# Patient Record
Sex: FEMALE | Race: WHITE | NOT HISPANIC OR LATINO | Employment: OTHER | URBAN - METROPOLITAN AREA
[De-identification: names, ages, dates, MRNs, and addresses within clinical notes are randomized per-mention and may not be internally consistent; named-entity substitution may affect disease eponyms.]

---

## 2021-01-20 DIAGNOSIS — Z23 ENCOUNTER FOR IMMUNIZATION: ICD-10-CM

## 2021-02-04 ENCOUNTER — IMMUNIZATIONS (OUTPATIENT)
Dept: FAMILY MEDICINE CLINIC | Facility: HOSPITAL | Age: 86
End: 2021-02-04

## 2021-02-04 DIAGNOSIS — Z23 ENCOUNTER FOR IMMUNIZATION: Primary | ICD-10-CM

## 2021-02-04 PROCEDURE — 0011A SARS-COV-2 / COVID-19 MRNA VACCINE (MODERNA) 100 MCG: CPT | Performed by: ANESTHESIOLOGY

## 2021-02-04 PROCEDURE — 91301 SARS-COV-2 / COVID-19 MRNA VACCINE (MODERNA) 100 MCG: CPT | Performed by: ANESTHESIOLOGY

## 2021-03-10 ENCOUNTER — IMMUNIZATIONS (OUTPATIENT)
Dept: FAMILY MEDICINE CLINIC | Facility: HOSPITAL | Age: 86
End: 2021-03-10

## 2021-03-10 DIAGNOSIS — Z23 ENCOUNTER FOR IMMUNIZATION: Primary | ICD-10-CM

## 2021-03-10 PROCEDURE — 91301 SARS-COV-2 / COVID-19 MRNA VACCINE (MODERNA) 100 MCG: CPT

## 2021-03-10 PROCEDURE — 0012A SARS-COV-2 / COVID-19 MRNA VACCINE (MODERNA) 100 MCG: CPT

## 2021-08-31 ENCOUNTER — APPOINTMENT (OUTPATIENT)
Dept: LAB | Facility: CLINIC | Age: 86
End: 2021-08-31
Payer: MEDICARE

## 2021-08-31 ENCOUNTER — TRANSCRIBE ORDERS (OUTPATIENT)
Dept: LAB | Facility: CLINIC | Age: 86
End: 2021-08-31

## 2021-08-31 DIAGNOSIS — I10 ESSENTIAL HYPERTENSION, BENIGN: ICD-10-CM

## 2021-08-31 DIAGNOSIS — E78.5 HYPERLIPIDEMIA, UNSPECIFIED HYPERLIPIDEMIA TYPE: Primary | ICD-10-CM

## 2021-08-31 DIAGNOSIS — E55.9 MILD VITAMIN D DEFICIENCY: ICD-10-CM

## 2021-08-31 DIAGNOSIS — Z79.899 ENCOUNTER FOR LONG-TERM (CURRENT) USE OF OTHER MEDICATIONS: ICD-10-CM

## 2021-08-31 DIAGNOSIS — E78.5 HYPERLIPIDEMIA, UNSPECIFIED HYPERLIPIDEMIA TYPE: ICD-10-CM

## 2021-08-31 LAB
25(OH)D3 SERPL-MCNC: 88.3 NG/ML (ref 30–100)
ALBUMIN SERPL BCP-MCNC: 3.2 G/DL (ref 3.5–5)
ALP SERPL-CCNC: 92 U/L (ref 46–116)
ALT SERPL W P-5'-P-CCNC: 22 U/L (ref 12–78)
ANION GAP SERPL CALCULATED.3IONS-SCNC: 5 MMOL/L (ref 4–13)
AST SERPL W P-5'-P-CCNC: 18 U/L (ref 5–45)
BASOPHILS # BLD AUTO: 0.07 THOUSANDS/ΜL (ref 0–0.1)
BASOPHILS NFR BLD AUTO: 1 % (ref 0–1)
BILIRUB SERPL-MCNC: 0.9 MG/DL (ref 0.2–1)
BUN SERPL-MCNC: 36 MG/DL (ref 5–25)
CALCIUM ALBUM COR SERPL-MCNC: 9.8 MG/DL (ref 8.3–10.1)
CALCIUM SERPL-MCNC: 9.2 MG/DL (ref 8.3–10.1)
CHLORIDE SERPL-SCNC: 106 MMOL/L (ref 100–108)
CHOLEST SERPL-MCNC: 195 MG/DL (ref 50–200)
CO2 SERPL-SCNC: 28 MMOL/L (ref 21–32)
CREAT SERPL-MCNC: 1.26 MG/DL (ref 0.6–1.3)
EOSINOPHIL # BLD AUTO: 0.27 THOUSAND/ΜL (ref 0–0.61)
EOSINOPHIL NFR BLD AUTO: 3 % (ref 0–6)
ERYTHROCYTE [DISTWIDTH] IN BLOOD BY AUTOMATED COUNT: 12.8 % (ref 11.6–15.1)
GFR SERPL CREATININE-BSD FRML MDRD: 37 ML/MIN/1.73SQ M
GLUCOSE P FAST SERPL-MCNC: 87 MG/DL (ref 65–99)
HCT VFR BLD AUTO: 42.3 % (ref 34.8–46.1)
HDLC SERPL-MCNC: 81 MG/DL
HGB BLD-MCNC: 13.2 G/DL (ref 11.5–15.4)
IMM GRANULOCYTES # BLD AUTO: 0.03 THOUSAND/UL (ref 0–0.2)
IMM GRANULOCYTES NFR BLD AUTO: 0 % (ref 0–2)
LDLC SERPL CALC-MCNC: 100 MG/DL (ref 0–100)
LDLC SERPL DIRECT ASSAY-MCNC: 96 MG/DL (ref 0–100)
LYMPHOCYTES # BLD AUTO: 1.64 THOUSANDS/ΜL (ref 0.6–4.47)
LYMPHOCYTES NFR BLD AUTO: 18 % (ref 14–44)
MCH RBC QN AUTO: 30.9 PG (ref 26.8–34.3)
MCHC RBC AUTO-ENTMCNC: 31.2 G/DL (ref 31.4–37.4)
MCV RBC AUTO: 99 FL (ref 82–98)
MONOCYTES # BLD AUTO: 0.97 THOUSAND/ΜL (ref 0.17–1.22)
MONOCYTES NFR BLD AUTO: 11 % (ref 4–12)
NEUTROPHILS # BLD AUTO: 6.09 THOUSANDS/ΜL (ref 1.85–7.62)
NEUTS SEG NFR BLD AUTO: 67 % (ref 43–75)
NONHDLC SERPL-MCNC: 114 MG/DL
NRBC BLD AUTO-RTO: 0 /100 WBCS
PLATELET # BLD AUTO: 301 THOUSANDS/UL (ref 149–390)
PMV BLD AUTO: 10.7 FL (ref 8.9–12.7)
POTASSIUM SERPL-SCNC: 4.4 MMOL/L (ref 3.5–5.3)
PROT SERPL-MCNC: 7.4 G/DL (ref 6.4–8.2)
RBC # BLD AUTO: 4.27 MILLION/UL (ref 3.81–5.12)
SODIUM SERPL-SCNC: 139 MMOL/L (ref 136–145)
TRIGL SERPL-MCNC: 69 MG/DL
WBC # BLD AUTO: 9.07 THOUSAND/UL (ref 4.31–10.16)

## 2021-08-31 PROCEDURE — 85025 COMPLETE CBC W/AUTO DIFF WBC: CPT

## 2021-08-31 PROCEDURE — 82306 VITAMIN D 25 HYDROXY: CPT

## 2021-08-31 PROCEDURE — 36415 COLL VENOUS BLD VENIPUNCTURE: CPT

## 2021-08-31 PROCEDURE — 80053 COMPREHEN METABOLIC PANEL: CPT

## 2021-08-31 PROCEDURE — 83721 ASSAY OF BLOOD LIPOPROTEIN: CPT

## 2021-08-31 PROCEDURE — 80061 LIPID PANEL: CPT

## 2021-09-08 ENCOUNTER — APPOINTMENT (INPATIENT)
Dept: CT IMAGING | Facility: HOSPITAL | Age: 86
DRG: 871 | End: 2021-09-08
Payer: MEDICARE

## 2021-09-08 ENCOUNTER — APPOINTMENT (EMERGENCY)
Dept: RADIOLOGY | Facility: HOSPITAL | Age: 86
DRG: 871 | End: 2021-09-08
Payer: MEDICARE

## 2021-09-08 ENCOUNTER — HOSPITAL ENCOUNTER (INPATIENT)
Facility: HOSPITAL | Age: 86
LOS: 2 days | Discharge: HOME | DRG: 871 | End: 2021-09-10
Attending: EMERGENCY MEDICINE | Admitting: INTERNAL MEDICINE
Payer: MEDICARE

## 2021-09-08 DIAGNOSIS — R09.02 HYPOXIA: ICD-10-CM

## 2021-09-08 DIAGNOSIS — N17.9 AKI (ACUTE KIDNEY INJURY) (HCC): ICD-10-CM

## 2021-09-08 DIAGNOSIS — I21.4 NSTEMI (NON-ST ELEVATED MYOCARDIAL INFARCTION) (HCC): ICD-10-CM

## 2021-09-08 DIAGNOSIS — J18.9 PNEUMONIA: ICD-10-CM

## 2021-09-08 DIAGNOSIS — R77.8 ELEVATED TROPONIN: ICD-10-CM

## 2021-09-08 DIAGNOSIS — G45.9 TIA (TRANSIENT ISCHEMIC ATTACK): ICD-10-CM

## 2021-09-08 DIAGNOSIS — J45.901 ASTHMA EXACERBATION: ICD-10-CM

## 2021-09-08 DIAGNOSIS — I48.91 ATRIAL FIBRILLATION (HCC): Primary | ICD-10-CM

## 2021-09-08 DIAGNOSIS — J18.9 PNEUMONIA OF RIGHT UPPER LOBE DUE TO INFECTIOUS ORGANISM: ICD-10-CM

## 2021-09-08 PROBLEM — R79.89 ELEVATED TROPONIN: Status: ACTIVE | Noted: 2021-09-08

## 2021-09-08 PROBLEM — A41.9 SEPSIS (HCC): Status: ACTIVE | Noted: 2021-09-08

## 2021-09-08 PROBLEM — N18.30 CKD (CHRONIC KIDNEY DISEASE) STAGE 3, GFR 30-59 ML/MIN (HCC): Status: ACTIVE | Noted: 2021-09-08

## 2021-09-08 PROBLEM — I48.20 ATRIAL FIBRILLATION, CHRONIC (HCC): Status: ACTIVE | Noted: 2019-11-08

## 2021-09-08 PROBLEM — I10 HYPERTENSION, ESSENTIAL: Status: ACTIVE | Noted: 2019-11-08

## 2021-09-08 PROBLEM — Z79.01 CURRENT USE OF LONG TERM ANTICOAGULATION: Status: ACTIVE | Noted: 2019-11-08

## 2021-09-08 LAB
ALBUMIN SERPL BCP-MCNC: 3.8 G/DL (ref 3.4–4.8)
ALP SERPL-CCNC: 71.1 U/L (ref 35–140)
ALT SERPL W P-5'-P-CCNC: 15 U/L (ref 5–54)
ANION GAP SERPL CALCULATED.3IONS-SCNC: 10 MMOL/L (ref 4–13)
APTT PPP: 26 SECONDS (ref 23–31)
AST SERPL W P-5'-P-CCNC: 19 U/L (ref 15–41)
ATRIAL RATE: 190 BPM
BASOPHILS # BLD AUTO: 0.02 THOUSANDS/ΜL (ref 0–0.1)
BASOPHILS NFR BLD AUTO: 0 % (ref 0–1)
BILIRUB SERPL-MCNC: 0.59 MG/DL (ref 0.3–1.2)
BUN SERPL-MCNC: 32 MG/DL (ref 6–20)
CALCIUM SERPL-MCNC: 9.4 MG/DL (ref 8.4–10.2)
CHLORIDE SERPL-SCNC: 106 MMOL/L (ref 96–108)
CHOLEST SERPL-MCNC: 171 MG/DL
CO2 SERPL-SCNC: 26 MMOL/L (ref 22–33)
CREAT SERPL-MCNC: 1.33 MG/DL (ref 0.4–1.1)
EOSINOPHIL # BLD AUTO: 0.09 THOUSAND/ΜL (ref 0–0.61)
EOSINOPHIL NFR BLD AUTO: 1 % (ref 0–6)
ERYTHROCYTE [DISTWIDTH] IN BLOOD BY AUTOMATED COUNT: 12.9 % (ref 11.6–15.1)
EST. AVERAGE GLUCOSE BLD GHB EST-MCNC: 117 MG/DL
GFR SERPL CREATININE-BSD FRML MDRD: 34 ML/MIN/1.73SQ M
GLUCOSE SERPL-MCNC: 120 MG/DL (ref 65–140)
HBA1C MFR BLD: 5.7 %
HCT VFR BLD AUTO: 42.4 % (ref 34.8–46.1)
HDLC SERPL-MCNC: 70 MG/DL
HGB BLD-MCNC: 13.3 G/DL (ref 11.5–15.4)
IMM GRANULOCYTES # BLD AUTO: 0.04 THOUSAND/UL (ref 0–0.2)
IMM GRANULOCYTES NFR BLD AUTO: 0 % (ref 0–2)
INR PPP: 0.98 (ref 0.9–1.1)
LACTATE SERPL-SCNC: 1.3 MMOL/L (ref 0–2)
LDLC SERPL CALC-MCNC: 87 MG/DL (ref 0–100)
LYMPHOCYTES # BLD AUTO: 1 THOUSANDS/ΜL (ref 0.6–4.47)
LYMPHOCYTES NFR BLD AUTO: 7 % (ref 14–44)
MCH RBC QN AUTO: 30.5 PG (ref 26.8–34.3)
MCHC RBC AUTO-ENTMCNC: 31.4 G/DL (ref 31.4–37.4)
MCV RBC AUTO: 97 FL (ref 82–98)
MONOCYTES # BLD AUTO: 1.21 THOUSAND/ΜL (ref 0.17–1.22)
MONOCYTES NFR BLD AUTO: 8 % (ref 4–12)
NEUTROPHILS # BLD AUTO: 12.58 THOUSANDS/ΜL (ref 1.85–7.62)
NEUTS SEG NFR BLD AUTO: 84 % (ref 43–75)
PHOSPHATE SERPL-MCNC: 3.1 MG/DL (ref 2.5–5)
PLATELET # BLD AUTO: 274 THOUSANDS/UL (ref 149–390)
PMV BLD AUTO: 10.5 FL (ref 8.9–12.7)
POTASSIUM SERPL-SCNC: 4 MMOL/L (ref 3.5–5)
PR INTERVAL: 52 MS
PROCALCITONIN SERPL-MCNC: 0.11 NG/ML
PROT SERPL-MCNC: 7.1 G/DL (ref 6.4–8.3)
PROTHROMBIN TIME: 11.1 SECONDS (ref 9.5–12.1)
QRS AXIS: -41 DEGREES
QRSD INTERVAL: 124 MS
QT INTERVAL: 377 MS
QTC INTERVAL: 496 MS
RBC # BLD AUTO: 4.36 MILLION/UL (ref 3.81–5.12)
SARS-COV-2 RNA RESP QL NAA+PROBE: NEGATIVE
SODIUM SERPL-SCNC: 142 MMOL/L (ref 133–145)
T WAVE AXIS: -68 DEGREES
TRIGL SERPL-MCNC: 68.4 MG/DL
TROPONIN I SERPL-MCNC: 1.17 NG/ML (ref 0–0.07)
TROPONIN I SERPL-MCNC: 1.23 NG/ML (ref 0–0.07)
TROPONIN I SERPL-MCNC: 1.24 NG/ML (ref 0–0.07)
VENTRICULAR RATE: 104 BPM
WBC # BLD AUTO: 14.94 THOUSAND/UL (ref 4.31–10.16)

## 2021-09-08 PROCEDURE — G1004 CDSM NDSC: HCPCS

## 2021-09-08 PROCEDURE — 85610 PROTHROMBIN TIME: CPT | Performed by: EMERGENCY MEDICINE

## 2021-09-08 PROCEDURE — 99285 EMERGENCY DEPT VISIT HI MDM: CPT | Performed by: EMERGENCY MEDICINE

## 2021-09-08 PROCEDURE — 85025 COMPLETE CBC W/AUTO DIFF WBC: CPT | Performed by: EMERGENCY MEDICINE

## 2021-09-08 PROCEDURE — 1124F ACP DISCUSS-NO DSCNMKR DOCD: CPT | Performed by: EMERGENCY MEDICINE

## 2021-09-08 PROCEDURE — 80053 COMPREHEN METABOLIC PANEL: CPT | Performed by: EMERGENCY MEDICINE

## 2021-09-08 PROCEDURE — 71045 X-RAY EXAM CHEST 1 VIEW: CPT

## 2021-09-08 PROCEDURE — 94760 N-INVAS EAR/PLS OXIMETRY 1: CPT

## 2021-09-08 PROCEDURE — 87635 SARS-COV-2 COVID-19 AMP PRB: CPT | Performed by: EMERGENCY MEDICINE

## 2021-09-08 PROCEDURE — 83605 ASSAY OF LACTIC ACID: CPT | Performed by: EMERGENCY MEDICINE

## 2021-09-08 PROCEDURE — 93010 ELECTROCARDIOGRAM REPORT: CPT | Performed by: INTERNAL MEDICINE

## 2021-09-08 PROCEDURE — 93005 ELECTROCARDIOGRAM TRACING: CPT

## 2021-09-08 PROCEDURE — 84100 ASSAY OF PHOSPHORUS: CPT | Performed by: EMERGENCY MEDICINE

## 2021-09-08 PROCEDURE — 71250 CT THORAX DX C-: CPT

## 2021-09-08 PROCEDURE — 84145 PROCALCITONIN (PCT): CPT | Performed by: PHYSICIAN ASSISTANT

## 2021-09-08 PROCEDURE — 80061 LIPID PANEL: CPT | Performed by: PHYSICIAN ASSISTANT

## 2021-09-08 PROCEDURE — 99222 1ST HOSP IP/OBS MODERATE 55: CPT | Performed by: INTERNAL MEDICINE

## 2021-09-08 PROCEDURE — 83036 HEMOGLOBIN GLYCOSYLATED A1C: CPT | Performed by: PHYSICIAN ASSISTANT

## 2021-09-08 PROCEDURE — 84484 ASSAY OF TROPONIN QUANT: CPT | Performed by: EMERGENCY MEDICINE

## 2021-09-08 PROCEDURE — 94664 DEMO&/EVAL PT USE INHALER: CPT

## 2021-09-08 PROCEDURE — 36415 COLL VENOUS BLD VENIPUNCTURE: CPT | Performed by: EMERGENCY MEDICINE

## 2021-09-08 PROCEDURE — 99285 EMERGENCY DEPT VISIT HI MDM: CPT

## 2021-09-08 PROCEDURE — 85730 THROMBOPLASTIN TIME PARTIAL: CPT | Performed by: EMERGENCY MEDICINE

## 2021-09-08 PROCEDURE — 84484 ASSAY OF TROPONIN QUANT: CPT | Performed by: PHYSICIAN ASSISTANT

## 2021-09-08 PROCEDURE — 87040 BLOOD CULTURE FOR BACTERIA: CPT | Performed by: EMERGENCY MEDICINE

## 2021-09-08 PROCEDURE — 99223 1ST HOSP IP/OBS HIGH 75: CPT | Performed by: INTERNAL MEDICINE

## 2021-09-08 PROCEDURE — 96375 TX/PRO/DX INJ NEW DRUG ADDON: CPT

## 2021-09-08 PROCEDURE — 92610 EVALUATE SWALLOWING FUNCTION: CPT

## 2021-09-08 PROCEDURE — 96374 THER/PROPH/DIAG INJ IV PUSH: CPT

## 2021-09-08 PROCEDURE — 94640 AIRWAY INHALATION TREATMENT: CPT

## 2021-09-08 PROCEDURE — 97163 PT EVAL HIGH COMPLEX 45 MIN: CPT

## 2021-09-08 RX ORDER — GUAIFENESIN 600 MG
600 TABLET, EXTENDED RELEASE 12 HR ORAL EVERY 12 HOURS SCHEDULED
Status: DISCONTINUED | OUTPATIENT
Start: 2021-09-08 | End: 2021-09-08

## 2021-09-08 RX ORDER — METOPROLOL SUCCINATE 100 MG/1
100 TABLET, EXTENDED RELEASE ORAL DAILY
Status: DISCONTINUED | OUTPATIENT
Start: 2021-09-08 | End: 2021-09-10 | Stop reason: HOSPADM

## 2021-09-08 RX ORDER — PRAVASTATIN SODIUM 40 MG
40 TABLET ORAL
Status: DISCONTINUED | OUTPATIENT
Start: 2021-09-11 | End: 2021-09-10 | Stop reason: HOSPADM

## 2021-09-08 RX ORDER — IRON POLYSACCHARIDE COMPLEX 150 MG
150 CAPSULE ORAL DAILY
Status: DISCONTINUED | OUTPATIENT
Start: 2021-09-08 | End: 2021-09-10 | Stop reason: HOSPADM

## 2021-09-08 RX ORDER — GUAIFENESIN 100 MG/5ML
200 SOLUTION ORAL EVERY 4 HOURS
Status: DISCONTINUED | OUTPATIENT
Start: 2021-09-08 | End: 2021-09-10 | Stop reason: HOSPADM

## 2021-09-08 RX ORDER — SODIUM CHLORIDE FOR INHALATION 0.9 %
3 VIAL, NEBULIZER (ML) INHALATION
Status: DISCONTINUED | OUTPATIENT
Start: 2021-09-08 | End: 2021-09-10 | Stop reason: HOSPADM

## 2021-09-08 RX ORDER — LEVALBUTEROL 1.25 MG/.5ML
1.25 SOLUTION, CONCENTRATE RESPIRATORY (INHALATION)
Status: DISCONTINUED | OUTPATIENT
Start: 2021-09-08 | End: 2021-09-10 | Stop reason: HOSPADM

## 2021-09-08 RX ORDER — GABAPENTIN 100 MG/1
200 CAPSULE ORAL 2 TIMES DAILY
Status: DISCONTINUED | OUTPATIENT
Start: 2021-09-08 | End: 2021-09-10 | Stop reason: HOSPADM

## 2021-09-08 RX ORDER — GABAPENTIN 100 MG/1
200 CAPSULE ORAL 2 TIMES DAILY
COMMUNITY
Start: 2021-08-26 | End: 2022-07-14 | Stop reason: SDUPTHER

## 2021-09-08 RX ORDER — ACETAMINOPHEN 325 MG/1
650 TABLET ORAL EVERY 6 HOURS PRN
Status: DISCONTINUED | OUTPATIENT
Start: 2021-09-08 | End: 2021-09-10 | Stop reason: HOSPADM

## 2021-09-08 RX ORDER — DILTIAZEM HYDROCHLORIDE 120 MG/1
120 CAPSULE, COATED, EXTENDED RELEASE ORAL DAILY
Status: DISCONTINUED | OUTPATIENT
Start: 2021-09-08 | End: 2021-09-10 | Stop reason: HOSPADM

## 2021-09-08 RX ORDER — CEFTRIAXONE 1 G/50ML
1000 INJECTION, SOLUTION INTRAVENOUS ONCE
Status: COMPLETED | OUTPATIENT
Start: 2021-09-08 | End: 2021-09-08

## 2021-09-08 RX ORDER — IPRATROPIUM BROMIDE AND ALBUTEROL SULFATE 2.5; .5 MG/3ML; MG/3ML
3 SOLUTION RESPIRATORY (INHALATION) ONCE
Status: COMPLETED | OUTPATIENT
Start: 2021-09-08 | End: 2021-09-08

## 2021-09-08 RX ORDER — LISINOPRIL 5 MG/1
5 TABLET ORAL DAILY
Status: DISCONTINUED | OUTPATIENT
Start: 2021-09-08 | End: 2021-09-10 | Stop reason: HOSPADM

## 2021-09-08 RX ORDER — LISINOPRIL 5 MG/1
5 TABLET ORAL DAILY
COMMUNITY
End: 2022-07-18 | Stop reason: SDUPTHER

## 2021-09-08 RX ORDER — IRON POLYSACCHARIDE COMPLEX 150 MG
150 CAPSULE ORAL DAILY
COMMUNITY

## 2021-09-08 RX ORDER — METOPROLOL SUCCINATE 100 MG/1
100 TABLET, EXTENDED RELEASE ORAL DAILY
COMMUNITY
End: 2022-07-18 | Stop reason: SDUPTHER

## 2021-09-08 RX ORDER — LEVALBUTEROL 1.25 MG/.5ML
1.25 SOLUTION, CONCENTRATE RESPIRATORY (INHALATION)
Status: DISCONTINUED | OUTPATIENT
Start: 2021-09-08 | End: 2021-09-08

## 2021-09-08 RX ORDER — ROSUVASTATIN CALCIUM 5 MG/1
5 TABLET, COATED ORAL
COMMUNITY
Start: 2021-06-11 | End: 2022-07-18 | Stop reason: SDUPTHER

## 2021-09-08 RX ORDER — BENZONATATE 100 MG/1
100 CAPSULE ORAL 3 TIMES DAILY PRN
Status: DISCONTINUED | OUTPATIENT
Start: 2021-09-08 | End: 2021-09-10 | Stop reason: HOSPADM

## 2021-09-08 RX ORDER — ASPIRIN 81 MG/1
162 TABLET, CHEWABLE ORAL ONCE
Status: COMPLETED | OUTPATIENT
Start: 2021-09-08 | End: 2021-09-08

## 2021-09-08 RX ORDER — METHYLPREDNISOLONE SODIUM SUCCINATE 125 MG/2ML
125 INJECTION, POWDER, LYOPHILIZED, FOR SOLUTION INTRAMUSCULAR; INTRAVENOUS ONCE
Status: COMPLETED | OUTPATIENT
Start: 2021-09-08 | End: 2021-09-08

## 2021-09-08 RX ORDER — CEFTRIAXONE 1 G/50ML
1000 INJECTION, SOLUTION INTRAVENOUS EVERY 24 HOURS
Status: DISCONTINUED | OUTPATIENT
Start: 2021-09-09 | End: 2021-09-10 | Stop reason: HOSPADM

## 2021-09-08 RX ORDER — DILTIAZEM HYDROCHLORIDE 120 MG/1
120 CAPSULE, COATED, EXTENDED RELEASE ORAL DAILY
COMMUNITY
Start: 2021-06-11 | End: 2022-07-18 | Stop reason: SDUPTHER

## 2021-09-08 RX ORDER — ALBUTEROL SULFATE 90 UG/1
2 AEROSOL, METERED RESPIRATORY (INHALATION) EVERY 4 HOURS PRN
Status: DISCONTINUED | OUTPATIENT
Start: 2021-09-08 | End: 2021-09-10 | Stop reason: HOSPADM

## 2021-09-08 RX ADMIN — LEVALBUTEROL HYDROCHLORIDE 1.25 MG: 1.25 SOLUTION, CONCENTRATE RESPIRATORY (INHALATION) at 19:42

## 2021-09-08 RX ADMIN — CEFTRIAXONE 1000 MG: 1 INJECTION, SOLUTION INTRAVENOUS at 06:10

## 2021-09-08 RX ADMIN — IPRATROPIUM BROMIDE AND ALBUTEROL SULFATE 3 ML: 2.5; .5 SOLUTION RESPIRATORY (INHALATION) at 05:05

## 2021-09-08 RX ADMIN — GUAIFENESIN 600 MG: 600 TABLET ORAL at 08:51

## 2021-09-08 RX ADMIN — GABAPENTIN 200 MG: 100 CAPSULE ORAL at 08:51

## 2021-09-08 RX ADMIN — APIXABAN 2.5 MG: 2.5 TABLET, FILM COATED ORAL at 17:01

## 2021-09-08 RX ADMIN — GUAIFENESIN 200 MG: 100 SOLUTION ORAL at 17:01

## 2021-09-08 RX ADMIN — GABAPENTIN 200 MG: 100 CAPSULE ORAL at 17:01

## 2021-09-08 RX ADMIN — AZITHROMYCIN MONOHYDRATE 500 MG: 500 INJECTION, POWDER, LYOPHILIZED, FOR SOLUTION INTRAVENOUS at 11:28

## 2021-09-08 RX ADMIN — ISODIUM CHLORIDE 3 ML: 0.03 SOLUTION RESPIRATORY (INHALATION) at 19:42

## 2021-09-08 RX ADMIN — POLYSACCHARIDE-IRON COMPLEX 150 MG: 150 CAPSULE ORAL at 08:51

## 2021-09-08 RX ADMIN — GUAIFENESIN 200 MG: 100 SOLUTION ORAL at 23:04

## 2021-09-08 RX ADMIN — ASPIRIN 162 MG: 81 TABLET, CHEWABLE ORAL at 08:56

## 2021-09-08 RX ADMIN — ISODIUM CHLORIDE 3 ML: 0.03 SOLUTION RESPIRATORY (INHALATION) at 14:28

## 2021-09-08 RX ADMIN — APIXABAN 2.5 MG: 2.5 TABLET, FILM COATED ORAL at 08:51

## 2021-09-08 RX ADMIN — SODIUM CHLORIDE 250 ML: 0.9 INJECTION, SOLUTION INTRAVENOUS at 05:02

## 2021-09-08 RX ADMIN — ISODIUM CHLORIDE 3 ML: 0.03 SOLUTION RESPIRATORY (INHALATION) at 09:03

## 2021-09-08 RX ADMIN — LEVALBUTEROL HYDROCHLORIDE 1.25 MG: 1.25 SOLUTION, CONCENTRATE RESPIRATORY (INHALATION) at 14:28

## 2021-09-08 RX ADMIN — METHYLPREDNISOLONE SODIUM SUCCINATE 125 MG: 125 INJECTION, POWDER, FOR SOLUTION INTRAMUSCULAR; INTRAVENOUS at 05:05

## 2021-09-08 RX ADMIN — LEVALBUTEROL HYDROCHLORIDE 1.25 MG: 1.25 SOLUTION, CONCENTRATE RESPIRATORY (INHALATION) at 09:03

## 2021-09-08 NOTE — CONSULTS
Consultation - Pulmonary Medicine   Norris Morfin 80 y o  female MRN: [id number]  Unit/Bed#: -01 Encounter: [id number]      Assessment and Plan:    1  Sepsis  - presented with sudden onset shortness of breath  Also reports of low-grade fever, chills and mild cough with productive whitish sputum  Meets sepsis criteria on admission  Leukocytosis WBC 14 94   - chest x-ray showed possible moderate right pneumonia with trace right effusion  - continue current empiric antibiotics azithromycin and ceftriaxone  2  Pneumonia  - meets sepsis criteria on admission  Currently no fever  She has leukocytosis to 14 94  Lactic acid is negative  COVID-19 negative  Patient reports she received COVID-19 vaccine x2   - chest x-ray showed possible moderate right pneumonia with trace right effusion  ? Opacity on left upper lobe as well  - currently patient is comfortable  Saturating well on 3 L of oxygen via nasal cannula  Consider to wean out oxygen  - will follow-up with CT chest without contrast  - follow-up with urine strep pneumonia, Legionella antigen, sputum culture, blood culture  3  Mild intermittent asthma  - presented with acute shortness of breath, cough with whitish sputum and wheezing  Improved with breathing treatment  - reports on albuterol as needed  She was prescribed Advair before however she does not use it  Reports she uses albuterol once every 3 days or so  - patient is comfortable without respiratory distress  She has mild occasional wheezing and rhonchi yanira on right lung currently  - currently on Xopenex Q 8 hourly and albuterol as needed  4  Elevated troponin  - troponin 1 17  > 1 24   > 1 23  - cardiology on board  - pending echocardiogram    5  Permanent Afib  - rate controlled  - on metoprolol, diltiazem and Eliquis    6  History of TIA  - history of TIA more than 10 years ago without residual deficit  - currently on low-dose Eliquis    7   CKD stage 3  - current creatinine 1 33  Last creatinine 1 26 on August     History of Present Illness   Physician Requesting Consult: Enid Calero MD  Reason for Consult / Principal Problem:  Asthma exacerbation, Pneumonia  Hx and PE limited by: None  HPI: Jerri Sheppard is a 80y o  year old female with past medical history of asthma, permanent atrial fibrillation on Eliquis, hypertension, hyperlipidemia, history of TIA who presents with sudden onset shortness of breath  Patient reports she woke up with sudden onset shortness of breath around 4:00 a m  this morning  Patient reports she was at her usual state of health until last night  Reported productive cough with whitish sputum, denies any blood in the sputum  Patient also reports she lost about 4 lb over 2 months which is unintentional   She reports her appetite is good  She is a never smoker  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  She is pretty active and reports she rarely uses a cane at home  She denies any chest pain, lightheadedness, dizziness, leg swelling  On admission, patient meets sepsis criteria likely due to pneumonia  She was given breathing treatment, ceftriaxone and azithromycin  Patient reports her breathing is significantly improved  Currently, patient offers no complaints  Inpatient consult to Pulmonology     Performed by  Samantha Christy MD     Authorized by Agnes Villa MD              Review of Systems   Constitutional: Negative for activity change, appetite change, chills, fatigue and fever  HENT: Negative for congestion, sinus pressure, sinus pain and sneezing  Respiratory: Positive for shortness of breath  Negative for cough, wheezing and stridor  Cardiovascular: Negative for chest pain, palpitations and leg swelling  Gastrointestinal: Negative for abdominal distention, constipation, diarrhea, nausea and vomiting  Genitourinary: Negative for difficulty urinating, dysuria and frequency     Musculoskeletal: Positive for gait problem  Negative for arthralgias, back pain and joint swelling  Neurological: Negative for dizziness, weakness, numbness and headaches  Psychiatric/Behavioral: Negative for decreased concentration and sleep disturbance  The patient is not nervous/anxious  Historical Information   Past Medical History:   Diagnosis Date    Anemia     Asthma     Atrial fibrillation ([address] ) 11/08/2019    CAD     CKD (chronic kidney disease)     Current use of long term anticoagulation 11/08/2019    Hypertension, essential 11/08/2019    Pacemaker     TIA (transient ischemic attack) 11/08/2019     History reviewed  No pertinent surgical history  Social History   Social History     Substance and Sexual Activity   Alcohol Use Yes    Comment: occasionaly      Social History     Substance and Sexual Activity   Drug Use Never     E-Cigarette/Vaping    E-Cigarette Use Never User      E-Cigarette/Vaping Substances    Nicotine No     THC No     CBD No     Flavoring No     Other No     Unknown No      Social History     Tobacco Use   Smoking Status Never Smoker   Smokeless Tobacco Never Used     Occupational History: Retired    Family History: non-contributory    Meds/Allergies   all current active meds have been reviewed    Allergies   Allergen Reactions    Silver Sulfadiazine Other (See Comments)       Objective   Vitals: Blood pressure 108/62, pulse 80, temperature 99 8 °F (37 7 °C), temperature source Oral, resp  rate 20, height 5' 2" (1 575 m), weight 50 6 kg (111 lb 8 8 oz), SpO2 96 %  ,Body mass index is 20 4 kg/m²  Intake/Output Summary (Last 24 hours) at 9/8/2021 1432  Last data filed at 9/8/2021 0520  Gross per 24 hour   Intake 250 ml   Output --   Net 250 ml     Invasive Devices     Peripheral Intravenous Line            Peripheral IV 09/08/21 Right Antecubital <1 day                Physical Exam  Vitals and nursing note reviewed     Constitutional:       General: She is not in acute distress  Appearance: She is well-developed  She is not ill-appearing  HENT:      Head: Normocephalic and atraumatic  Eyes:      General: No scleral icterus  Conjunctiva/sclera: Conjunctivae normal    Cardiovascular:      Rate and Rhythm: Normal rate  Rhythm irregular  Pulses: Normal pulses  Heart sounds: Normal heart sounds  No murmur heard  No friction rub  No gallop  Pulmonary:      Effort: Pulmonary effort is normal  No respiratory distress  Breath sounds: Rhonchi present  No wheezing or rales  Abdominal:      General: Bowel sounds are normal  There is no distension  Palpations: Abdomen is soft  Tenderness: There is no abdominal tenderness  There is no guarding  Musculoskeletal:      Cervical back: Neck supple  Right lower leg: No edema  Left lower leg: No edema  Skin:     General: Skin is warm and dry  Neurological:      Mental Status: She is alert  Mental status is at baseline  Psychiatric:         Mood and Affect: Mood normal          Behavior: Behavior normal          Lab Results: I have personally reviewed pertinent lab results  Imaging Studies: I have personally reviewed pertinent reports  EKG, Pathology, and Other Studies: I have personally reviewed pertinent reports  VTE Prophylaxis: Sequential compression device Denita Perez)     Code Status: Level 1 - Full Code  Advance Directive and Living Will:      Power of :    POLST:      Counseling/Coordination of Care: Total floor / unit time spent today 50 minutes  Greater than 50% of total time was spent with the patient and / or family counseling and / or coordination of care   A description of the counseling / coordination of care: as above

## 2021-09-08 NOTE — SPEECH THERAPY NOTE
Speech-Language Pathology Bedside Swallow Evaluation        Patient Name: Baron Valdez    IVGAS'K Date: 9/8/2021     Problem List  Principal Problem:    Right upper lobe pneumonia  Active Problems:    Atrial fibrillation ([facility] )    Hypertension, essential    Asthma exacerbation    Elevated troponin    CKD (chronic kidney disease) stage 3, GFR 30-59 ml/min ([facility] )    Sepsis ([facility] )         Past Medical History  Past Medical History:   Diagnosis Date    Anemia     Asthma     Atrial fibrillation ([facility] ) 11/08/2019    CAD     CKD (chronic kidney disease)     Current use of long term anticoagulation 11/08/2019    Hypertension, essential 11/08/2019    Pacemaker     TIA (transient ischemic attack) 11/08/2019       Past Surgical History  History reviewed  No pertinent surgical history  Summary    Oral and pharyngeal stages of swallowing appear WNL  No overt s/s aspiration and no c/o food dysphagia symptoms  Recommendations:   Diet: regular diet and thin liquids   Meds: whole with liquid   Frequent Oral care: 2x/day  Other Recommendations/ considerations: No follow up tx needed  Current Medical Status  Pt is a 80 y o  female who presented to [address] [place] [address]  with RUL pneumonia  Pt woke up this morning suddenly short of breath with productive cough of white mucus with small streaks of blood  There was no yanick hemoptysis  She also was noted to developed chills and the sudden difficulty with breathing    The patient was noted to be wheezing on arrival and was given Solu-Medrol and DuoNeb with improvement  Patient's troponin was noted to return elevated at 1 17  Emergency department physician discussed with the patient's family  They did not wish to pursue cardiac catheterization or CABG and were only interested in medical treatment  The patient was noted have chest x-ray finding of potential right-sided pneumonia was started on ceftriaxone      Past medical history:   Please see H&P for details    Special Studies:  CXR: 9/8/21  Moderate right pneumonia with trace right effusion  Social/Education/Vocational Hx:  Pt lives with family    Swallow Information   Current Risks for Dysphagia & Aspiration: generalized weakness  Current Symptoms/Concerns: current pneumonia  Current Diet: regular diet and thin liquids   Baseline Diet: regular diet and thin liquids  Takes pills- whole w/ water     Baseline Assessment   Behavior/Cognition: alert  Speech/Language Status: able to participate in conversation and able to follow commands  Patient Positioning: upright in bed     Swallow Mechanism Exam   Facial: symmetrical  Labial: WFL  Lingual: WFL  Velum: unable to visualize  Mandible: adequate ROM  Dentition: adequate  Vocal quality:clear/adequate   Volitional Cough: strong/productive   Respiratory: NC    Consistencies Assessed and Performance   Consistencies Administered: thin liquids, nectar thick, puree and hard solids    Oral Stage: pt was able to self feed, drink from cup and straw  Able to bite cookie  Mastication was somewhat prolonged due to pt stated she is missing some teeth  Good oral control of liquids  No oral residue  Pharyngeal Stage: swallow initiation was timely and complete w/ no coughing, throat clearing or wet voice         Esophageal Concerns: none reported      Results Reviewed with: patient and RN   Dysphagia Goals: none at this time      Shane Peña CCC-SLP  Speech Pathologist  Available via  tiger text

## 2021-09-08 NOTE — ED PROVIDER NOTES
History  Chief Complaint   Patient presents with    Shortness of Breath     patient woke up with SOB      This is a 80year old female with a PMH of asthma, atrial fib ( on Eliquis), hypertension TIA, anemia  and a pacemaker that presented to the ED this morning via private vehicle accompanied by her daughter and     Pt is amor Amador 46 are patient, daughter and  - all of whom are reliable historians    Pt has a hx of asthma for which she uses albuterol HFA as needed - one of her triggers is seasonal environmental allergies  Pt awoke this morning reporting shortness of breath and coughing up thick white mucous  Denies hx of smoking or COPD  Denies chest pain  Daughter reports a low grade fever or 99 0 during the day yesterday  Pt reports chills at present  Denies nausea vomiting or diarrhea  Denies chest or abd pain    No identified aggravating or alleviating factors    Some of PMH obtained from reviewing cardiology notes SIRS           Prior to Admission Medications   Prescriptions Last Dose Informant Patient Reported? Taking?    apixaban (ELIQUIS) 2 5 mg 9/7/2021 at Unknown time  Yes Yes   Sig: Take 2 5 mg by mouth 2 (two) times a day   diltiazem (CARDIZEM CD) 120 mg 24 hr capsule 9/7/2021 at Unknown time  Yes Yes   Sig: Take 120 mg by mouth daily    gabapentin (NEURONTIN) 100 mg capsule 9/7/2021 at Unknown time  Yes Yes   Sig: Take 200 mg by mouth 2 (two) times a day    iron polysaccharides (FERREX) 150 mg capsule 9/7/2021 at Unknown time  Yes Yes   Sig: Take 150 mg by mouth daily   lisinopril (ZESTRIL) 5 mg tablet 9/7/2021 at Unknown time  Yes Yes   Sig: Take 5 mg by mouth daily   metoprolol succinate (TOPROL-XL) 100 mg 24 hr tablet 9/7/2021 at Unknown time  Yes Yes   Sig: Take 100 mg by mouth daily   rosuvastatin (CRESTOR) 5 mg tablet 9/7/2021 at Unknown time  Yes Yes   Sig: Take 5 mg by mouth tues saturday      Facility-Administered Medications: None       Past Medical History: Diagnosis Date    Anemia     Asthma     Atrial fibrillation ([facility] [address] ) 11/08/2019    CAD     CKD (chronic kidney disease)     Current use of long term anticoagulation 11/08/2019    Hypertension, essential 11/08/2019    Pacemaker     TIA (transient ischemic attack) 11/08/2019       History reviewed  No pertinent surgical history  History reviewed  No pertinent family history  I have reviewed and agree with the history as documented  E-Cigarette/Vaping    E-Cigarette Use Never User      E-Cigarette/Vaping Substances    Nicotine No     THC No     CBD No     Flavoring No     Other No     Unknown No      Social History     Tobacco Use    Smoking status: Never Smoker    Smokeless tobacco: Never Used   Vaping Use    Vaping Use: Never used   Substance Use Topics    Alcohol use: Yes     Comment: occasionaly     Drug use: Never       Review of Systems   Constitutional: Positive for chills  Negative for activity change, appetite change, diaphoresis, fatigue, fever and unexpected weight change  HENT: Negative for congestion, ear discharge, ear pain, mouth sores, sinus pressure, sinus pain, sneezing, sore throat, trouble swallowing and voice change  [place]   Eyes: Negative for photophobia, pain, discharge, redness, itching and visual disturbance  Respiratory: Positive for cough, shortness of breath and wheezing  Negative for chest tightness  Cardiovascular: Negative for chest pain, palpitations and leg swelling  Gastrointestinal: Negative for abdominal pain, constipation, nausea and vomiting  Endocrine: Negative for cold intolerance, heat intolerance, polydipsia, polyphagia and polyuria  Genitourinary: Negative for decreased urine volume, difficulty urinating, dysuria, frequency, hematuria and urgency  Musculoskeletal: Negative for arthralgias, back pain, gait problem, joint swelling, myalgias, neck pain and neck stiffness  Skin: Negative for color change and rash  Allergic/Immunologic: Negative for immunocompromised state  Neurological: Negative for dizziness, tremors, seizures, syncope, speech difficulty, weakness, light-headedness, numbness and headaches  Hematological: Does not bruise/bleed easily  Psychiatric/Behavioral: Negative for behavioral problems and suicidal ideas  Physical Exam  Physical Exam  Vitals and nursing note reviewed  Constitutional:       General: She is not in acute distress  Appearance: Normal appearance  She is well-developed and normal weight  She is not ill-appearing, toxic-appearing or diaphoretic  HENT:      Head: Normocephalic and atraumatic  Right Ear: Tympanic membrane, ear canal and external ear normal  There is no impacted cerumen  Left Ear: Tympanic membrane, ear canal and external ear normal  There is no impacted cerumen  Nose: Nose normal  No congestion or rhinorrhea  Mouth/Throat:      Pharynx: No oropharyngeal exudate or posterior oropharyngeal erythema  Eyes:      General: No scleral icterus  Right eye: No discharge  Left eye: No discharge  Extraocular Movements: Extraocular movements intact  Conjunctiva/sclera: Conjunctivae normal       Pupils: Pupils are equal, round, and reactive to light  Neck:      Thyroid: No thyromegaly  Vascular: No hepatojugular reflux or JVD  Trachea: No tracheal deviation  Cardiovascular:      Rate and Rhythm: Regular rhythm  Tachycardia present  Pulses: Normal pulses  Carotid pulses are 2+ on the right side and 2+ on the left side  Radial pulses are 2+ on the right side and 2+ on the left side  Dorsalis pedis pulses are 2+ on the right side and 2+ on the left side  Posterior tibial pulses are 2+ on the right side and 2+ on the left side  Heart sounds: Normal heart sounds  No murmur heard       Pulmonary:      Effort: Pulmonary effort is normal  No accessory muscle usage or respiratory distress  Breath sounds: Examination of the right-upper field reveals decreased breath sounds and wheezing  Examination of the left-upper field reveals decreased breath sounds and wheezing  Examination of the right-middle field reveals decreased breath sounds and wheezing  Examination of the left-middle field reveals decreased breath sounds and wheezing  Examination of the right-lower field reveals decreased breath sounds and wheezing  Examination of the left-lower field reveals decreased breath sounds and wheezing  Decreased breath sounds and wheezing present  No rales  Chest:      Chest wall: No mass, deformity, tenderness, crepitus or edema  Abdominal:      General: Bowel sounds are normal  There is no distension or abdominal bruit  Palpations: Abdomen is soft  There is no hepatomegaly, splenomegaly or mass  Tenderness: There is no abdominal tenderness  There is no right CVA tenderness, left CVA tenderness, guarding or rebound  Hernia: No hernia is present  Musculoskeletal:         General: No tenderness  Normal range of motion  Cervical back: Normal range of motion and neck supple  No rigidity  No muscular tenderness  Right lower leg: No tenderness  No edema  Left lower leg: No tenderness  No edema  Lymphadenopathy:      Cervical: No cervical adenopathy  Skin:     General: Skin is warm and dry  Capillary Refill: Capillary refill takes less than 2 seconds  Findings: No ecchymosis or rash  Neurological:      General: No focal deficit present  Mental Status: She is alert and oriented to person, place, and time  Cranial Nerves: No cranial nerve deficit  Sensory: No sensory deficit  Motor: No weakness or abnormal muscle tone  Deep Tendon Reflexes: Reflexes normal    Psychiatric:         Mood and Affect: Mood normal          Behavior: Behavior normal          Thought Content:  Thought content normal          Judgment: Judgment normal  Vital Signs  ED Triage Vitals   Temperature Pulse Respirations Blood Pressure SpO2   09/08/21 0450 09/08/21 0450 09/08/21 0450 09/08/21 0450 09/08/21 0450   99 4 °F (37 4 °C) 105 18 169/78 95 %      Temp Source Heart Rate Source Patient Position - Orthostatic VS BP Location FiO2 (%)   09/08/21 0450 09/08/21 0450 09/08/21 0450 09/08/21 0450 --   Oral Monitor Lying Left arm       Pain Score       09/08/21 0850       No Pain           Vitals:    09/08/21 0744 09/08/21 1133 09/08/21 1443 09/08/21 2300   BP: 126/69 108/62 98/53 125/64   Pulse: 61 80 80 94   Patient Position - Orthostatic VS: Lying  Lying Lying         Visual Acuity      ED Medications  Medications   benzonatate (TESSALON PERLES) capsule 100 mg (has no administration in time range)   acetaminophen (TYLENOL) tablet 650 mg (has no administration in time range)   apixaban (ELIQUIS) tablet 2 5 mg (2 5 mg Oral Given 9/8/21 1701)   diltiazem (CARDIZEM CD) 24 hr capsule 120 mg (120 mg Oral Not Given 9/8/21 1133)   gabapentin (NEURONTIN) capsule 200 mg (200 mg Oral Given 9/8/21 1701)   iron polysaccharides (FERREX) capsule 150 mg (150 mg Oral Given 9/8/21 0851)   lisinopril (ZESTRIL) tablet 5 mg (5 mg Oral Not Given 9/8/21 1134)   metoprolol succinate (TOPROL-XL) 24 hr tablet 100 mg (100 mg Oral Not Given 9/8/21 1133)   pravastatin (PRAVACHOL) tablet 40 mg (has no administration in time range)   cefTRIAXone (ROCEPHIN) IVPB (premix in dextrose) 1,000 mg 50 mL (has no administration in time range)   albuterol (PROVENTIL HFA,VENTOLIN HFA) inhaler 2 puff (has no administration in time range)   sodium chloride 0 9 % inhalation solution 3 mL (3 mL Nebulization Given 9/8/21 1942)     And   levalbuterol (XOPENEX) inhalation solution 1 25 mg (1 25 mg Nebulization Given 9/8/21 1942)   azithromycin (ZITHROMAX) 500 mg in sodium chloride 0 9 % 250 mL IVPB (500 mg Intravenous New Bag 9/8/21 1128)   guaiFENesin (ROBITUSSIN) oral solution 200 mg (200 mg Oral Given 9/8/21 2412)   sodium chloride 0 9 % bolus 250 mL (0 mL Intravenous Stopped 9/8/21 0520)   methylPREDNISolone sodium succinate (Solu-MEDROL) injection 125 mg (125 mg Intravenous Given 9/8/21 0505)   ipratropium-albuterol (DUO-NEB) 0 5-2 5 mg/3 mL inhalation solution 3 mL (3 mL Nebulization Given 9/8/21 0505)   cefTRIAXone (ROCEPHIN) IVPB (premix in dextrose) 1,000 mg 50 mL (0 mg Intravenous Stopped 9/8/21 0730)   aspirin chewable tablet 162 mg (162 mg Oral Given 9/8/21 0856)       Diagnostic Studies  Results Reviewed     Procedure Component Value Units Date/Time    Procalcitonin [[id number]]  (Normal) Collected: 09/08/21 0801    Lab Status: Final result Specimen: Blood from Arm, Left Updated: 09/08/21 1618     Procalcitonin 0 11 ng/ml     Blood culture #1 [[id number]] Collected: 09/08/21 0458    Lab Status: Preliminary result Specimen: Blood from Arm, Left Updated: 09/08/21 1601     Blood Culture Received in Microbiology Lab  Culture in Progress  Blood culture #2 [[id number]] Collected: 09/08/21 0457    Lab Status: Preliminary result Specimen: Blood from Arm, Right Updated: 09/08/21 1601     Blood Culture Received in Microbiology Lab  Culture in Progress      Hemoglobin A1C [[id number]]  (Abnormal) Collected: 09/08/21 0457    Lab Status: Final result Specimen: Blood from Arm, Right Updated: 09/08/21 1214     Hemoglobin A1C 5 7 %       mg/dl     Troponin I [[id number]]  (Abnormal) Collected: 09/08/21 1124    Lab Status: Final result Specimen: Blood from Arm, Left Updated: 09/08/21 1149     Troponin I 1 23 ng/mL     Lipid Panel with Direct LDL reflex [[id number]]  (Normal) Collected: 09/08/21 0457    Lab Status: Final result Specimen: Blood from Arm, Right Updated: 09/08/21 0857     Cholesterol 171 mg/dL      Triglycerides 68 4 mg/dL      HDL, Direct 70 mg/dL      LDL Calculated 87 mg/dL     Troponin I [[id number]]  (Abnormal) Collected: 09/08/21 0801    Lab Status: Final result Specimen: Blood from Arm, Left Updated: 09/08/21 0845     Troponin I 1 24 ng/mL     Legionella antigen, urine [[id number]]     Lab Status: No result Specimen: Urine     Strep Pneumoniae, Urine [[id number]]     Lab Status: No result Specimen: Urine     Sputum culture and Gram stain [[id number]]     Lab Status: No result Specimen: Sputum     Novel Coronavirus (Covid-19),PCR [facility] - 2 Hour Stat [[id number]]  (Normal) Collected: 09/08/21 0457    Lab Status: Final result Specimen: Nares from Nose Updated: 09/08/21 0604     SARS-CoV-2 Negative    Narrative: The specimen collection materials, transport medium, and/or testing methodology utilized in the production of these test results have been proven to be reliable in a limited validation with an abbreviated program under the Emergency Utilization Authorization provided by the FDA  Testing reported as "Presumptive positive" will be confirmed with secondary testing to ensure result accuracy  Clinical caution and judgement should be used with the interpretation of these results with consideration of the clinical impression and other laboratory testing  Testing reported as "Positive" or "Negative" has been proven to be accurate according to standard laboratory validation requirements  All testing is performed with control materials showing appropriate reactivity at standard intervals  Troponin I [[id number]]  (Abnormal) Collected: 09/08/21 0457    Lab Status: Final result Specimen: Blood from Arm, Right Updated: 09/08/21 0535     Troponin I 1 17 ng/mL     Lactic acid [[id number]]  (Normal) Collected: 09/08/21 0457    Lab Status: Final result Specimen: Blood from Arm, Right Updated: 09/08/21 0534     LACTIC ACID 1 3 mmol/L     Narrative:      Result may be elevated if tourniquet was used during collection      Comprehensive metabolic panel [[id number]]  (Abnormal) Collected: 09/08/21 0457    Lab Status: Final result Specimen: Blood from Arm, Right Updated: 09/08/21 0533     Sodium 142 mmol/L      Potassium 4 0 mmol/L      Chloride 106 mmol/L      CO2 26 mmol/L      ANION GAP 10 mmol/L      BUN 32 mg/dL      Creatinine 1 33 mg/dL      Glucose 120 mg/dL      Calcium 9 4 mg/dL      AST 19 U/L      ALT 15 U/L      Alkaline Phosphatase 71 1 U/L      Total Protein 7 1 g/dL      Albumin 3 8 g/dL      Total Bilirubin 0 59 mg/dL      eGFR 34 ml/min/1 73sq m     Narrative:      [place] guidelines for Chronic Kidney Disease (CKD):     Stage 1 with normal or high GFR (GFR > 90 mL/min/1 73 square meters)    Stage 2 Mild CKD (GFR = 60-89 mL/min/1 73 square meters)    Stage 3A Moderate CKD (GFR = 45-59 mL/min/1 73 square meters)    Stage 3B Moderate CKD (GFR = 30-44 mL/min/1 73 square meters)    Stage 4 Severe CKD (GFR = 15-29 mL/min/1 73 square meters)    Stage 5 End Stage CKD (GFR <15 mL/min/1 73 square meters)  Note: GFR calculation is accurate only with a steady state creatinine    Phosphorus [[id number]]  (Normal) Collected: 09/08/21 0457    Lab Status: Final result Specimen: Blood from Arm, Right Updated: 09/08/21 0533     Phosphorus 3 1 mg/dL     Protime-INR [[id number]]  (Normal) Collected: 09/08/21 0457    Lab Status: Final result Specimen: Blood from Arm, Right Updated: 09/08/21 0518     Protime 11 1 seconds      INR 0 98    Narrative:      INR Reference Ranges:  No Anticoagulant, Normal:           0 9-1 1  Standard Dose, Oral Anticoagulant:  2 0-3 0  High Dose, Oral Anticoagulant:      2 5-3 5    APTT [[id number]]  (Normal) Collected: 09/08/21 0457    Lab Status: Final result Specimen: Blood from Arm, Right Updated: 09/08/21 0518     PTT 26 seconds     CBC and differential [[id number]]  (Abnormal) Collected: 09/08/21 0457    Lab Status: Final result Specimen: Blood from Arm, Right Updated: 09/08/21 0507     WBC 14 94 Thousand/uL      RBC 4 36 Million/uL      Hemoglobin 13 3 g/dL      Hematocrit 42 4 %      MCV 97 fL      MCH 30 5 pg      MCHC 31 4 g/dL      RDW 12 9 %      MPV 10 5 fL Platelets 531 Thousands/uL      Neutrophils Relative 84 %      Immat GRANS % 0 %      Lymphocytes Relative 7 %      Monocytes Relative 8 %      Eosinophils Relative 1 %      Basophils Relative 0 %      Neutrophils Absolute 12 58 Thousands/µL      Immature Grans Absolute 0 04 Thousand/uL      Lymphocytes Absolute 1 00 Thousands/µL      Monocytes Absolute 1 21 Thousand/µL      Eosinophils Absolute 0 09 Thousand/µL      Basophils Absolute 0 02 Thousands/µL     UA (URINE) with reflex to Scope [[id number]]     Lab Status: No result Specimen: Urine                  XR chest 1 view portable   ED Interpretation by Deandre Sherwood MD (09/08 0606)   Right sided PNA      Final Result by Indu Santos MD (09/08 0745)      Moderate right pneumonia with trace right effusion  Workstation performed: [id number]         CT chest wo contrast    (Results Pending)              Procedures  ECG 12 Lead Documentation Only    Date/Time: 9/8/2021 5:08 AM  Performed by: Deandre Sherwood MD  Authorized by:  Deandre Sherwood MD     Indications / Diagnosis:  Shortness of breath  ECG reviewed by me, the ED Provider: yes    Patient location:  Bedside  Previous ECG:     Previous ECG:  Unavailable    Comparison to cardiac monitor: Yes    Interpretation:     Interpretation: abnormal    Rate:     ECG rate:  104    ECG rate assessment: tachycardic    Rhythm:     Rhythm: atrial fibrillation    QRS:     QRS axis:  Normal    QRS intervals:  Normal  Conduction:     Conduction: abnormal      Abnormal conduction: non-specific intraventricular conduction delay    ST segments:     ST segments:  Normal  T waves:     T waves: normal    Comments:      Non spec ST depression inferior leads    No acute ischemia or infarction    Atrial fib with controlled ventricular response   (known atrial fib)             ED Course  ED Course as of Sep 09 0257   Wed Sep 08, 2021   0600 Discussed elevated trop with daughter and patient and possible causes including primary cardiac ischemia or secondary due to increased demand from shortness of breath - pt has no chest pain - no acute EKG changes - patient and family request only medical intervention if needed and not cardiac catherization/CABG       0603 Will TT hospitalist for admission at this facility due to elevated trop and asthma exacerbation      0604 No sepsis criteria - only one SIRS criteria - WBB over 14K      2000 Hypoxia with sleeping at 88% - applied oxygen by NC at 2L      Thu Sep 09, 2021   0254 Troponin elevated 1 24  Patient has no EKG changes  She also denies chest pain  Rule repeat troponin at 3:00 a m  Rinda Manner  COVID was negative  BUN and creatinine elevated at 32 1 33  Patient's CECI  Electrolytes are normal   Random glucose normal 120 and liver function normal   Leukocytosis with a white count of 14 94  Hemoglobin hematocrit 13 3 and 42 4  Platelets stable 849  Lactic acid normal 1 3  Blood cultures  Drawn are pending  0255 Chest x-ray showed right upper lobe pneumonia  Started patient on Rocephin  Patient to be admitted under the care of the hospitalist  She is stable to be admitted at this [facility] as family does not desire intervention other than medications for elevated trop (if indicated)    All imaging and/or lab testing discussed with patient  Patient and/or family members verbalizes understanding and agrees with plan for admission  Patient is stable for admission      Portions of the record may have been created with voice recognition software  Occasional wrong word or "sound a like" substitutions may have occurred due to the inherent limitations of voice recognition software  Read the chart carefully and recognize, using context, where substitutions have occurred                                                       MDM  Number of Diagnoses or Management Options  CECI (acute kidney injury) [facility]): new and requires workup  Asthma exacerbation: new and requires workup  Atrial fibrillation [facility]): new and requires workup  Elevated troponin: new and requires workup  Hypoxia: new and requires workup  Pneumonia: new and requires workup  Diagnosis management comments: This is a 80year old female with a PMH of asthma, a fib, anticoagulation that presented to the ED this morning with shortness of breath and wheezing of sudden onset  Denies chest pain, fever or chills  Wheezing throughout all lobes on arrival      Concerned for asthma exac, COVID, CHF, AMI, NSTEMI, PNA, viral syndrome, bronchitis and other concerns  Will obtain cardiac workup  COVID test  Chest x-ray and EKG   Order Duoneb and Solumedrol for asthma exac - if she becomes hypoxic will apply appropriate method of oxygenation       Amount and/or Complexity of Data Reviewed  Clinical lab tests: ordered and reviewed  Tests in the radiology section of CPT®: ordered and reviewed  Obtain history from someone other than the patient: yes (Daughter and spouse)  Review and summarize past medical records: yes  Discuss the patient with other providers: yes (hospitalist)  Independent visualization of images, tracings, or specimens: yes    Risk of Complications, Morbidity, and/or Mortality  Presenting problems: high  Diagnostic procedures: moderate  Management options: moderate    Patient Progress  Patient progress: improved      Disposition  Final diagnoses:   Asthma exacerbation   Pneumonia   Hypoxia   Elevated troponin   Atrial fibrillation ([facility] [address] )   [facility]     Time reflects when diagnosis was documented in both MDM as applicable and the Disposition within this note     Time User Action Codes Description Comment    9/8/2021  6:11 AM Gem Grieve Add [J45 901] Asthma exacerbation     9/8/2021  6:11 AM Gem Grieve Add [J18 9] Pneumonia     9/8/2021  6:11 AM Gem Grieve Add [R09 02] Hypoxia     9/8/2021  6:11 AM Gem Grieve Add [R77 8] Elevated troponin     9/8/2021  6:11 AM Ginette Keen Add [B11 76] Atrial fibrillation ([facility] [address] )     9/8/2021  6:12 AM Ginette Keen Add [N17 9] [facility]     9/8/2021  6:40 AM Odilia Rueda Add [I21 4] NSTEMI (non-ST elevated myocardial infarction) [facility])       ED Disposition     ED Disposition Condition Date/Time Comment    Admit Stable Wed Sep 8, 2021  6:11 AM Case was discussed with hospitalist and the patient's admission status was agreed to be Admission Status: inpatient status to the service of Dr Mitesh Nava   Follow-up Information    None         Current Discharge Medication List      CONTINUE these medications which have NOT CHANGED    Details   apixaban (ELIQUIS) 2 5 mg Take 2 5 mg by mouth 2 (two) times a day      diltiazem (CARDIZEM CD) 120 mg 24 hr capsule Take 120 mg by mouth daily       gabapentin (NEURONTIN) 100 mg capsule Take 200 mg by mouth 2 (two) times a day       iron polysaccharides (FERREX) 150 mg capsule Take 150 mg by mouth daily      lisinopril (ZESTRIL) 5 mg tablet Take 5 mg by mouth daily      metoprolol succinate (TOPROL-XL) 100 mg 24 hr tablet Take 100 mg by mouth daily      rosuvastatin (CRESTOR) 5 mg tablet Take 5 mg by mouth tues saturday           No discharge procedures on file      PDMP Review     None          ED Provider  Electronically Signed by           Standley Ahumada, MD  09/08/21 8048       Standley Ahumada, MD  09/09/21 1409

## 2021-09-08 NOTE — CONSULTS
[address] Cardiology  CONSULT    Patient Name: Miles Cervantes  Patient MRN: [de-identified]  Admission Date: 9/8/2021  4:48 AM  Attending Provider: Wm Akhtar MD  Service: Cardiology    Reason for consult: elevated troponin     NOTE: Patient is hard of hearing but a reliable historian  History obtained from patient, daughter and granddaughter  HPI  This is a 80 y o  female with a past medical history of asthma, hypertension, chronic kidney disease, permanent atrial fibrillation, TIA, permanent pacemaker, ?coronary artery disease who presented initially with acute on onset shortness of breath  She was in her baseline state of health yesterday when she turned 80years old  She went about her normal activities and went out to get her hair done  She returned home around 1900 and had no complaints  This morning around 0300 she became short of breath  It was associated with a productive cough of white mucus with streaks of blood  She had chills and a low grade fever  In the emergency department her ECG revealed atrial fibrillation w/ RVR, nonspecific IVCD and nonspecific ST-T wave abnormalities inferior leads  CXR revealed right upper lobe pneumonia with trace right sided pleural effusion  Her blood work was significant for troponin 1 17 and white blood cell count 15  Her SARS-COV-2 testing was negative  Currently, she feels like "a million bucks " No complaints at this time and denies any preceding chest pain, palpitations, exertional dyspnea  Further denies fever, chills, orthopnea, PND, nausea, vomiting, lower extremity swelling       Review of Systems  10 point review of systems negative except as noted in HPI    Past Medical History:   Diagnosis Date    Anemia     Asthma     Atrial fibrillation ([facility] [address] ) 11/08/2019    CAD     CKD (chronic kidney disease)     Current use of long term anticoagulation 11/08/2019    Hypertension, essential 11/08/2019    Pacemaker     TIA (transient ischemic attack) 11/08/2019     History reviewed  No pertinent surgical history  History reviewed  No pertinent family history  Social History     Tobacco Use    Smoking status: Never Smoker    Smokeless tobacco: Never Used   Substance Use Topics    Alcohol use: Yes     Comment: occasionaly        Vitals:    09/08/21 0906   BP:    Pulse:    Resp:    Temp:    SpO2: 97%        I/O last 3 completed shifts: In: 250 [IV Piggyback:250]  Out: -   No intake/output data recorded       Oxygen:  3L NC    Physical Exam  General Appearance: Alert, cooperative, no distress, hard of hearing  Head: Normocephalic, without obvious abnormality, atraumatic  Mouth: Dry mucous membranes  Eyes: PERRL, sclerae anicteric, EOM's intact  Neck: No carotid bruit or JVD  Lungs: Coarse BS with rhonchi R > L, +expiratory wheezing, no rales  Heart: irregular rate and rhythm, no murmur, rub or gallop  Abdomen: Soft, non-tender, bowel sounds active  Extremities: Extremities normal, atraumatic, no edema  Skin: Warm and dry without and rashes or lesions  Neurologic: Grossly normal    Current Medications:  Scheduled Meds:  Current Facility-Administered Medications   Medication Dose Route Frequency Provider Last Rate    acetaminophen  650 mg Oral Q6H PRN Florecita Rueda PA-C      albuterol  2 puff Inhalation Q4H PRN Florecita Rueda PA-C      apixaban  2 5 mg Oral BID Florecita Rueda PA-C      benzonatate  100 mg Oral TID PRN Js Agee PA-C      [START ON 9/9/2021] cefTRIAXone  1,000 mg Intravenous Q24H Florecita Rueda PA-C      diltiazem  120 mg Oral Daily Florecita Rueda PA-C      gabapentin  200 mg Oral BID Florecita Rueda PA-C      guaiFENesin  600 mg Oral Q12H [address] Florecita Rueda PA-C      iron polysaccharides  150 mg Oral Daily Florecita Rueda PA-C      sodium chloride  3 mL Nebulization TID Jun Haynes MD      And    levalbuterol  1 25 mg Nebulization TID Jun Haynes MD      lisinopril  5 mg Oral Daily Florecita Rueda PA-C      metoprolol succinate  100 mg Oral Daily Florecita Rueda PA-C      [START ON 9/11/2021] pravastatin  40 mg Oral Once per day on Tue Sat Brooke Maldonado PA-C       Continuous Infusions:   PRN Meds:   acetaminophen    albuterol    benzonatate    Laboratory Studies:  Lab Results   Component Value Date    WBC 14 94 (H) 09/08/2021    HGB 13 3 09/08/2021    HCT 42 4 09/08/2021    MCV 97 09/08/2021     09/08/2021       Lab Results   Component Value Date    CALCIUM 9 4 09/08/2021    SODIUM 142 09/08/2021    K 4 0 09/08/2021    CO2 26 09/08/2021     09/08/2021    BUN 32 (H) 09/08/2021    CREATININE 1 33 (H) 09/08/2021       No results found for: HGBA1C    No results found for: TSH    Lab Results   Component Value Date    HDL 70 09/08/2021    LDLCALC 87 09/08/2021    TRIG 68 4 09/08/2021       Lactate  No results for input(s): LACTATE in the last 72 hours  CARDIAC IMAGING:  Echocardiogram: PENDING    Cardiac Cath: No prior available    Stress Test: No prior avaliable    ECG: Personally reviewed    Telemetry: Not on tele    IMAGING  XR chest 1 view portable   ED Interpretation   Right sided PNA      Final Result      Moderate right pneumonia with trace right effusion  Workstation performed: [id number]              Assessment / Plan  #  Sepsis  #  Right Upper Lobe Pneumonia - CAP'  #  Asthma  #  Elevated troponin  #  CKD III  #  Permanent atrial fibrillation  #  Chronic anticoagulation   #  Biotronik dual chamber PPM  #  History TIA (>11 years ago)  ? Coronary artery disease    80year old female with the above mentioned past medical history who presented with acute onset shortness of breath and associated low grade fever with chills  Presentation revealed tachycardia, leukocytosis and RUL pneumonia on chest x-ray  She has been started on antibiotics for CAP  Her blood cultures and urinary antigens are pending at this time  ECG revealed afib with RVR   Rates are now controlled and in 70's on exam  She has no signs of congestive heart failure on exam or low output state  She is warm and well-perfused on exam  Lactic acid was negative  Renal function elevated but likely at her baseline when compared to prior labs  Troponin is elevated and I suspect this is non-MI troponin elevation from underlying sepsis rather than a primary acute coronary syndrome  Due to the acute onset of her symptoms, it is worthwhile to check a repeat troponin and echocardiogram for wall motion abnormalities  I did discuss potential avenues if her second troponin is significantly elevated and/or she has abnormalities on the echocardiogram  Namely, stress testing and whether or not this would impact management  On chart review, previous discussions noted that the family would not be interested in pursuing a cardiac cath or invasive procedures  For this reason, medical management is reasonable  She is very active and independent at home (uses walker and wheelchair for safety)  RECOMMENDATIONS:  · Treatment of underlying sepsis and CAP per primary team  She will benefit from a nebulizer given wheezing on exam   · Will follow up repeat troponin level and echocardiogram  No need to begin heparin or DAPT at this time  Continue her statin and beta blocker  · For her atrial fibrillation, okay to continue metoprolol and diltiazem at this time  Rates are currently controlled  No signs of decompensated CHF and no known history of reduced LVEF  Follow up echocardiogram  Continue Eliquis 2 5 mg BID for stroke prophylaxis      Principal Problem:    Right upper lobe pneumonia  Active Problems:    Atrial fibrillation (HCC)    Hypertension, essential    Asthma exacerbation    Elevated troponin    CKD (chronic kidney disease) stage 3, GFR 30-59 ml/min (HCC)    Sepsis ([facility] [address] )    Code Status: Level 1 - Full Code    Shelby Jurado MD

## 2021-09-08 NOTE — RESPIRATORY THERAPY NOTE
RT Protocol Note  Gracie Mcgowan 80 y o  female MRN: [id number]  Unit/Bed#: -01 Encounter: [id number]    Assessment    Principal Problem:    Right upper lobe pneumonia  Active Problems:    Atrial fibrillation ([place] [address] )    Hypertension, essential    Asthma exacerbation    Elevated troponin    CKD (chronic kidney disease) stage 3, GFR 30-59 ml/min ([facility])    Sepsis ([place] [address] )      Home Pulmonary Medications:  Albuterol HFA  Advair (Dose unknown)       Past Medical History:   Diagnosis Date    Anemia     Asthma     Atrial fibrillation ([place] [address] ) 11/08/2019    CAD     CKD (chronic kidney disease)     Current use of long term anticoagulation 11/08/2019    Hypertension, essential 11/08/2019    Pacemaker     TIA (transient ischemic attack) 11/08/2019     Social History     Socioeconomic History    Marital status:      Spouse name: None    Number of children: None    Years of education: None    Highest education level: None   Occupational History    None   Tobacco Use    Smoking status: Never Smoker    Smokeless tobacco: Never Used   Vaping Use    Vaping Use: Never used   Substance and Sexual Activity    Alcohol use: Yes     Comment: occasionaly     Drug use: Never    Sexual activity: Not Currently     Birth control/protection: Post-menopausal   Other Topics Concern    None   Social History Narrative    None     Social Determinants of Health     Financial Resource Strain:     Difficulty of Paying Living Expenses:    Food Insecurity:     Worried About Running Out of Food in the Last Year:     Ran Out of Food in the Last Year:    Transportation Needs:     Lack of Transportation (Medical):      Lack of Transportation (Non-Medical):    Physical Activity:     Days of Exercise per Week:     Minutes of Exercise per Session:    Stress:     Feeling of Stress :    Social Connections:     Frequency of Communication with Friends and Family:     Frequency of Social Gatherings with Friends and Family:  Attends [employer] Services:     Active Member of Clubs or Organizations:     Attends Club or Organization Meetings:     Marital Status:    Intimate Partner Violence:     Fear of Current or Ex-Partner:     Emotionally Abused:     Physically Abused:     Sexually Abused:        Subjective         Objective    Physical Exam:   Assessment Type: Pre-treatment  General Appearance: Alert, Awake  Respiratory Pattern: Normal  Chest Assessment: Chest expansion symmetrical  Bilateral Breath Sounds: Diminished  Cough: (P) Non-productive, Strong    Vitals:  Blood pressure 126/69, pulse 61, temperature 99 8 °F (37 7 °C), temperature source Oral, resp  rate 20, height 5' 2" (1 575 m), weight 50 6 kg (111 lb 8 8 oz), SpO2 97 %  Imaging and other studies: I have personally reviewed pertinent reports  Plan    Respiratory Plan: Mild Distress pathway        Resp Comments: (P) Patient assessed per Respiratory Protocol  Patient has a history of asthma  Home medications discussed with family at bedside  Current lung sounds are diminished  Patient currently on 3L NC with a saturation of 97%  Mild Distress Pathway ordered and Respiratory to follow and reassess as needed  Soraida Carlton

## 2021-09-08 NOTE — H&P
[address]  [facility]   H&P- Ana Cavanaugh 9/7/1926, 80 y o  female MRN: [id number]  Unit/Bed#: -01 Encounter: [id number]  Primary Care Provider: Rodrigo Rodrigez MD   Date and time admitted to hospital: 9/8/2021  4:48 AM    * Right upper lobe pneumonia  Assessment & Plan  · Chest x-ray personally reviewed shows patchy opacities in the right lung, predominantly at the right upper lobe  · COVID-19 negative  · Likely community-acquired in nature  · Check Legionella and strep pneumo urinary antigen  · Obtain sputum culture if able to expectorates  · Status post IV ceftriaxone in the emergency department which will be continued  · Obtain procalcitonin  · Respiratory protocol  · Mucinex scheduled    Sepsis [facility])  Assessment & Plan  · Patient meet septic criteria on admission secondary to leukocytosis and tachycardia  · Source felt to be right-sided pneumonia  · COVID-19 negative  · Follow-up on blood cultures obtained in the emergency department  · Lactic acid negative  · No evidence of end-organ dysfunction  · Continue IV ceftriaxone  · Follow-up on procalcitonin    Elevated troponin  Assessment & Plan  · Troponin noted to be elevated to 1 17 on admission  · DDX: non-MI trop elevation secondary to PNA vs  NSTEMI  · Patient is noted to be chronically anticoagulated with Eliquis 2 5 mg PO BID secondary to permanent atrial fibrillation  Will continue at this time rather than transition to heparin - will discuss with cardiology  · Emergency department physician discussed with patient's family who would not be interested in cardiac catheterization or invasive measures  · Will obtain echocardiogram   · Cardiology consultation  · Trend serial troponins  · Give 162 mg of aspirin x1 now  · Monitor on telemetry  · Serial EKGs    Asthma exacerbation  Assessment & Plan  · Noted to be wheezing on admission to the emergency department    · Currently without wheezing at this time  · Likely related to pneumonia  · Hold further steroids  If wheezing recurs, would initiate steroids at that time  · Will schedule Xopenex TID    Atrial fibrillation [facility])  Assessment & Plan  · Patient with known permanent atrial fibrillation  · Chronically anticoagulated with 2 5 mg PO Eliquis BID secondary to age, CKD and weight  · [id number] score was   which corresponds to No [id number] score filed  Please complete smartform annual risk of stroke  · Continue metoprolol and Cardizem  · Follow[facility] with [facility]    CKD (chronic kidney disease) stage 3, GFR 30-59 ml/min [facility])  Assessment & Plan  Lab Results   Component Value Date    EGFR 34 09/08/2021    EGFR 37 08/31/2021    CREATININE 1 33 (H) 09/08/2021    CREATININE 1 26 08/31/2021     · Baseline creatinine per review of outpatient records is 1 2-1 3  · Currently remains at baseline creatinine    Hypertension, essential  Assessment & Plan  · Continue metoprolol and Cardizem for rate control and blood pressure control  · Continue lisinopril    VTE Pharmacologic Prophylaxis: VTE Score: 5 High Risk (Score >/= 5) - Pharmacological DVT Prophylaxis Ordered: apixaban (Eliquis)  Sequential Compression Devices Ordered  Code Status: FULL CODE   Discussion with family: Updated  (daughter) at bedside  Anticipated Length of Stay: Patient will be admitted on an inpatient basis with an anticipated length of stay of greater than 2 midnights secondary to PNA  Total Time for Visit, including Counseling / Coordination of Care: 60 minutes Greater than 50% of this total time spent on direct patient counseling and coordination of care      Chief Complaint: SOB    History of Present Illness:  Lon Price is a 80 y o  female with a PMH of asthma, permanent nonvalvular atrial fibrillation anticoagulated with Eliquis, hyperlipidemia, hypertension, history of permanent pacemaker, coronary artery disease, perimembranous VSD, history of TIA who presents with shortness of breath and productive cough of thick white mucus  The patient turned 89 yesterday  She still drives and lives independently  She was noted to get her hair done yesterday  She had her family over her house  There was a dog there that was noted to be rolling around on her couch which was not normal for her  She thinks that the dog may have caused her to get sick  She woke up this morning suddenly short of breath with productive cough of white mucus with small streaks of blood  There was no yanick hemoptysis  She also was noted to developed chills and the sudden difficulty with breathing  The patient was noted to be wheezing on arrival and was given Solu-Medrol and DuoNeb with improvement  Patient's troponin was noted to return elevated at 1 17  Emergency department physician discussed with the patient's family  They did not wish to pursue cardiac catheterization or CABG and were only interested in medical treatment  The patient was noted have chest x-ray finding of potential right-sided pneumonia was started on ceftriaxone  She feels significantly better since admission  Leukocytosis was noted as well as a troponin of 1 17  She denies any chest pain  She was in her normal state of health yesterday and is doing her activities of daily living without any change in symptoms  Review of Systems:  Review of Systems   Constitutional: Positive for chills and fatigue  Negative for diaphoresis, fever and unexpected weight change  HENT: Negative for sore throat  Respiratory: Positive for cough and shortness of breath  Negative for chest tightness  Cardiovascular: Negative for chest pain, palpitations and leg swelling  Gastrointestinal: Negative for abdominal pain, diarrhea, nausea and vomiting  Genitourinary: Negative for dysuria  Musculoskeletal: Negative for myalgias  Neurological: Positive for weakness  Negative for dizziness, syncope, numbness and headaches  Psychiatric/Behavioral: Negative for confusion  All other systems reviewed and are negative  Past Medical and Surgical History:   Past Medical History:   Diagnosis Date    Anemia     Asthma     Atrial fibrillation ([address] ) 11/08/2019    CAD     CKD (chronic kidney disease)     Current use of long term anticoagulation 11/08/2019    Hypertension, essential 11/08/2019    Pacemaker     TIA (transient ischemic attack) 11/08/2019       History reviewed  No pertinent surgical history  Meds/Allergies:  Prior to Admission medications    Medication Sig Start Date End Date Taking? Authorizing Provider   rosuvastatin (CRESTOR) 5 mg tablet TAKE 1 TABLET TWICE WEEKLY 6/11/21  Yes Historical Provider, MD   apixaban (ELIQUIS) 2 5 mg Take 2 5 mg by mouth 2 (two) times a day    Historical Provider, MD   diltiazem (CARDIZEM CD) 120 mg 24 hr capsule  6/11/21   Historical Provider, MD   gabapentin (NEURONTIN) 300 mg capsule  8/26/21   Historical Provider, MD   iron polysaccharides (FERREX) 150 mg capsule Take 150 mg by mouth daily    Historical Provider, MD   lisinopril (ZESTRIL) 5 mg tablet Take 5 mg by mouth daily    Historical Provider, MD   metoprolol succinate (TOPROL-XL) 100 mg 24 hr tablet Take 100 mg by mouth daily    Historical Provider, MD     I have reviewed home medications with patient family member  Allergies: Allergies   Allergen Reactions    Hydrocortisone Other (See Comments)    Silver Sulfadiazine Other (See Comments)       Social History:  Marital Status:     Occupation: retired  Patient Pre-hospital Living Situation: Home  Patient Pre-hospital Level of Mobility: walks with cane  Patient Pre-hospital Diet Restrictions: none  Substance Use History:   Social History     Substance and Sexual Activity   Alcohol Use Yes    Comment: occasionaly      Social History     Tobacco Use   Smoking Status Never Smoker   Smokeless Tobacco Never Used     Social History     Substance and Sexual Activity Drug Use Never       Family History:  History reviewed  No pertinent family history  Physical Exam:     Vitals:   Blood Pressure: 169/78 (09/08/21 0450)  Pulse: 105 (09/08/21 0450)  Temperature: 99 4 °F (37 4 °C) (09/08/21 0450)  Temp Source: Oral (09/08/21 0450)  Respirations: 18 (09/08/21 0450)  Height: 5' 2" (157 5 cm) (09/08/21 0450)  Weight - Scale: 49 kg (108 lb) (09/08/21 0450)  SpO2: 95 % (09/08/21 0450)    Physical Exam  Vitals and nursing note reviewed  Constitutional:       General: She is not in acute distress  Appearance: Normal appearance  She is not diaphoretic  Comments: Appears younger than stated age   HENT:      Head: Normocephalic and atraumatic  Mouth/Throat:      Mouth: Mucous membranes are dry  Cardiovascular:      Rate and Rhythm: Normal rate  Rhythm irregular  Pulmonary:      Effort: Pulmonary effort is normal       Breath sounds: No stridor  Rhonchi ( right side) present  No wheezing or rales  Abdominal:      General: Bowel sounds are normal       Palpations: Abdomen is soft  There is no mass  Tenderness: There is no abdominal tenderness  There is no guarding  Musculoskeletal:      Right lower leg: No edema  Left lower leg: No edema  Skin:     General: Skin is warm and dry  Neurological:      General: No focal deficit present  Mental Status: She is alert and oriented to person, place, and time  Cranial Nerves: No cranial nerve deficit     Psychiatric:         Mood and Affect: Mood normal          Behavior: Behavior normal           Additional Data:     Lab Results:  Results from last 7 days   Lab Units 09/08/21 0457   WBC Thousand/uL 14 94*   HEMOGLOBIN g/dL 13 3   HEMATOCRIT % 42 4   PLATELETS Thousands/uL 274   NEUTROS PCT % 84*   LYMPHS PCT % 7*   MONOS PCT % 8   EOS PCT % 1     Results from last 7 days   Lab Units 09/08/21 0457   SODIUM mmol/L 142   POTASSIUM mmol/L 4 0   CHLORIDE mmol/L 106   CO2 mmol/L 26   BUN mg/dL 32* CREATININE mg/dL 1 33*   ANION GAP mmol/L 10   CALCIUM mg/dL 9 4   ALBUMIN g/dL 3 8   TOTAL BILIRUBIN mg/dL 0 59   ALK PHOS U/L 71 1   ALT U/L 15   AST U/L 19   GLUCOSE RANDOM mg/dL 120     Results from last 7 days   Lab Units 09/08/21  0457   INR  0 98             Results from last 7 days   Lab Units 09/08/21  0457   LACTIC ACID mmol/L 1 3       Imaging: Personally reviewed the following imaging: chest xray  XR chest 1 view portable   ED Interpretation by Pepper Masterson MD (09/08 0606)   Right sided PNA          EKG and Other Studies Reviewed on Admission:   · EKG: Atrial fibrillation    poor r wave progression  ** Please Note: This note has been constructed using a voice recognition system   **

## 2021-09-08 NOTE — PHYSICAL THERAPY NOTE
PHYSICAL THERAPY EVALUATION  Time: 8520-2242    NAME:  Alexx Myles  DATE: 09/08/21    AGE:   89 y o  Mrn:   [id number]  Length Of Stay: 0    ADMIT DX:  Atrial fibrillation ([facility] ) [I48 91]  Shortness of breath [R06 02]  Pneumonia [J18 9]  Hypoxia [R09 02]  Elevated troponin [R77 8]  NSTEMI (non-ST elevated myocardial infarction) ([facility] ) [I21 4]  CECI (acute kidney injury) ([place] [address] ) [N17 9]  Asthma exacerbation [J45 901]    Past Medical History:   Diagnosis Date    Anemia     Asthma     Atrial fibrillation ([place] [address] ) 11/08/2019    CAD     CKD (chronic kidney disease)     Current use of long term anticoagulation 11/08/2019    Hypertension, essential 11/08/2019    Pacemaker     TIA (transient ischemic attack) 11/08/2019     History reviewed  No pertinent surgical history  Performed at least 2 patient identifiers during session: Name, Humberto Louise and ID bracelet        09/08/21 0945   PT Last Visit   PT Visit Date 09/08/21   Note Type   Note type Evaluation   Pain Assessment   Pain Assessment Tool 0-10   Pain Score No Pain   Effect of Pain on Daily Activities n/a   Hospital Pain Intervention(s) Ambulation/increased activity   Multiple Pain Sites No   Home Living   Type of Home House   Home Layout Performs ADLs on one level; Able to live on main level with bedroom/bathroom;Stairs to enter with rails  (3-4 MARILYNN w/ HR)   Bathroom Toilet Standard   Bathroom Equipment Grab bars in shower   [address]; Wheelchair-manual  ("hurri-cane")   Prior Function   Level of [place] Independent with ADLs and functional mobility   Lives With Alone   Receives Help From Family   ADL Assistance Independent   IADLs Independent  (but has family assist for IADLs as needed)   Falls in the last 6 months 0  (pt and family deny falls)   Vocational Retired   Comments Pt lives at home alone in a [facility] with a couple MARILYNN  Pt is indep with ADLs and most IADLs but has family assist prn   Pt ambulates household and community distances with no AD vs cane  Owns WC for longer community distance mobility (boardwalk, shopping malls, etc)  Pt's family very supportive and provides assistance as needed, lives next door, son or daughter check in daily  Pt drives  Sleeps on couch  Restrictions/Precautions   Weight Bearing Precautions Per Order No   Other Precautions Bed Alarm; Fall Risk; Chair Alarm;Hard of hearing  ([place])   General   Additional Pertinent History Pt admitted 9/8/21 with SOB, dx: CECI, R lower lobe PNA, NSTEMI  Family/Caregiver Present Yes  (daughter and granddaughter present t/o session)   Cognition   Overall Cognitive Status WFL   Arousal/Participation Alert   Orientation Level Oriented X4   Memory Within functional limits   Following Commands Follows multistep commands with increased time or repetition  (due to hearing impairment)   RUE Assessment   RUE Assessment WFL   LUE Assessment   LUE Assessment WFL   RLE Assessment   RLE Assessment WFL   LLE Assessment   LLE Assessment WFL   Coordination   Movements are Fluid and Coordinated 1   Sensation WFL   Light Touch   RLE Light Touch Grossly intact   LLE Light Touch Grossly intact   Bed Mobility   Supine to Sit 6  Modified independent   Additional items Assist x 1;HOB elevated; Bedrails   Sit to Supine 6  Modified independent   Additional items HOB elevated   Transfers   Sit to Stand 5  Supervision   Additional items Assist x 1   Stand to Sit 5  Supervision   Additional items Assist x 1   Toilet transfer 5  Supervision   Additional items Assist x 1;Standard toilet   Ambulation/Elevation   Gait pattern Decreased foot clearance; Short stride; Excessively slow;Narrow JOANNA;Shuffling  (improved mechanics with increased amb distance)   Gait Assistance 5  Supervision   Additional items Assist x 1;Verbal cues   Assistive Device Straight cane   Distance 74ft x1 including change in direction residential   Stair Management Assistance Not tested   Balance   Static Sitting Good   Dynamic Sitting Fair +   Static Standing Fair +   Dynamic Standing Fair +   Ambulatory Fair +   Endurance Deficit   Endurance Deficit Yes   Endurance Deficit Description At baseline pt does not wear supplemental O2  Pt currently on 3L O2 via NC  Activity Tolerance   Activity Tolerance Patient tolerated treatment well;Patient limited by fatigue   Medical Staff Made Aware Spoke with SLIM   Nurse Made Aware Spoke with MERYL Javier pre/post session   Assessment   Prognosis Good   Problem List Decreased endurance; Impaired balance;Decreased mobility; Impaired hearing   Assessment Pt seen for PT evaluation, cleared by MERYL Javier, activity orders of "up with assist"  Pt admitted 9/8/2021 with SOB, dx Right upper lobe pneumonia, NSTEMI, CECI  Comorbidities affecting pt's fnxl performance include: Afib, asthma, hypertension and pacemaker, severe hearing impairment  Personal factors affecting pt at time of PT IE include: ambulating with assistive device, stairs to enter home and hearing impairments  PTA, pt was independent with functional mobility with straight cane, independent with ADLS, independent with IADLS and living alone in 1 story home with 3-4 steps to enter  Pt demonstrates fnxl impairments identified in objective findings from Elderly Mobility Scale assessment, scoring 13/20, indicating borderline in terms of safety with mobility/requires some assist with ADLs & mobility  Pt scores 20/24 on AM-PAC objective tool w/ a standardized score of 43 99, indicating pt demonstrates functional capacity for return to home self care vs with increased supports upon d/c  The Barthel Index outcome tool was used & pt scores 65 indicating moderate limitations of fnxl mobility/ADLS  Pt is at risk of falls d/t impaired balance, use of ambulatory aid, varying levels of pain , advanced age, acuity of medical illness and ongoing medical treatment of primary dx   Pt's clinical presentation is currently unstable/unpredictable seen in pt's presentation of changing level of pain, varying levels of cognitive performance, increased fall risk, new onset of impairment of functional mobility and decreased endurance  This PT IE requires high complexity clinical decision making, based on multiple medical/physiological/fnxl/social factors which affect pt's performance w/ evaluation & therapist judgement for disposition recommendations  Pt will benefit from continued PT treatment in order to address impairments, decrease risk of falls, maximize independence w/ fnxl mobility, & ensure safety w/ mobility for transition to next level of care  Based on pt presentation & impairments, pt would most appropriately benefit from return to home with family support upon d/c     Barriers to Discharge None   Goals   Patient Goals "to go home"   [facility] Expiration Date 09/15/21   Short Term Goal #1 Patient PT goals established in order to address patient self reported goal of "to go home"  Pt will complete all transfers at [facility] level with LRAD, in order to increase safety with functional mobility  Pt will ambulate >150ft with LRAD at [facility] level in order to increase safety with household and short community distance functional mobility  Pt will negotiate 3-5 stairs with HR assist at [facility] level in order to demonstrate safety to enter/exit her home  Pt will improve AM-PAC score to >/= 24/24 in order to increase independence with mobility and decrease burden of care  Pt will improve Barthel Index score to >/= 75/100 in order to increase independence and decrease risk of falls  Pt will improve Elderly Mobility Scale score to >/= 17/20 in order to decrease burden of care and increase independence with functional mobility and ADLs  Pt will improve dynamic standing balance to >/= GOOD grade in order to promote safety and increased independence with mobility  Pt will tolerate >3hrs OOB in upright position, in order to improve muscular endurance and respiratory status      PT Treatment Day 0   Plan   Treatment/Interventions Functional transfer training;ADL retraining;LE strengthening/ROM; Elevations; Therapeutic exercise; Endurance training;Patient/family training;Gait training;Spoke to MD;Spoke to nursing   PT Frequency 2-3x/wk   Recommendation   PT Discharge Recommendation No rehabilitation needs  ([address] [facility] )   Additional Comments Pt owns [facility] for ambulation and  for longer community mobility if needed  AM-PAC Basic Mobility Inpatient   Turning in Bed Without Bedrails 4   Lying on Back to Sitting on Edge of Flat Bed 4   Moving Bed to Chair 3   Standing Up From Chair 3   Walk in Room 3   Climb 3-5 Stairs 3   Basic Mobility Inpatient Raw Score 20   Basic Mobility Standardized Score 43 99   Modified Delfino Scale   Modified [place] Scale 3   Barthel Index   Feeding 10   Bathing 0   Grooming Score 5   Dressing Score 10   Bladder Score 10   Bowels Score 10   Toilet Use Score 5   Transfers (Bed/Chair) Score 10   Mobility (Level Surface) Score 0   Stairs Score 5   Barthel Index Score 65       The patient's AM-PAC Basic Mobility Inpatient Short Form Raw Score is 20, Standardized Score is 43 99  A standardized score greater than 42 9 suggests the patient may benefit from discharge to home  Please also refer to the recommendation of the Physical Therapist for safe discharge planning          Wing Robles, PT, DPT   Available via [employer]  NPI # [id number]  [place] License - [id number]  0/5/7918

## 2021-09-09 ENCOUNTER — APPOINTMENT (INPATIENT)
Dept: NON INVASIVE DIAGNOSTICS | Facility: HOSPITAL | Age: 86
DRG: 871 | End: 2021-09-09
Payer: MEDICARE

## 2021-09-09 LAB
ALBUMIN SERPL BCP-MCNC: 3.3 G/DL (ref 3.4–4.8)
ALP SERPL-CCNC: 55.7 U/L (ref 35–140)
ALT SERPL W P-5'-P-CCNC: 16 U/L (ref 5–54)
ANION GAP SERPL CALCULATED.3IONS-SCNC: 8 MMOL/L (ref 4–13)
AST SERPL W P-5'-P-CCNC: 15 U/L (ref 15–41)
BASOPHILS # BLD AUTO: 0.01 THOUSANDS/ΜL (ref 0–0.1)
BASOPHILS NFR BLD AUTO: 0 % (ref 0–1)
BILIRUB SERPL-MCNC: 0.45 MG/DL (ref 0.3–1.2)
BILIRUB UR QL STRIP: NEGATIVE
BUN SERPL-MCNC: 31 MG/DL (ref 6–20)
CALCIUM ALBUM COR SERPL-MCNC: 9.3 MG/DL (ref 8.3–10.1)
CALCIUM SERPL-MCNC: 8.7 MG/DL (ref 8.4–10.2)
CHLORIDE SERPL-SCNC: 109 MMOL/L (ref 96–108)
CLARITY UR: CLEAR
CO2 SERPL-SCNC: 26 MMOL/L (ref 22–33)
COLOR UR: YELLOW
CREAT SERPL-MCNC: 1.14 MG/DL (ref 0.4–1.1)
EOSINOPHIL # BLD AUTO: 0 THOUSAND/ΜL (ref 0–0.61)
EOSINOPHIL NFR BLD AUTO: 0 % (ref 0–6)
ERYTHROCYTE [DISTWIDTH] IN BLOOD BY AUTOMATED COUNT: 13.1 % (ref 11.6–15.1)
GFR SERPL CREATININE-BSD FRML MDRD: 41 ML/MIN/1.73SQ M
GLUCOSE SERPL-MCNC: 128 MG/DL (ref 65–140)
GLUCOSE UR STRIP-MCNC: NEGATIVE MG/DL
HCT VFR BLD AUTO: 34.8 % (ref 34.8–46.1)
HGB BLD-MCNC: 10.7 G/DL (ref 11.5–15.4)
HGB UR QL STRIP.AUTO: NEGATIVE
IMM GRANULOCYTES # BLD AUTO: 0.06 THOUSAND/UL (ref 0–0.2)
IMM GRANULOCYTES NFR BLD AUTO: 0 % (ref 0–2)
KETONES UR STRIP-MCNC: NEGATIVE MG/DL
L PNEUMO1 AG UR QL IA.RAPID: NEGATIVE
LEUKOCYTE ESTERASE UR QL STRIP: NEGATIVE
LYMPHOCYTES # BLD AUTO: 0.95 THOUSANDS/ΜL (ref 0.6–4.47)
LYMPHOCYTES NFR BLD AUTO: 5 % (ref 14–44)
MAGNESIUM SERPL-MCNC: 1.9 MG/DL (ref 1.6–2.6)
MCH RBC QN AUTO: 30.1 PG (ref 26.8–34.3)
MCHC RBC AUTO-ENTMCNC: 30.7 G/DL (ref 31.4–37.4)
MCV RBC AUTO: 98 FL (ref 82–98)
MONOCYTES # BLD AUTO: 1.44 THOUSAND/ΜL (ref 0.17–1.22)
MONOCYTES NFR BLD AUTO: 7 % (ref 4–12)
NEUTROPHILS # BLD AUTO: 17.61 THOUSANDS/ΜL (ref 1.85–7.62)
NEUTS SEG NFR BLD AUTO: 88 % (ref 43–75)
NITRITE UR QL STRIP: NEGATIVE
PH UR STRIP.AUTO: 5 [PH]
PLATELET # BLD AUTO: 240 THOUSANDS/UL (ref 149–390)
PMV BLD AUTO: 11.1 FL (ref 8.9–12.7)
POTASSIUM SERPL-SCNC: 4.2 MMOL/L (ref 3.5–5)
PROCALCITONIN SERPL-MCNC: 0.3 NG/ML
PROT SERPL-MCNC: 6 G/DL (ref 6.4–8.3)
PROT UR STRIP-MCNC: NEGATIVE MG/DL
RBC # BLD AUTO: 3.56 MILLION/UL (ref 3.81–5.12)
S PNEUM AG UR QL: NEGATIVE
SODIUM SERPL-SCNC: 143 MMOL/L (ref 133–145)
SP GR UR STRIP.AUTO: 1.02 (ref 1–1.03)
UROBILINOGEN UR QL STRIP.AUTO: 0.2 E.U./DL
WBC # BLD AUTO: 20.07 THOUSAND/UL (ref 4.31–10.16)

## 2021-09-09 PROCEDURE — 94760 N-INVAS EAR/PLS OXIMETRY 1: CPT

## 2021-09-09 PROCEDURE — 83735 ASSAY OF MAGNESIUM: CPT | Performed by: PHYSICIAN ASSISTANT

## 2021-09-09 PROCEDURE — 94640 AIRWAY INHALATION TREATMENT: CPT

## 2021-09-09 PROCEDURE — 87449 NOS EACH ORGANISM AG IA: CPT | Performed by: INTERNAL MEDICINE

## 2021-09-09 PROCEDURE — 84145 PROCALCITONIN (PCT): CPT | Performed by: PHYSICIAN ASSISTANT

## 2021-09-09 PROCEDURE — 87070 CULTURE OTHR SPECIMN AEROBIC: CPT | Performed by: PHYSICIAN ASSISTANT

## 2021-09-09 PROCEDURE — 81003 URINALYSIS AUTO W/O SCOPE: CPT | Performed by: PHYSICIAN ASSISTANT

## 2021-09-09 PROCEDURE — 85025 COMPLETE CBC W/AUTO DIFF WBC: CPT | Performed by: PHYSICIAN ASSISTANT

## 2021-09-09 PROCEDURE — 93306 TTE W/DOPPLER COMPLETE: CPT

## 2021-09-09 PROCEDURE — 99232 SBSQ HOSP IP/OBS MODERATE 35: CPT | Performed by: INTERNAL MEDICINE

## 2021-09-09 PROCEDURE — 87449 NOS EACH ORGANISM AG IA: CPT | Performed by: PHYSICIAN ASSISTANT

## 2021-09-09 PROCEDURE — 97116 GAIT TRAINING THERAPY: CPT

## 2021-09-09 PROCEDURE — 87205 SMEAR GRAM STAIN: CPT | Performed by: PHYSICIAN ASSISTANT

## 2021-09-09 PROCEDURE — 87186 SC STD MICRODIL/AGAR DIL: CPT | Performed by: PHYSICIAN ASSISTANT

## 2021-09-09 PROCEDURE — 93306 TTE W/DOPPLER COMPLETE: CPT | Performed by: INTERNAL MEDICINE

## 2021-09-09 PROCEDURE — 80053 COMPREHEN METABOLIC PANEL: CPT | Performed by: PHYSICIAN ASSISTANT

## 2021-09-09 PROCEDURE — 87077 CULTURE AEROBIC IDENTIFY: CPT | Performed by: PHYSICIAN ASSISTANT

## 2021-09-09 RX ORDER — FLUTICASONE FUROATE AND VILANTEROL 100; 25 UG/1; UG/1
1 POWDER RESPIRATORY (INHALATION) DAILY
Status: DISCONTINUED | OUTPATIENT
Start: 2021-09-10 | End: 2021-09-10 | Stop reason: HOSPADM

## 2021-09-09 RX ADMIN — GABAPENTIN 200 MG: 100 CAPSULE ORAL at 17:19

## 2021-09-09 RX ADMIN — GUAIFENESIN 200 MG: 100 SOLUTION ORAL at 11:15

## 2021-09-09 RX ADMIN — DILTIAZEM HYDROCHLORIDE 120 MG: 120 CAPSULE, COATED, EXTENDED RELEASE ORAL at 11:06

## 2021-09-09 RX ADMIN — ISODIUM CHLORIDE 3 ML: 0.03 SOLUTION RESPIRATORY (INHALATION) at 13:58

## 2021-09-09 RX ADMIN — APIXABAN 2.5 MG: 2.5 TABLET, FILM COATED ORAL at 17:19

## 2021-09-09 RX ADMIN — ISODIUM CHLORIDE 3 ML: 0.03 SOLUTION RESPIRATORY (INHALATION) at 07:33

## 2021-09-09 RX ADMIN — GUAIFENESIN 200 MG: 100 SOLUTION ORAL at 05:21

## 2021-09-09 RX ADMIN — GUAIFENESIN 200 MG: 100 SOLUTION ORAL at 22:06

## 2021-09-09 RX ADMIN — ISODIUM CHLORIDE 3 ML: 0.03 SOLUTION RESPIRATORY (INHALATION) at 19:46

## 2021-09-09 RX ADMIN — LISINOPRIL 5 MG: 5 TABLET ORAL at 11:06

## 2021-09-09 RX ADMIN — POLYSACCHARIDE-IRON COMPLEX 150 MG: 150 CAPSULE ORAL at 08:51

## 2021-09-09 RX ADMIN — LEVALBUTEROL HYDROCHLORIDE 1.25 MG: 1.25 SOLUTION, CONCENTRATE RESPIRATORY (INHALATION) at 19:46

## 2021-09-09 RX ADMIN — AZITHROMYCIN MONOHYDRATE 500 MG: 500 INJECTION, POWDER, LYOPHILIZED, FOR SOLUTION INTRAVENOUS at 11:06

## 2021-09-09 RX ADMIN — CEFTRIAXONE 1000 MG: 1 INJECTION, SOLUTION INTRAVENOUS at 05:21

## 2021-09-09 RX ADMIN — APIXABAN 2.5 MG: 2.5 TABLET, FILM COATED ORAL at 08:51

## 2021-09-09 RX ADMIN — METOPROLOL SUCCINATE 100 MG: 100 TABLET, EXTENDED RELEASE ORAL at 11:06

## 2021-09-09 RX ADMIN — LEVALBUTEROL HYDROCHLORIDE 1.25 MG: 1.25 SOLUTION, CONCENTRATE RESPIRATORY (INHALATION) at 07:33

## 2021-09-09 RX ADMIN — LEVALBUTEROL HYDROCHLORIDE 1.25 MG: 1.25 SOLUTION, CONCENTRATE RESPIRATORY (INHALATION) at 13:58

## 2021-09-09 RX ADMIN — GABAPENTIN 200 MG: 100 CAPSULE ORAL at 08:51

## 2021-09-09 NOTE — OCCUPATIONAL THERAPY NOTE
Occupational Therapy Screen     Patient Name: Marian Elliott  LNSYE'X Date: 9/9/2021  Problem List  Principal Problem:    Right upper lobe pneumonia  Active Problems:    Atrial fibrillation ([facility] )    Hypertension, essential    Asthma exacerbation    Elevated troponin    CKD (chronic kidney disease) stage 3, GFR 30-59 ml/min ([facility] )    Sepsis ([facility] )    Past Medical History  Past Medical History:   Diagnosis Date    Anemia     Asthma     Atrial fibrillation ([facility] ) 11/08/2019    CAD     CKD (chronic kidney disease)     Current use of long term anticoagulation 11/08/2019    Hypertension, essential 11/08/2019    Pacemaker     TIA (transient ischemic attack) 11/08/2019     Past Surgical History  History reviewed  No pertinent surgical history  09/09/21 1604   OT Last Visit   OT Visit Date 09/09/21   Note Type   Note type Screen   Activity Tolerance   Nurse Made Aware per Joey SHETH appropriate to see pt   Assessment   Assessment OT orders received and chart review completed  Contact made w/ pt from 5621-5009  Pt is hard of hearing but able to participate in conversation w/ + time  Pt lives alone and supportive daughter present  Pt completing ADL / near baseline level of I and has been ambulating  Recommend active participation in ADL tasks w/ RN staff in acute care and discharge home to [facility] w/ family support when medically stable  No acute OT needs at this time   Will screen from [address], OTR/L

## 2021-09-09 NOTE — PROGRESS NOTES
Luis U  66   Progress Note - Cornell Libman 9/7/1926, 80 y o  female MRN: [id number]  Unit/Bed#: -Anup Encounter: [id number]  Primary Care Provider: Duncan Amador MD   Date and time admitted to hospital: 9/8/2021  4:48 AM    * Right upper lobe pneumonia  Assessment & Plan  · Chest x-ray personally reviewed shows patchy opacities in the right lung, predominantly at the right upper lobe  · COVID-19 negative  · Likely community-acquired in nature  · Check Legionella and strep pneumo urinary antigen  · Obtain sputum culture if able to expectorates  · Continue IV ceftriaxone and azithromycin  Sepsis ([facility] )  Assessment & Plan  · On presentation, patient met sepsis criteria  · Sepsis most likely due to right upper lobe pneumonia  CKD (chronic kidney disease) stage 3, GFR 30-59 ml/min [facility])  Assessment & Plan  Lab Results   Component Value Date    EGFR 41 09/09/2021    EGFR 34 09/08/2021    EGFR 37 08/31/2021    CREATININE 1 14 (H) 09/09/2021    CREATININE 1 33 (H) 09/08/2021    CREATININE 1 26 08/31/2021     · Baseline creatinine per review of outpatient records is 1 2-1 3  · Currently remains at baseline creatinine    Elevated troponin  Assessment & Plan  · Troponin noted to be elevated to 1 17 on admission  · DDX: non-MI trop elevation secondary to PNA vs  NSTEMI  · Patient is noted to be chronically anticoagulated with Eliquis 2 5 mg PO BID secondary to permanent atrial fibrillation  Will continue at this time rather than transition to heparin - will discuss with cardiology  · Emergency department physician discussed with patient's family who would not be interested in cardiac catheterization or invasive measures  · Troponin stabilized at 1 2  · Elevated troponin most likely due to sepsis versus PNA  · Give 162 mg of aspirin x1 now    · Echo with no  RWMA, LVEF of 60%, pulmonary hypertension, moderate AV stenosis  · Serial EKGs    Asthma exacerbation  Assessment & Plan  · Noted to be wheezing on admission to the emergency department  · Currently without wheezing at this time  · Likely related to pneumonia  · Hold further steroids  If wheezing recurs, would initiate steroids at that time  · Will schedule Xopenex TID    Hypertension, essential  Assessment & Plan  · Continue metoprolol and Cardizem for rate control and blood pressure control  · Continue lisinopril    Atrial fibrillation (HCC)  Assessment & Plan  · Patient with known permanent atrial fibrillation  · Chronically anticoagulated with 2 5 mg PO Eliquis BID secondary to age, CKD and weight  · [id number] score was 6 which corresponds to 9 8% annual risk of stroke  · Continue metoprolol and Cardizem  · Follows with [facility]      VTE Pharmacologic Prophylaxis:   VTE Score: 5 Moderate Risk (Score 3-4) - Pharmacological DVT Prophylaxis Ordered: Enoxaparin (Lovenox)  Mechanical VTE Prophylaxis in Place: Yes    Patient Centered Rounds: I have performed bedside rounds with nursing staff today  Discussions with Specialists or Other Care Team Provider:  Cardiology, Attending, physical therapy    Education and Discussions with Family / Patient: Updated  (daughter) at bedside  Current Length of Stay: 1 day(s)    Current Patient Status: Inpatient     Discharge Plan / Estimated Discharge Date: Anticipate discharge tomorrow to home  Code Status: Level 1 - Full Code      Subjective:   Patient seen and examined by me at bedside  Reports no new complaints  Feels much better when compared to presentation status  Denies cough, nausea, vomiting, dysuria and leg swelling or pain  Will continue IV antibiotics with possible discharge tomorrow      Objective:     Vitals:   Temp (24hrs), Av 8 °F (36 6 °C), Min:97 2 °F (36 2 °C), Max:98 2 °F (36 8 °C)    Temp:  [97 2 °F (36 2 °C)-98 2 °F (36 8 °C)] 98 2 °F (36 8 °C)  HR:  [80-94] 80  Resp:  [18] 18  BP: ()/(53-67) 117/67  SpO2:  [90 %-99 %] 97 %  Body mass index is 20 4 kg/m²  Input and Output Summary (last 24 hours): Intake/Output Summary (Last 24 hours) at 9/9/2021 1429  Last data filed at 9/9/2021 0901  Gross per 24 hour   Intake 240 ml   Output 450 ml   Net -210 ml       Physical Exam:     Physical Exam  Vitals and nursing note reviewed  Constitutional:       General: She is not in acute distress  Appearance: Normal appearance  She is normal weight  She is not toxic-appearing  HENT:      Head: Normocephalic and atraumatic  Mouth/Throat:      Mouth: Mucous membranes are moist    Cardiovascular:      Rate and Rhythm: Tachycardia present  Rhythm irregular  Pulses: Normal pulses  Heart sounds: No murmur heard  Pulmonary:      Breath sounds: Rhonchi present  Abdominal:      General: There is no distension  Palpations: Abdomen is soft  Tenderness: There is no abdominal tenderness  Musculoskeletal:      Right lower leg: No edema  Left lower leg: No edema  Skin:     Coloration: Skin is not jaundiced or pale  Neurological:      General: No focal deficit present  Mental Status: She is oriented to person, place, and time  Mental status is at baseline            Additional Data:     Labs:  Results from last 7 days   Lab Units 09/09/21  0500   WBC Thousand/uL 20 07*   HEMOGLOBIN g/dL 10 7*   HEMATOCRIT % 34 8   PLATELETS Thousands/uL 240   NEUTROS PCT % 88*   LYMPHS PCT % 5*   MONOS PCT % 7   EOS PCT % 0     Results from last 7 days   Lab Units 09/09/21  0500   SODIUM mmol/L 143   POTASSIUM mmol/L 4 2   CHLORIDE mmol/L 109*   CO2 mmol/L 26   BUN mg/dL 31*   CREATININE mg/dL 1 14*   ANION GAP mmol/L 8   CALCIUM mg/dL 8 7   ALBUMIN g/dL 3 3*   TOTAL BILIRUBIN mg/dL 0 45   ALK PHOS U/L 55 7   ALT U/L 16   AST U/L 15   GLUCOSE RANDOM mg/dL 128     Results from last 7 days   Lab Units 09/08/21  0457   INR  0 98         Results from last 7 days   Lab Units 09/08/21  0457   HEMOGLOBIN A1C % 5 7*     Results from last 7 days   Lab Units 09/09/21  0500 09/08/21  0801 09/08/21  0457   LACTIC ACID mmol/L  --   --  1 3   PROCALCITONIN ng/ml 0 30* 0 11  --        Imaging: Reviewed radiology reports from this admission including: chest xray and chest CT scan    Recent Cultures (last 7 days):     Results from last 7 days   Lab Units 09/08/21  0458 09/08/21  0457   BLOOD CULTURE  Received in Microbiology Lab  Culture in Progress  Received in Microbiology Lab  Culture in Progress  Lines/Drains:  Invasive Devices     Peripheral Intravenous Line            Peripheral IV 09/08/21 Right Antecubital 1 day                Telemetry:        Last 24 Hours Medication List:   Current Facility-Administered Medications   Medication Dose Route Frequency Provider Last Rate    acetaminophen  650 mg Oral Q6H PRN Yani Dejesus PA-C      albuterol  2 puff Inhalation Q4H PRN Florecita Rueda PA-C      apixaban  2 5 mg Oral BID Florecita Rueda PA-C      azithromycin  500 mg Intravenous Q24H Romulo Smyth  mg (09/09/21 1106)    benzonatate  100 mg Oral TID PRN Fernanda Rueda PA-C      cefTRIAXone  1,000 mg Intravenous Q24H Florecita Rueda PA-C 1,000 mg (09/09/21 0521)    diltiazem  120 mg Oral Daily Florecita Rueda PA-C      gabapentin  200 mg Oral BID Florecita Rueda PA-C      guaiFENesin  200 mg Oral Q4H Romulo Smyth MD      iron polysaccharides  150 mg Oral Daily Florecita Rueda PA-C      sodium chloride  3 mL Nebulization TID Monse Dasilva MD      And    levalbuterol  1 25 mg Nebulization TID Monse Dasilva MD      lisinopril  5 mg Oral Daily Florecita Rueda PA-C      metoprolol succinate  100 mg Oral Daily Florecita Rueda PA-C      [START ON 9/11/2021] pravastatin  40 mg Oral Once per day on Tue Sat Yani Dejesus PA-C          Today, Patient Was Seen By: Jaylyn Li MD    ** Please Note: This note has been constructed using a voice recognition system   **

## 2021-09-09 NOTE — PLAN OF CARE
Problem: PHYSICAL THERAPY ADULT  Goal: Performs mobility at highest level of function for planned discharge setting  See evaluation for individualized goals  Description: Treatment/Interventions: Functional transfer training, ADL retraining, LE strengthening/ROM, Elevations, Therapeutic exercise, Endurance training, Patient/family training, Gait training, Spoke to MD, Spoke to nursing     See flowsheet documentation for full assessment, interventions and recommendations  Outcome: Adequate for Discharge  Note: Prognosis: Good  Problem List: Impaired balance, Decreased mobility, Impaired hearing  Assessment: Pt seen for PT treatment today with focus on gait training and stair training  Pt presents semi-supine in bed, eager and agreeable to participate in session, denies pain or SOB  Pt completes all bed mobility independently, transfers at independently, ambulates 130ft with RW at [facility] level (therapist management of O2 tank), and negotiates 5 stairs with HR assist at [facility] level  Pt overall making excellent progress towards therapy goals, demonstrates improved balance and confident during mobility with use of RW as compared to [facility]  Pt's daughter present t/o session, reports she plans to self purchase a beatriz RW for home  Pt denies SOB with functional mobility training, supplemental O2 at 3L (baseline does not wear)  Pt and pt's daughter both deny concerns about return to home upon d/c  Will continue skilled PT POC 1-2 additional sessions to encourage safe transition to use of SPC for ambulation as able, as pt utilized SPC at baseline  Pt was left seated in bedside recliner chair with chair alarm engaged, needs within reach, and lines intact, family present  Care returned to RN  Barriers to Discharge: None  Barriers to Discharge Comments: Although pt lives at home alone, pt demonstrates good safety awareness + has excellent family support at home (checks in at least daily and provides assistance as needed)  PT Discharge Recommendation: No rehabilitation needs ([address] )          See flowsheet documentation for full assessment

## 2021-09-09 NOTE — CASE MANAGEMENT
LOS: 1 day   Bundle: No   Readmission: No   Unplanned Readmission Score: 14    Patient admitted to [facility] on 9/8/21 for right upper lobe pna  CM Met with patient and granddaughter today to introduce CM, review role, complete initial assessment and discuss DCP  Lives [place]: [address] [place] [address]  Lives with: Son   Home Type & Entry: House with 2 steps entry  DME: Patient uses straight cane to ambulate  Patient does not remember where she got cane from  Patient may be interested in a rolator  CM to follow up with Tejas Chamberlain about whether patient needs a rolator at this time  ADLs: Independent   VNA history: No  STR history: No  Pharmacy Preference & Coverage: [facility]  Patient states she has prescription insurance and can normally afford prescriptions  Mental Health/Drug/ETOH history:No  Dialysis history: None   POA/AD/LW: No; Patient is not interested in information about advanced directives at this time  Income/Employment: [employer] and [employer] pension  Transportation: Family transports  PCP: ALMITA [address]: Daughter Tejas Chamberlain    DCP: Home with no needs      CM reviewed discharge planning process including the following: identifying caregivers at home, preference for d/c planning needs,    availability of treatment team to discuss questions or concerns patient and/or family may have regarding diagnosis, plan of care, old or new medications and discharge planning   CM will continue to follow for care coordination and update assessment as appropriate

## 2021-09-09 NOTE — PROGRESS NOTES
"[facility]    Cardiology Progress Note  Chun Robin 80 y o  female   YOB: 1926 MRN: [id number]  Unit/Bed#: MS [facility] Encounter: [id number]      Subjective:   No significant events overnight  Assessments  Principal Problem:    Right upper lobe pneumonia  Active Problems:    Atrial fibrillation (HCC)    Hypertension, essential    Asthma exacerbation    Elevated troponin    CKD (chronic kidney disease) stage 3, GFR 30-59 ml/min (HCC)    Sepsis [facility])    Outpatient cardiologist is Dr Emily Gong  Elevated troponin  · No c/o chest pain  · Troponin peaked at 1 24 and mostly flat  · ECG - Afib, non-specific ST-T changes  · Echo done, personally reviewed - LVEF normal, no diagnotic RWMA, mild LVH, moderate TR with mild-moderate PHTN, mild-moderate AS,  dilated LA/RA, dilated IVC  · Likely related to sepsis/PNA + CK  · No further inpatient cardiac evaluation    Shortness of breath  · Currently on 3 L O2 NC at rest with SpO2 97-98%  · Assess SpO2 on room air  · She does have bilateral wheezing, and has evidence of pneumonia on CT chest  · Antibiotics and breathing treatments per primary service   · She has JVP elevation on exam, and echocardiogram does show evidence of mild-to-moderate pulmonary hypertension and IVC dilatation, but she has no peripheral edema, orthopnea or PND at present  · Would avoid more fluid repletion unless absolutely necessary from infection standpoint  · If continues to be hypoxic and needing oxygen, consider single dose trial of IV Lasix    Chronic atrial fibrillation with acutely rapid rates  · Rates initially mildly elevated, but has been well controlled since then  ·  continue metoprolol succinate 100 mg, Cardizem CD 1 20 mg   · Continue anticoagulation with Eliquis 2 5 mg bid    S/p PPM  · No acute complaints related to same  · Being monitored outpatient with Dr Ruiz  · Outpatient follow up    ? Sepsis/PNA  · procalcitonin " negative  · WBC trended up to 20  · CT chest with evidence of right upper/middle lobe pneumonia      Review of Systems   All other systems reviewed and are negative  Telemetry Review: Not on telemetry    Objective:   Vitals: Blood pressure 117/67, pulse 80, temperature 98 2 °F (36 8 °C), temperature source Oral, resp  rate 18, height 5' 2" (1 575 m), weight 50 6 kg (111 lb 8 8 oz), SpO2 99 %  , Body mass index is 20 4 kg/m² ,   Orthostatic Blood Pressures      Most Recent Value   Blood Pressure  117/67 filed at 09/09/2021 4294   Patient Position - Orthostatic VS  Lying filed at 09/09/2021 9836         Systolic (00DUM), GPY:038 , Min:98 , DEEPTI:876     Diastolic (07RGW), VHF:88, Min:53, Max:67    Wt Readings from Last 5 Encounters:   09/08/21 50 6 kg (111 lb 8 8 oz)     I/O       09/07 0701 - 09/08 0700 09/08 0701 - 09/09 0700 09/09 0701 - 09/10 0700    P  O    240    IV Piggyback 250      Total Intake(mL/kg) 250 (5 1)  240 (4 7)    Urine (mL/kg/hr)  200 (0 2) 250 (1 5)    Total Output  200 250    Net +250 -200 -10                     Physical Exam  Vitals and nursing note reviewed  Constitutional:       General: She is not in acute distress  Appearance: Normal appearance  She is well-developed  She is not ill-appearing  HENT:      Head: Normocephalic and atraumatic  Nose: No congestion  Eyes:      General: No scleral icterus  Conjunctiva/sclera: Conjunctivae normal    Neck:      Vascular: JVD present  No carotid bruit  Cardiovascular:      Rate and Rhythm: Normal rate  Rhythm irregularly irregular  Pulses: Normal pulses  Heart sounds: Murmur heard  No friction rub  No gallop  Pulmonary:      Effort: Pulmonary effort is normal  No tachypnea or respiratory distress  Breath sounds: Examination of the right-upper field reveals wheezing  Examination of the left-upper field reveals wheezing  Examination of the right-middle field reveals wheezing  Wheezing present  No rales  Abdominal:      General: There is no distension  Palpations: Abdomen is soft  Tenderness: There is no abdominal tenderness  Musculoskeletal:         General: No swelling or tenderness  Cervical back: Neck supple  No edema  Right lower leg: No edema  Left lower leg: No edema  Skin:     General: Skin is warm  Neurological:      General: No focal deficit present  Mental Status: She is alert and oriented to person, place, and time  Mental status is at baseline  Psychiatric:         Mood and Affect: Mood normal          Behavior: Behavior normal          Thought Content: Thought content normal        Laboratory Results: personally reviewed  Results from last 7 days   Lab Units 09/08/21  1124 09/08/21  0801 09/08/21  0457   TROPONIN I ng/mL 1 23* 1 24* 1 17*       CBC with diff:   Results from last 7 days   Lab Units 09/09/21  0500 09/08/21  0457   WBC Thousand/uL 20 07* 14 94*   HEMOGLOBIN g/dL 10 7* 13 3   HEMATOCRIT % 34 8 42 4   MCV fL 98 97   PLATELETS Thousands/uL 240 274   MCH pg 30 1 30 5   MCHC g/dL 30 7* 31 4   RDW % 13 1 12 9   MPV fL 11 1 10 5         CMP:  Results from last 7 days   Lab Units 09/09/21  0500 09/08/21  0457   POTASSIUM mmol/L 4 2 4 0   CHLORIDE mmol/L 109* 106   CO2 mmol/L 26 26   BUN mg/dL 31* 32*   CREATININE mg/dL 1 14* 1 33*   CALCIUM mg/dL 8 7 9 4   AST U/L 15 19   ALT U/L 16 15   ALK PHOS U/L 55 7 71 1   EGFR ml/min/1 73sq m 41 34         BMP:  Results from last 7 days   Lab Units 09/09/21  0500 09/08/21  0457   POTASSIUM mmol/L 4 2 4 0   CHLORIDE mmol/L 109* 106   CO2 mmol/L 26 26   BUN mg/dL 31* 32*   CREATININE mg/dL 1 14* 1 33*   CALCIUM mg/dL 8 7 9 4       BNP: No results for input(s): BNP in the last 72 hours      Magnesium:   Results from last 7 days   Lab Units 09/09/21  0500   MAGNESIUM mg/dL 1 9       Coags:   Results from last 7 days   Lab Units 09/08/21  0457   PTT seconds 26   INR  0 98       TSH:        Hemoglobin A1C   Results from last 7 days   Lab Units 09/08/21  0457   HEMOGLOBIN A1C % 5 7*       Lipid Profile:   Results from last 7 days   Lab Units 09/08/21  0457   TRIGLYCERIDES mg/dL 68 4   HDL mg/dL 70       Meds/Allergies   all current active meds have been reviewed and current meds:   Current Facility-Administered Medications   Medication Dose Route Frequency    acetaminophen (TYLENOL) tablet 650 mg  650 mg Oral Q6H PRN    albuterol (PROVENTIL HFA,VENTOLIN HFA) inhaler 2 puff  2 puff Inhalation Q4H PRN    apixaban (ELIQUIS) tablet 2 5 mg  2 5 mg Oral BID    azithromycin (ZITHROMAX) 500 mg in sodium chloride 0 9 % 250 mL IVPB  500 mg Intravenous Q24H    benzonatate (TESSALON PERLES) capsule 100 mg  100 mg Oral TID PRN    cefTRIAXone (ROCEPHIN) IVPB (premix in dextrose) 1,000 mg 50 mL  1,000 mg Intravenous Q24H    diltiazem (CARDIZEM CD) 24 hr capsule 120 mg  120 mg Oral Daily    gabapentin (NEURONTIN) capsule 200 mg  200 mg Oral BID    guaiFENesin (ROBITUSSIN) oral solution 200 mg  200 mg Oral Q4H    iron polysaccharides (FERREX) capsule 150 mg  150 mg Oral Daily    sodium chloride 0 9 % inhalation solution 3 mL  3 mL Nebulization TID    And    levalbuterol (XOPENEX) inhalation solution 1 25 mg  1 25 mg Nebulization TID    lisinopril (ZESTRIL) tablet 5 mg  5 mg Oral Daily    metoprolol succinate (TOPROL-XL) 24 hr tablet 100 mg  100 mg Oral Daily    [START ON 9/11/2021] pravastatin (PRAVACHOL) tablet 40 mg  40 mg Oral Once per day on Tue Sat     Medications Prior to Admission   Medication    apixaban (ELIQUIS) 2 5 mg    diltiazem (CARDIZEM CD) 120 mg 24 hr capsule    gabapentin (NEURONTIN) 100 mg capsule    iron polysaccharides (FERREX) 150 mg capsule    lisinopril (ZESTRIL) 5 mg tablet    metoprolol succinate (TOPROL-XL) 100 mg 24 hr tablet    rosuvastatin (CRESTOR) 5 mg tablet            Cardiac testing: reviewed  No results found for this or any previous visit      No results found for this or any previous visit  No results found for this or any previous visit  No results found for this or any previous visit  Advancement Flap (Double) Text: Given the location of the defect, inherent tension at the surgical site, and the proximity to free margins a double (bilateral) advancement flap was deemed most appropriate for wound reconstruction. The risks, benefits, and possible outcomes of this procedure were discussed along with the risks, benefits, and possible outcomes of other options for wound repair (including but not limited to: complex closure, other flaps, grafts, and second intention). The patient verbalized understanding and consent to the outlined procedure. The patient verbalized understanding that intraoperative conditions may necessitate a change in the outlined procedure resulting in modification of the original surgical plan. This decision may be made at the discretion of the surgeon, and is due to factors subject to change or that are difficult to predict preoperatively. Using a sterile surgical marker, an appropriate bilateral advancement flap was drawn incorporating the defect and placing the expected incisions within the relaxed skin tension lines where possible. The surgical site and surrounding skin were prepped and draped in sterile fashion.  Standing cones were removed from opposite ends of the defect within the appropriate surgical plane, repositioning as necessary based upon local tension, proximity to free margins/other anatomic structures, and position of the relaxed skin tension lines. The resulting defect was undermined widely and bilaterally in the appropriate surgical plane taking care to preserve local arteries, veins, nerves, and other structures of anatomic importance. This created a sliding flap capable of advancing bilaterally across the defect to close the wound under minimal tension. Hemostasis was achieved and then the wound was sutured in layered fashion.

## 2021-09-09 NOTE — PHYSICAL THERAPY NOTE
PHYSICAL THERAPY TREATMENT  TIME: 0321-2826    NAME:  Michelle Krishna  DATE: 09/09/21    AGE:   89 y o  Mrn:   [id number]  Length Of Stay: 1    ADMIT DX:  Atrial fibrillation ([facility] ) [I48 91]  Shortness of breath [R06 02]  Pneumonia [J18 9]  Hypoxia [R09 02]  Elevated troponin [R77 8]  NSTEMI (non-ST elevated myocardial infarction) ([facility] ) [I21 4]  CECI (acute kidney injury) ([facility] ) [N17 9]  Asthma exacerbation [J45 901]    Past Medical History:   Diagnosis Date    Anemia     Asthma     Atrial fibrillation ([place] [address] ) 11/08/2019    CAD     CKD (chronic kidney disease)     Current use of long term anticoagulation 11/08/2019    Hypertension, essential 11/08/2019    Pacemaker     TIA (transient ischemic attack) 11/08/2019     History reviewed  No pertinent surgical history  Performed at least 2 patient identifiers during session: Name and ID bracelet        09/09/21 0815   PT Last Visit   PT Visit Date 09/09/21   Note Type   Note Type Treatment   Pain Assessment   Pain Assessment Tool 0-10   Pain Score No Pain   Effect of Pain on Daily Activities n/a   Hospital Pain Intervention(s) Ambulation/increased activity;Repositioned   Multiple Pain Sites No   Restrictions/Precautions   Weight Bearing Precautions Per Order No   Other Precautions Chair Alarm; Bed Alarm;Hard of hearing;O2  ([place]; 3L O2 via NC)   General   Chart Reviewed Yes   Additional Pertinent History Pt admitted 9/8/21 with SOB, dx: CECI, R lower lobe PNA, NSTEMI  No significant change since previous session  Response to Previous Treatment Patient with no complaints from previous session  Family/Caregiver Present Yes  (daughter present t/o session)   Cognition   Overall Cognitive Status WFL   Arousal/Participation Alert; Cooperative   Attention Within functional limits   Orientation Level Oriented X4   Memory Within functional limits   Following Commands Follows multistep commands with increased time or repetition  (due to hearing impairment) Subjective   Subjective "I am hoping to go home today! I'm ready to get out of here "   Bed Mobility   Supine to Sit 7  Independent   Sit to Supine 7  Independent   Transfers   Sit to Stand 7  Independent   Stand to Sit 7  Independent   Stand pivot 7  Independent  (bed>chair transfer)   Ambulation/Elevation   Gait pattern WNL; Short stride; Forward Flexion   Gait Assistance 6  Modified independent   Additional items Assist x 1;Other (Comment)  (therapist management of O2 tank)   Assistive Device Rolling walker  (beatriz RW)   Distance 130ft x1 including change in direction and stair training half way   Stair Management Assistance 6  Modified independent   Additional items Assist x 1;Other (Comment)  (therapist management of O2 tubing/tank)   Stair Management Technique One rail R;Step to pattern; Foreward   Number of Stairs 5   Balance   Static Sitting Good   Dynamic Sitting Good   Static Standing Good   Dynamic Standing Fair +   Ambulatory Fair +   Endurance Deficit   Endurance Deficit Yes   Endurance Deficit Description At baseline pt does not wear supplemental O2  Pt currently on 3L O2 via NC, however denies SOB at rest or with mobility  Activity Tolerance   Activity Tolerance Patient tolerated treatment well   Medical Staff Made Aware Spoke with SLIM and CM   Nurse Made Aware Spoke with MERYL Xiong pre/post session   Assessment   Prognosis Good   Problem List Impaired balance;Decreased mobility; Impaired hearing   Assessment Pt seen for PT treatment today with focus on gait training and stair training  Pt presents semi-supine in bed, eager and agreeable to participate in session, denies pain or SOB  Pt completes all bed mobility independently, transfers at independently, ambulates 130ft with RW at [facility] level (therapist management of O2 tank), and negotiates 5 stairs with HR assist at [facility] level   Pt overall making excellent progress towards therapy goals, demonstrates improved balance and confident during mobility with use of RW as compared to [facility]  Pt's daughter present t/o session, reports she plans to self purchase a beatriz RW for home  Pt denies SOB with functional mobility training, supplemental O2 at 3L (baseline does not wear)  Pt and pt's daughter both deny concerns about return to home upon d/c  Will continue skilled PT POC 1-2 additional sessions to encourage safe transition to use of SPC for ambulation as able, as pt utilized SPC at baseline  Pt was left seated in bedside recliner chair with chair alarm engaged, needs within reach, and lines intact, family present  Care returned to RN  Barriers to Discharge None   Barriers to Discharge Comments Although pt lives at home alone, pt demonstrates good safety awareness + has excellent family support at home (checks in at least daily and provides assistance as needed)  Goals   Patient Goals "to go home"   [facility] Expiration Date 09/15/21   Short Term Goal #1 Patient PT goals established in order to address patient self reported goal of "to go home"  Pt will complete all transfers at [facility] level with LRAD, in order to increase safety with functional mobility  Pt will ambulate >150ft with LRAD at [facility] level in order to increase safety with household and short community distance functional mobility  Pt will negotiate 3-5 stairs with HR assist at [facility] level in order to demonstrate safety to enter/exit her home  Pt will improve AM-PAC score to >/= 24/24 in order to increase independence with mobility and decrease burden of care  Pt will improve Barthel Index score to >/= 75/100 in order to increase independence and decrease risk of falls  Pt will improve Elderly Mobility Scale score to >/= 17/20 in order to decrease burden of care and increase independence with functional mobility and ADLs  Pt will improve dynamic standing balance to >/= GOOD grade in order to promote safety and increased independence with mobility   Pt will tolerate >3hrs OOB in upright position, in order to improve muscular endurance and respiratory status  PT Treatment Day 1   Plan   Treatment/Interventions Elevations;Gait training;Spoke to nursing;Family   Progress Improving as expected   PT Frequency 2-3x/wk   Recommendation   PT Discharge Recommendation No rehabilitation needs  ([address] [facility] )   Additional Comments Pt owns [facility] for ambulation and  for longer community mobility if needed  Pt's daughter also states she will self purchase a beatriz RW  AM-PAC Basic Mobility Inpatient   Turning in Bed Without Bedrails 4   Lying on Back to Sitting on Edge of Flat Bed 4   Moving Bed to Chair 4   Standing Up From Chair 4   Walk in Room 3   Climb 3-5 Stairs 3   Basic Mobility Inpatient Raw Score 22   Basic Mobility Standardized Score 47 4       The patient's AM-PAC Basic Mobility Inpatient Short Form Raw Score is 22, Standardized Score is 47 4  A standardized score greater than 42 9 suggests the patient may benefit from discharge to home  Please also refer to the recommendation of the Physical Therapist for safe discharge planning          Vane Kaye, PT, DPT   Available via [employer]  [facility] # [id number]  [place] License - [id number]  9/3/8287

## 2021-09-09 NOTE — PLAN OF CARE
Problem: Potential for Falls  Goal: Patient will remain free of falls  Description: INTERVENTIONS:  - Educate patient/family on patient safety including physical limitations  - Instruct patient to call for assistance with activity   - Consult OT/PT to assist with strengthening/mobility   - Keep Call bell within reach  - Keep bed low and locked with side rails adjusted as appropriate  - Keep care items and personal belongings within reach  - Initiate and maintain comfort rounds  - Make Fall Risk Sign visible to staff  - Initiate/Maintain bed alarm  - Apply yellow socks and bracelet for high fall risk patients  - Consider moving patient to room near nurses station  Outcome: Progressing     Problem: MOBILITY - ADULT  Goal: Maintain or return to baseline ADL function  Description: INTERVENTIONS:  -  Assess patient's ability to carry out ADLs; assess patient's baseline for ADL function and identify physical deficits which impact ability to perform ADLs (bathing, care of mouth/teeth, toileting, grooming, dressing, etc )  - Assess/evaluate cause of self-care deficits   - Assess range of motion  - Assess patient's mobility; develop plan if impaired  - Assess patient's need for assistive devices and provide as appropriate  - Encourage maximum independence but intervene and supervise when necessary  - Consider OT consult to assist with ADL evaluation and planning for discharge  - Provide patient education as appropriate  Outcome: Progressing

## 2021-09-09 NOTE — PROGRESS NOTES
Progress Note - Pulmonary   Chun Robin 80 y o  female MRN: [id number]  Unit/Bed#: -01 Encounter: [id number]    Assessment:    1  Sepsis  - presented with sudden onset shortness of breath  Also reports of low-grade fever, chills and mild cough with productive whitish sputum  Meets sepsis criteria on admission  Leukocytosis WBC 14 94   - chest x-ray showed possible moderate right pneumonia with trace right effusion    - currently on empiric antibiotics    2  Multilobar pneumonia  - meets sepsis criteria on admission  Currently no fever  She has leukocytosis to 14 94  Lactic acid is negative  COVID-19 negative  Patient reports she received COVID-19 vaccine x2   - chest x-ray showed possible moderate right pneumonia with trace right effusion  - CT chest showed pneumonia predominantly involving the right upper lobe and to a lesser extent the right middle lobe  - patient is clinically improving  Saturating well on room air  She has been afebrile  - Leukocytosis is trending up likely due to steroid: she was given methyl pred 125 mg at ED   - Procalcitonin is trending up 0 11 > 0 30     3  Mild intermittent asthma  - presented with acute shortness of breath, cough with whitish sputum and wheezing  Improved with breathing treatment  - reports on albuterol as needed  She was prescribed Advair before however she does not use it  Reports she uses albuterol once every 3 days or so  - patient is comfortable without respiratory distress  She has mild occasional wheezing and rhonchi yanira on right lung currently  - currently on Xopenex Q 8 hourly and albuterol as needed  4  Large hiatal hernia  - She was found to have large hiatal hernia on CT chest   - Currently she is asymptomatic, no nausea, vomiting  She was evaluated by speech therapy  5  Permanent Afib  - rate controlled  - on metoprolol, diltiazem and Eliquis     6   History of TIA  - history of TIA more than 10 years ago without residual deficit  - currently on low-dose Eliquis     7  CKD stage 3  - current creatinine 1 1  Last creatinine 1 26 on August     Plan:  - Follow-up with urine strep pneumonia, Legionella antigen, sputum culture, blood culture  - Could be considered to add flagyl to cover anaerobes for possible aspiration    - Add Breo upon discharge  Continue albuterol as needed  - She will need to follow up with pulmonology as an outpatient upon discharge  - Patient is very good 95 years ago and she is independent with her ADLs  Could be considered surgery referral for further evaluation and treatment for her large hiatal hernia  Chief Complaint:   Shortness of breath    Subjective:   Patient reports she is feeling better  She is concerning for mold at her home  [place] was [place] on Labor Day weekend  Currently patient denies any shortness of breath, cough, fever, chills, nausea or vomiting  She reports she was diagnosis of asthma more than 30 years ago and she has wheezing most of the time  However she had never hospitalized because of asthma  No significant event overnight  Objective:     Vitals: Blood pressure 117/67, pulse 80, temperature 98 2 °F (36 8 °C), temperature source Oral, resp  rate 18, height 5' 2" (1 575 m), weight 50 6 kg (111 lb 8 8 oz), SpO2 96 %  ,Body mass index is 20 4 kg/m²  Intake/Output Summary (Last 24 hours) at 9/9/2021 1336  Last data filed at 9/9/2021 0901  Gross per 24 hour   Intake 240 ml   Output 450 ml   Net -210 ml       Invasive Devices     Peripheral Intravenous Line            Peripheral IV 09/08/21 Right Antecubital 1 day                Physical Exam  Vitals and nursing note reviewed  Constitutional:       General: She is not in acute distress  Appearance: She is well-developed  She is not ill-appearing  HENT:      Head: Normocephalic and atraumatic  Eyes:      General: No scleral icterus       Conjunctiva/sclera: Conjunctivae normal    Cardiovascular:      Rate and Rhythm: Normal rate  Rhythm irregular  Pulses: Normal pulses  Heart sounds: Normal heart sounds  No murmur heard  No friction rub  No gallop  Pulmonary:      Effort: Pulmonary effort is normal  No respiratory distress  Breath sounds: Rhonchi present  No wheezing or rales  Abdominal:      General: Bowel sounds are normal  There is no distension  Palpations: Abdomen is soft  Tenderness: There is no abdominal tenderness  There is no guarding  Musculoskeletal:      Cervical back: Neck supple  Right lower leg: No edema  Left lower leg: No edema  Skin:     General: Skin is warm and dry  Neurological:      Mental Status: She is alert  Mental status is at baseline  Psychiatric:         Mood and Affect: Mood normal          Behavior: Behavior normal        Labs: I have personally reviewed pertinent lab results  Imaging and other studies: I have personally reviewed pertinent reports

## 2021-09-10 VITALS
BODY MASS INDEX: 20.53 KG/M2 | OXYGEN SATURATION: 95 % | RESPIRATION RATE: 16 BRPM | HEART RATE: 69 BPM | WEIGHT: 111.55 LBS | HEIGHT: 62 IN | DIASTOLIC BLOOD PRESSURE: 76 MMHG | SYSTOLIC BLOOD PRESSURE: 150 MMHG | TEMPERATURE: 98.8 F

## 2021-09-10 PROBLEM — R77.8 ELEVATED TROPONIN: Status: RESOLVED | Noted: 2021-09-08 | Resolved: 2021-09-10

## 2021-09-10 PROBLEM — R79.89 ELEVATED TROPONIN: Status: RESOLVED | Noted: 2021-09-08 | Resolved: 2021-09-10

## 2021-09-10 LAB
ANION GAP SERPL CALCULATED.3IONS-SCNC: 6 MMOL/L (ref 4–13)
BASOPHILS # BLD AUTO: 0.02 THOUSANDS/ΜL (ref 0–0.1)
BASOPHILS NFR BLD AUTO: 0 % (ref 0–1)
BUN SERPL-MCNC: 30 MG/DL (ref 6–20)
CALCIUM SERPL-MCNC: 8.6 MG/DL (ref 8.4–10.2)
CHLORIDE SERPL-SCNC: 106 MMOL/L (ref 96–108)
CO2 SERPL-SCNC: 29 MMOL/L (ref 22–33)
CREAT SERPL-MCNC: 1.16 MG/DL (ref 0.4–1.1)
EOSINOPHIL # BLD AUTO: 0.13 THOUSAND/ΜL (ref 0–0.61)
EOSINOPHIL NFR BLD AUTO: 1 % (ref 0–6)
ERYTHROCYTE [DISTWIDTH] IN BLOOD BY AUTOMATED COUNT: 13.4 % (ref 11.6–15.1)
GFR SERPL CREATININE-BSD FRML MDRD: 40 ML/MIN/1.73SQ M
GLUCOSE SERPL-MCNC: 81 MG/DL (ref 65–140)
HCT VFR BLD AUTO: 34.6 % (ref 34.8–46.1)
HGB BLD-MCNC: 10.9 G/DL (ref 11.5–15.4)
IMM GRANULOCYTES # BLD AUTO: 0.03 THOUSAND/UL (ref 0–0.2)
IMM GRANULOCYTES NFR BLD AUTO: 0 % (ref 0–2)
LYMPHOCYTES # BLD AUTO: 1.61 THOUSANDS/ΜL (ref 0.6–4.47)
LYMPHOCYTES NFR BLD AUTO: 11 % (ref 14–44)
MCH RBC QN AUTO: 30.6 PG (ref 26.8–34.3)
MCHC RBC AUTO-ENTMCNC: 31.5 G/DL (ref 31.4–37.4)
MCV RBC AUTO: 97 FL (ref 82–98)
MONOCYTES # BLD AUTO: 1.33 THOUSAND/ΜL (ref 0.17–1.22)
MONOCYTES NFR BLD AUTO: 9 % (ref 4–12)
NEUTROPHILS # BLD AUTO: 11.84 THOUSANDS/ΜL (ref 1.85–7.62)
NEUTS SEG NFR BLD AUTO: 79 % (ref 43–75)
PLATELET # BLD AUTO: 252 THOUSANDS/UL (ref 149–390)
PMV BLD AUTO: 10.7 FL (ref 8.9–12.7)
POTASSIUM SERPL-SCNC: 4.1 MMOL/L (ref 3.5–5)
RBC # BLD AUTO: 3.56 MILLION/UL (ref 3.81–5.12)
SODIUM SERPL-SCNC: 141 MMOL/L (ref 133–145)
WBC # BLD AUTO: 14.96 THOUSAND/UL (ref 4.31–10.16)

## 2021-09-10 PROCEDURE — 94760 N-INVAS EAR/PLS OXIMETRY 1: CPT

## 2021-09-10 PROCEDURE — 80048 BASIC METABOLIC PNL TOTAL CA: CPT | Performed by: INTERNAL MEDICINE

## 2021-09-10 PROCEDURE — 94640 AIRWAY INHALATION TREATMENT: CPT

## 2021-09-10 PROCEDURE — 99239 HOSP IP/OBS DSCHRG MGMT >30: CPT | Performed by: INTERNAL MEDICINE

## 2021-09-10 PROCEDURE — 99232 SBSQ HOSP IP/OBS MODERATE 35: CPT | Performed by: INTERNAL MEDICINE

## 2021-09-10 PROCEDURE — 85025 COMPLETE CBC W/AUTO DIFF WBC: CPT | Performed by: INTERNAL MEDICINE

## 2021-09-10 RX ORDER — AMOXICILLIN AND CLAVULANATE POTASSIUM 875; 125 MG/1; MG/1
1 TABLET, FILM COATED ORAL EVERY 12 HOURS SCHEDULED
Qty: 8 TABLET | Refills: 0 | Status: SHIPPED | OUTPATIENT
Start: 2021-09-10 | End: 2021-09-13 | Stop reason: HOSPADM

## 2021-09-10 RX ADMIN — GABAPENTIN 200 MG: 100 CAPSULE ORAL at 08:22

## 2021-09-10 RX ADMIN — APIXABAN 2.5 MG: 2.5 TABLET, FILM COATED ORAL at 08:22

## 2021-09-10 RX ADMIN — POLYSACCHARIDE-IRON COMPLEX 150 MG: 150 CAPSULE ORAL at 08:22

## 2021-09-10 RX ADMIN — CEFTRIAXONE 1000 MG: 1 INJECTION, SOLUTION INTRAVENOUS at 06:06

## 2021-09-10 RX ADMIN — METOPROLOL SUCCINATE 100 MG: 100 TABLET, EXTENDED RELEASE ORAL at 11:16

## 2021-09-10 RX ADMIN — LEVALBUTEROL HYDROCHLORIDE 1.25 MG: 1.25 SOLUTION, CONCENTRATE RESPIRATORY (INHALATION) at 08:19

## 2021-09-10 RX ADMIN — GUAIFENESIN 200 MG: 100 SOLUTION ORAL at 03:23

## 2021-09-10 RX ADMIN — DILTIAZEM HYDROCHLORIDE 120 MG: 120 CAPSULE, COATED, EXTENDED RELEASE ORAL at 11:16

## 2021-09-10 RX ADMIN — AZITHROMYCIN MONOHYDRATE 500 MG: 500 INJECTION, POWDER, LYOPHILIZED, FOR SOLUTION INTRAVENOUS at 09:39

## 2021-09-10 RX ADMIN — LISINOPRIL 5 MG: 5 TABLET ORAL at 11:16

## 2021-09-10 RX ADMIN — GUAIFENESIN 200 MG: 100 SOLUTION ORAL at 06:06

## 2021-09-10 RX ADMIN — ISODIUM CHLORIDE 3 ML: 0.03 SOLUTION RESPIRATORY (INHALATION) at 08:19

## 2021-09-10 RX ADMIN — FLUTICASONE FUROATE AND VILANTEROL TRIFENATATE 1 PUFF: 100; 25 POWDER RESPIRATORY (INHALATION) at 08:27

## 2021-09-10 NOTE — PLAN OF CARE
Problem: Potential for Falls  Goal: Patient will remain free of falls  Description: INTERVENTIONS:  - Educate patient/family on patient safety including physical limitations  - Instruct patient to call for assistance with activity   - Consult OT/PT to assist with strengthening/mobility   - Keep Call bell within reach  - Keep bed low and locked with side rails adjusted as appropriate  - Keep care items and personal belongings within reach  - Initiate and maintain comfort rounds  - Make Fall Risk Sign visible to staff  - Initiate/Maintain bed alarm  - Apply yellow socks and bracelet for high fall risk patients  - Consider moving patient to room near nurses station  9/10/2021 1026 by Jung More RN  Outcome: Progressing  9/10/2021 1026 by Jung More RN  Outcome: Progressing     Problem: SAFETY ADULT  Goal: Maintain or return to baseline ADL function  Description: INTERVENTIONS:  -  Assess patient's ability to carry out ADLs; assess patient's baseline for ADL function and identify physical deficits which impact ability to perform ADLs (bathing, care of mouth/teeth, toileting, grooming, dressing, etc )  - Assess/evaluate cause of self-care deficits   - Assess range of motion  - Assess patient's mobility; develop plan if impaired  - Assess patient's need for assistive devices and provide as appropriate  - Encourage maximum independence but intervene and supervise when necessary  - Involve family in performance of ADLs  - Assess for home care needs following discharge   - Consider OT consult to assist with ADL evaluation and planning for discharge  - Provide patient education as appropriate  9/10/2021 1026 by Jung More RN  Outcome: Progressing  9/10/2021 1026 by Jung More RN  Outcome: Progressing

## 2021-09-10 NOTE — PROGRESS NOTES
[facility]   Progress Note    Patient Name: Zina Beltran  Patient MRN: [de-identified]  Date of Service: 09/10/21 9:11 AM  Service: Cardiology    SUBJECTIVE  Interval: No acute events overnight  Feels well and is ready to go home  Now off supplemental oxygen  OBJECTIVE  Vitals:    09/10/21 0836   BP:    Pulse:    Resp:    Temp:    SpO2: 95%     I/O last 3 completed shifts: In: 56 [P O :240; IV Piggyback:250]  Out: 750 [Urine:750]  No intake/output data recorded        Physical Exam  General Appearance: Alert, cooperative, no distress, appears stated age  Head: Normocephalic, without obvious abnormality, atraumatic  Eyes: Sclerae anicteric  Neck: No carotid bruit or JVD  Lungs: bilateral expiratory wheezes, less rhonchi than prior, no rales  Heart: irreg irreg, rates normal, 2/6 murmur, no rub or gallop  Abdomen: Soft, non-tender, bowel sounds active  Extremities: Extremities normal, atraumatic, no edema  Skin: Warm and dry without and rashes or lesions    Current Medications:  Scheduled Meds:  Current Facility-Administered Medications   Medication Dose Route Frequency Provider Last Rate    acetaminophen  650 mg Oral Q6H PRN Florecita Rueda PA-C      albuterol  2 puff Inhalation Q4H PRN Florecita Rueda PA-C      apixaban  2 5 mg Oral BID Florecita Rueda PA-C      azithromycin  500 mg Intravenous Q24H Romulo Smyth MD Stopped (09/09/21 1206)    benzonatate  100 mg Oral TID PRN Roxanna Rueda PA-C      cefTRIAXone  1,000 mg Intravenous Q24H Florecita Rueda PA-C 1,000 mg (09/10/21 0606)    diltiazem  120 mg Oral Daily Florecita Rueda PA-C      fluticasone-vilanterol  1 puff Inhalation Daily Sophia Diaz MD      gabapentin  200 mg Oral BID Florecita Rueda PA-C      guaiFENesin  200 mg Oral Q4H Romulo Smyth MD      iron polysaccharides  150 mg Oral Daily Florecita Rueda PA-C      sodium chloride  3 mL Nebulization TID Adolfo Leal MD      And    levalbuterol  1 25 mg Nebulization TID Anjel Spring MD      lisinopril  5 mg Oral Daily Florecita Rueda PA-C      metoprolol succinate  100 mg Oral Daily Florecita Rueda PA-C      [START ON 9/11/2021] pravastatin  40 mg Oral Once per day on Tue Sat Florecita Rueda PA-C       Continuous Infusions:   PRN Meds:   acetaminophen    albuterol    benzonatate      Laboratory Studies:    Lab Results   Component Value Date    WBC 14 96 (H) 09/10/2021    HGB 10 9 (L) 09/10/2021    HCT 34 6 (L) 09/10/2021    MCV 97 09/10/2021     09/10/2021       Lab Results   Component Value Date    BUN 30 (H) 09/10/2021    CREATININE 1 16 (H) 09/10/2021    CALCIUM 8 6 09/10/2021    K 4 1 09/10/2021    CO2 29 09/10/2021     09/10/2021    ALKPHOS 55 7 09/09/2021    AST 15 09/09/2021    ALT 16 09/09/2021       Lab Results   Component Value Date    HGBA1C 5 7 (H) 09/08/2021       No results found for: TSH    Lab Results   Component Value Date    HDL 70 09/08/2021    LDLCALC 87 09/08/2021    TRIG 68 4 09/08/2021       CARDIAC IMAGING:  Echocardiogram 9/9/2021:   LEFT VENTRICLE:  Systolic function was normal  Ejection fraction was estimated to be 65 %  There were no regional wall motion abnormalities  Wall thickness was mildly to moderately increased  There was mild concentric hypertrophy      VENTRICULAR SEPTUM:  There was dyssynergic motion      RIGHT VENTRICLE:  The size was at the upper limits of normal      LEFT ATRIUM:  The atrium was moderately to markedly dilated      RIGHT ATRIUM:  The atrium was moderately to markedly dilated      MITRAL VALVE:  There was mild regurgitation      AORTIC VALVE:  The valve was probably trileaflet  Leaflets exhibited moderately increased thickness, moderate calcification, and moderately reduced cuspal separation  Transaortic velocity was increased due to valvular stenosis  There was moderate stenosis  Valve mean gradient was 12 mmHg  Estimated aortic valve area (by VTI) was 1 15 cmï¾²    Estimated aortic valve area (by Vmax) was 1 2 cmï¾²     TRICUSPID VALVE:  There was moderate to severe regurgitation  Regurgitation grade was 2-3+ on a scale of 0 to 4+  Estimated peak PA pressure was 52 mmHg  The findings suggest moderate pulmonary hypertension      PULMONIC VALVE:  There was mild regurgitation  Telemetry: Not on telemetry    IMAGING  CT chest wo contrast   Final Result      Pneumonia predominantly involving the right upper lobe and to a lesser extent the right middle lobe  Large hiatal hernia  Workstation performed: [id number]         XR chest 1 view portable   ED Interpretation   Right sided PNA      Final Result      Moderate right pneumonia with trace right effusion  Workstation performed: [id number]             ASSESSMENT & PLAN  #  Sepsis  #  Right Upper Lobe Pneumonia - CAP'  #  Asthma  #  Elevated troponin  #  CKD III  #  Permanent atrial fibrillation  #  Chronic anticoagulation   #  Biotronik dual chamber PPM  #  History TIA (>11 years ago)  #  Moderate AS  #  Moderate pulmonary hypertension     80year old female with the above mentioned past medical history who presented with acute onset shortness of breath and associated low grade fever with chills  Presentation revealed tachycardia, leukocytosis and RUL pneumonia on chest x-ray  She has been started on antibiotics for CAP  ECG revealed afib with RVR  Rates are now controlled and in 70's on exam  Today, she is doing well and improving with antibiotics  She is now off supplemental oxygen  ?Flagyl to be added for possible aspiration  Still with some bilateral expiratory wheezing on exam  No signs or symptoms of congestive heart failure on exam  Heart rates are well controlled on current regimen  Echo reviewed personally  Troponin elevation secondary to underlying sepsis from pneumonia and CKD  No acute coronary syndrome       RECOMMENDATIONS:  · Continue management of pneumonia per primary team  · Continue metoprolol succinate and cardizem at current doses  · Continue stroke prophylaxis with Eliquis 2 5 mg BID  · No additional inpatient cardiac evaluation at this time  · Follow up with Dr Ross Pepe  · Called and updated daughter    Principal Problem:    Right upper lobe pneumonia  Active Problems:    Atrial fibrillation (HCC)    Hypertension, essential    Asthma exacerbation    Elevated troponin    CKD (chronic kidney disease) stage 3, GFR 30-59 ml/min (HCC)    Sepsis ([facility] [address] )    Code Status: Level 1 - Full Code    Jordy Renee MD

## 2021-09-10 NOTE — DISCHARGE SUMMARY
[facility]  66   Discharge- Bishop Bullocks 9/7/1926, 80 y o  female MRN: [id number]  Unit/Bed#: -01 Encounter: [id number]  Primary Care Provider: Laurent Chavira MD   Date and time admitted to hospital: 9/8/2021  4:48 AM    * Right upper lobe pneumonia  Assessment & Plan  · Chest x-ray personally reviewed shows patchy opacities in the right lung, predominantly at the right upper lobe  · COVID-19 negative  · Likely community-acquired in nature  · Check Legionella and strep pneumo urinary antigen  · Obtain sputum culture if able to expectorates  · CC 3 days of IV ceftriaxone and azithromycin  · Will discharge on Augmentin 1 g b i d   -= total duration of 7 days    Sepsis ([address] )  Assessment & Plan  · Now resolved  · On presentation, patient met sepsis criteria  · Sepsis most likely due to right upper lobe pneumonia  CKD (chronic kidney disease) stage 3, GFR 30-59 ml/min [facility])  Assessment & Plan  Lab Results   Component Value Date    EGFR 40 09/10/2021    EGFR 41 09/09/2021    EGFR 34 09/08/2021    CREATININE 1 16 (H) 09/10/2021    CREATININE 1 14 (H) 09/09/2021    CREATININE 1 33 (H) 09/08/2021     · Baseline creatinine per review of outpatient records is 1 2-1 3  · Currently remains at baseline creatinine    Asthma exacerbation  Assessment & Plan  · Noted to be wheezing on admission to the emergency department  · Currently without wheezing at this time  · Likely related to pneumonia  · Hold further steroids    If wheezing recurs, would initiate steroids at that time  · Will schedule Xopenex TID    Hypertension, essential  Assessment & Plan  · Continue metoprolol and Cardizem for rate control and blood pressure control  · Continue lisinopril    Atrial fibrillation (HCC)  Assessment & Plan  · Patient with known permanent atrial fibrillation  · Chronically anticoagulated with 2 5 mg PO Eliquis BID secondary to age, CKD and weight  · [id number] score was 6 which corresponds to 9 8% annual risk of stroke  · Continue metoprolol and Cardizem  · Follows with [facility]    Elevated troponin-resolved as of 9/10/2021  Assessment & Plan  · Troponin noted to be elevated to 1 17 on admission  · DDX: non-MI trop elevation secondary to PNA vs  NSTEMI  · Patient is noted to be chronically anticoagulated with Eliquis 2 5 mg PO BID secondary to permanent atrial fibrillation  Will continue at this time rather than transition to heparin - will discuss with cardiology  · Emergency department physician discussed with patient's family who would not be interested in cardiac catheterization or invasive measures  · Troponin stabilized at 1 2  · Elevated troponin most likely due to sepsis versus PNA  · Give 162 mg of aspirin x1 now    · Echo with no  RWMA, LVEF of 60%, pulmonary hypertension, moderate AV stenosis      Discharging Resident Physician: Salvatore Santos MD  Attending: No att  providers found  PCP: Jj Lebron MD  Admission Date: 9/8/2021  Admission Date:   Admission Orders (From admission, onward)     Ordered        09/08/21 0617  INPATIENT ADMISSION  Once                   Discharge Date: 09/10/21    Disposition:     Home with VNA Services (Reminder: Complete face to face encounter)    Reason for Admission:  Rt Lobar pneumonia    Consultations During Hospital Stay:  · Pulmonary  · Cardiology  · Physical therapy    Procedures Performed:     Orders Placed This Encounter   Procedures    ED ECG Documentation Only       Diagnosis:    Medical Problems     Resolved Problems  Date Reviewed: 9/10/2021        Resolved    Elevated troponin 9/10/2021     Resolved by  Salvatore Santos MD                Principal Problem:    Right upper lobe pneumonia  Active Problems:    Atrial fibrillation ([address] )    Hypertension, essential    Asthma exacerbation    CKD (chronic kidney disease) stage 3, GFR 30-59 ml/min ([address] )    Sepsis ([facility] [address] )      Significant Findings / Test Results:     CT chest wo contrast Final Result by Ghanshyam Campuzano MD (09/09 0957)      Pneumonia predominantly involving the right upper lobe and to a lesser extent the right middle lobe  Large hiatal hernia  Workstation performed: [id number]         XR chest 1 view portable   ED Interpretation by Elisabeth Yoder MD (09/08 1004)   Right sided PNA      Final Result by Janice Humphreys MD (09/08 0745)      Moderate right pneumonia with trace right effusion  Workstation performed: [id number]         ·     Incidental Findings:   · None     Test Results Pending at Discharge (will require follow up): · None     Outpatient Tests Requested:  · None    Complications:  None    Hospital Course:     Jerri Sheppard is a 80 y o  female patient who originally presented to the hospital on 9/8/2021 due to acute shortness of breath  Chest x-ray on presentation showed a right lobar infiltrates consistent with a right lobar pneumonia  She has had IV antibiotic  Pulmonary was consulted who recommended CT of the chest   CT chest revealed hiatal hernia  She was recommended outpatient surgical follow-up  Cardiology was consulted in light of elevated troponin  Recommended outpatient follow-up with primary cardiologist     At the time of discharge, patient appears hemodynamically and clinically stable  She is discharged on Augmentin 1 g b i d  to complete 7 days of antibiotics  Condition at Discharge: stable     Discharge Day Visit / Exam:     Subjective: Patient seen and examined by me at bedside  Reports no new complaints  Feels much better when compared to presentation status  Denies cough, nausea, vomiting, dysuria and leg swelling or pain      Vitals: Blood Pressure: 150/76 (09/10/21 0734)  Pulse: 69 (09/10/21 0734)  Temperature: 98 8 °F (37 1 °C) (09/10/21 0734)  Temp Source: Oral (09/10/21 0734)  Respirations: 16 (09/10/21 0734)  Height: 5' 2" (157 5 cm) (09/08/21 0744)  Weight - Scale: 50 6 kg (111 lb 8 8 oz) (09/08/21 8644)  SpO2: 95 % (09/10/21 0836)  Exam:   Physical Exam  Vitals reviewed  Constitutional:       Appearance: Normal appearance  HENT:      Head: Normocephalic and atraumatic  Mouth/Throat:      Mouth: Mucous membranes are moist    Eyes:      Pupils: Pupils are equal, round, and reactive to light  Cardiovascular:      Rate and Rhythm: Normal rate  Rhythm irregular  Pulses: Normal pulses  Heart sounds: Normal heart sounds  Pulmonary:      Effort: Pulmonary effort is normal  No respiratory distress  Breath sounds: No stridor  Abdominal:      General: Bowel sounds are normal  There is no distension  Palpations: Abdomen is soft  There is no mass  Tenderness: There is no abdominal tenderness  Musculoskeletal:         General: Normal range of motion  Cervical back: Normal range of motion and neck supple  Right lower leg: No edema  Left lower leg: No edema  Skin:     General: Skin is warm  Neurological:      Mental Status: She is alert and oriented to person, place, and time  Psychiatric:         Mood and Affect: Mood normal        Discussion with Family:  Talked to patient/daughter  All questions/concerns were answered  they agrees with the plan  Discharge instructions/Information to patient and family:   See after visit summary for information provided to patient and family  Provisions for Follow-Up Care:  See after visit summary for information related to follow-up care and any pertinent home health orders  Planned Readmission: N/A     Discharge Medications:  See after visit summary for reconciled discharge medications provided to patient and family  Discharge Statement :  I spent 25 minutes discharging the patient  This time was spent on the day of discharge  I had direct contact with the patient on the day of discharge  Additional documentation is required if more than 30 minutes were spent on discharge           ** Please Note: This note has been constructed using a voice recognition system **

## 2021-09-10 NOTE — DISCHARGE INSTRUCTIONS
Pneumonia   WHAT YOU NEED TO KNOW:   Pneumonia is an infection in your lungs caused by bacteria, viruses, fungi, or parasites  You can become infected if you come in contact with someone who is sick  You can get pneumonia if you recently had surgery or needed a ventilator to help you breathe  Pneumonia can also be caused by accidentally inhaling saliva or small pieces of food  Pneumonia may cause mild symptoms, or it can be severe and life-threatening  DISCHARGE INSTRUCTIONS:   Seek care immediately if:   · You cough up blood  · Your heart beats more than 100 beats in 1 minute  · You are very tired, confused, and cannot think clearly  · You have chest pain or trouble breathing  · Your lips or fingernails turn gray or blue  Call your doctor if:   · Your symptoms are the same or get worse 48 hours after you start antibiotics  · Your fever is not below 99°F (37 2°C) 48 hours after you start antibiotics  · You have a fever higher than 101°F (38 3°C)  · You cannot eat, or you have loss of appetite, nausea, or are vomiting  · You have questions or concerns about your condition or care  Medicines: You may need any of the following:  · Antibiotics  treat pneumonia caused by bacteria  · Acetaminophen  decreases pain and fever  It is available without a doctor's order  Ask how much to take and how often to take it  Follow directions  Read the labels of all other medicines you are using to see if they also contain acetaminophen, or ask your doctor or pharmacist  Acetaminophen can cause liver damage if not taken correctly  Do not use more than 4 grams (4,000 milligrams) total of acetaminophen in one day  · NSAIDs , such as ibuprofen, help decrease swelling, pain, and fever  This medicine is available with or without a doctor's order  NSAIDs can cause stomach bleeding or kidney problems in certain people   If you take blood thinner medicine, always ask your healthcare provider if NSAIDs are safe for you  Always read the medicine label and follow directions  · Take your medicine as directed  Contact your healthcare provider if you think your medicine is not helping or if you have side effects  Tell him or her if you are allergic to any medicine  Keep a list of the medicines, vitamins, and herbs you take  Include the amounts, and when and why you take them  Bring the list or the pill bottles to follow-up visits  Carry your medicine list with you in case of an emergency  Follow up with your doctor as directed: You will need to return for more tests  Write down your questions so you remember to ask them during your visits  Manage your symptoms:   · Rest as needed  Rest often throughout the day  Alternate times of activity with times of rest     · Drink liquids as directed  Ask how much liquid to drink each day and which liquids are best for you  Liquids help thin your mucus, which may make it easier for you to cough it up  · Do not smoke  Avoid secondhand smoke  Smoking increases your risk for pneumonia  Smoking also makes it harder for you to get better after you have had pneumonia  Ask your healthcare provider for information if you need help to quit smoking  · Limit alcohol  Women should limit alcohol to 1 drink a day  Men should limit alcohol to 2 drinks a day  A drink of alcohol is 12 ounces of beer, 5 ounces of wine, or 1½ ounces of liquor  · Use a cool mist humidifier  A humidifier will help increase air moisture in your home  This may make it easier for you to breathe and help decrease your cough  · Keep your head elevated  You may be able to breathe better if you lie down with the head of your bed up  Prevent pneumonia:   · Wash your hands often  Use soap and water every time you wash your hands  Rub your soapy hands together, lacing your fingers  Use the fingers of one hand to scrub under the nails of the other hand  Wash for at least 20 seconds   Rinse with warm, running water for several seconds  Then dry your hands with a clean towel or paper towel  Use hand  that contains alcohol if soap and water are not available  Do not touch your eyes, nose, or mouth without washing your hands first          · Cover a sneeze or cough  Use a tissue that covers your mouth and nose  Throw the tissue away in a trash can right away  Use the bend of your arm if a tissue is not available  Wash your hands well with soap and water or use a hand   Do not stand close to anyone who is sneezing or coughing  · Stay away from others until you are well  Do not go to work or other activities  Wait until your symptoms are gone or your healthcare provider says it is okay to return  · Ask about vaccines you may need  You may need a vaccine to help prevent pneumonia  Get an influenza (flu) vaccine every year as soon as recommended, usually in September or October  Flu viruses change, so it is important to get a yearly flu vaccine  © Copyright [employer] 2021 Information is for End User's use only and may not be sold, redistributed or otherwise used for commercial purposes  All illustrations and images included in CareNotes® are the copyrighted property of A D A M , Inc  or [address] [place] [address]  The above information is an  only  It is not intended as medical advice for individual conditions or treatments  Talk to your doctor, nurse or pharmacist before following any medical regimen to see if it is safe and effective for you  Amoxicillin/Clavulanate Potassium (By mouth)   Amoxicillin (j-hbu-b-DODIE-in), Clavulanate Potassium (WEDA-zm-oe-parviz coc-ZNV-oc-um)  Treats infections  This medicine is a penicillin antibiotic  Brand Name(s): Augmentin, Augmentin ES-600, Augmentin XR   There may be other brand names for this medicine  When This Medicine Should Not Be Used: This medicine is not right for everyone   Do not use it if you had an allergic reaction to amoxicillin, clavulanate, or a similar antibiotic (penicillin or cephalosporin), or if you had liver problems caused by Augmentin®  How to Use This Medicine:   Liquid, Tablet, Chewable Tablet, Long Acting Tablet  · Your doctor will tell you how much medicine to use  Do not use more than directed  · Take this medicine with a snack or at the beginning of a meal to help prevent nausea  · Chewable tablets: Chew the tablet completely before you swallow it  · Measure the oral liquid medicine with a marked measuring spoon, oral syringe, or medicine cup  Shake the medicine well just before you measure each dose  Rinse the spoon or dropper after each use  · Swallow the extended-release tablet whole  Do not crush, break, or chew it  · Take all of the medicine in your prescription to clear up your infection, even if you feel better after the first few doses  · Missed dose: Take a dose as soon as you remember  If it is almost time for your next dose, wait until then and take a regular dose  Do not take extra medicine to make up for a missed dose  · Tablet, extended-release tablet, chewable tablet: Store at room temperature, away from heat, moisture, and direct light  · Oral liquid: Store in the refrigerator  Do not freeze  · Throw away any unused oral liquid after 10 days  Drugs and Foods to Avoid:   Ask your doctor or pharmacist before using any other medicine, including over-the-counter medicines, vitamins, and herbal products  · Some medicines can affect how this medicine works  Tell your doctor if you are taking a blood thinner (such as warfarin), allopurinol, or probenecid  Warnings While Using This Medicine:   · Tell your doctor if you are pregnant or breastfeeding, or if you have kidney disease, liver disease, or mononucleosis (mono)  · Birth control pills may not work as well while you are taking this medicine  Use another form of birth control to prevent pregnancy    · This medicine can cause diarrhea  Call your doctor if the diarrhea becomes severe, does not stop, or is bloody  Do not take any medicine to stop diarrhea until you have talked to your doctor  Diarrhea can occur 2 months or more after you stop taking this medicine  · Tell any doctor or dentist who treats you that you are using this medicine  This medicine may affect certain medical test results  · Call your doctor if your symptoms do not improve or if they get worse  · The chewable tablet and oral liquid contain phenylalanine  Talk to your doctor before you use this medicine if you have phenylketonuria (PKU)  · Keep all medicine out of the reach of children  Never share your medicine with anyone  Possible Side Effects While Using This Medicine:   Call your doctor right away if you notice any of these side effects:  · Allergic reaction: Itching or hives, swelling in your face or hands, swelling or tingling in your mouth or throat, chest tightness, trouble breathing  · Blistering, peeling, red skin rash  · Change in how much or how often you urinate  · Dark urine or pale stools, nausea, vomiting, loss of appetite, stomach pain, yellow eyes or skin  · Diarrhea that may contain blood, stomach cramps  If you notice these less serious side effects, talk with your doctor:   · Diaper rash  · Mild diarrhea, nausea, vomiting  · Tooth discoloration (in children)  If you notice other side effects that you think are caused by this medicine, tell your doctor  Call your doctor for medical advice about side effects  You may report side effects to FDA at 6-981-FDA-2308  © Copyright [employer] 2021 Information is for End User's use only and may not be sold, redistributed or otherwise used for commercial purposes  The above information is an  only  It is not intended as medical advice for individual conditions or treatments   Talk to your doctor, nurse or pharmacist before following any medical regimen to see if it is safe and effective for you

## 2021-09-10 NOTE — DISCHARGE INSTR - AVS FIRST PAGE
DISCHARGE INSTRUCTIONS   1  Follow up with Dr Rodrigo Rodrigez MD in one week  in regards to recent hospitalization    2  Take medications regularly     New medication:-  Augmentin 1 twice daily for the next 4 days    3  Come back to the ER if symptoms recur or worsen   4  Activity as tolerated  5  Diet : regular   Small meals

## 2021-09-11 ENCOUNTER — HOSPITAL ENCOUNTER (INPATIENT)
Facility: HOSPITAL | Age: 86
LOS: 1 days | Discharge: HOME | DRG: 393 | End: 2021-09-13
Attending: EMERGENCY MEDICINE | Admitting: INTERNAL MEDICINE
Payer: MEDICARE

## 2021-09-11 DIAGNOSIS — R19.7 DIARRHEA: ICD-10-CM

## 2021-09-11 DIAGNOSIS — M18.11 OSTEOARTHRITIS OF CARPOMETACARPAL (CMC) JOINT OF RIGHT THUMB: ICD-10-CM

## 2021-09-11 DIAGNOSIS — R11.2 NAUSEA AND VOMITING: Primary | ICD-10-CM

## 2021-09-11 DIAGNOSIS — J15.6: ICD-10-CM

## 2021-09-11 PROBLEM — K52.9 ACUTE GASTROENTERITIS: Status: ACTIVE | Noted: 2021-09-11

## 2021-09-11 LAB
ALBUMIN SERPL BCP-MCNC: 3.7 G/DL (ref 3.4–4.8)
ALP SERPL-CCNC: 102 U/L (ref 35–140)
ALT SERPL W P-5'-P-CCNC: 50 U/L (ref 5–54)
ANION GAP SERPL CALCULATED.3IONS-SCNC: 10 MMOL/L (ref 4–13)
AST SERPL W P-5'-P-CCNC: 26 U/L (ref 15–41)
BACTERIA SPT RESP CULT: ABNORMAL
BACTERIA SPT RESP CULT: ABNORMAL
BASOPHILS # BLD AUTO: 0.01 THOUSANDS/ΜL (ref 0–0.1)
BASOPHILS NFR BLD AUTO: 0 % (ref 0–1)
BILIRUB DIRECT SERPL-MCNC: 0.17 MG/DL (ref 0–0.3)
BILIRUB SERPL-MCNC: 0.9 MG/DL (ref 0.3–1.2)
BUN SERPL-MCNC: 23 MG/DL (ref 6–20)
CALCIUM SERPL-MCNC: 9.4 MG/DL (ref 8.4–10.2)
CHLORIDE SERPL-SCNC: 105 MMOL/L (ref 96–108)
CO2 SERPL-SCNC: 27 MMOL/L (ref 22–33)
CREAT SERPL-MCNC: 1.15 MG/DL (ref 0.4–1.1)
EOSINOPHIL # BLD AUTO: 0.15 THOUSAND/ΜL (ref 0–0.61)
EOSINOPHIL NFR BLD AUTO: 1 % (ref 0–6)
ERYTHROCYTE [DISTWIDTH] IN BLOOD BY AUTOMATED COUNT: 13 % (ref 11.6–15.1)
GFR SERPL CREATININE-BSD FRML MDRD: 41 ML/MIN/1.73SQ M
GLUCOSE SERPL-MCNC: 132 MG/DL (ref 65–140)
GRAM STN SPEC: ABNORMAL
HCT VFR BLD AUTO: 42.7 % (ref 34.8–46.1)
HGB BLD-MCNC: 13.4 G/DL (ref 11.5–15.4)
IMM GRANULOCYTES # BLD AUTO: 0.03 THOUSAND/UL (ref 0–0.2)
IMM GRANULOCYTES NFR BLD AUTO: 0 % (ref 0–2)
LIPASE SERPL-CCNC: 15 U/L (ref 13–60)
LYMPHOCYTES # BLD AUTO: 0.37 THOUSANDS/ΜL (ref 0.6–4.47)
LYMPHOCYTES NFR BLD AUTO: 3 % (ref 14–44)
MCH RBC QN AUTO: 29.9 PG (ref 26.8–34.3)
MCHC RBC AUTO-ENTMCNC: 31.4 G/DL (ref 31.4–37.4)
MCV RBC AUTO: 95 FL (ref 82–98)
MONOCYTES # BLD AUTO: 1.09 THOUSAND/ΜL (ref 0.17–1.22)
MONOCYTES NFR BLD AUTO: 8 % (ref 4–12)
NEUTROPHILS # BLD AUTO: 11.47 THOUSANDS/ΜL (ref 1.85–7.62)
NEUTS SEG NFR BLD AUTO: 88 % (ref 43–75)
PLATELET # BLD AUTO: 274 THOUSANDS/UL (ref 149–390)
PMV BLD AUTO: 10.9 FL (ref 8.9–12.7)
POTASSIUM SERPL-SCNC: 3.5 MMOL/L (ref 3.5–5)
PROT SERPL-MCNC: 7.2 G/DL (ref 6.4–8.3)
RBC # BLD AUTO: 4.48 MILLION/UL (ref 3.81–5.12)
SODIUM SERPL-SCNC: 142 MMOL/L (ref 133–145)
WBC # BLD AUTO: 13.12 THOUSAND/UL (ref 4.31–10.16)

## 2021-09-11 PROCEDURE — 36415 COLL VENOUS BLD VENIPUNCTURE: CPT | Performed by: EMERGENCY MEDICINE

## 2021-09-11 PROCEDURE — 80048 BASIC METABOLIC PNL TOTAL CA: CPT | Performed by: EMERGENCY MEDICINE

## 2021-09-11 PROCEDURE — 99220 PR INITIAL OBSERVATION CARE/DAY 70 MINUTES: CPT | Performed by: INTERNAL MEDICINE

## 2021-09-11 PROCEDURE — 83690 ASSAY OF LIPASE: CPT | Performed by: EMERGENCY MEDICINE

## 2021-09-11 PROCEDURE — 96361 HYDRATE IV INFUSION ADD-ON: CPT

## 2021-09-11 PROCEDURE — 80076 HEPATIC FUNCTION PANEL: CPT | Performed by: EMERGENCY MEDICINE

## 2021-09-11 PROCEDURE — 85025 COMPLETE CBC W/AUTO DIFF WBC: CPT | Performed by: EMERGENCY MEDICINE

## 2021-09-11 PROCEDURE — 99284 EMERGENCY DEPT VISIT MOD MDM: CPT

## 2021-09-11 PROCEDURE — 96374 THER/PROPH/DIAG INJ IV PUSH: CPT

## 2021-09-11 PROCEDURE — 99285 EMERGENCY DEPT VISIT HI MDM: CPT | Performed by: EMERGENCY MEDICINE

## 2021-09-11 PROCEDURE — 87493 C DIFF AMPLIFIED PROBE: CPT | Performed by: EMERGENCY MEDICINE

## 2021-09-11 RX ORDER — ONDANSETRON 2 MG/ML
4 INJECTION INTRAMUSCULAR; INTRAVENOUS ONCE
Status: COMPLETED | OUTPATIENT
Start: 2021-09-11 | End: 2021-09-11

## 2021-09-11 RX ORDER — ONDANSETRON 2 MG/ML
4 INJECTION INTRAMUSCULAR; INTRAVENOUS EVERY 6 HOURS PRN
Status: DISCONTINUED | OUTPATIENT
Start: 2021-09-11 | End: 2021-09-13 | Stop reason: HOSPADM

## 2021-09-11 RX ORDER — DILTIAZEM HYDROCHLORIDE 120 MG/1
120 CAPSULE, COATED, EXTENDED RELEASE ORAL DAILY
Status: DISCONTINUED | OUTPATIENT
Start: 2021-09-12 | End: 2021-09-13 | Stop reason: HOSPADM

## 2021-09-11 RX ORDER — DEXTROSE AND SODIUM CHLORIDE 5; .45 G/100ML; G/100ML
75 INJECTION, SOLUTION INTRAVENOUS CONTINUOUS
Status: DISCONTINUED | OUTPATIENT
Start: 2021-09-11 | End: 2021-09-11

## 2021-09-11 RX ORDER — PRAVASTATIN SODIUM 40 MG
40 TABLET ORAL
Status: DISCONTINUED | OUTPATIENT
Start: 2021-09-11 | End: 2021-09-13 | Stop reason: HOSPADM

## 2021-09-11 RX ORDER — CEFEPIME HYDROCHLORIDE 1 G/50ML
1000 INJECTION, SOLUTION INTRAVENOUS EVERY 12 HOURS
Status: DISCONTINUED | OUTPATIENT
Start: 2021-09-11 | End: 2021-09-13 | Stop reason: HOSPADM

## 2021-09-11 RX ORDER — METOPROLOL SUCCINATE 100 MG/1
100 TABLET, EXTENDED RELEASE ORAL DAILY
Status: DISCONTINUED | OUTPATIENT
Start: 2021-09-12 | End: 2021-09-13 | Stop reason: HOSPADM

## 2021-09-11 RX ORDER — DEXTROSE, SODIUM CHLORIDE, AND POTASSIUM CHLORIDE 5; .45; .15 G/100ML; G/100ML; G/100ML
75 INJECTION INTRAVENOUS CONTINUOUS
Status: DISCONTINUED | OUTPATIENT
Start: 2021-09-11 | End: 2021-09-12

## 2021-09-11 RX ORDER — POTASSIUM CHLORIDE 14.9 MG/ML
20 INJECTION INTRAVENOUS ONCE
Status: COMPLETED | OUTPATIENT
Start: 2021-09-11 | End: 2021-09-11

## 2021-09-11 RX ORDER — GABAPENTIN 100 MG/1
200 CAPSULE ORAL 2 TIMES DAILY
Status: DISCONTINUED | OUTPATIENT
Start: 2021-09-11 | End: 2021-09-13 | Stop reason: HOSPADM

## 2021-09-11 RX ORDER — ACETAMINOPHEN 325 MG/1
650 TABLET ORAL EVERY 6 HOURS PRN
Status: DISCONTINUED | OUTPATIENT
Start: 2021-09-11 | End: 2021-09-13 | Stop reason: HOSPADM

## 2021-09-11 RX ADMIN — POTASSIUM CHLORIDE 20 MEQ: 14.9 INJECTION, SOLUTION INTRAVENOUS at 20:56

## 2021-09-11 RX ADMIN — ONDANSETRON 4 MG: 2 INJECTION INTRAMUSCULAR; INTRAVENOUS at 15:42

## 2021-09-11 RX ADMIN — METOPROLOL TARTRATE 25 MG: 25 TABLET, FILM COATED ORAL at 18:51

## 2021-09-11 RX ADMIN — DEXTROSE, SODIUM CHLORIDE, AND POTASSIUM CHLORIDE 75 ML/HR: 5; .45; .15 INJECTION INTRAVENOUS at 20:30

## 2021-09-11 RX ADMIN — SODIUM CHLORIDE 1000 ML: 0.9 INJECTION, SOLUTION INTRAVENOUS at 15:42

## 2021-09-11 RX ADMIN — CEFEPIME HYDROCHLORIDE 1000 MG: 1 INJECTION, SOLUTION INTRAVENOUS at 18:54

## 2021-09-11 RX ADMIN — APIXABAN 2.5 MG: 2.5 TABLET, FILM COATED ORAL at 18:51

## 2021-09-11 NOTE — ED PROVIDER NOTES
History  Chief Complaint   Patient presents with    Vomiting     pt s/p d/c from hospital yesterday for pneumonia, pt was feeling better, but d/c on oral augmentin, shortly after starting med she became nauseas and started vomiting and having liquid stools     80year-old female previous history of atrial fibrillation, CAD, CKD, Eliquis usage, TIA, pacemaker, recent admission for right upper lobe pneumonia discharged yesterday  Patient notes acute onset of nausea, vomiting, diarrhea yesterday evening  Profuse vomiting and diarrhea  Nonbloody  nonbilious  Clear diarrhea  No hx of C diff  Patient took 2 dosage of Augmentin since being discharged home  Vomiting  Severity:  Severe  Timing:  Constant  Quality:  Stomach contents  Able to tolerate:  Liquids  Progression:  Unchanged  Chronicity:  New  Relieved by:  Nothing  Worsened by:  Nothing  Ineffective treatments:  None tried  Associated symptoms: diarrhea    Diarrhea:     Quality:  Explosive    Severity:  Severe    Timing:  Constant    Progression:  Unchanged  Diarrhea  Onset quality:  Gradual  Timing:  Constant  Associated symptoms: vomiting        Prior to Admission Medications   Prescriptions Last Dose Informant Patient Reported?  Taking?   amoxicillin-clavulanate (AUGMENTIN) 875-125 mg per tablet   No No   Sig: Take 1 tablet by mouth every 12 (twelve) hours for 4 days   apixaban (ELIQUIS) 2 5 mg   Yes No   Sig: Take 2 5 mg by mouth 2 (two) times a day   diltiazem (CARDIZEM CD) 120 mg 24 hr capsule   Yes No   Sig: Take 120 mg by mouth daily    gabapentin (NEURONTIN) 100 mg capsule   Yes No   Sig: Take 200 mg by mouth 2 (two) times a day    iron polysaccharides (FERREX) 150 mg capsule   Yes No   Sig: Take 150 mg by mouth daily   lisinopril (ZESTRIL) 5 mg tablet   Yes No   Sig: Take 5 mg by mouth daily   metoprolol succinate (TOPROL-XL) 100 mg 24 hr tablet   Yes No   Sig: Take 100 mg by mouth daily   rosuvastatin (CRESTOR) 5 mg tablet   Yes No   Sig: Take 5 mg by mouth tues saturday      Facility-Administered Medications: None       Past Medical History:   Diagnosis Date    Anemia     Asthma     Atrial fibrillation ([address] ) 11/08/2019    CAD     CKD (chronic kidney disease)     Current use of long term anticoagulation 11/08/2019    Elevated troponin 9/8/2021    Hypertension, essential 11/08/2019    Pacemaker     TIA (transient ischemic attack) 11/08/2019       History reviewed  No pertinent surgical history  History reviewed  No pertinent family history  I have reviewed and agree with the history as documented  E-Cigarette/Vaping    E-Cigarette Use Never User      E-Cigarette/Vaping Substances    Nicotine No     THC No     CBD No     Flavoring No     Other No     Unknown No      Social History     Tobacco Use    Smoking status: Never Smoker    Smokeless tobacco: Never Used   Vaping Use    Vaping Use: Never used   Substance Use Topics    Alcohol use: Yes     Comment: occasionaly     Drug use: Never       Review of Systems   Constitutional: Positive for fatigue  Gastrointestinal: Positive for diarrhea and vomiting  All other systems reviewed and are negative  Physical Exam  Physical Exam  Vitals and nursing note reviewed  Constitutional:       General: She is not in acute distress  Appearance: Normal appearance  She is not ill-appearing  Comments: Fatigued appearing     HENT:      Head: Normocephalic and atraumatic  Right Ear: External ear normal       Left Ear: External ear normal       Nose: Nose normal       Mouth/Throat:      Comments: Appears dehydrated  Eyes:      General:         Right eye: No discharge  Left eye: No discharge  Conjunctiva/sclera: Conjunctivae normal    Cardiovascular:      Rate and Rhythm: Normal rate and regular rhythm  Pulses: Normal pulses  Heart sounds: No murmur heard       Pulmonary:      Effort: Pulmonary effort is normal       Breath sounds: Normal breath sounds  Abdominal:      General: Abdomen is flat  There is no distension  Tenderness: There is no abdominal tenderness  Musculoskeletal:         General: Normal range of motion  Cervical back: Normal range of motion  Skin:     General: Skin is warm  Capillary Refill: Capillary refill takes less than 2 seconds  Findings: No rash  Neurological:      General: No focal deficit present  Mental Status: She is alert  Mental status is at baseline     Psychiatric:         Mood and Affect: Mood normal          Behavior: Behavior normal          Vital Signs  ED Triage Vitals   Temperature Pulse Respirations Blood Pressure SpO2   09/11/21 1525 09/11/21 0109 09/11/21 0109 09/11/21 1534 09/11/21 0109   97 8 °F (36 6 °C) (!) 109 18 (!) 181/88 97 %      Temp Source Heart Rate Source Patient Position - Orthostatic VS BP Location FiO2 (%)   09/11/21 1525 09/11/21 0109 09/11/21 0109 09/11/21 0109 --   Oral Monitor Lying Right arm       Pain Score       09/11/21 1839       No Pain           Vitals:    09/11/21 0109 09/11/21 1534 09/11/21 1839   BP:  (!) 181/88 168/83   Pulse: (!) 109  100   Patient Position - Orthostatic VS: Lying Lying Lying         Visual Acuity      ED Medications  Medications   acetaminophen (TYLENOL) tablet 650 mg (has no administration in time range)   ondansetron (ZOFRAN) injection 4 mg (has no administration in time range)   apixaban (ELIQUIS) tablet 2 5 mg (2 5 mg Oral Given 9/11/21 1851)   diltiazem (CARDIZEM CD) 24 hr capsule 120 mg (has no administration in time range)   gabapentin (NEURONTIN) capsule 200 mg (200 mg Oral Not Given 9/11/21 1857)   metoprolol succinate (TOPROL-XL) 24 hr tablet 100 mg (has no administration in time range)   pravastatin (PRAVACHOL) tablet 40 mg (40 mg Oral Not Given 9/11/21 1858)   cefepime (MAXIPIME) IVPB (premix in dextrose) 1,000 mg 50 mL (1,000 mg Intravenous New Bag 9/11/21 1854)   potassium chloride 20 mEq IVPB (premix) (has no administration in time range)   dextrose 5 % and sodium chloride 0 45 % with KCl 20 mEq/L infusion (has no administration in time range)   ondansetron (ZOFRAN) injection 4 mg (4 mg Intravenous Given 9/11/21 1542)   sodium chloride 0 9 % bolus 1,000 mL (1,000 mL Intravenous New Bag 9/11/21 1542)   metoprolol tartrate (LOPRESSOR) tablet 25 mg (25 mg Oral Given 9/11/21 1851)       Diagnostic Studies  Results Reviewed     Procedure Component Value Units Date/Time    Hepatic function panel [[id number]]  (Normal) Collected: 09/11/21 1538    Lab Status: Final result Specimen: Blood from Arm, Right Updated: 09/11/21 1636     Total Bilirubin 0 90 mg/dL      Bilirubin, Direct 0 17 mg/dL      Alkaline Phosphatase 102 0 U/L      AST 26 U/L      ALT 50 U/L      Total Protein 7 2 g/dL      Albumin 3 7 g/dL     Basic metabolic panel [[id number]]  (Abnormal) Collected: 09/11/21 1538    Lab Status: Final result Specimen: Blood from Arm, Right Updated: 09/11/21 1603     Sodium 142 mmol/L      Potassium 3 5 mmol/L      Chloride 105 mmol/L      CO2 27 mmol/L      ANION GAP 10 mmol/L      BUN 23 mg/dL      Creatinine 1 15 mg/dL      Glucose 132 mg/dL      Calcium 9 4 mg/dL      eGFR 41 ml/min/1 73sq m     Narrative:      [place] guidelines for Chronic Kidney Disease (CKD):     Stage 1 with normal or high GFR (GFR > 90 mL/min/1 73 square meters)    Stage 2 Mild CKD (GFR = 60-89 mL/min/1 73 square meters)    Stage 3A Moderate CKD (GFR = 45-59 mL/min/1 73 square meters)    Stage 3B Moderate CKD (GFR = 30-44 mL/min/1 73 square meters)    Stage 4 Severe CKD (GFR = 15-29 mL/min/1 73 square meters)    Stage 5 End Stage CKD (GFR <15 mL/min/1 73 square meters)  Note: GFR calculation is accurate only with a steady state creatinine    Lipase [[id number]]  (Normal) Collected: 09/11/21 1538    Lab Status: Final result Specimen: Blood from Arm, Right Updated: 09/11/21 1603     Lipase 15 u/L     Clostridium difficile toxin by PCR with EIA [[id number]] Collected: 09/11/21 1557    Lab Status: In process Specimen: Stool from Per Rectum Updated: 09/11/21 1558    CBC and differential [[id number]]  (Abnormal) Collected: 09/11/21 1538    Lab Status: Final result Specimen: Blood from Arm, Right Updated: 09/11/21 1549     WBC 13 12 Thousand/uL      RBC 4 48 Million/uL      Hemoglobin 13 4 g/dL      Hematocrit 42 7 %      MCV 95 fL      MCH 29 9 pg      MCHC 31 4 g/dL      RDW 13 0 %      MPV 10 9 fL      Platelets 658 Thousands/uL      Neutrophils Relative 88 %      Immat GRANS % 0 %      Lymphocytes Relative 3 %      Monocytes Relative 8 %      Eosinophils Relative 1 %      Basophils Relative 0 %      Neutrophils Absolute 11 47 Thousands/µL      Immature Grans Absolute 0 03 Thousand/uL      Lymphocytes Absolute 0 37 Thousands/µL      Monocytes Absolute 1 09 Thousand/µL      Eosinophils Absolute 0 15 Thousand/µL      Basophils Absolute 0 01 Thousands/µL                  No orders to display              Procedures  Procedures         ED Course                             SBIRT 20yo+      Most Recent Value   SBIRT (22 yo +)   In order to provide better care to our patients, we are screening all of our patients for alcohol and drug use  Would it be okay to ask you these screening questions? Yes Filed at: 09/11/2021 1603   Initial Alcohol Screen: US AUDIT-C    1  How often do you have a drink containing alcohol?  0 Filed at: 09/11/2021 1603   2  How many drinks containing alcohol do you have on a typical day you are drinking? 0 Filed at: 09/11/2021 1603   3a  Male UNDER 65: How often do you have five or more drinks on one occasion? 0 Filed at: 09/11/2021 1603   3b  FEMALE Any Age, or MALE 65+: How often do you have 4 or more drinks on one occassion? 0 Filed at: 09/11/2021 1603   Audit-C Score  0 Filed at: 09/11/2021 1603   SIMÓN: How many times in the past year have you       Used an illegal drug or used a prescription medication for non-medical reasons? Never Filed at: 09/11/2021 1603                    [facility]  Number of Diagnoses or Management Options  Diarrhea: new and requires workup  Nausea and vomiting: new and requires workup  Diagnosis management comments: Fatigued female with Diarrhea, N/V  Possible C diff due to recent antibiotics  No abdominal pain or suspicion for acute surgical abd process  Possible viral illness  Will rehydrate  Will likely need observation secondary to copious diarrhea, N/ V and living at home  No significant metabolic abnormalities or CECI  Will place in observation  Amount and/or Complexity of Data Reviewed  Clinical lab tests: ordered and reviewed  Review and summarize past medical records: yes  Discuss the patient with other providers: yes    Risk of Complications, Morbidity, and/or Mortality  Presenting problems: moderate  Diagnostic procedures: moderate  Management options: moderate        Disposition  Final diagnoses:   Nausea and vomiting   Diarrhea     Time reflects when diagnosis was documented in both MDM as applicable and the Disposition within this note     Time User Action Codes Description Comment    9/11/2021  5:29 PM Jacquelyn Calle Add [R11 2] Nausea and vomiting     9/11/2021  5:29 PM Jacquelyn Calle Add [R19 7] Diarrhea       ED Disposition     ED Disposition Condition Date/Time Comment    Admit Stable Sat Sep 11, 2021  5:29 PM Case was discussed with angelo and the patient's admission status was agreed to be Admission Status: observation status to the service of Dr Miguelito Griffin           Follow-up Information    None         Current Discharge Medication List      CONTINUE these medications which have NOT CHANGED    Details   amoxicillin-clavulanate (AUGMENTIN) 875-125 mg per tablet Take 1 tablet by mouth every 12 (twelve) hours for 4 days  Qty: 8 tablet, Refills: 0    Associated Diagnoses: Pneumonia of right upper lobe due to infectious organism      apixaban (ELIQUIS) 2 5 mg Take 2 5 mg by mouth 2 (two) times a day      diltiazem (CARDIZEM CD) 120 mg 24 hr capsule Take 120 mg by mouth daily       gabapentin (NEURONTIN) 100 mg capsule Take 200 mg by mouth 2 (two) times a day       iron polysaccharides (FERREX) 150 mg capsule Take 150 mg by mouth daily      lisinopril (ZESTRIL) 5 mg tablet Take 5 mg by mouth daily      metoprolol succinate (TOPROL-XL) 100 mg 24 hr tablet Take 100 mg by mouth daily      rosuvastatin (CRESTOR) 5 mg tablet Take 5 mg by mouth tues saturday           No discharge procedures on file      PDMP Review       Value Time User    PDMP Reviewed  Yes 9/10/2021  9:14 AM Soraida Cheung MD          ED Provider  Electronically Signed by           Fady Gabriel DO  09/11/21 2033

## 2021-09-11 NOTE — H&P
Luis U  66   H&P- Damián Romero 9/7/1926, 80 y o  female MRN: [id number]  Unit/Bed#: [facility] Encounter: [id number]  Primary Care Provider: Alex Morrissey MD   Date and time admitted to hospital: 9/11/2021  3:22 PM    * Acute gastroenteritis  Assessment & Plan  As per the family patient started to have nausea, vomiting, diarrhea after taking Augmentin since yesterday  She took 1 pill yesterday and 1 pill this morning and she has not tolerated it and had multiple episodes of vomiting and multiple episodes of loose stools  No blood in it  No prior history of C difficile colitis  Stool for C difficile was sent from the emergency department  Will keep the patient under observation, discontinue Augmentin, give gentle hydration  Clear liquid diet  CKD (chronic kidney disease) stage 3, GFR 30-59 ml/min [facility])  Assessment & Plan  Lab Results   Component Value Date    EGFR 41 09/11/2021    EGFR 40 09/10/2021    EGFR 41 09/09/2021    CREATININE 1 15 (H) 09/11/2021    CREATININE 1 16 (H) 09/10/2021    CREATININE 1 14 (H) 09/09/2021   Monitor BUN and creatinine  Right upper lobe pneumonia  Assessment & Plan  Recently hospitalized treated with IV antibiotics and transition to Augmentin upon discharge however she is not tolerating Augmentin  Sputum culture growing Pseudomonas will treat the patient with cefepime  Hypertension, essential  Assessment & Plan  Continue on metoprolol and diltiazem, hold lisinopril  Atrial fibrillation [facility])  Assessment & Plan  Paroxysmal, continue on diltiazem and metoprolol and Eliquis  VTE Prophylaxis: Apixaban (Eliquis)  / sequential compression device   Code Status:  DNR  POLST: There is no POLST form on file for this patient (pre-hospital)  Discussion with family:  Patient's daughter    Anticipated Length of Stay:  Patient will be admitted on an Observation basis with an anticipated length of stay of  less than 2 midnights     Justification for Hospital Stay:  Nausea and vomiting, diarrhea antibiotic intolerance    Total Time for Visit, including Counseling / Coordination of Care: 60 minutes  Greater than 50% of this total time spent on direct patient counseling and coordination of care  Chief Complaint:   Nausea vomiting and diarrhea    History of Present Illness:    Ana Cavanaugh is a 80 y o  female who presents with past medical history of AFib, hypertension was discharged from [facility] yesterday after treatment for pneumonia and hypoxia, she was started on Augmentin  After going home she started taking Augmentin since yesterday, she took 1 pill yesterday and had nausea vomiting and diarrhea and this morning she called her PCP who advised her to take it with food, she took another pill this morning with food and she continued to have nausea vomiting and diarrhea  Patient states she had multiple episodes, no blood in it, denies any abdominal pain  After coming to the emergency department she received some Zofran and she feels better nausea and vomiting wise however she continues to have diarrhea  She denies any chest pain, shortness of breath, fever, chills  Complains of mild cough  Denies any urinary complaints  Given her advanced age and ongoing diarrhea which appears to be intractable she will be placed under observation  Review of Systems:    More than 10 system review of systems was performed, pertinent positive and negative findings mentioned as per history present illness  Past Medical and Surgical History:     Past Medical History:   Diagnosis Date    Anemia     Asthma     Atrial fibrillation ([facility] [address] ) 11/08/2019    CAD     CKD (chronic kidney disease)     Current use of long term anticoagulation 11/08/2019    Elevated troponin 9/8/2021    Hypertension, essential 11/08/2019    Pacemaker     TIA (transient ischemic attack) 11/08/2019       History reviewed   No pertinent surgical history  Meds/Allergies:    Prior to Admission medications    Medication Sig Start Date End Date Taking? Authorizing Provider   amoxicillin-clavulanate (AUGMENTIN) 875-125 mg per tablet Take 1 tablet by mouth every 12 (twelve) hours for 4 days 9/10/21 9/14/21  Anjelica Agee MD   apixaban (ELIQUIS) 2 5 mg Take 2 5 mg by mouth 2 (two) times a day    Historical Provider, MD   diltiazem (CARDIZEM CD) 120 mg 24 hr capsule Take 120 mg by mouth daily  6/11/21   Historical Provider, MD   gabapentin (NEURONTIN) 100 mg capsule Take 200 mg by mouth 2 (two) times a day  8/26/21   Historical Provider, MD   iron polysaccharides (FERREX) 150 mg capsule Take 150 mg by mouth daily    Historical Provider, MD   lisinopril (ZESTRIL) 5 mg tablet Take 5 mg by mouth daily    Historical Provider, MD   metoprolol succinate (TOPROL-XL) 100 mg 24 hr tablet Take 100 mg by mouth daily    Historical Provider, MD   rosuvastatin (CRESTOR) 5 mg tablet Take 5 mg by mouth tues saturday 6/11/21   Historical Provider, MD     I have reviewed home medications with patient personally  Allergies: Allergies   Allergen Reactions    Silver Sulfadiazine Other (See Comments)       Social History:     Marital Status:       Social History     Substance and Sexual Activity   Alcohol Use Yes    Comment: occasionaly      Social History     Tobacco Use   Smoking Status Never Smoker   Smokeless Tobacco Never Used     Social History     Substance and Sexual Activity   Drug Use Never       Family History:    non-contributory    Physical Exam:     Vitals:   Blood Pressure: (!) 181/88 (09/11/21 1534)  Pulse: (!) 109 (09/11/21 0109)  Temperature: 97 8 °F (36 6 °C) (09/11/21 1525)  Temp Source: Oral (09/11/21 1525)  Respirations: 18 (09/11/21 0109)  SpO2: 97 % (09/11/21 0109)    Physical Exam    General appearance:  Patient not in acute distress, but appears tired  Eyes:  Pupils equal reacting to light  ENT:  Dry oral mucous membranes  CVS:  S1-S2 heard, regular rate and rhythm, no pedal edema  Chest:  Bilateral air entry present, clear to auscultation  Abdomen:  Soft, nontender, increased bowel sounds  CNS:  No focal neurological deficits  Genitourinary: deferred  Skin:  No acute rash   psychiatric:  No psychosis  Musculoskeletal:  No joint deformities       Additional Data:     Lab Results: I have personally reviewed pertinent reports  Results from last 7 days   Lab Units 09/11/21  1538   WBC Thousand/uL 13 12*   HEMOGLOBIN g/dL 13 4   HEMATOCRIT % 42 7   PLATELETS Thousands/uL 274   NEUTROS PCT % 88*   LYMPHS PCT % 3*   MONOS PCT % 8   EOS PCT % 1     Results from last 7 days   Lab Units 09/11/21  1538   SODIUM mmol/L 142   POTASSIUM mmol/L 3 5   CHLORIDE mmol/L 105   CO2 mmol/L 27   BUN mg/dL 23*   CREATININE mg/dL 1 15*   ANION GAP mmol/L 10   CALCIUM mg/dL 9 4   ALBUMIN g/dL 3 7   TOTAL BILIRUBIN mg/dL 0 90   ALK PHOS U/L 102 0   ALT U/L 50   AST U/L 26   GLUCOSE RANDOM mg/dL 132     Results from last 7 days   Lab Units 09/08/21  0457   INR  0 98         Results from last 7 days   Lab Units 09/08/21  0457   HEMOGLOBIN A1C % 5 7*     Results from last 7 days   Lab Units 09/09/21  0500 09/08/21  0801 09/08/21  0457   LACTIC ACID mmol/L  --   --  1 3   PROCALCITONIN ng/ml 0 30* 0 11  --        Imaging: I have personally reviewed pertinent reports  No orders to display           Allscripts / Epic Records Reviewed: Yes     ** Please Note: This note has been constructed using a voice recognition system   **

## 2021-09-11 NOTE — ASSESSMENT & PLAN NOTE
Lab Results   Component Value Date    EGFR 41 09/11/2021    EGFR 40 09/10/2021    EGFR 41 09/09/2021    CREATININE 1 15 (H) 09/11/2021    CREATININE 1 16 (H) 09/10/2021    CREATININE 1 14 (H) 09/09/2021   Monitor BUN and creatinine

## 2021-09-11 NOTE — ASSESSMENT & PLAN NOTE
Recently hospitalized treated with IV antibiotics and transition to Augmentin upon discharge however she is not tolerating Augmentin  Sputum culture growing Pseudomonas will treat the patient with cefepime

## 2021-09-11 NOTE — ASSESSMENT & PLAN NOTE
As per the family patient started to have nausea, vomiting, diarrhea after taking Augmentin since yesterday  She took 1 pill yesterday and 1 pill this morning and she has not tolerated it and had multiple episodes of vomiting and multiple episodes of loose stools  No blood in it  No prior history of C difficile colitis  Stool for C difficile was sent from the emergency department  Will keep the patient under observation, discontinue Augmentin, give gentle hydration  Clear liquid diet

## 2021-09-11 NOTE — PLAN OF CARE
Problem: Potential for Falls  Goal: Patient will remain free of falls  Description: INTERVENTIONS:  - Educate patient/family on patient safety including physical limitations  - Instruct patient to call for assistance with activity   - Consult OT/PT to assist with strengthening/mobility   - Keep Call bell within reach  - Keep bed low and locked with side rails adjusted as appropriate  - Keep care items and personal belongings within reach  - Initiate and maintain comfort rounds    - Offer Toileting every  2  Hours, in advance of need  - Initiate/Maintain bed and chair alarm    - Apply yellow socks and bracelet for high fall risk patients    Outcome: Progressing     Problem: MOBILITY - ADULT  Goal: Maintain or return to baseline ADL function  Description: INTERVENTIONS:  - Educate patient/family on patient safety including physical limitations  - Instruct patient to call for assistance with activity   - Consult OT/PT to assist with strengthening/mobility   - Keep Call bell within reach  - Keep bed low and locked with side rails adjusted as appropriate  - Keep care items and personal belongings within reach  - Initiate and maintain comfort rounds    - Offer Toileting every 2  Hours, in advance of need  - Initiate/Maintain bed and chair alarm    - Apply yellow socks and bracelet for high fall risk patients    Outcome: Progressing  Goal: Maintains/Returns to pre admission functional level  Description: INTERVENTIONS:  -  Assess patient's ability to carry out ADLs; assess patient's baseline for ADL function and identify physical deficits which impact ability to perform ADLs (bathing, care of mouth/teeth, toileting, grooming, dressing, etc )  - Assess/evaluate cause of self-care deficits   - Assess range of motion  - Assess patient's mobility; develop plan if impaired  - Assess patient's need for assistive devices and provide as appropriate  - Encourage maximum independence but intervene and supervise when necessary  - Involve family in performance of ADLs  - Assess for home care needs following discharge   - Consider OT consult to assist with ADL evaluation and planning for discharge  - Provide patient education as appropriate  Outcome: Progressing     Problem: Prexisting or High Potential for Compromised Skin Integrity  Goal: Skin integrity is maintained or improved  Description: INTERVENTIONS:  - Identify patients at risk for skin breakdown  - Assess and monitor skin integrity  - Assess and monitor nutrition and hydration status  - Monitor labs   - Assess for incontinence   - Turn and reposition patient  - Assist with mobility/ambulation  - Relieve pressure over bony prominences  - Avoid friction and shearing  - Provide appropriate hygiene as needed including keeping skin clean and dry  - Evaluate need for skin moisturizer/barrier cream  - Collaborate with interdisciplinary team   - Patient/family teaching  - Consider wound care consult   Outcome: Progressing     Problem: PAIN - ADULT  Goal: Verbalizes/displays adequate comfort level or baseline comfort level  Description: Interventions:  - Encourage patient to monitor pain and request assistance  - Assess pain using appropriate pain scale  - Administer analgesics based on type and severity of pain and evaluate response  - Implement non-pharmacological measures as appropriate and evaluate response  - Consider cultural and social influences on pain and pain management  - Notify physician/advanced practitioner if interventions unsuccessful or patient reports new pain  Outcome: Progressing     Problem: INFECTION - ADULT  Goal: Absence or prevention of progression during hospitalization  Description: INTERVENTIONS:  - Assess and monitor for signs and symptoms of infection  - Monitor lab/diagnostic results  r  - Administer medications as ordered  - Instruct and encourage patient and family to use good hand hygiene technique  - Identify and instruct in appropriate isolation precautions for identified infection/condition  Outcome: Progressing  Goal: Absence of fever/infection during neutropenic period  Description: INTERVENTIONS:  - Monitor WBC    Outcome: Progressing     Problem: SAFETY ADULT  Goal: Patient will remain free of falls  Description: INTERVENTIONS:  - Educate patient/family on patient safety including physical limitations  - Instruct patient to call for assistance with activity   - Consult OT/PT to assist with strengthening/mobility   - Keep Call bell within reach  - Keep bed low and locked with side rails adjusted as appropriate  - Keep care items and personal belongings within reach  - Initiate and maintain comfort rounds    - Offer Toileting every 2 Hours, in advance of need  - Initiate/Maintain  bed and chair alarm    - Apply yellow socks and bracelet for high fall risk patients    Outcome: Progressing  Goal: Maintain or return to baseline ADL function  Description: INTERVENTIONS:  - Educate patient/family on patient safety including physical limitations  - Instruct patient to call for assistance with activity   - Consult OT/PT to assist with strengthening/mobility   - Keep Call bell within reach  - Keep bed low and locked with side rails adjusted as appropriate  - Keep care items and personal belongings within reach  - Initiate and maintain comfort rounds    - Offer Toileting every  2  Hours, in advance of need  - Initiate/Maintain bed and chair alarm    - Apply yellow socks and bracelet for high fall risk patients    Outcome: Progressing  Goal: Maintains/Returns to pre admission functional level  Description: INTERVENTIONS:  -  Assess patient's ability to carry out ADLs; assess patient's baseline for ADL function and identify physical deficits which impact ability to perform ADLs (bathing, care of mouth/teeth, toileting, grooming, dressing, etc )  - Assess/evaluate cause of self-care deficits   - Assess range of motion  - Assess patient's mobility; develop plan if impaired  - Assess patient's need for assistive devices and provide as appropriate  - Encourage maximum independence but intervene and supervise when necessary  - Involve family in performance of ADLs  - Assess for home care needs following discharge   - Consider OT consult to assist with ADL evaluation and planning for discharge  - Provide patient education as appropriate  Outcome: Progressing

## 2021-09-12 LAB
ANION GAP SERPL CALCULATED.3IONS-SCNC: 7 MMOL/L (ref 4–13)
BUN SERPL-MCNC: 17 MG/DL (ref 6–20)
C DIFF TOX GENS STL QL NAA+PROBE: NEGATIVE
CALCIUM SERPL-MCNC: 8.4 MG/DL (ref 8.4–10.2)
CHLORIDE SERPL-SCNC: 109 MMOL/L (ref 96–108)
CO2 SERPL-SCNC: 26 MMOL/L (ref 22–33)
CREAT SERPL-MCNC: 1.07 MG/DL (ref 0.4–1.1)
ERYTHROCYTE [DISTWIDTH] IN BLOOD BY AUTOMATED COUNT: 13 % (ref 11.6–15.1)
GFR SERPL CREATININE-BSD FRML MDRD: 44 ML/MIN/1.73SQ M
GLUCOSE P FAST SERPL-MCNC: 88 MG/DL (ref 70–105)
GLUCOSE SERPL-MCNC: 88 MG/DL (ref 65–140)
HCT VFR BLD AUTO: 37.7 % (ref 34.8–46.1)
HGB BLD-MCNC: 11.9 G/DL (ref 11.5–15.4)
MCH RBC QN AUTO: 30.3 PG (ref 26.8–34.3)
MCHC RBC AUTO-ENTMCNC: 31.6 G/DL (ref 31.4–37.4)
MCV RBC AUTO: 96 FL (ref 82–98)
PLATELET # BLD AUTO: 285 THOUSANDS/UL (ref 149–390)
PMV BLD AUTO: 10.5 FL (ref 8.9–12.7)
POTASSIUM SERPL-SCNC: 4.2 MMOL/L (ref 3.5–5)
RBC # BLD AUTO: 3.93 MILLION/UL (ref 3.81–5.12)
SODIUM SERPL-SCNC: 142 MMOL/L (ref 133–145)
WBC # BLD AUTO: 6.55 THOUSAND/UL (ref 4.31–10.16)

## 2021-09-12 PROCEDURE — 99232 SBSQ HOSP IP/OBS MODERATE 35: CPT | Performed by: INTERNAL MEDICINE

## 2021-09-12 PROCEDURE — 80048 BASIC METABOLIC PNL TOTAL CA: CPT | Performed by: INTERNAL MEDICINE

## 2021-09-12 PROCEDURE — 85027 COMPLETE CBC AUTOMATED: CPT | Performed by: INTERNAL MEDICINE

## 2021-09-12 RX ORDER — SACCHAROMYCES BOULARDII 250 MG
250 CAPSULE ORAL 2 TIMES DAILY
Status: DISCONTINUED | OUTPATIENT
Start: 2021-09-12 | End: 2021-09-13 | Stop reason: HOSPADM

## 2021-09-12 RX ADMIN — CEFEPIME HYDROCHLORIDE 1000 MG: 1 INJECTION, SOLUTION INTRAVENOUS at 18:00

## 2021-09-12 RX ADMIN — METOPROLOL SUCCINATE 100 MG: 100 TABLET, EXTENDED RELEASE ORAL at 08:18

## 2021-09-12 RX ADMIN — Medication 250 MG: at 17:57

## 2021-09-12 RX ADMIN — GABAPENTIN 200 MG: 100 CAPSULE ORAL at 17:57

## 2021-09-12 RX ADMIN — CEFEPIME HYDROCHLORIDE 1000 MG: 1 INJECTION, SOLUTION INTRAVENOUS at 07:00

## 2021-09-12 RX ADMIN — DEXTROSE, SODIUM CHLORIDE, AND POTASSIUM CHLORIDE 75 ML/HR: 5; .45; .15 INJECTION INTRAVENOUS at 12:23

## 2021-09-12 RX ADMIN — PRAVASTATIN SODIUM 40 MG: 40 TABLET ORAL at 17:57

## 2021-09-12 RX ADMIN — APIXABAN 2.5 MG: 2.5 TABLET, FILM COATED ORAL at 08:18

## 2021-09-12 RX ADMIN — APIXABAN 2.5 MG: 2.5 TABLET, FILM COATED ORAL at 17:57

## 2021-09-12 RX ADMIN — GABAPENTIN 200 MG: 100 CAPSULE ORAL at 08:18

## 2021-09-12 RX ADMIN — DILTIAZEM HYDROCHLORIDE 120 MG: 120 CAPSULE, COATED, EXTENDED RELEASE ORAL at 08:18

## 2021-09-12 NOTE — PROGRESS NOTES
Luis U  66   Progress Note - Ute Adler 9/7/1926, 80 y o  female MRN: [id number]  Unit/Bed#: -01 Encounter: [id number]  Primary Care Provider: Krishna Parker MD   Date and time admitted to hospital: 9/11/2021  3:22 PM    * Acute gastroenteritis  Assessment & Plan  Patient feels much better today, denies any further episodes of nausea and vomiting  Patient states she is still having some loose stools however much better  Denies any fever, chills, abdominal pain  Stool for C difficile came back negative  Will give the patient some Imodium and will start her on diet  CKD (chronic kidney disease) stage 3, GFR 30-59 ml/min [facility])  Assessment & Plan  Lab Results   Component Value Date    EGFR 44 09/12/2021    EGFR 41 09/11/2021    EGFR 40 09/10/2021    CREATININE 1 07 09/12/2021    CREATININE 1 15 (H) 09/11/2021    CREATININE 1 16 (H) 09/10/2021   Monitor BUN and creatinine  Right upper lobe pneumonia  Assessment & Plan  Recently hospitalized treated with IV antibiotics and transition to Augmentin upon discharge however she is not tolerating Augmentin  Sputum culture growing Pseudomonas will treat the patient with cefepime  Hypertension, essential  Assessment & Plan  Continue on metoprolol and diltiazem, hold lisinopril  Atrial fibrillation [facility])  Assessment & Plan  Paroxysmal, continue on diltiazem and metoprolol and Eliquis  Subjective/Objective     Subjective:   Patient feels better today, denies any nausea vomiting, abdominal pain  Denies any fever, chills  Patient states diarrhea is improving  Objective:  Vitals: Blood pressure 148/69, pulse 70, temperature 98 9 °F (37 2 °C), temperature source Tympanic, resp  rate 19, height 5' 3" (1 6 m), weight 48 5 kg (107 lb), SpO2 96 %  ,Body mass index is 18 95 kg/m²        Intake/Output Summary (Last 24 hours) at 9/12/2021 1519  Last data filed at 9/12/2021 0719  Gross per 24 hour   Intake --   Output 200 ml   Net -200 ml       Invasive Devices     Peripheral Intravenous Line            Peripheral IV 09/11/21 Right Antecubital <1 day                Physical Exam:  General appearance:  Patient not in acute distress  Eyes:  Pupils equal reacting to light  ENT:  Moist oral mucous membranes  CVS:  S1-S2 heard, regular rate and rhythm, no pedal edema  Chest:  Bilateral air entry present, clear to auscultation  Abdomen:  Soft, nontender, bowel sounds present  CNS:  No focal neurological deficits  Genitourinary: deferred  Skin:  No acute rash   psychiatric:  No psychosis  Musculoskeletal:  No joint deformities    Lab, Imaging and other studies: I have personally reviewed pertinent reports  VTE Pharmacologic Prophylaxis; apixaban  VTE Mechanical Prophylaxis: sequential compression device     Discussed with the patient's daughter at the bedside, continue with gentle hydration, advance diet  Possible discharge to home on 09/13/2021

## 2021-09-12 NOTE — ASSESSMENT & PLAN NOTE
Patient feels much better today, denies any further episodes of nausea and vomiting  Patient states she is still having some loose stools however much better  Denies any fever, chills, abdominal pain  Stool for C difficile came back negative  Will give the patient some Imodium and will start her on diet

## 2021-09-12 NOTE — ASSESSMENT & PLAN NOTE
Lab Results   Component Value Date    EGFR 44 09/12/2021    EGFR 41 09/11/2021    EGFR 40 09/10/2021    CREATININE 1 07 09/12/2021    CREATININE 1 15 (H) 09/11/2021    CREATININE 1 16 (H) 09/10/2021   Monitor BUN and creatinine

## 2021-09-12 NOTE — PLAN OF CARE
Problem: Potential for Falls  Goal: Patient will remain free of falls  Description: INTERVENTIONS:  - Educate patient/family on patient safety including physical limitations  - Instruct patient to call for assistance with activity   - Consult OT/PT to assist with strengthening/mobility   - Keep Call bell within reach  - Keep bed low and locked with side rails adjusted as appropriate  - Keep care items and personal belongings within reach  - Initiate and maintain comfort rounds    - Offer Toileting every 2  Hours, in advance of need  - Initiate/Maintain bed and chair alarm    - Apply yellow socks and bracelet for high fall risk patients    Outcome: Progressing     Problem: MOBILITY - ADULT  Goal: Maintain or return to baseline ADL function  Description: INTERVENTIONS:  -  Assess patient's ability to carry out ADLs; assess patient's baseline for ADL function and identify physical deficits which impact ability to perform ADLs (bathing, care of mouth/teeth, toileting, grooming, dressing, etc )  - Assess/evaluate cause of self-care deficits   - Assess range of motion  - Assess patient's mobility; develop plan if impaired  - Assess patient's need for assistive devices and provide as appropriate  - Encourage maximum independence but intervene and supervise when necessary  - Involve family in performance of ADLs  - Assess for home care needs following discharge   - Consider OT consult to assist with ADL evaluation and planning for discharge  - Provide patient education as appropriate  Outcome: Progressing  Goal: Maintains/Returns to pre admission functional level  Description: INTERVENTIONS:  - Perform BMAT or MOVE assessment daily    - Set and communicate daily mobility goal to care team and patient/family/caregiver  - Collaborate with rehabilitation services on mobility goals if consulted  - Perform Range of Motion  3  times a day        - Stand patient  3  times a day  - Ambulate patient 3 times a day  - Out of bed to chair  3  times a day   - Out of bed for meals 3  times a day  - Out of bed for toileting  - Record patient progress and toleration of activity level   Outcome: Progressing     Problem: Prexisting or High Potential for Compromised Skin Integrity  Goal: Skin integrity is maintained or improved  Description: INTERVENTIONS:  - Identify patients at risk for skin breakdown  - Assess and monitor skin integrity  - Assess and monitor nutrition and hydration status  - Monitor labs   - Assess for incontinence     - Assist with mobility/ambulation  - Relieve pressure over bony prominences  - Avoid friction and shearing  - Provide appropriate hygiene as needed including keeping skin clean and dry  - Evaluate need for skin moisturizer/barrier cream  - Collaborate with interdisciplinary team   - Patient/family teaching  - Consider wound care consult   Outcome: Progressing     Problem: PAIN - ADULT  Goal: Verbalizes/displays adequate comfort level or baseline comfort level  Description: Interventions:  - Encourage patient to monitor pain and request assistance  - Assess pain using appropriate pain scale  - Administer analgesics based on type and severity of pain and evaluate response  - Implement non-pharmacological measures as appropriate and evaluate response  - Consider cultural and social influences on pain and pain management  - Notify physician/advanced practitioner if interventions unsuccessful or patient reports new pain  Outcome: Progressing     Problem: INFECTION - ADULT  Goal: Absence or prevention of progression during hospitalization  Description: INTERVENTIONS:  - Assess and monitor for signs and symptoms of infection  - Monitor lab/diagnostic results    - Administer medications as ordered  - Instruct and encourage patient and family to use good hand hygiene technique  - Identify and instruct in appropriate isolation precautions for identified infection/condition  Outcome: Progressing  Goal: Absence of fever/infection during neutropenic period  Description: INTERVENTIONS:  - Monitor WBC    Outcome: Progressing     Problem: SAFETY ADULT  Goal: Patient will remain free of falls  Description: INTERVENTIONS:  - Educate patient/family on patient safety including physical limitations  - Instruct patient to call for assistance with activity   - Consult OT/PT to assist with strengthening/mobility   - Keep Call bell within reach  - Keep bed low and locked with side rails adjusted as appropriate  - Keep care items and personal belongings within reach  - Initiate and maintain comfort rounds    - Offer Toileting every  2 Hours, in advance of need  - Initiate/Maintain  bed and chair alarm    - Apply yellow socks and bracelet for high fall risk patients  - Consider moving patient to room near nurses station  Outcome: Progressing  Goal: Maintain or return to baseline ADL function  Description: INTERVENTIONS:  -  Assess patient's ability to carry out ADLs; assess patient's baseline for ADL function and identify physical deficits which impact ability to perform ADLs (bathing, care of mouth/teeth, toileting, grooming, dressing, etc )  - Assess/evaluate cause of self-care deficits   - Assess range of motion  - Assess patient's mobility; develop plan if impaired  - Assess patient's need for assistive devices and provide as appropriate  - Encourage maximum independence but intervene and supervise when necessary  - Involve family in performance of ADLs  - Assess for home care needs following discharge   - Consider OT consult to assist with ADL evaluation and planning for discharge  - Provide patient education as appropriate  Outcome: Progressing  Goal: Maintains/Returns to pre admission functional level  Description: INTERVENTIONS:  - Perform BMAT or MOVE assessment daily    - Set and communicate daily mobility goal to care team and patient/family/caregiver     - Collaborate with rehabilitation services on mobility goals if consulted  - Perform Range of Motion 3 times a day        - Stand patient 3  times a day  - Ambulate patient  3  times a day  - Out of bed to chair  3  times a day   - Out of bed for meals 3  times a day  - Out of bed for toileting  - Record patient progress and toleration of activity level   Outcome: Progressing

## 2021-09-13 VITALS
OXYGEN SATURATION: 96 % | WEIGHT: 107 LBS | SYSTOLIC BLOOD PRESSURE: 156 MMHG | DIASTOLIC BLOOD PRESSURE: 78 MMHG | TEMPERATURE: 98.7 F | HEIGHT: 63 IN | BODY MASS INDEX: 18.96 KG/M2 | HEART RATE: 67 BPM | RESPIRATION RATE: 16 BRPM

## 2021-09-13 LAB
ANION GAP SERPL CALCULATED.3IONS-SCNC: 6 MMOL/L (ref 4–13)
BACTERIA BLD CULT: NORMAL
BACTERIA BLD CULT: NORMAL
BASOPHILS # BLD AUTO: 0.01 THOUSANDS/ΜL (ref 0–0.1)
BASOPHILS NFR BLD AUTO: 0 % (ref 0–1)
BUN SERPL-MCNC: 14 MG/DL (ref 6–20)
CALCIUM SERPL-MCNC: 8.5 MG/DL (ref 8.4–10.2)
CHLORIDE SERPL-SCNC: 108 MMOL/L (ref 96–108)
CO2 SERPL-SCNC: 26 MMOL/L (ref 22–33)
CREAT SERPL-MCNC: 1.03 MG/DL (ref 0.4–1.1)
EOSINOPHIL # BLD AUTO: 0.36 THOUSAND/ΜL (ref 0–0.61)
EOSINOPHIL NFR BLD AUTO: 6 % (ref 0–6)
ERYTHROCYTE [DISTWIDTH] IN BLOOD BY AUTOMATED COUNT: 12.5 % (ref 11.6–15.1)
GFR SERPL CREATININE-BSD FRML MDRD: 46 ML/MIN/1.73SQ M
GLUCOSE SERPL-MCNC: 80 MG/DL (ref 65–140)
HCT VFR BLD AUTO: 34.6 % (ref 34.8–46.1)
HGB BLD-MCNC: 11 G/DL (ref 11.5–15.4)
IMM GRANULOCYTES # BLD AUTO: 0.01 THOUSAND/UL (ref 0–0.2)
IMM GRANULOCYTES NFR BLD AUTO: 0 % (ref 0–2)
LYMPHOCYTES # BLD AUTO: 1.24 THOUSANDS/ΜL (ref 0.6–4.47)
LYMPHOCYTES NFR BLD AUTO: 20 % (ref 14–44)
MCH RBC QN AUTO: 30.3 PG (ref 26.8–34.3)
MCHC RBC AUTO-ENTMCNC: 31.8 G/DL (ref 31.4–37.4)
MCV RBC AUTO: 95 FL (ref 82–98)
MONOCYTES # BLD AUTO: 0.81 THOUSAND/ΜL (ref 0.17–1.22)
MONOCYTES NFR BLD AUTO: 13 % (ref 4–12)
NEUTROPHILS # BLD AUTO: 3.8 THOUSANDS/ΜL (ref 1.85–7.62)
NEUTS SEG NFR BLD AUTO: 61 % (ref 43–75)
PLATELET # BLD AUTO: 254 THOUSANDS/UL (ref 149–390)
PMV BLD AUTO: 10.6 FL (ref 8.9–12.7)
POTASSIUM SERPL-SCNC: 3.7 MMOL/L (ref 3.5–5)
RBC # BLD AUTO: 3.63 MILLION/UL (ref 3.81–5.12)
SODIUM SERPL-SCNC: 140 MMOL/L (ref 133–145)
WBC # BLD AUTO: 6.23 THOUSAND/UL (ref 4.31–10.16)

## 2021-09-13 PROCEDURE — 80048 BASIC METABOLIC PNL TOTAL CA: CPT | Performed by: INTERNAL MEDICINE

## 2021-09-13 PROCEDURE — 99238 HOSP IP/OBS DSCHRG MGMT 30/<: CPT | Performed by: INTERNAL MEDICINE

## 2021-09-13 PROCEDURE — 85025 COMPLETE CBC W/AUTO DIFF WBC: CPT | Performed by: INTERNAL MEDICINE

## 2021-09-13 RX ORDER — ONDANSETRON 4 MG/1
4 TABLET, FILM COATED ORAL EVERY 8 HOURS PRN
Qty: 20 TABLET | Refills: 0 | Status: SHIPPED | OUTPATIENT
Start: 2021-09-13 | End: 2021-12-21 | Stop reason: SDUPTHER

## 2021-09-13 RX ORDER — METHYLPREDNISOLONE 4 MG/1
TABLET ORAL
Qty: 21 TABLET | Refills: 0 | Status: SHIPPED | OUTPATIENT
Start: 2021-09-13 | End: 2021-12-20

## 2021-09-13 RX ORDER — CIPROFLOXACIN 500 MG/1
500 TABLET, FILM COATED ORAL EVERY 24 HOURS
Qty: 4 TABLET | Refills: 0 | Status: SHIPPED | OUTPATIENT
Start: 2021-09-14 | End: 2021-12-20

## 2021-09-13 RX ORDER — CIPROFLOXACIN 500 MG/1
500 TABLET, FILM COATED ORAL EVERY 12 HOURS SCHEDULED
Status: DISCONTINUED | OUTPATIENT
Start: 2021-09-13 | End: 2021-09-13 | Stop reason: HOSPADM

## 2021-09-13 RX ADMIN — CEFEPIME HYDROCHLORIDE 1000 MG: 1 INJECTION, SOLUTION INTRAVENOUS at 05:54

## 2021-09-13 RX ADMIN — APIXABAN 2.5 MG: 2.5 TABLET, FILM COATED ORAL at 08:10

## 2021-09-13 RX ADMIN — METOPROLOL SUCCINATE 100 MG: 100 TABLET, EXTENDED RELEASE ORAL at 08:10

## 2021-09-13 RX ADMIN — CIPROFLOXACIN HYDROCHLORIDE 500 MG: 500 TABLET, FILM COATED ORAL at 11:24

## 2021-09-13 RX ADMIN — Medication 250 MG: at 08:10

## 2021-09-13 RX ADMIN — DILTIAZEM HYDROCHLORIDE 120 MG: 120 CAPSULE, COATED, EXTENDED RELEASE ORAL at 08:10

## 2021-09-13 RX ADMIN — GABAPENTIN 200 MG: 100 CAPSULE ORAL at 08:10

## 2021-09-13 NOTE — ASSESSMENT & PLAN NOTE
Lab Results   Component Value Date    EGFR 46 09/13/2021    EGFR 44 09/12/2021    EGFR 41 09/11/2021    CREATININE 1 03 09/13/2021    CREATININE 1 07 09/12/2021    CREATININE 1 15 (H) 09/11/2021   Monitor BUN and creatinine

## 2021-09-13 NOTE — ASSESSMENT & PLAN NOTE
Recently hospitalized treated with IV antibiotics and transition to Augmentin upon discharge however she is not tolerating Augmentin  Sputum culture growing Pseudomonas will treat the patient with cefepime    Will DC on Ciprofloxacin 500 mg daily ( CrCl of 11) for a total of 5 days - starting 9/13/2021

## 2021-09-13 NOTE — DISCHARGE INSTRUCTIONS
Gastroenteritis   WHAT YOU NEED TO KNOW:   Gastroenteritis, or stomach flu, is an infection of the stomach and intestines  DISCHARGE INSTRUCTIONS:   Call 911 for any of the following:   · You have trouble breathing or a very fast pulse  Seek care immediately if:   · You see blood in your diarrhea  · You cannot stop vomiting  · You have not urinated for 12 hours  · You feel like you are going to faint  Contact your healthcare provider if:   · You have a fever  · You continue to vomit or have diarrhea, even after treatment  · You see worms in your diarrhea  · Your mouth or eyes are dry  You are not urinating as much or as often  · You have questions or concerns about your condition or care  Medicines:   · Medicines  may be given to stop vomiting or diarrhea, decrease abdominal cramps, or treat an infection  · Take your medicine as directed  Contact your healthcare provider if you think your medicine is not helping or if you have side effects  Tell him or her if you are allergic to any medicine  Keep a list of the medicines, vitamins, and herbs you take  Include the amounts, and when and why you take them  Bring the list or the pill bottles to follow-up visits  Carry your medicine list with you in case of an emergency  Manage your symptoms:   · Drink liquids as directed  Ask your healthcare provider how much liquid to drink each day, and which liquids are best for you  You may also need to drink an oral rehydration solution (ORS)  An ORS has the right amounts of sugar, salt, and minerals in water to replace body fluids  · Eat bland foods  When you feel hungry, begin eating soft, bland foods  Examples are bananas, clear soup, potatoes, and applesauce  Do not have dairy products, alcohol, sugary drinks, or drinks with caffeine until you feel better  · Rest as much as possible  Slowly start to do more each day when you begin to feel better      Prevent the spread of gastroenteritis:  Gastroenteritis can spread easily  Keep yourself, your family, and your surroundings clean to help prevent the spread of gastroenteritis:  · Wash your hands often  Use soap and water  Wash your hands after you use the bathroom, change a child's diapers, or sneeze  Wash your hands before you prepare or eat food  · Clean surfaces and do laundry often  Wash your clothes and towels separately from the rest of the laundry  Clean surfaces in your home with antibacterial  or bleach  · Clean food thoroughly and cook safely  Wash raw vegetables before you cook  Cook meat, fish, and eggs fully  Do not use the same dishes for raw meat as you do for other foods  Refrigerate any leftover food immediately  · Be aware when you camp or travel  Drink only clean water  Do not drink from rivers or lakes unless you purify or boil the water first  When you travel, drink bottled water and do not add ice  Do not eat fruit that has not been peeled  Do not eat raw fish or meat that is not fully cooked  Follow up with your healthcare provider as directed:  Write down your questions so you remember to ask them during your visits  © Copyright [employer] 2021 Information is for End User's use only and may not be sold, redistributed or otherwise used for commercial purposes  All illustrations and images included in CareNotes® are the copyrighted property of [employer]  or [employer]  The above information is an  only  It is not intended as medical advice for individual conditions or treatments  Talk to your doctor, nurse or pharmacist before following any medical regimen to see if it is safe and effective for you

## 2021-09-13 NOTE — DISCHARGE INSTR - AVS FIRST PAGE
DISCHARGE INSTRUCTIONS   1  Follow up with Dr Armani Hoffman MD in one week  in regards to recent hospitalization    2  Take medications regularly     New medication:-  Take ciprofloxacin 500 mg every day for the next 4 days  Take Zofran as needed for nausea and vomiting   Medrol Dosepak for thumb arthritis    Discontinue medication:-  Stop taking Augmentin     3  Come back to the ER if symptoms recur or worsen   4  Activity as tolerated  5   Diet :  Regular

## 2021-09-13 NOTE — NURSING NOTE
AVS reviewed with pt's daughter  Daughter verbalizes understanding of instructions  All belongings accounted for  IV removed  Pt has no further questions at this time

## 2021-09-13 NOTE — DISCHARGE SUMMARY
Luis U  66   Discharge- [facility] 9/7/1926, 80 y o  female MRN: [id number]  Unit/Bed#: -01 Encounter: [id number]  Primary Care Provider: Marion Marlow MD   Date and time admitted to hospital: 9/11/2021  3:22 PM    Right upper lobe pneumonia  Assessment & Plan  Recently hospitalized treated with IV antibiotics and transition to Augmentin upon discharge however she is not tolerating Augmentin  Sputum culture growing Pseudomonas will treat the patient with cefepime  Will DC on Cirofloxacin 500 mg daily ( CrCl of 11) for a total of 5 days    CKD (chronic kidney disease) stage 3, GFR 30-59 ml/min [facility])  Assessment & Plan  Lab Results   Component Value Date    EGFR 46 09/13/2021    EGFR 44 09/12/2021    EGFR 41 09/11/2021    CREATININE 1 03 09/13/2021    CREATININE 1 07 09/12/2021    CREATININE 1 15 (H) 09/11/2021   Monitor BUN and creatinine  Hypertension, essential  Assessment & Plan  Continue on metoprolol and diltiazem, hold lisinopril  Atrial fibrillation [facility])  Assessment & Plan  Paroxysmal, continue on diltiazem and metoprolol and Eliquis  * Acute gastroenteritis  Assessment & Plan  Patient feels much better today, denies any further episodes of nausea and vomiting  Patient states she is still having some loose stools however much better  Denies any fever, chills, abdominal pain  Stool for C difficile came back negative  Will give the patient some Imodium and will start her on diet        Discharging Resident Physician: Anjelica Agee MD  Attending: Ganga Lopez MD  PCP: Marion Marlow MD  Admission Date: 9/11/2021  Admission Date:   Admission Orders (From admission, onward)     Ordered        09/12/21 1522  Inpatient Admission  Once         09/11/21 1730  Place in Observation  Once                   Discharge Date: 09/13/21    Disposition:     Home    Reason for Admission:  Intractable nausea and vomiting    Consultations During [facility] Stay:  · None    Procedures Performed:     No orders of the defined types were placed in this encounter  Diagnosis:    Medical Problems     Resolved Problems  Date Reviewed: 9/13/2021    None                Principal Problem:    Acute gastroenteritis  Active Problems:    Right upper lobe pneumonia    Atrial fibrillation (HCC)    Hypertension, essential    CKD (chronic kidney disease) stage 3, GFR 30-59 ml/min (HCC)      Significant Findings / Test Results:     No orders to display   ·     Incidental Findings:   · None    Test Results Pending at Discharge (will require follow up): · None     Outpatient Tests Requested:  · None    Complications:  None    Hospital Course:     Hector Busby is a 80 y o  female patient who originally presented to the hospital on 9/11/2021 due to intractable nausea and vomiting  Patient was recently hospitalized for right lobar pneumonia treated with Cefepime and discharged on Augmentin  However was unable to tolerate Augmentin with causedher intractable nausea, vomiting and diarrhea  C diff toxin was negative  She is subsequently improved  Augmentin was discontinued  Sputum culture grew pansensitive Pseudomonas  She is to be discharged on ciprofloxacin 500 mg daily for the next 5 days  At the time of discharge, patient appears hemodynamically and clinically stable  First dose of ciprofloxacin will be given as inpatient to see if patient tolerates it  Condition at Discharge: stable     Discharge Day Visit / Exam:     Subjective: Patient seen and examined by me at bedside  Reports no new complaints  Feels much better when compared to presentation status  Denies cough, nausea, vomiting, dysuria and leg swelling or pain      Vitals: Blood Pressure: 156/78 (09/13/21 0741)  Pulse: 67 (09/13/21 0741)  Temperature: 98 7 °F (37 1 °C) (09/13/21 0741)  Temp Source: Oral (09/13/21 0741)  Respirations: 16 (09/13/21 0741)  Height: 5' 3" (160 cm) (09/11/21 1838)  Weight - Scale: 48 5 kg (107 lb) (09/11/21 1838)  SpO2: 96 % (09/13/21 0741)  Exam:   Physical Exam  Vitals reviewed  Constitutional:       General: She is not in acute distress  HENT:      Head: Normocephalic and atraumatic  Cardiovascular:      Rate and Rhythm: Normal rate  Rhythm irregular  Pulses: Normal pulses  Heart sounds: No murmur heard  Pulmonary:      Effort: Pulmonary effort is normal  No respiratory distress  Breath sounds: Wheezing (scattered) present  Abdominal:      General: Bowel sounds are normal  There is no distension  Palpations: Abdomen is soft  Tenderness: There is no abdominal tenderness  Musculoskeletal:      Right lower leg: No edema  Left lower leg: No edema  Skin:     Coloration: Skin is not jaundiced or pale  Neurological:      General: No focal deficit present  Mental Status: She is alert and oriented to person, place, and time  Mental status is at baseline  Psychiatric:         Mood and Affect: Mood normal          Behavior: Behavior normal          Discussion with Family:  Talked to daughter  All questions/concerns were answered  They agrees with the plan  Discharge instructions/Information to patient and family:   See after visit summary for information provided to patient and family  Provisions for Follow-Up Care:  See after visit summary for information related to follow-up care and any pertinent home health orders  Planned Readmission:  No     Discharge Medications:  See after visit summary for reconciled discharge medications provided to patient and family  Discharge Statement :  I spent 25 minutes discharging the patient  This time was spent on the day of discharge  I had direct contact with the patient on the day of discharge  Additional documentation is required if more than 30 minutes were spent on discharge           ** Please Note: This note has been constructed using a voice recognition system **

## 2021-09-17 NOTE — PHYSICIAN ADVISOR
Current patient class: Inpatient  The patient is currently on Hospital Day: 3 at [facility]      The patient was admitted to the hospital at  3:22 PM on 9/12/21 for the following diagnosis:  Diarrhea [R19 7]  Vomiting [R11 10]  Nausea and vomiting [R11 2]       There is documentation in the medical record of an expected length of stay of at least 2 midnights  The patient is therefore expected to satisfy the 2 midnight benchmark and given the 2 midnight presumption is appropriate for INPATIENT ADMISSION  Given this expectation of a satisfying stay, CMS instructs us that the patient is most often appropriate for inpatient admission under part A provided medical necessity is documented in the chart  After review of the relevant documentation, labs, vital signs and test results, the patient is appropriate for INPATIENT ADMISSION  Admission to the hospital as an inpatient is a complex decision making process which requires the practitioner to consider the patients presenting complaint, history and physical examination and all relevant testing  With this in mind, in this case, the patient was deemed appropriate for INPATIENT ADMISSION  After review of the documentation and testing available at the time of the admission I concur with this clinical determination of medical necessity  Rationale is as follows: The patient is a 80 yrs old Female who presented to the ED at 9/11/2021  3:22 PM with a chief complaint of Vomiting (pt s/p d/c from hospital yesterday for pneumonia, pt was feeling better, but d/c on oral augmentin, shortly after starting med she became nauseas and started vomiting and having liquid stools)  Pt admitted with intractable nausea and vomiting  She received IVF and IV zofran  Patient was recently hospitalized for right lobar pneumonia treated with Cefepime and discharged on Augmentin  C diff toxin was negative  She is subsequently improved   Augmentin was discontinued as likely culprit  Sputum culture 9/9/21 grew pansensitive Pseudomonas  She was discharged on cipro  The patients vitals on arrival were   ED Triage Vitals   Temperature Pulse Respirations Blood Pressure SpO2   09/11/21 1525 09/11/21 0109 09/11/21 0109 09/11/21 1534 09/11/21 0109   97 8 °F (36 6 °C) (!) 109 18 (!) 181/88 97 %      Temp Source Heart Rate Source Patient Position - Orthostatic VS BP Location FiO2 (%)   09/11/21 1525 09/11/21 0109 09/11/21 0109 09/11/21 0109 --   Oral Monitor Lying Right arm       Pain Score       09/11/21 1839       No Pain           Past Medical History:   Diagnosis Date    Anemia     Asthma     Atrial fibrillation ([facility] [address] ) 11/08/2019    CAD     CKD (chronic kidney disease)     Current use of long term anticoagulation 11/08/2019    Elevated troponin 9/8/2021    Hypertension, essential 11/08/2019    Pacemaker     TIA (transient ischemic attack) 11/08/2019     History reviewed  No pertinent surgical history  Consults have been placed to:   None    Vitals:    09/12/21 0719 09/12/21 1445 09/12/21 2112 09/13/21 0741   BP: 164/98 148/69 149/75 156/78   BP Location: Left arm Left arm Left arm Left arm   Pulse: 99 70 71 67   Resp: 17 19 18 16   Temp: 99 3 °F (37 4 °C) 98 9 °F (37 2 °C) 98 2 °F (36 8 °C) 98 7 °F (37 1 °C)   TempSrc: Oral Tympanic Tympanic Oral   SpO2: 96% 96% 97% 96%   Weight:       Height:           Most recent labs:    No results for input(s): WBC, HGB, HCT, PLT, K, NA, CALCIUM, BUN, CREATININE, LIPASE, AMYLASE, INR, TROPONINI, CKTOTAL, AST, ALT, ALKPHOS, BILITOT in the last 72 hours  Scheduled Meds:  Continuous Infusions:No current facility-administered medications for this encounter  PRN Meds:      Surgical procedures (if appropriate):

## 2021-12-20 ENCOUNTER — HOSPITAL ENCOUNTER (OUTPATIENT)
Facility: HOSPITAL | Age: 86
Setting detail: OBSERVATION
Discharge: HOME | End: 2021-12-21
Attending: EMERGENCY MEDICINE | Admitting: INTERNAL MEDICINE
Payer: MEDICARE

## 2021-12-20 ENCOUNTER — APPOINTMENT (EMERGENCY)
Dept: CT IMAGING | Facility: HOSPITAL | Age: 86
End: 2021-12-20
Payer: MEDICARE

## 2021-12-20 DIAGNOSIS — R19.7 DIARRHEA: ICD-10-CM

## 2021-12-20 DIAGNOSIS — I48.91 ATRIAL FIBRILLATION WITH RAPID VENTRICULAR RESPONSE (HCC): ICD-10-CM

## 2021-12-20 DIAGNOSIS — N17.9 AKI (ACUTE KIDNEY INJURY) (HCC): ICD-10-CM

## 2021-12-20 DIAGNOSIS — R11.2 NAUSEA AND VOMITING: ICD-10-CM

## 2021-12-20 DIAGNOSIS — R10.9 ABDOMINAL PAIN: Primary | ICD-10-CM

## 2021-12-20 PROBLEM — R65.10 SIRS (SYSTEMIC INFLAMMATORY RESPONSE SYNDROME) (HCC): Status: RESOLVED | Noted: 2021-12-20 | Resolved: 2021-12-20

## 2021-12-20 PROBLEM — R65.10 SIRS (SYSTEMIC INFLAMMATORY RESPONSE SYNDROME) (HCC): Status: ACTIVE | Noted: 2021-12-20

## 2021-12-20 LAB
2HR DELTA HS TROPONIN: 0 NG/L
4HR DELTA HS TROPONIN: 0 NG/L
ALBUMIN SERPL BCP-MCNC: 3.9 G/DL (ref 3.4–4.8)
ALP SERPL-CCNC: 82.4 U/L (ref 35–140)
ALT SERPL W P-5'-P-CCNC: 16 U/L (ref 5–54)
ANION GAP SERPL CALCULATED.3IONS-SCNC: 11 MMOL/L (ref 4–13)
AST SERPL W P-5'-P-CCNC: 29 U/L (ref 15–41)
BACTERIA UR QL AUTO: ABNORMAL /HPF
BASOPHILS # BLD AUTO: 0.02 THOUSANDS/ΜL (ref 0–0.1)
BASOPHILS NFR BLD AUTO: 0 % (ref 0–1)
BILIRUB SERPL-MCNC: 0.53 MG/DL (ref 0.3–1.2)
BILIRUB UR QL STRIP: NEGATIVE
BUN SERPL-MCNC: 21 MG/DL (ref 6–20)
CALCIUM SERPL-MCNC: 9 MG/DL (ref 8.4–10.2)
CARDIAC TROPONIN I PNL SERPL HS: 12 NG/L
CHLORIDE SERPL-SCNC: 105 MMOL/L (ref 96–108)
CLARITY UR: CLEAR
CO2 SERPL-SCNC: 25 MMOL/L (ref 22–33)
COLOR UR: YELLOW
CREAT SERPL-MCNC: 1.19 MG/DL (ref 0.4–1.1)
EOSINOPHIL # BLD AUTO: 0.02 THOUSAND/ΜL (ref 0–0.61)
EOSINOPHIL NFR BLD AUTO: 0 % (ref 0–6)
ERYTHROCYTE [DISTWIDTH] IN BLOOD BY AUTOMATED COUNT: 13.7 % (ref 11.6–15.1)
GFR SERPL CREATININE-BSD FRML MDRD: 38 ML/MIN/1.73SQ M
GLUCOSE SERPL-MCNC: 98 MG/DL (ref 65–140)
GLUCOSE UR STRIP-MCNC: NEGATIVE MG/DL
HCT VFR BLD AUTO: 41.6 % (ref 34.8–46.1)
HGB BLD-MCNC: 13.6 G/DL (ref 11.5–15.4)
HGB UR QL STRIP.AUTO: ABNORMAL
IMM GRANULOCYTES # BLD AUTO: 0.02 THOUSAND/UL (ref 0–0.2)
IMM GRANULOCYTES NFR BLD AUTO: 0 % (ref 0–2)
KETONES UR STRIP-MCNC: ABNORMAL MG/DL
LACTATE SERPL-SCNC: 1.7 MMOL/L (ref 0–2)
LEUKOCYTE ESTERASE UR QL STRIP: NEGATIVE
LIPASE SERPL-CCNC: 21 U/L (ref 13–60)
LYMPHOCYTES # BLD AUTO: 1.14 THOUSANDS/ΜL (ref 0.6–4.47)
LYMPHOCYTES NFR BLD AUTO: 20 % (ref 14–44)
MCH RBC QN AUTO: 30.1 PG (ref 26.8–34.3)
MCHC RBC AUTO-ENTMCNC: 32.7 G/DL (ref 31.4–37.4)
MCV RBC AUTO: 92 FL (ref 82–98)
MONOCYTES # BLD AUTO: 0.65 THOUSAND/ΜL (ref 0.17–1.22)
MONOCYTES NFR BLD AUTO: 11 % (ref 4–12)
NEUTROPHILS # BLD AUTO: 3.87 THOUSANDS/ΜL (ref 1.85–7.62)
NEUTS SEG NFR BLD AUTO: 69 % (ref 43–75)
NITRITE UR QL STRIP: NEGATIVE
NON-SQ EPI CELLS URNS QL MICRO: ABNORMAL /HPF
NRBC BLD AUTO-RTO: 0 /100 WBCS
PH UR STRIP.AUTO: 5.5 [PH]
PLATELET # BLD AUTO: 248 THOUSANDS/UL (ref 149–390)
PMV BLD AUTO: 10.3 FL (ref 8.9–12.7)
POTASSIUM SERPL-SCNC: 4.5 MMOL/L (ref 3.5–5)
PROT SERPL-MCNC: 6.9 G/DL (ref 6.4–8.3)
PROT UR STRIP-MCNC: NEGATIVE MG/DL
RBC # BLD AUTO: 4.52 MILLION/UL (ref 3.81–5.12)
RBC #/AREA URNS AUTO: ABNORMAL /HPF
SODIUM SERPL-SCNC: 141 MMOL/L (ref 133–145)
SP GR UR STRIP.AUTO: <=1.005 (ref 1–1.03)
UROBILINOGEN UR QL STRIP.AUTO: 0.2 E.U./DL
WBC # BLD AUTO: 5.72 THOUSAND/UL (ref 4.31–10.16)
WBC #/AREA URNS AUTO: ABNORMAL /HPF

## 2021-12-20 PROCEDURE — 81001 URINALYSIS AUTO W/SCOPE: CPT | Performed by: EMERGENCY MEDICINE

## 2021-12-20 PROCEDURE — 99285 EMERGENCY DEPT VISIT HI MDM: CPT | Performed by: EMERGENCY MEDICINE

## 2021-12-20 PROCEDURE — 85025 COMPLETE CBC W/AUTO DIFF WBC: CPT | Performed by: EMERGENCY MEDICINE

## 2021-12-20 PROCEDURE — 83605 ASSAY OF LACTIC ACID: CPT | Performed by: EMERGENCY MEDICINE

## 2021-12-20 PROCEDURE — 96375 TX/PRO/DX INJ NEW DRUG ADDON: CPT

## 2021-12-20 PROCEDURE — 83690 ASSAY OF LIPASE: CPT | Performed by: EMERGENCY MEDICINE

## 2021-12-20 PROCEDURE — 96374 THER/PROPH/DIAG INJ IV PUSH: CPT

## 2021-12-20 PROCEDURE — 74177 CT ABD & PELVIS W/CONTRAST: CPT

## 2021-12-20 PROCEDURE — 99285 EMERGENCY DEPT VISIT HI MDM: CPT

## 2021-12-20 PROCEDURE — 96361 HYDRATE IV INFUSION ADD-ON: CPT

## 2021-12-20 PROCEDURE — 80053 COMPREHEN METABOLIC PANEL: CPT | Performed by: EMERGENCY MEDICINE

## 2021-12-20 PROCEDURE — 99220 PR INITIAL OBSERVATION CARE/DAY 70 MINUTES: CPT | Performed by: INTERNAL MEDICINE

## 2021-12-20 PROCEDURE — 84484 ASSAY OF TROPONIN QUANT: CPT | Performed by: EMERGENCY MEDICINE

## 2021-12-20 PROCEDURE — 36415 COLL VENOUS BLD VENIPUNCTURE: CPT | Performed by: EMERGENCY MEDICINE

## 2021-12-20 PROCEDURE — 93005 ELECTROCARDIOGRAM TRACING: CPT

## 2021-12-20 RX ORDER — DILTIAZEM HYDROCHLORIDE 120 MG/1
120 CAPSULE, COATED, EXTENDED RELEASE ORAL DAILY
Status: DISCONTINUED | OUTPATIENT
Start: 2021-12-20 | End: 2021-12-21 | Stop reason: HOSPADM

## 2021-12-20 RX ORDER — METOPROLOL SUCCINATE 50 MG/1
100 TABLET, EXTENDED RELEASE ORAL DAILY
Status: DISCONTINUED | OUTPATIENT
Start: 2021-12-20 | End: 2021-12-21 | Stop reason: HOSPADM

## 2021-12-20 RX ORDER — ONDANSETRON 2 MG/ML
4 INJECTION INTRAMUSCULAR; INTRAVENOUS ONCE
Status: COMPLETED | OUTPATIENT
Start: 2021-12-20 | End: 2021-12-20

## 2021-12-20 RX ORDER — GABAPENTIN 100 MG/1
200 CAPSULE ORAL 2 TIMES DAILY
Status: DISCONTINUED | OUTPATIENT
Start: 2021-12-20 | End: 2021-12-21 | Stop reason: HOSPADM

## 2021-12-20 RX ORDER — IRON POLYSACCHARIDE COMPLEX 150 MG
150 CAPSULE ORAL DAILY
Status: DISCONTINUED | OUTPATIENT
Start: 2021-12-20 | End: 2021-12-21 | Stop reason: HOSPADM

## 2021-12-20 RX ORDER — PRAVASTATIN SODIUM 40 MG
40 TABLET ORAL
Status: DISCONTINUED | OUTPATIENT
Start: 2021-12-20 | End: 2021-12-21 | Stop reason: HOSPADM

## 2021-12-20 RX ORDER — DEXTROSE AND SODIUM CHLORIDE 5; .9 G/100ML; G/100ML
75 INJECTION, SOLUTION INTRAVENOUS CONTINUOUS
Status: DISCONTINUED | OUTPATIENT
Start: 2021-12-20 | End: 2021-12-21 | Stop reason: HOSPADM

## 2021-12-20 RX ADMIN — Medication 150 MG: at 17:26

## 2021-12-20 RX ADMIN — DILTIAZEM HYDROCHLORIDE 120 MG: 120 CAPSULE, COATED, EXTENDED RELEASE ORAL at 14:36

## 2021-12-20 RX ADMIN — GABAPENTIN 200 MG: 100 CAPSULE ORAL at 17:30

## 2021-12-20 RX ADMIN — DEXTROSE AND SODIUM CHLORIDE 75 ML/HR: 5; .9 INJECTION, SOLUTION INTRAVENOUS at 14:04

## 2021-12-20 RX ADMIN — MORPHINE SULFATE 2 MG: 2 INJECTION, SOLUTION INTRAMUSCULAR; INTRAVENOUS at 09:45

## 2021-12-20 RX ADMIN — SODIUM CHLORIDE 1000 ML: 0.9 INJECTION, SOLUTION INTRAVENOUS at 09:45

## 2021-12-20 RX ADMIN — PRAVASTATIN SODIUM 40 MG: 40 TABLET ORAL at 17:26

## 2021-12-20 RX ADMIN — METOPROLOL SUCCINATE 100 MG: 50 TABLET, EXTENDED RELEASE ORAL at 14:36

## 2021-12-20 RX ADMIN — IOHEXOL 85 ML: 350 INJECTION, SOLUTION INTRAVENOUS at 10:16

## 2021-12-20 RX ADMIN — ONDANSETRON 4 MG: 2 INJECTION INTRAMUSCULAR; INTRAVENOUS at 09:45

## 2021-12-20 RX ADMIN — APIXABAN 2.5 MG: 2.5 TABLET, FILM COATED ORAL at 17:30

## 2021-12-21 VITALS
RESPIRATION RATE: 18 BRPM | HEART RATE: 69 BPM | WEIGHT: 111.77 LBS | SYSTOLIC BLOOD PRESSURE: 117 MMHG | HEIGHT: 63 IN | TEMPERATURE: 98.5 F | BODY MASS INDEX: 19.8 KG/M2 | OXYGEN SATURATION: 97 % | DIASTOLIC BLOOD PRESSURE: 59 MMHG

## 2021-12-21 LAB
ANION GAP SERPL CALCULATED.3IONS-SCNC: 10 MMOL/L (ref 4–13)
ATRIAL RATE: 126 BPM
BUN SERPL-MCNC: 16 MG/DL (ref 6–20)
CALCIUM SERPL-MCNC: 8.1 MG/DL (ref 8.4–10.2)
CHLORIDE SERPL-SCNC: 107 MMOL/L (ref 96–108)
CO2 SERPL-SCNC: 24 MMOL/L (ref 22–33)
CREAT SERPL-MCNC: 1.15 MG/DL (ref 0.4–1.1)
ERYTHROCYTE [DISTWIDTH] IN BLOOD BY AUTOMATED COUNT: 13.8 % (ref 11.6–15.1)
GFR SERPL CREATININE-BSD FRML MDRD: 40 ML/MIN/1.73SQ M
GLUCOSE SERPL-MCNC: 106 MG/DL (ref 65–140)
HCT VFR BLD AUTO: 36.3 % (ref 34.8–46.1)
HGB BLD-MCNC: 11.6 G/DL (ref 11.5–15.4)
MCH RBC QN AUTO: 29.8 PG (ref 26.8–34.3)
MCHC RBC AUTO-ENTMCNC: 32 G/DL (ref 31.4–37.4)
MCV RBC AUTO: 93 FL (ref 82–98)
PLATELET # BLD AUTO: 235 THOUSANDS/UL (ref 149–390)
PMV BLD AUTO: 10.3 FL (ref 8.9–12.7)
POTASSIUM SERPL-SCNC: 3.8 MMOL/L (ref 3.5–5)
PR INTERVAL: 156 MS
QRS AXIS: -39 DEGREES
QRSD INTERVAL: 117 MS
QT INTERVAL: 360 MS
QTC INTERVAL: 471 MS
RBC # BLD AUTO: 3.89 MILLION/UL (ref 3.81–5.12)
SODIUM SERPL-SCNC: 141 MMOL/L (ref 133–145)
T WAVE AXIS: 117 DEGREES
VENTRICULAR RATE: 103 BPM
WBC # BLD AUTO: 6.07 THOUSAND/UL (ref 4.31–10.16)

## 2021-12-21 PROCEDURE — 85027 COMPLETE CBC AUTOMATED: CPT | Performed by: INTERNAL MEDICINE

## 2021-12-21 PROCEDURE — 99217 PR OBSERVATION CARE DISCHARGE MANAGEMENT: CPT | Performed by: INTERNAL MEDICINE

## 2021-12-21 PROCEDURE — 80048 BASIC METABOLIC PNL TOTAL CA: CPT | Performed by: INTERNAL MEDICINE

## 2021-12-21 PROCEDURE — 36415 COLL VENOUS BLD VENIPUNCTURE: CPT | Performed by: INTERNAL MEDICINE

## 2021-12-21 PROCEDURE — 93010 ELECTROCARDIOGRAM REPORT: CPT | Performed by: INTERNAL MEDICINE

## 2021-12-21 RX ORDER — ACETAMINOPHEN 325 MG/1
650 TABLET ORAL EVERY 6 HOURS PRN
Status: DISCONTINUED | OUTPATIENT
Start: 2021-12-21 | End: 2021-12-21 | Stop reason: HOSPADM

## 2021-12-21 RX ORDER — ONDANSETRON 4 MG/1
4 TABLET, FILM COATED ORAL EVERY 8 HOURS PRN
Qty: 20 TABLET | Refills: 0 | Status: SHIPPED | OUTPATIENT
Start: 2021-12-21 | End: 2022-01-14 | Stop reason: HOSPADM

## 2021-12-21 RX ADMIN — ACETAMINOPHEN 650 MG: 325 TABLET, FILM COATED ORAL at 10:05

## 2021-12-21 RX ADMIN — Medication 150 MG: at 11:05

## 2021-12-21 RX ADMIN — DILTIAZEM HYDROCHLORIDE 120 MG: 120 CAPSULE, COATED, EXTENDED RELEASE ORAL at 09:28

## 2021-12-21 RX ADMIN — APIXABAN 2.5 MG: 2.5 TABLET, FILM COATED ORAL at 09:29

## 2021-12-21 RX ADMIN — METOPROLOL SUCCINATE 100 MG: 50 TABLET, EXTENDED RELEASE ORAL at 09:29

## 2021-12-21 RX ADMIN — GABAPENTIN 200 MG: 100 CAPSULE ORAL at 09:28

## 2022-01-12 ENCOUNTER — APPOINTMENT (EMERGENCY)
Dept: RADIOLOGY | Facility: HOSPITAL | Age: 87
DRG: 202 | End: 2022-01-12
Payer: MEDICARE

## 2022-01-12 ENCOUNTER — HOSPITAL ENCOUNTER (INPATIENT)
Facility: HOSPITAL | Age: 87
LOS: 2 days | Discharge: HOME HEALTH | DRG: 202 | End: 2022-01-14
Attending: EMERGENCY MEDICINE | Admitting: HOSPITALIST
Payer: MEDICARE

## 2022-01-12 DIAGNOSIS — R06.02 SHORTNESS OF BREATH: ICD-10-CM

## 2022-01-12 DIAGNOSIS — J18.9 COMMUNITY ACQUIRED PNEUMONIA: Primary | ICD-10-CM

## 2022-01-12 DIAGNOSIS — R06.02 SOB (SHORTNESS OF BREATH): ICD-10-CM

## 2022-01-12 DIAGNOSIS — J45.41 MODERATE PERSISTENT ASTHMA WITH EXACERBATION: ICD-10-CM

## 2022-01-12 LAB
2HR DELTA HS TROPONIN: -1 NG/L
4HR DELTA HS TROPONIN: 0 NG/L
ALBUMIN SERPL BCP-MCNC: 3.4 G/DL (ref 3.5–5)
ALP SERPL-CCNC: 102 U/L (ref 46–116)
ALT SERPL W P-5'-P-CCNC: 22 U/L (ref 12–78)
ANION GAP SERPL CALCULATED.3IONS-SCNC: 9 MMOL/L (ref 4–13)
AST SERPL W P-5'-P-CCNC: 19 U/L (ref 5–45)
BASOPHILS # BLD AUTO: 0.07 THOUSANDS/ΜL (ref 0–0.1)
BASOPHILS NFR BLD AUTO: 1 % (ref 0–1)
BILIRUB SERPL-MCNC: 0.52 MG/DL (ref 0.2–1)
BUN SERPL-MCNC: 20 MG/DL (ref 5–25)
CALCIUM ALBUM COR SERPL-MCNC: 9.7 MG/DL (ref 8.3–10.1)
CALCIUM SERPL-MCNC: 9.2 MG/DL (ref 8.3–10.1)
CARDIAC TROPONIN I PNL SERPL HS: 5 NG/L
CARDIAC TROPONIN I PNL SERPL HS: 6 NG/L
CARDIAC TROPONIN I PNL SERPL HS: 6 NG/L
CHLORIDE SERPL-SCNC: 102 MMOL/L (ref 100–108)
CO2 SERPL-SCNC: 26 MMOL/L (ref 21–32)
CREAT SERPL-MCNC: 1.43 MG/DL (ref 0.6–1.3)
EOSINOPHIL # BLD AUTO: 0.02 THOUSAND/ΜL (ref 0–0.61)
EOSINOPHIL NFR BLD AUTO: 0 % (ref 0–6)
ERYTHROCYTE [DISTWIDTH] IN BLOOD BY AUTOMATED COUNT: 14.2 % (ref 11.6–15.1)
FLUAV RNA RESP QL NAA+PROBE: NEGATIVE
FLUBV RNA RESP QL NAA+PROBE: NEGATIVE
GFR SERPL CREATININE-BSD FRML MDRD: 31 ML/MIN/1.73SQ M
GLUCOSE SERPL-MCNC: 99 MG/DL (ref 65–140)
HCT VFR BLD AUTO: 40.6 % (ref 34.8–46.1)
HGB BLD-MCNC: 12.7 G/DL (ref 11.5–15.4)
IMM GRANULOCYTES # BLD AUTO: 0.04 THOUSAND/UL (ref 0–0.2)
IMM GRANULOCYTES NFR BLD AUTO: 0 % (ref 0–2)
LYMPHOCYTES # BLD AUTO: 1.53 THOUSANDS/ΜL (ref 0.6–4.47)
LYMPHOCYTES NFR BLD AUTO: 16 % (ref 14–44)
MCH RBC QN AUTO: 30.2 PG (ref 26.8–34.3)
MCHC RBC AUTO-ENTMCNC: 31.3 G/DL (ref 31.4–37.4)
MCV RBC AUTO: 96 FL (ref 82–98)
MONOCYTES # BLD AUTO: 1.09 THOUSAND/ΜL (ref 0.17–1.22)
MONOCYTES NFR BLD AUTO: 12 % (ref 4–12)
NEUTROPHILS # BLD AUTO: 6.58 THOUSANDS/ΜL (ref 1.85–7.62)
NEUTS SEG NFR BLD AUTO: 71 % (ref 43–75)
NRBC BLD AUTO-RTO: 0 /100 WBCS
NT-PROBNP SERPL-MCNC: 5632 PG/ML
PLATELET # BLD AUTO: 253 THOUSANDS/UL (ref 149–390)
PLATELET # BLD AUTO: 298 THOUSANDS/UL (ref 149–390)
PMV BLD AUTO: 10.2 FL (ref 8.9–12.7)
PMV BLD AUTO: 10.3 FL (ref 8.9–12.7)
POTASSIUM SERPL-SCNC: 4.5 MMOL/L (ref 3.5–5.3)
PROCALCITONIN SERPL-MCNC: 0.08 NG/ML
PROT SERPL-MCNC: 8 G/DL (ref 6.4–8.2)
RBC # BLD AUTO: 4.21 MILLION/UL (ref 3.81–5.12)
RSV RNA RESP QL NAA+PROBE: NEGATIVE
SARS-COV-2 RNA RESP QL NAA+PROBE: NEGATIVE
SODIUM SERPL-SCNC: 137 MMOL/L (ref 136–145)
WBC # BLD AUTO: 9.33 THOUSAND/UL (ref 4.31–10.16)

## 2022-01-12 PROCEDURE — 87040 BLOOD CULTURE FOR BACTERIA: CPT

## 2022-01-12 PROCEDURE — 83880 ASSAY OF NATRIURETIC PEPTIDE: CPT

## 2022-01-12 PROCEDURE — 84484 ASSAY OF TROPONIN QUANT: CPT | Performed by: EMERGENCY MEDICINE

## 2022-01-12 PROCEDURE — 99222 1ST HOSP IP/OBS MODERATE 55: CPT | Performed by: GENERAL PRACTICE

## 2022-01-12 PROCEDURE — 99285 EMERGENCY DEPT VISIT HI MDM: CPT | Performed by: EMERGENCY MEDICINE

## 2022-01-12 PROCEDURE — 93005 ELECTROCARDIOGRAM TRACING: CPT

## 2022-01-12 PROCEDURE — 71045 X-RAY EXAM CHEST 1 VIEW: CPT

## 2022-01-12 PROCEDURE — 85025 COMPLETE CBC W/AUTO DIFF WBC: CPT | Performed by: EMERGENCY MEDICINE

## 2022-01-12 PROCEDURE — 36415 COLL VENOUS BLD VENIPUNCTURE: CPT

## 2022-01-12 PROCEDURE — 84484 ASSAY OF TROPONIN QUANT: CPT | Performed by: FAMILY MEDICINE

## 2022-01-12 PROCEDURE — 0241U HB NFCT DS VIR RESP RNA 4 TRGT: CPT

## 2022-01-12 PROCEDURE — 84145 PROCALCITONIN (PCT): CPT

## 2022-01-12 PROCEDURE — 85049 AUTOMATED PLATELET COUNT: CPT | Performed by: FAMILY MEDICINE

## 2022-01-12 PROCEDURE — 80053 COMPREHEN METABOLIC PANEL: CPT | Performed by: EMERGENCY MEDICINE

## 2022-01-12 PROCEDURE — 99285 EMERGENCY DEPT VISIT HI MDM: CPT

## 2022-01-12 RX ORDER — ALBUTEROL SULFATE 90 UG/1
2 AEROSOL, METERED RESPIRATORY (INHALATION) EVERY 6 HOURS PRN
Status: DISCONTINUED | OUTPATIENT
Start: 2022-01-12 | End: 2022-01-14 | Stop reason: HOSPADM

## 2022-01-12 RX ORDER — ACETAMINOPHEN 325 MG/1
650 TABLET ORAL EVERY 6 HOURS PRN
Status: DISCONTINUED | OUTPATIENT
Start: 2022-01-12 | End: 2022-01-14 | Stop reason: HOSPADM

## 2022-01-12 RX ORDER — GABAPENTIN 100 MG/1
200 CAPSULE ORAL 2 TIMES DAILY
Status: DISCONTINUED | OUTPATIENT
Start: 2022-01-12 | End: 2022-01-14 | Stop reason: HOSPADM

## 2022-01-12 RX ORDER — DOCUSATE SODIUM 100 MG/1
100 CAPSULE, LIQUID FILLED ORAL 2 TIMES DAILY
Status: DISCONTINUED | OUTPATIENT
Start: 2022-01-12 | End: 2022-01-14 | Stop reason: HOSPADM

## 2022-01-12 RX ORDER — ALBUTEROL SULFATE 90 UG/1
2 AEROSOL, METERED RESPIRATORY (INHALATION) EVERY 4 HOURS PRN
Status: DISCONTINUED | OUTPATIENT
Start: 2022-01-12 | End: 2022-01-14 | Stop reason: HOSPADM

## 2022-01-12 RX ORDER — ASCORBIC ACID 500 MG
250 TABLET ORAL DAILY
Status: DISCONTINUED | OUTPATIENT
Start: 2022-01-13 | End: 2022-01-14 | Stop reason: HOSPADM

## 2022-01-12 RX ORDER — IRON POLYSACCHARIDE COMPLEX 150 MG
150 CAPSULE ORAL DAILY
Status: DISCONTINUED | OUTPATIENT
Start: 2022-01-13 | End: 2022-01-14 | Stop reason: HOSPADM

## 2022-01-12 RX ORDER — PREDNISONE 20 MG/1
40 TABLET ORAL DAILY
Status: DISCONTINUED | OUTPATIENT
Start: 2022-01-13 | End: 2022-01-14 | Stop reason: HOSPADM

## 2022-01-12 RX ORDER — METOPROLOL SUCCINATE 100 MG/1
100 TABLET, EXTENDED RELEASE ORAL DAILY
Status: DISCONTINUED | OUTPATIENT
Start: 2022-01-13 | End: 2022-01-14 | Stop reason: HOSPADM

## 2022-01-12 RX ORDER — HEPARIN SODIUM 5000 [USP'U]/ML
5000 INJECTION, SOLUTION INTRAVENOUS; SUBCUTANEOUS EVERY 8 HOURS SCHEDULED
Status: DISCONTINUED | OUTPATIENT
Start: 2022-01-12 | End: 2022-01-12

## 2022-01-12 RX ORDER — ALBUTEROL SULFATE 90 UG/1
2 AEROSOL, METERED RESPIRATORY (INHALATION) ONCE
Status: COMPLETED | OUTPATIENT
Start: 2022-01-12 | End: 2022-01-12

## 2022-01-12 RX ORDER — GUAIFENESIN 600 MG
600 TABLET, EXTENDED RELEASE 12 HR ORAL EVERY 12 HOURS SCHEDULED
Status: DISCONTINUED | OUTPATIENT
Start: 2022-01-12 | End: 2022-01-14 | Stop reason: HOSPADM

## 2022-01-12 RX ORDER — DILTIAZEM HYDROCHLORIDE 120 MG/1
120 CAPSULE, COATED, EXTENDED RELEASE ORAL DAILY
Status: DISCONTINUED | OUTPATIENT
Start: 2022-01-13 | End: 2022-01-14 | Stop reason: HOSPADM

## 2022-01-12 RX ORDER — BENZONATATE 100 MG/1
100 CAPSULE ORAL 3 TIMES DAILY PRN
Status: DISCONTINUED | OUTPATIENT
Start: 2022-01-12 | End: 2022-01-14 | Stop reason: HOSPADM

## 2022-01-12 RX ORDER — ONDANSETRON 2 MG/ML
4 INJECTION INTRAMUSCULAR; INTRAVENOUS EVERY 6 HOURS PRN
Status: DISCONTINUED | OUTPATIENT
Start: 2022-01-12 | End: 2022-01-14 | Stop reason: HOSPADM

## 2022-01-12 RX ORDER — PRAVASTATIN SODIUM 40 MG
40 TABLET ORAL
Status: DISCONTINUED | OUTPATIENT
Start: 2022-01-13 | End: 2022-01-14 | Stop reason: HOSPADM

## 2022-01-12 RX ADMIN — DOCUSATE SODIUM 100 MG: 100 CAPSULE ORAL at 21:20

## 2022-01-12 RX ADMIN — GABAPENTIN 200 MG: 100 CAPSULE ORAL at 21:19

## 2022-01-12 RX ADMIN — CEFTRIAXONE SODIUM 1000 MG: 10 INJECTION, POWDER, FOR SOLUTION INTRAVENOUS at 18:31

## 2022-01-12 RX ADMIN — GUAIFENESIN 600 MG: 600 TABLET, EXTENDED RELEASE ORAL at 21:20

## 2022-01-12 RX ADMIN — ALBUTEROL SULFATE 2 PUFF: 90 AEROSOL, METERED RESPIRATORY (INHALATION) at 18:30

## 2022-01-12 RX ADMIN — APIXABAN 2.5 MG: 2.5 TABLET, FILM COATED ORAL at 21:20

## 2022-01-12 NOTE — ED PROVIDER NOTES
History  Chief Complaint   Patient presents with    Shortness of Breath     C/o SOB x3 days  Referred by allergist for r/o pneumonia  Ms Allison Cheng is a 80 yof sent in by her allergist for increasing shortness of breath and productive cough x 1 week, worse over the past three days  Has history of asthma, had pneumonia in 2021  Endorses complete resolution of symptoms from that bout of pneumonia prior to symptom onset last week  Feels breathing is "tight", is out of breath with minimal exertion, has productive cough with white sputum, was febrile this morning which resolved after taking tylenol  States breathing not improved with albuterol inhaler use, but inhaler is "probably "  Denies headache, chills, weakness, sore throat, chest pain, abdominal pain, N/V/D, constipation, hematuria, blood in stool, unilateral calf tenderness or swelling, or peripheral edema  No recent travel or sick contacts, no history of DVT, is vaccinated but not boosted for COVID  Prior to Admission Medications   Prescriptions Last Dose Informant Patient Reported? Taking?    apixaban (ELIQUIS) 2 5 mg   Yes Yes   Sig: Take 2 5 mg by mouth 2 (two) times a day   diltiazem (CARDIZEM CD) 120 mg 24 hr capsule   Yes Yes   Sig: Take 120 mg by mouth daily    gabapentin (NEURONTIN) 100 mg capsule   Yes Yes   Sig: Take 200 mg by mouth 2 (two) times a day    iron polysaccharides (FERREX) 150 mg capsule   Yes Yes   Sig: Take 150 mg by mouth daily   lisinopril (ZESTRIL) 5 mg tablet   Yes Yes   Sig: Take 5 mg by mouth daily   metoprolol succinate (TOPROL-XL) 100 mg 24 hr tablet   Yes Yes   Sig: Take 100 mg by mouth daily   ondansetron (ZOFRAN) 4 mg tablet Not Taking at Unknown time  No No   Sig: Take 1 tablet (4 mg total) by mouth every 8 (eight) hours as needed for nausea or vomiting   Patient not taking: Reported on 2022    rosuvastatin (CRESTOR) 5 mg tablet   Yes Yes   Sig: Take 5 mg by mouth  Facility-Administered Medications: None       Past Medical History:   Diagnosis Date    Anemia     Asthma     Atrial fibrillation ([facility], [place] [address] ) 11/08/2019    CAD     CKD (chronic kidney disease)     Current use of long term anticoagulation 11/08/2019    Elevated troponin 9/8/2021    Hypertension, essential 11/08/2019    Pacemaker     TIA (transient ischemic attack) 11/08/2019       History reviewed  No pertinent surgical history  History reviewed  No pertinent family history  I have reviewed and agree with the history as documented  E-Cigarette/Vaping    E-Cigarette Use Never User      E-Cigarette/Vaping Substances    Nicotine No     THC No     CBD No     Flavoring No     Other No     Unknown No      Social History     Tobacco Use    Smoking status: Never Smoker    Smokeless tobacco: Never Used   Vaping Use    Vaping Use: Never used   Substance Use Topics    Alcohol use: Yes     Comment: occasionaly     Drug use: Never        Review of Systems   Constitutional: Positive for appetite change and fever  Negative for chills, diaphoresis and fatigue  Poor PO intake x 3 days  HENT: Negative  Negative for congestion, rhinorrhea, sneezing, sore throat and trouble swallowing  Eyes: Negative  Negative for visual disturbance  Respiratory: Positive for cough, chest tightness and shortness of breath  Negative for wheezing  Cardiovascular: Negative  Negative for chest pain, palpitations and leg swelling  Gastrointestinal: Negative  Negative for abdominal distention, abdominal pain, constipation, diarrhea, nausea and vomiting  Endocrine: Negative  Genitourinary: Negative  Negative for difficulty urinating and hematuria  Musculoskeletal: Negative  Negative for back pain, gait problem and neck pain  Skin: Negative  Neurological: Negative for dizziness, syncope, weakness, light-headedness and headaches  Hematological: Negative  Psychiatric/Behavioral: Negative      All other systems reviewed and are negative  Physical Exam  ED Triage Vitals   Temperature Pulse Respirations Blood Pressure SpO2   01/12/22 1545 01/12/22 1546 01/12/22 1546 01/12/22 1549 01/12/22 1546   98 4 °F (36 9 °C) 95 16 167/76 100 %      Temp Source Heart Rate Source Patient Position - Orthostatic VS BP Location FiO2 (%)   01/12/22 1545 01/12/22 1546 01/12/22 1600 01/12/22 1600 --   Oral Monitor Lying Right arm       Pain Score       01/12/22 1546       No Pain             Orthostatic Vital Signs  Vitals:    01/12/22 1546 01/12/22 1549 01/12/22 1600 01/12/22 1845   BP:  167/76 167/76 146/73   Pulse: 95  80 72   Patient Position - Orthostatic VS:   Lying Lying       Physical Exam  Vitals and nursing note reviewed  Constitutional:       General: She is not in acute distress  Appearance: Normal appearance  She is not ill-appearing or diaphoretic  HENT:      Head: Normocephalic and atraumatic  Right Ear: External ear normal       Left Ear: External ear normal       Nose: Nose normal       Mouth/Throat:      Mouth: Mucous membranes are moist       Pharynx: Oropharynx is clear  Eyes:      General: No scleral icterus  Extraocular Movements: Extraocular movements intact  Conjunctiva/sclera: Conjunctivae normal       Pupils: Pupils are equal, round, and reactive to light  Cardiovascular:      Rate and Rhythm: Normal rate and regular rhythm  Pulses: Normal pulses  Heart sounds: Normal heart sounds  No murmur heard  No friction rub  No gallop  Pulmonary:      Effort: Pulmonary effort is normal  No respiratory distress  Breath sounds: Examination of the right-upper field reveals wheezing  Examination of the left-upper field reveals wheezing  Examination of the right-middle field reveals wheezing  Examination of the left-middle field reveals wheezing  Examination of the right-lower field reveals wheezing and rales   Examination of the left-lower field reveals wheezing and rales  Wheezing and rales present  No decreased breath sounds or rhonchi  Comments: Diffuse expiratory wheezing, bilateral lower lobe rales, R>L  Chest:      Chest wall: No tenderness  Abdominal:      General: Abdomen is flat  Bowel sounds are normal  There is no distension  Palpations: Abdomen is soft  Tenderness: There is no abdominal tenderness  Musculoskeletal:         General: No swelling or tenderness  Normal range of motion  Cervical back: Normal range of motion and neck supple  No tenderness  Right lower leg: No edema  Left lower leg: No edema  Comments: No calf tenderness, erythema, or swelling bilaterally  Lymphadenopathy:      Cervical: No cervical adenopathy  Skin:     General: Skin is warm and dry  Capillary Refill: Capillary refill takes less than 2 seconds  Coloration: Skin is not pale  Findings: No rash  Neurological:      General: No focal deficit present  Mental Status: She is alert     Psychiatric:         Mood and Affect: Mood normal          Behavior: Behavior normal          ED Medications  Medications   apixaban (ELIQUIS) tablet 2 5 mg (2 5 mg Oral Given 1/12/22 2120)   diltiazem (CARDIZEM CD) 24 hr capsule 120 mg (has no administration in time range)   gabapentin (NEURONTIN) capsule 200 mg (200 mg Oral Given 1/12/22 2119)   iron polysaccharides (FERREX) capsule 150 mg (has no administration in time range)   metoprolol succinate (TOPROL-XL) 24 hr tablet 100 mg (has no administration in time range)   pravastatin (PRAVACHOL) tablet 40 mg (has no administration in time range)   acetaminophen (TYLENOL) tablet 650 mg (has no administration in time range)   docusate sodium (COLACE) capsule 100 mg (100 mg Oral Given 1/12/22 2120)   ondansetron (ZOFRAN) injection 4 mg (has no administration in time range)   guaiFENesin (MUCINEX) 12 hr tablet 600 mg (600 mg Oral Given 1/12/22 2120)   ascorbic acid (VITAMIN C) tablet 250 mg (has no administration in time range)   predniSONE tablet 40 mg (has no administration in time range)   albuterol (PROVENTIL HFA,VENTOLIN HFA) inhaler 2 puff (has no administration in time range)   ceftriaxone (ROCEPHIN) 1 g/50 mL in dextrose IVPB (has no administration in time range)   benzonatate (TESSALON PERLES) capsule 100 mg (has no administration in time range)   albuterol (PROVENTIL HFA,VENTOLIN HFA) inhaler 2 puff (2 puffs Inhalation Given 1/12/22 1830)   ceftriaxone (ROCEPHIN) 1 g/50 mL in dextrose IVPB (0 mg Intravenous Stopped 1/12/22 1901)       Diagnostic Studies  Results Reviewed     Procedure Component Value Units Date/Time    HS Troponin I 4hr [[id number]] Collected: 01/12/22 1957    Lab Status: Final result Specimen: Blood from Arm, Left Updated: 01/12/22 2027     hs TnI 4hr 6 ng/L      Delta 4hr hsTnI 0 ng/L     Platelet count [[id number]]  (Normal) Collected: 01/12/22 1957    Lab Status: Final result Specimen: Blood from Arm, Left Updated: 01/12/22 2005     Platelets 529 Thousands/uL      MPV 10 3 fL     Blood culture #1 [[id number]] Collected: 01/12/22 1641    Lab Status: Preliminary result Specimen: Blood from Arm, Left Updated: 01/12/22 2001     Blood Culture Received in Microbiology Lab  Culture in Progress  Blood culture #2 [[id number]] Collected: 01/12/22 1641    Lab Status: Preliminary result Specimen: Blood from Arm, Right Updated: 01/12/22 2001     Blood Culture Received in Microbiology Lab  Culture in Progress      Sputum culture and Gram stain [[id number]]     Lab Status: No result Specimen: Sputum     Strep Pneumoniae, Urine [[id number]]     Lab Status: No result Specimen: Urine     Legionella antigen, Urine [[id number]]     Lab Status: No result Specimen: Urine     HS Troponin I 2hr [[id number]] Collected: 01/12/22 1830    Lab Status: Final result Specimen: Blood from Arm, Left Updated: 01/12/22 1925     hs TnI 2hr 5 ng/L      Delta 2hr hsTnI -1 ng/L     COVID/FLU/RSV [[id number]]  (Normal) Collected: 01/12/22 1641    Lab Status: Final result Specimen: Nares from Nose Updated: 01/12/22 2026     SARS-CoV-2 Negative     INFLUENZA A PCR Negative     INFLUENZA B PCR Negative     RSV PCR Negative    Narrative:      FOR PEDIATRIC PATIENTS - copy/paste COVID Guidelines URL to browser: [email or web address]    SARS-CoV-2 assay is a Nucleic Acid Amplification assay intended for the  qualitative detection of nucleic acid from SARS-CoV-2 in nasopharyngeal  swabs  Results are for the presumptive identification of SARS-CoV-2 RNA  Positive results are indicative of infection with SARS-CoV-2, the virus  causing COVID-19, but do not rule out bacterial infection or co-infection  with other viruses  Laboratories within the [place] and its  territories are required to report all positive results to the appropriate  public health authorities  Negative results do not preclude SARS-CoV-2  infection and should not be used as the sole basis for treatment or other  patient management decisions  Negative results must be combined with  clinical observations, patient history, and epidemiological information  This test has not been FDA cleared or approved  This test has been authorized by FDA under an Emergency Use Authorization  (EUA)  This test is only authorized for the duration of time the  declaration that circumstances exist justifying the authorization of the  emergency use of an in vitro diagnostic tests for detection of SARS-CoV-2  virus and/or diagnosis of COVID-19 infection under section 564(b)(1) of  the Act, 21 U  S C  177LSU-0(T)(4), unless the authorization is terminated  or revoked sooner  The test has been validated but independent review by FDA  and CLIA is pending  Test performed using [employer]: This RT-PCR assay targets N2,  a region unique to SARS-CoV-2   A conserved region in the E-gene was chosen  for pan-Sarbecovirus detection which includes SARS-CoV-2      NT-BNP PRO [[id number]]  (Abnormal) Collected: 01/12/22 1558    Lab Status: Final result Specimen: Blood from Arm, Left Updated: 01/12/22 1719     NT-proBNP 5,632 pg/mL     Procalcitonin with AM Reflex [[id number]]  (Normal) Collected: 01/12/22 1558    Lab Status: Final result Specimen: Blood Updated: 01/12/22 1704     Procalcitonin 0 08 ng/ml     Procalcitonin Reflex [[id number]]     Lab Status: No result Specimen: Blood     HS Troponin 0hr (reflex protocol) [[id number]]  (Normal) Collected: 01/12/22 1558    Lab Status: Final result Specimen: Blood from Arm, Left Updated: 01/12/22 1635     hs TnI 0hr 6 ng/L     Comprehensive metabolic panel [[id number]]  (Abnormal) Collected: 01/12/22 1558    Lab Status: Final result Specimen: Blood from Arm, Left Updated: 01/12/22 1629     Sodium 137 mmol/L      Potassium 4 5 mmol/L      Chloride 102 mmol/L      CO2 26 mmol/L      ANION GAP 9 mmol/L      BUN 20 mg/dL      Creatinine 1 43 mg/dL      Glucose 99 mg/dL      Calcium 9 2 mg/dL      Corrected Calcium 9 7 mg/dL      AST 19 U/L      ALT 22 U/L      Alkaline Phosphatase 102 U/L      Total Protein 8 0 g/dL      Albumin 3 4 g/dL      Total Bilirubin 0 52 mg/dL      eGFR 31 ml/min/1 73sq m     Narrative:      [place] guidelines for Chronic Kidney Disease (CKD):     Stage 1 with normal or high GFR (GFR > 90 mL/min/1 73 square meters)    Stage 2 Mild CKD (GFR = 60-89 mL/min/1 73 square meters)    Stage 3A Moderate CKD (GFR = 45-59 mL/min/1 73 square meters)    Stage 3B Moderate CKD (GFR = 30-44 mL/min/1 73 square meters)    Stage 4 Severe CKD (GFR = 15-29 mL/min/1 73 square meters)    Stage 5 End Stage CKD (GFR <15 mL/min/1 73 square meters)  Note: GFR calculation is accurate only with a steady state creatinine    CBC and differential [[id number]]  (Abnormal) Collected: 01/12/22 1558    Lab Status: Final result Specimen: Blood from Arm, Left Updated: 01/12/22 1614     WBC 9 33 Thousand/uL      RBC 4 21 Million/uL      Hemoglobin 12 7 g/dL      Hematocrit 40 6 %      MCV 96 fL      MCH 30 2 pg      MCHC 31 3 g/dL      RDW 14 2 %      MPV 10 2 fL      Platelets 059 Thousands/uL      nRBC 0 /100 WBCs      Neutrophils Relative 71 %      Immat GRANS % 0 %      Lymphocytes Relative 16 %      Monocytes Relative 12 %      Eosinophils Relative 0 %      Basophils Relative 1 %      Neutrophils Absolute 6 58 Thousands/µL      Immature Grans Absolute 0 04 Thousand/uL      Lymphocytes Absolute 1 53 Thousands/µL      Monocytes Absolute 1 09 Thousand/µL      Eosinophils Absolute 0 02 Thousand/µL      Basophils Absolute 0 07 Thousands/µL                  XR chest 1 view portable   Final Result by Amy Troncoso MD (01/12 5594)   Improved right upper lobe infiltrate  Small right effusion unchanged  Workstation performed: [id number]               Procedures  ECG 12 Lead Documentation Only    Date/Time: 1/12/2022 3:50 PM  Performed by: Ignacio Tay DO  Authorized by: Ignacio Tay DO     Indications / Diagnosis:  SOB  ECG reviewed by me, the ED Provider: yes    Previous ECG:     Previous ECG:  Compared to current    Comparison ECG info:  12/20/2021    Similarity:  No change  Interpretation:     Interpretation: non-specific    Rate:     ECG rate:  77    ECG rate assessment: normal    Rhythm:     Rhythm: sinus rhythm    Ectopy:     Ectopy: PVCs      PVCs:  Multifocal and infrequent  QRS:     QRS axis:  Left    QRS intervals:  Normal  Conduction:     Conduction: normal    ST segments:     ST segments:  Normal  T waves:     T waves: inverted      Inverted:  V5 and V6          ED Course                             SBIRT 20yo+      Most Recent Value   SBIRT (22 yo +)    In order to provide better care to our patients, we are screening all of our patients for alcohol and drug use  Would it be okay to ask you these screening questions?  Yes Filed at: 01/12/2022 4513   Initial Alcohol Screen: US AUDIT-C     1  How often do you have a drink containing alcohol? 0 Filed at: 01/12/2022 1553   2  How many drinks containing alcohol do you have on a typical day you are drinking? 0 Filed at: 01/12/2022 1553   3b  FEMALE Any Age, or MALE 65+: How often do you have 4 or more drinks on one occassion? 0 Filed at: 01/12/2022 1553   Audit-C Score 0 Filed at: 01/12/2022 1553   SIMÓN: How many times in the past year have you    Used an illegal drug or used a prescription medication for non-medical reasons? Never Filed at: 01/12/2022 1553                MDM  Number of Diagnoses or Management Options  Community acquired pneumonia: new and requires workup  SOB (shortness of breath): new and requires workup  Diagnosis management comments: Ms Whitten is a pleasant 89 yof presenting for worsening cough and SOB, with onset of fever this morning  HPI and ROS as above  Pt is well appearing, in no apparent distress, VS stable  Physical exam with diffuse expiratory wheezing, R>L lower lobe rales, however good air movement appreciated, no evidence of respiratory distress, no signs of DVT  Labs:  CMP with mild CECI  CBC WNL  Troponin, procal, and COVID/flu WNL  BNP 5632, no baseline for comparison  CXR with small right effusion, improvement in previous pneumonia  Given fever, shortness of breath, concern exists for occult, developing pneumonia  However, heart failure cannot be excluded as cause of SOB, however no evidence of fluid overload on exam   Given albuterol inhaler with mild improvement in symptoms  Started on rocephin for possible CAP, inpatient team to de-escalate if appropriate  Stable on admission          Amount and/or Complexity of Data Reviewed  Clinical lab tests: ordered and reviewed  Tests in the radiology section of CPT®: ordered and reviewed  Obtain history from someone other than the patient: yes  Review and summarize past medical records: yes  Discuss the patient with other providers: yes  Independent visualization of images, tracings, or specimens: yes        Disposition  Final diagnoses:   SOB (shortness of breath)   Community acquired pneumonia     Time reflects when diagnosis was documented in both MDM as applicable and the Disposition within this note     Time User Action Codes Description Comment    1/12/2022  6:02 PM Padmaja  Add [R06 02] SOB (shortness of breath)     1/12/2022  6:03 PM Padmaja  Add [J18 9] Community acquired pneumonia     1/12/2022  6:03 PM Padmaja  Modify [R06 02] SOB (shortness of breath)     1/12/2022  6:03 PM Padmaja  Modify [J18 9] Community acquired pneumonia       ED Disposition     ED Disposition Condition Date/Time Comment    Admit Stable Wed Jan 12, 2022  6:02 PM Case was discussed with Dr Edgar Bowling and the patient's admission status was agreed to be Admission Status: inpatient status to the service of Dr Edgar Bowling   Follow-up Information    None         Patient's Medications   Discharge Prescriptions    No medications on file     No discharge procedures on file  PDMP Review       Value Time User    PDMP Reviewed  Yes 9/10/2021  9:14 AM Janusz Dias MD           ED Provider  Attending physically available and evaluated Daniele Thompson  PARAMJIT managed the patient along with the ED Attending      Electronically Signed by         Ruel Malave DO  01/12/22 9497

## 2022-01-13 LAB
ANION GAP SERPL CALCULATED.3IONS-SCNC: 10 MMOL/L (ref 4–13)
ATRIAL RATE: 258 BPM
BUN SERPL-MCNC: 20 MG/DL (ref 5–25)
CALCIUM SERPL-MCNC: 8.9 MG/DL (ref 8.3–10.1)
CHLORIDE SERPL-SCNC: 103 MMOL/L (ref 100–108)
CO2 SERPL-SCNC: 24 MMOL/L (ref 21–32)
CREAT SERPL-MCNC: 1.13 MG/DL (ref 0.6–1.3)
ERYTHROCYTE [DISTWIDTH] IN BLOOD BY AUTOMATED COUNT: 14 % (ref 11.6–15.1)
GFR SERPL CREATININE-BSD FRML MDRD: 41 ML/MIN/1.73SQ M
GLUCOSE SERPL-MCNC: 94 MG/DL (ref 65–140)
HCT VFR BLD AUTO: 37.7 % (ref 34.8–46.1)
HGB BLD-MCNC: 12.3 G/DL (ref 11.5–15.4)
L PNEUMO1 AG UR QL IA.RAPID: NEGATIVE
MCH RBC QN AUTO: 30.9 PG (ref 26.8–34.3)
MCHC RBC AUTO-ENTMCNC: 32.6 G/DL (ref 31.4–37.4)
MCV RBC AUTO: 95 FL (ref 82–98)
PLATELET # BLD AUTO: 245 THOUSANDS/UL (ref 149–390)
PMV BLD AUTO: 10.2 FL (ref 8.9–12.7)
POTASSIUM SERPL-SCNC: 4.2 MMOL/L (ref 3.5–5.3)
PROCALCITONIN SERPL-MCNC: 0.12 NG/ML
QRS AXIS: -67 DEGREES
QRSD INTERVAL: 118 MS
QT INTERVAL: 408 MS
QTC INTERVAL: 461 MS
RBC # BLD AUTO: 3.98 MILLION/UL (ref 3.81–5.12)
S PNEUM AG UR QL: NEGATIVE
SODIUM SERPL-SCNC: 137 MMOL/L (ref 136–145)
T WAVE AXIS: 118 DEGREES
VENTRICULAR RATE: 77 BPM
WBC # BLD AUTO: 7.87 THOUSAND/UL (ref 4.31–10.16)

## 2022-01-13 PROCEDURE — 85027 COMPLETE CBC AUTOMATED: CPT | Performed by: FAMILY MEDICINE

## 2022-01-13 PROCEDURE — 93010 ELECTROCARDIOGRAM REPORT: CPT | Performed by: INTERNAL MEDICINE

## 2022-01-13 PROCEDURE — 99232 SBSQ HOSP IP/OBS MODERATE 35: CPT | Performed by: GENERAL PRACTICE

## 2022-01-13 PROCEDURE — 87449 NOS EACH ORGANISM AG IA: CPT | Performed by: FAMILY MEDICINE

## 2022-01-13 PROCEDURE — 80048 BASIC METABOLIC PNL TOTAL CA: CPT | Performed by: FAMILY MEDICINE

## 2022-01-13 PROCEDURE — 84145 PROCALCITONIN (PCT): CPT | Performed by: FAMILY MEDICINE

## 2022-01-13 RX ADMIN — GABAPENTIN 200 MG: 100 CAPSULE ORAL at 17:06

## 2022-01-13 RX ADMIN — OXYCODONE HYDROCHLORIDE AND ACETAMINOPHEN 250 MG: 500 TABLET ORAL at 08:56

## 2022-01-13 RX ADMIN — DOCUSATE SODIUM 100 MG: 100 CAPSULE ORAL at 17:06

## 2022-01-13 RX ADMIN — APIXABAN 2.5 MG: 2.5 TABLET, FILM COATED ORAL at 08:56

## 2022-01-13 RX ADMIN — GABAPENTIN 200 MG: 100 CAPSULE ORAL at 08:56

## 2022-01-13 RX ADMIN — GUAIFENESIN 600 MG: 600 TABLET, EXTENDED RELEASE ORAL at 08:55

## 2022-01-13 RX ADMIN — PREDNISONE 40 MG: 20 TABLET ORAL at 08:55

## 2022-01-13 RX ADMIN — POLYSACCHARIDE-IRON COMPLEX 150 MG: 150 CAPSULE ORAL at 08:56

## 2022-01-13 RX ADMIN — APIXABAN 2.5 MG: 2.5 TABLET, FILM COATED ORAL at 17:06

## 2022-01-13 RX ADMIN — GUAIFENESIN 600 MG: 600 TABLET, EXTENDED RELEASE ORAL at 21:49

## 2022-01-13 RX ADMIN — ACETAMINOPHEN 650 MG: 325 TABLET, FILM COATED ORAL at 06:07

## 2022-01-13 RX ADMIN — PRAVASTATIN SODIUM 40 MG: 40 TABLET ORAL at 17:06

## 2022-01-13 RX ADMIN — DILTIAZEM HYDROCHLORIDE 120 MG: 120 CAPSULE, COATED, EXTENDED RELEASE ORAL at 08:56

## 2022-01-13 RX ADMIN — ALBUTEROL SULFATE 2 PUFF: 90 AEROSOL, METERED RESPIRATORY (INHALATION) at 07:47

## 2022-01-13 RX ADMIN — METOPROLOL SUCCINATE 100 MG: 100 TABLET, EXTENDED RELEASE ORAL at 08:55

## 2022-01-13 RX ADMIN — DOCUSATE SODIUM 100 MG: 100 CAPSULE ORAL at 08:56

## 2022-01-13 NOTE — ED NOTES
02 2L/min via NC applied at this time due to desaturations while asleep       Liat Mak RN  01/13/22 9047

## 2022-01-13 NOTE — H&P
[address] 9/7/1926, 80 y o  female MRN: [id number]  Unit/Bed#: ED 27 Encounter: [id number]  Primary Care Provider: Sarahi Lozano MD   Date and time admitted to hospital: 1/12/2022  3:45 PM    * Shortness of breath  Assessment & Plan  Patient presents with worsening productive cough and SOB x 1 week  Recent hospitalization on 9/11/2021 for RUL PNA with sputum cultures growing Pseudomonas and was treated with ciprofloxacin x 5 days  Significant wheezing b/l noted on exam      CXR- Improved right upper lobe infiltrate  Small right effusion unchanged  7/0/7877 Echo- Systolic function was normal  Ejection fraction was estimated to be 65 %  There was mild concentric hypertrophy  Estimated peak PA pressure was 52 mmHg  The findings suggest moderate pulmonary hypertension  Work up  CBC wnl, CMP wnl  COVID-19/Flu/COVID neg  Procal 0 08 wnl  BNP 5,632, trop neg x1     Plan  · Continue IV rocephin for possible CAP  · Will treat with Prednisone 40mg x 5 days  · Continue albuterol inhaler q4prn  · Trend troponins  · Procal in the am, if neg will d/c abx  · Bcx x 2 pending  · Sputum cultures ordered  · Urine Legionella  · Urine Strep Pneumoniae    CKD (chronic kidney disease) stage 3, GFR 30-59 ml/min [facility])  Assessment & Plan  Lab Results   Component Value Date    EGFR 31 01/12/2022    EGFR 40 12/21/2021    EGFR 38 12/20/2021    CREATININE 1 43 (H) 01/12/2022    CREATININE 1 15 (H) 12/21/2021    CREATININE 1 19 (H) 12/20/2021   Stage 3 CKD baseline 1-1 2  Cr slightly elevated on admission,however, does not meet criteria for CECI  Plan  · Repeat BMP in am  · Hold lisinopril overnight, likely resume in the am if improvement in Cr  · Monitor UOP  · Encourage PO intake    Hypertension, essential  Assessment & Plan  BP stable 140/70s      Plan  · Will hold home lisinopril overnight  · Monitor VSS closely    A-fib [facility])  Assessment & Plan  Patient with hx of Afib follows with [facility] cardiology Dr Ruiz  Plan  · Continue Eliquis 2 5mg BID  · Continue Cardizem 120mg 24 hour capsule  · Continue metoprolol 100mg 24 hour tablet    VTE Prophylaxis: Apixaban (Eliquis)  / sequential compression device   Code Status: Level 1  POLST: There is no POLST form on file for this patient (pre-hospital)    Anticipated Length of Stay:  Patient will be admitted on an Inpatient basis with an anticipated length of stay of  2 midnights  Justification for Hospital Stay:  Shortness of Breathe    Chief Complaint:   SOB    History of Present Illness:    Ayah Thompson is a 80 y o  female with a PMH of hypertension, atrial fibrillation on Eliquis, history of pacemaker implant x 10 years ago, CKD stage 3 presents with SOB and productive cough  x 1 week  Patient is hard of hearing, and had difficulty relaying full details or history  Hence, daughter was also called to discuss further  Per daughter, patient developed worsening cough and white thick productive phelgm x 1 week  Daughter reports she called PCPs office and he ended up sending over an albuterol inhaler for symptoms which did seem to slightly improve symptoms  However, patient developed a fever this am of 100 2F and was subsequently evaluated at PCP's office around 2:30 pm on day of admission  PCP expressed concern for pneumonia and recommend patient be evaluated in the ED  Patient denies any N/V, diarrhea, CP or SOB  Upon admission patient is Afebrile  at 98 4F, satting 95% on RA, Resp 16  CXR showing improved right upper lobe infiltrate and small right effusion unchanged  Of note, patient was hospitalized back on 9/11/2021 for RUL PNA with sputum cultures growing Pseudomonas and was treated with ciprofloxacin x 5 days  Review of Systems:    Review of Systems   Constitutional: Negative for chills  Respiratory: Positive for cough  Negative for chest tightness and shortness of breath      Gastrointestinal: Negative for abdominal pain, nausea and vomiting  Neurological: Negative for headaches  Past Medical and Surgical History:     Past Medical History:   Diagnosis Date    Anemia     Asthma     Atrial fibrillation ([address] ) 11/08/2019    CAD     CKD (chronic kidney disease)     Current use of long term anticoagulation 11/08/2019    Elevated troponin 9/8/2021    Hypertension, essential 11/08/2019    Pacemaker     TIA (transient ischemic attack) 11/08/2019       History reviewed  No pertinent surgical history  Meds/Allergies:    Prior to Admission medications    Medication Sig Start Date End Date Taking? Authorizing Provider   apixaban (ELIQUIS) 2 5 mg Take 2 5 mg by mouth 2 (two) times a day   Yes Historical Provider, MD   diltiazem (CARDIZEM CD) 120 mg 24 hr capsule Take 120 mg by mouth daily  6/11/21  Yes Historical Provider, MD   gabapentin (NEURONTIN) 100 mg capsule Take 200 mg by mouth 2 (two) times a day  8/26/21  Yes Historical Provider, MD   iron polysaccharides (FERREX) 150 mg capsule Take 150 mg by mouth daily   Yes Historical Provider, MD   lisinopril (ZESTRIL) 5 mg tablet Take 5 mg by mouth daily   Yes Historical Provider, MD   metoprolol succinate (TOPROL-XL) 100 mg 24 hr tablet Take 100 mg by mouth daily   Yes Historical Provider, MD   rosuvastatin (CRESTOR) 5 mg tablet Take 5 mg by mouth tues saturday 6/11/21  Yes Historical Provider, MD   ondansetron (ZOFRAN) 4 mg tablet Take 1 tablet (4 mg total) by mouth every 8 (eight) hours as needed for nausea or vomiting  Patient not taking: Reported on 1/12/2022 12/21/21   Kari Irvin MD     I have reviewed home medications with patient family member  Allergies: Allergies   Allergen Reactions    Silver Sulfadiazine Other (See Comments)       Social History:     Marital Status:     Occupation: Retired  Patient Pre-hospital Living Situation: Living with daughter  Patient Pre-hospital Level of Mobility: Ambulates independently   Patient Pre-hospital Diet Restrictions: None  Substance Use History:   Social History     Substance and Sexual Activity   Alcohol Use Yes    Comment: occasionaly      Social History     Tobacco Use   Smoking Status Never Smoker   Smokeless Tobacco Never Used     Social History     Substance and Sexual Activity   Drug Use Never       Family History:    non-contributory    Physical Exam:     Vitals:   Blood Pressure: 146/73 (01/12/22 1845)  Pulse: 72 (01/12/22 1845)  Temperature: 98 3 °F (36 8 °C) (01/12/22 1845)  Temp Source: Oral (01/12/22 1845)  Respirations: 18 (01/12/22 1845)  Height: 5' 3" (160 cm) (01/12/22 1845)  Weight - Scale: 50 7 kg (111 lb 12 4 oz) (01/12/22 1845)  SpO2: 93 % (01/12/22 1845)    Physical Exam  Constitutional:       Appearance: Normal appearance  Comments: Hard of hearing   HENT:      Head: Normocephalic and atraumatic  Right Ear: External ear normal       Left Ear: External ear normal       Nose: Nose normal    Eyes:      Conjunctiva/sclera: Conjunctivae normal    Cardiovascular:      Rate and Rhythm: Normal rate and regular rhythm  Heart sounds: Normal heart sounds  Pulmonary:      Effort: Pulmonary effort is normal       Breath sounds: Wheezing present  Abdominal:      General: Bowel sounds are normal       Palpations: Abdomen is soft  Tenderness: There is no abdominal tenderness  Musculoskeletal:      Right lower leg: No edema  Left lower leg: No edema  Neurological:      Mental Status: She is alert  Mental status is at baseline  Additional Data:     Lab Results: I have personally reviewed pertinent reports        Results from last 7 days   Lab Units 01/12/22 1957 01/12/22  1558 01/12/22  1558   WBC Thousand/uL  --   --  9 33   HEMOGLOBIN g/dL  --   --  12 7   HEMATOCRIT %  --   --  40 6   PLATELETS Thousands/uL 253   < > 298   NEUTROS PCT %  --   --  71   LYMPHS PCT %  --   --  16   MONOS PCT %  --   --  12   EOS PCT %  --   --  0    < > = values in this interval not displayed  Results from last 7 days   Lab Units 01/12/22  1558   POTASSIUM mmol/L 4 5   CHLORIDE mmol/L 102   CO2 mmol/L 26   BUN mg/dL 20   CREATININE mg/dL 1 43*   CALCIUM mg/dL 9 2   ALK PHOS U/L 102   ALT U/L 22   AST U/L 19           Imaging: I have personally reviewed pertinent reports  XR chest 1 view portable    Result Date: 1/12/2022  Narrative: CHEST INDICATION:   Shortness of Breath  COMPARISON:  Compared with 9/8/2021 EXAM PERFORMED/VIEWS:  XR CHEST PORTABLE FINDINGS:  Left-sided chest wall intracardiac device is identified  Leads are intact  Mild cardiomegaly    The lungs are clear  No pneumothorax  Blunted right costophrenic angle unchanged  Osseous structures appear within normal limits for patient age  Impression: Improved right upper lobe infiltrate  Small right effusion unchanged  Workstation performed: [id number]     CT abdomen pelvis with contrast    Result Date: 12/20/2021  Narrative: CT ABDOMEN AND PELVIS WITH IV CONTRAST INDICATION:   Abdominal pain, diarrhea, history of atrial fibrillation  COMPARISON:  None  TECHNIQUE:  CT examination of the abdomen and pelvis was performed  Axial, sagittal, and coronal 2D reformatted images were created from the source data and submitted for interpretation  Radiation dose length product (DLP) for this visit:  291 mGy-cm   This examination, like all CT scans performed in the [address] [place] [address], was performed utilizing techniques to minimize radiation dose exposure, including the use of iterative reconstruction and automated exposure control  IV Contrast:  85 mL of iohexol (OMNIPAQUE) Enteric Contrast:  Enteric contrast was not administered  FINDINGS: ABDOMEN LOWER CHEST:  Trace pericardial fluid partially imaged  Heart is enlarged  Pacer leads are visualized  There is right lower lobe endobronchial mucus  There is a large hiatal hernia with partially intrathoracic stomach   LIVER/BILIARY TREE:  One or more subcentimeter sharply circumscribed low-density hepatic lesion(s) are noted, too small to accurately characterize, but statistically most likely to represent subcentimeter hepatic cysts  No suspicious solid hepatic lesion is identified  Hepatic contours are normal   No biliary dilatation  GALLBLADDER:  No calcified gallstones  No pericholecystic inflammatory change  SPLEEN:  Unremarkable  PANCREAS:  There is prominence of the pancreatic duct measuring 5 to 6 mm  There is no abrupt duct cut off sign  No discrete pancreatic mass lesion is seen  ADRENAL GLANDS:  Unremarkable  KIDNEYS/URETERS:  Unremarkable  No hydronephrosis  STOMACH AND BOWEL:  There is colonic diverticulosis without evidence of acute diverticulitis  APPENDIX:  No findings to suggest appendicitis  ABDOMINOPELVIC CAVITY:  No ascites  No pneumoperitoneum  No lymphadenopathy  VESSELS:  Atherosclerotic changes are present  No evidence of aneurysm  PELVIS REPRODUCTIVE ORGANS:  There is a right ovarian cyst measuring 4 9 x 5 3 cm  There is no suspicious associated nodularity  URINARY BLADDER:  Unremarkable  ABDOMINAL WALL/INGUINAL REGIONS:  There is a right inguinal hernia containing fat and bowel  OSSEOUS STRUCTURES:  No acute fracture or destructive osseous lesion  Impression: Colonic diverticulosis without evidence for diverticulitis  Large hiatal hernia with a partially intrathoracic stomach  Prominent pancreatic duct measuring 5 to 6 mm  No discrete duct cutoff sign or definite focal pancreatic lesion identified on the current exam  Right ovarian cyst measuring 5 3 x 4 9 cm  No suspicious nodularity on the current exam  Right inguinal hernia containing nonobstructed bowel  Workstation performed: [id number]       EKG, Pathology, and Other Studies Reviewed on Admission:   · EKG: NSR    Epic / Care Everywhere Records Reviewed: Yes     ** Please Note: This note has been constructed using a voice recognition system   **

## 2022-01-13 NOTE — ASSESSMENT & PLAN NOTE
Patient presents with worsening productive cough and SOB x 1 week  Recent hospitalization on 9/11/2021 for RUL PNA with sputum cultures growing Pseudomonas and was treated with ciprofloxacin x 5 days  Significant wheezing b/l noted on exam      CXR- Improved right upper lobe infiltrate    Small right effusion unchanged    Work up  CBC wnl, CMP wnl  COVID-19/Flu/COVID neg  Procal 0 08 wnl  BNP 5,632, trop neg x1     Plan  · Continue IV rocephin for possible CAP  · Will treat with Prednisone 40mg x 5 days  · Continue albuterol inhaler q4prn  · Trend troponins  · Procal in the am, if neg will d/c abx  · Bcx x 2 pending  · Sputum cultures ordered  · Urine Legionella  · Urine Strep Pneumoniae

## 2022-01-13 NOTE — PLAN OF CARE
Problem: PAIN - ADULT  Goal: Verbalizes/displays adequate comfort level or baseline comfort level  Description: Interventions:  - Encourage patient to monitor pain and request assistance  - Assess pain using appropriate pain scale  - Administer analgesics based on type and severity of pain and evaluate response  - Implement non-pharmacological measures as appropriate and evaluate response  - Consider cultural and social influences on pain and pain management  - Notify physician/advanced practitioner if interventions unsuccessful or patient reports new pain  Outcome: Progressing     Problem: INFECTION - ADULT  Goal: Absence or prevention of progression during hospitalization  Description: INTERVENTIONS:  - Assess and monitor for signs and symptoms of infection  - Monitor lab/diagnostic results  - Monitor all insertion sites, i e  indwelling lines, tubes, and drains  - Monitor endotracheal if appropriate and nasal secretions for changes in amount and color  - [place] appropriate cooling/warming therapies per order  - Administer medications as ordered  - Instruct and encourage patient and family to use good hand hygiene technique  - Identify and instruct in appropriate isolation precautions for identified infection/condition  Outcome: Progressing  Goal: Absence of fever/infection during neutropenic period  Description: INTERVENTIONS:  - Monitor WBC    Outcome: Progressing     Problem: SAFETY ADULT  Goal: Patient will remain free of falls  Description: INTERVENTIONS:  - Educate patient/family on patient safety including physical limitations  - Instruct patient to call for assistance with activity   - Consult OT/PT to assist with strengthening/mobility   - Keep Call bell within reach  - Keep bed low and locked with side rails adjusted as appropriate  - Keep care items and personal belongings within reach  - Initiate and maintain comfort rounds  - Make Fall Risk Sign visible to staff  - Offer Toileting every  Hours, in advance of need  - Initiate/Maintain alarm  - Obtain necessary fall risk management equipment  - Apply yellow socks and bracelet for high fall risk patients  - Consider moving patient to room near nurses station  Outcome: Progressing  Goal: Maintain or return to baseline ADL function  Description: INTERVENTIONS:  -  Assess patient's ability to carry out ADLs; assess patient's baseline for ADL function and identify physical deficits which impact ability to perform ADLs (bathing, care of mouth/teeth, toileting, grooming, dressing, etc )  - Assess/evaluate cause of self-care deficits   - Assess range of motion  - Assess patient's mobility; develop plan if impaired  - Assess patient's need for assistive devices and provide as appropriate  - Encourage maximum independence but intervene and supervise when necessary  - Involve family in performance of ADLs  - Assess for home care needs following discharge   - Consider OT consult to assist with ADL evaluation and planning for discharge  - Provide patient education as appropriate  Outcome: Progressing  Goal: Maintains/Returns to pre admission functional level  Description: INTERVENTIONS:  - Perform BMAT or MOVE assessment daily    - Set and communicate daily mobility goal to care team and patient/family/caregiver  - Collaborate with rehabilitation services on mobility goals if consulted  - Perform Range of Motion  times a day  - Reposition patient every  hours    - Dangle patient  times a day  - Stand patient  times a day  - Ambulate patient  times a day  - Out of bed to chair  times a day   - Out of bed for meals  times a day  - Out of bed for toileting  - Record patient progress and toleration of activity level   Outcome: Progressing

## 2022-01-13 NOTE — ASSESSMENT & PLAN NOTE
Patient with hx of Afib follows with [facility] cardiology Dr Ruiz      Plan  · Continue Eliquis 2 5mg BID  · Continue Cardizem 120mg 24 hour capsule  · Continue metoprolol 100mg 24 hour tablet

## 2022-01-13 NOTE — ASSESSMENT & PLAN NOTE
Patient with hx of Afib follows with [facility] cardiology Dr Ruiz      Plan  · Continue Eliquis 2 5mg BID  · Continue Cardizem 120mg 24 hour capsule  · Continue metoprolol 100mg 24 hour tablet

## 2022-01-13 NOTE — ED ATTENDING ATTESTATION
1/12/2022  Mimi YUSUF DO, saw and evaluated the patient  I have discussed the patient with the resident/non-physician practitioner and agree with the resident's/non-physician practitioner's findings, Plan of Care, and MDM as documented in the resident's/non-physician practitioner's note, except where noted  All available labs and Radiology studies were reviewed  I was present for key portions of any procedure(s) performed by the resident/non-physician practitioner and I was immediately available to provide assistance  At this point I agree with the current assessment done in the Emergency Department  I have conducted an independent evaluation of this patient a history and physical is as follows:    Patient is a 80-year-old female who presents with shortness of breath  Patient has had a cough and shortness of breath for a couple of days  Daughter states that she had a fever earlier today as well  She has received 2 vaccines for COVID receive her booster  She denies chest pain, nausea, vomiting, diarrhea, constipation or other complaints  On exam, patient is in no acute distress  She has coarse breath sounds in bilateral bases  Heart is regular rate and rhythm  Abdomen is soft, nontender, nondistended  No lower extremity edema or calf tenderness  Chest x-ray reveals opacity obscuring right hemidiaphragm  Concern for community-acquired pneumonia  Patient has an elevated BNP but does not clinically appear fluid overloaded  Will administer IV antibiotics for possible pneumonia and hospitalize for further workup and treatment  Portions of the above record have been created with voice recognition software  Occasional wrong word or "sound alike" substitutions may have occurred due to the inherent limitations of voice recognition software  Read the chart carefully and recognize, using context, where substitutions may have occurred        ED Course         Critical Care Time  Procedures

## 2022-01-13 NOTE — PROGRESS NOTES
[facility] Attending Physician Attestation Note - Progress Note    I have seen and examined Tavo Tesfaye personally and have reviewed the medical record independently  I have reviewed the case with the resident physician including all assessments and the plan of care for each  I agree with the resident physician and offer the following addendum to the below statements by the resident physician:     Date Evaluated: 1/13/222  Time Evaluated: 11 AM    Subjective / HPI: Noted to desat to 87 last night    Exam:   Vitals:    01/13/22 1551   BP: 127/86   Pulse: 84   Resp: 20   Temp: 98 6 °F (37 °C)   SpO2: 94%     NAD  Wheezes    Assessment and Plan:  · Asthma exac  · Viral bronchitis vs PNA  O2 sat acceptable in low 90s on RA while awake  Will tolerate O2 sat of 85 while asleep  D/c abx as procals neg x 2  C/w prednisone    Patient Centered Rounds: I have performed bedside rounds with nursing staff today  Time Spent for Care: 30 minutes  More than 50% of total time spent on counseling and coordination of care as described above  Current Length of Stay: 1 day(s)    Current Patient Status: Inpatient   Certification Statement: The patient will continue to require additional inpatient hospital stay due to need to monitor O2    Discharge Plan / Estimated Discharge Date: possibly tomorrow    For detailed history, assessment, and plan of care, please review the statements in a separate note by Dr Randall Sigala,

## 2022-01-13 NOTE — ASSESSMENT & PLAN NOTE
Lab Results   Component Value Date    EGFR 41 01/13/2022    EGFR 31 01/12/2022    EGFR 40 12/21/2021    CREATININE 1 13 01/13/2022    CREATININE 1 43 (H) 01/12/2022    CREATININE 1 15 (H) 12/21/2021   Stage 3 CKD baseline 1-1 2  Cr slightly elevated on admission,however, does not meet criteria for CECI      Plan  · Resolved at this time - may consider restarting lisinopril as needed  · Monitor UOP  · Encourage PO intake

## 2022-01-13 NOTE — ASSESSMENT & PLAN NOTE
Lab Results   Component Value Date    EGFR 31 01/12/2022    EGFR 40 12/21/2021    EGFR 38 12/20/2021    CREATININE 1 43 (H) 01/12/2022    CREATININE 1 15 (H) 12/21/2021    CREATININE 1 19 (H) 12/20/2021   Stage 3 CKD baseline 1-1 2  Cr slightly elevated on admission,however, does not meet criteria for CECI      Plan  · Repeat BMP in am  · Hold lisinopril overnight, likely resume in the am if improvement in Cr  · Monitor UOP  · Encourage PO intake

## 2022-01-13 NOTE — ASSESSMENT & PLAN NOTE
POA: Patient presents with worsening productive cough and SOB x 1 week  Recent hospitalization on 9/11/2021 for RUL PNA with sputum cultures growing Pseudomonas and was treated with ciprofloxacin x 5 days  Significant wheezing b/l noted on exam      CXR- Improved right upper lobe infiltrate  Small right effusion unchanged  8/4/6930 Echo- Systolic function was normal  Ejection fraction was estimated to be 65 %  There was mild concentric hypertrophy  Estimated peak PA pressure was 52 mmHg  The findings suggest moderate pulmonary hypertension      Work up  CBC wnl, CMP wnl  COVID-19/Flu/COVID neg  Procal negative x2  BNP 5,632, trop neg x3  Negative urine legionella and strep pneumonae    Plan  ·  IV rocephin discontinued at this time  · Will treat with Prednisone 40mg x 5 days 1/5  · Continue albuterol inhaler q4prn  · Bcx x 2 negative at 24 hours   · Sputum cultures ordered

## 2022-01-14 VITALS
DIASTOLIC BLOOD PRESSURE: 78 MMHG | RESPIRATION RATE: 18 BRPM | HEART RATE: 85 BPM | SYSTOLIC BLOOD PRESSURE: 155 MMHG | WEIGHT: 111.77 LBS | HEIGHT: 63 IN | TEMPERATURE: 98.6 F | BODY MASS INDEX: 19.8 KG/M2 | OXYGEN SATURATION: 92 %

## 2022-01-14 PROCEDURE — 97163 PT EVAL HIGH COMPLEX 45 MIN: CPT

## 2022-01-14 PROCEDURE — 99239 HOSP IP/OBS DSCHRG MGMT >30: CPT | Performed by: GENERAL PRACTICE

## 2022-01-14 PROCEDURE — 94761 N-INVAS EAR/PLS OXIMETRY MLT: CPT

## 2022-01-14 RX ORDER — PREDNISONE 20 MG/1
40 TABLET ORAL DAILY
Qty: 6 TABLET | Refills: 0 | Status: SHIPPED | OUTPATIENT
Start: 2022-01-15 | End: 2022-01-18

## 2022-01-14 RX ORDER — ALBUTEROL SULFATE 90 UG/1
2 AEROSOL, METERED RESPIRATORY (INHALATION) EVERY 4 HOURS PRN
Qty: 18 G | Refills: 0 | Status: SHIPPED | OUTPATIENT
Start: 2022-01-14

## 2022-01-14 RX ORDER — GUAIFENESIN 600 MG
600 TABLET, EXTENDED RELEASE 12 HR ORAL EVERY 12 HOURS SCHEDULED
Qty: 20 TABLET | Refills: 0 | Status: SHIPPED | OUTPATIENT
Start: 2022-01-14 | End: 2022-01-24

## 2022-01-14 RX ORDER — BENZONATATE 100 MG/1
100 CAPSULE ORAL 3 TIMES DAILY PRN
Qty: 20 CAPSULE | Refills: 0 | Status: SHIPPED | OUTPATIENT
Start: 2022-01-14

## 2022-01-14 RX ADMIN — DILTIAZEM HYDROCHLORIDE 120 MG: 120 CAPSULE, COATED, EXTENDED RELEASE ORAL at 09:47

## 2022-01-14 RX ADMIN — METOPROLOL SUCCINATE 100 MG: 100 TABLET, EXTENDED RELEASE ORAL at 09:41

## 2022-01-14 RX ADMIN — OXYCODONE HYDROCHLORIDE AND ACETAMINOPHEN 250 MG: 500 TABLET ORAL at 09:40

## 2022-01-14 RX ADMIN — PREDNISONE 40 MG: 20 TABLET ORAL at 09:40

## 2022-01-14 RX ADMIN — POLYSACCHARIDE-IRON COMPLEX 150 MG: 150 CAPSULE ORAL at 09:40

## 2022-01-14 RX ADMIN — GUAIFENESIN 600 MG: 600 TABLET, EXTENDED RELEASE ORAL at 09:40

## 2022-01-14 RX ADMIN — GABAPENTIN 200 MG: 100 CAPSULE ORAL at 09:40

## 2022-01-14 RX ADMIN — APIXABAN 2.5 MG: 2.5 TABLET, FILM COATED ORAL at 09:40

## 2022-01-14 NOTE — PROGRESS NOTES
[facility]  Progress Note - Wilhemenia Leaks 9/7/1926, 80 y o  female MRN: [id number]  Unit/Bed#: W -01 Encounter: [id number]  Primary Care Provider: Clyde Hernandez MD   Date and time admitted to hospital: 1/12/2022  3:45 PM    * Shortness of breath  Assessment & Plan  POA: Patient presents with worsening productive cough and SOB x 1 week  Recent hospitalization on 9/11/2021 for RUL PNA with sputum cultures growing Pseudomonas and was treated with ciprofloxacin x 5 days  Significant wheezing b/l noted on exam      CXR- Improved right upper lobe infiltrate  Small right effusion unchanged  9/0/6380 Echo- Systolic function was normal  Ejection fraction was estimated to be 65 %  There was mild concentric hypertrophy  Estimated peak PA pressure was 52 mmHg  The findings suggest moderate pulmonary hypertension  Work up  CBC wnl, CMP wnl  COVID-19/Flu/COVID neg  Procal negative x2  BNP 5,632, trop neg x3  Negative urine legionella and strep pneumonae    Plan  ·  IV rocephin discontinued at this time  · Will treat with Prednisone 40mg x 5 days 1/5  · Continue albuterol inhaler q4prn  · Bcx x 2 negative at 24 hours   · Sputum cultures ordered    CKD (chronic kidney disease) stage 3, GFR 30-59 ml/min [facility])  Assessment & Plan  Lab Results   Component Value Date    EGFR 41 01/13/2022    EGFR 31 01/12/2022    EGFR 40 12/21/2021    CREATININE 1 13 01/13/2022    CREATININE 1 43 (H) 01/12/2022    CREATININE 1 15 (H) 12/21/2021   Stage 3 CKD baseline 1-1 2  Cr slightly elevated on admission,however, does not meet criteria for CECI  Plan  · Resolved at this time - may consider restarting lisinopril as needed  · Monitor UOP  · Encourage PO intake    Hypertension, essential  Assessment & Plan  BP stable 140/70s      Plan  · Holding home lisinopril may consider restarting as needed/tolerated    A-fib [facility])  Assessment & Plan  Patient with hx of Afib follows with [facility] cardiology Dr Ruiz  Plan  · Continue Eliquis 2 5mg BID  · Continue Cardizem 120mg 24 hour capsule  · Continue metoprolol 100mg 24 hour tablet          VTE Pharmacologic Prophylaxis:   VTE Score: 3 Moderate Risk (Score 3-4) - Pharmacological DVT Prophylaxis Ordered: Apixaban (Eliquis)  Mechanical VTE Prophylaxis in Place: Yes    Patient Centered Rounds: I have performed bedside rounds with nursing staff today  Discussions with Specialists or Other Care Team Provider:     Education and Discussions with Family / Patient: Family member updated by Dr Mendoza Peguero of Stay: 1 day(s)    Current Patient Status: Inpatient     Discharge Plan / Estimated Discharge Date: Anticipate discharge tomorrow to home vs home with home health services    Code Status: Level 1 - Full Code      Subjective:   Patient reports that she not feeling well this morning with significant cough and wheezing  She reports poor sleep  She denies chest pain, shortness of breath, N/V/D/C, abdominal pain  Objective:     Vitals:   Temp (24hrs), Av 9 °F (37 2 °C), Min:98 6 °F (37 °C), Max:99 1 °F (37 3 °C)    Temp:  [98 6 °F (37 °C)-99 1 °F (37 3 °C)] 98 6 °F (37 °C)  HR:  [] 84  Resp:  [16-24] 20  BP: (127-196)/(71-90) 127/86  SpO2:  [87 %-99 %] 94 %  Body mass index is 19 8 kg/m²  Input and Output Summary (last 24 hours): Intake/Output Summary (Last 24 hours) at 2022  Last data filed at 2022 1800  Gross per 24 hour   Intake 240 ml   Output --   Net 240 ml       Physical Exam:     Physical Exam  Vitals and nursing note reviewed  Constitutional:       General: She is not in acute distress  Appearance: She is ill-appearing  She is not toxic-appearing  HENT:      Head: Normocephalic and atraumatic  Mouth/Throat:      Mouth: Mucous membranes are moist       Pharynx: Oropharynx is clear  Eyes:      Pupils: Pupils are equal, round, and reactive to light     Cardiovascular:      Rate and Rhythm: Normal rate and regular rhythm  Heart sounds: No murmur heard  No friction rub  No gallop  Pulmonary:      Breath sounds: Rhonchi present  Comments: Prolonged expiratory phase noted  Abdominal:      General: Bowel sounds are normal       Palpations: Abdomen is soft  Musculoskeletal:      Right lower leg: No edema  Left lower leg: No edema  Skin:     General: Skin is warm and dry  Neurological:      General: No focal deficit present  Mental Status: She is alert and oriented to person, place, and time  Psychiatric:         Mood and Affect: Mood normal          Behavior: Behavior normal           Additional Data:     Labs:  Results from last 7 days   Lab Units 01/13/22  0541 01/12/22  1957 01/12/22  1558   WBC Thousand/uL 7 87   < > 9 33   HEMOGLOBIN g/dL 12 3   < > 12 7   HEMATOCRIT % 37 7   < > 40 6   PLATELETS Thousands/uL 245   < > 298   NEUTROS PCT %  --   --  71   LYMPHS PCT %  --   --  16   MONOS PCT %  --   --  12   EOS PCT %  --   --  0    < > = values in this interval not displayed  Results from last 7 days   Lab Units 01/13/22  0541 01/12/22  1558 01/12/22  1558   SODIUM mmol/L 137   < > 137   POTASSIUM mmol/L 4 2   < > 4 5   CHLORIDE mmol/L 103   < > 102   CO2 mmol/L 24   < > 26   BUN mg/dL 20   < > 20   CREATININE mg/dL 1 13   < > 1 43*   ANION GAP mmol/L 10   < > 9   CALCIUM mg/dL 8 9   < > 9 2   ALBUMIN g/dL  --   --  3 4*   TOTAL BILIRUBIN mg/dL  --   --  0 52   ALK PHOS U/L  --   --  102   ALT U/L  --   --  22   AST U/L  --   --  19   GLUCOSE RANDOM mg/dL 94   < > 99    < > = values in this interval not displayed  Results from last 7 days   Lab Units 01/13/22  0541 01/12/22  1558   PROCALCITONIN ng/ml 0 12 0 08       Imaging: Reviewed radiology reports from this admission including: chest xray    Recent Cultures (last 7 days):     Results from last 7 days   Lab Units 01/13/22  0155 01/12/22  1641   BLOOD CULTURE   --  No Growth at 24 hrs    No Growth at 24 hrs  LEGIONELLA URINARY ANTIGEN  Negative  --        Lines/Drains:  Invasive Devices  Report    Peripheral Intravenous Line            Peripheral IV 01/12/22 Left;Dorsal (posterior) Forearm 1 day                Telemetry:        Last 24 Hours Medication List:   Current Facility-Administered Medications   Medication Dose Route Frequency Provider Last Rate    acetaminophen  650 mg Oral Q6H PRN Arnold Painter MD      albuterol  2 puff Inhalation Q4H PRN Arnold Painter MD      albuterol  2 puff Inhalation Q6H PRN Castillo Pascual MD      apixaban  2 5 mg Oral BID Arnold Painter MD      ascorbic acid  250 mg Oral Daily Arnold Painter MD      benzonatate  100 mg Oral TID PRN Arnold Painter MD      diltiazem  120 mg Oral Daily Arnold Painter MD      docusate sodium  100 mg Oral BID Arnold Painter MD      gabapentin  200 mg Oral BID Arnold Painter MD      guaiFENesin  600 mg Oral Q12H [address] Arnold Painter MD      iron polysaccharides  150 mg Oral Daily Arnold Painter MD      metoprolol succinate  100 mg Oral Daily Hui Wolf MD      ondansetron  4 mg Intravenous Q6H PRN Arnold Painter MD      pravastatin  40 mg Oral Daily With Dinner Arnold Painter MD      predniSONE  40 mg Oral Daily Arnold Painter MD          Today, Patient Was Seen By: Piero Rodrigues DO    ** Please Note: This note has been constructed using a voice recognition system   **

## 2022-01-14 NOTE — PLAN OF CARE
Problem: PHYSICAL THERAPY ADULT  Goal: Performs mobility at highest level of function for planned discharge setting  See evaluation for individualized goals  Description: Treatment/Interventions: Functional transfer training,LE strengthening/ROM,Elevations,Therapeutic exercise,Endurance training,Patient/family training,Equipment eval/education,Bed mobility,Gait training  Equipment Recommended: Amrik Grace       See flowsheet documentation for full assessment, interventions and recommendations  Note: Prognosis: Good  Problem List: Decreased strength,Impaired balance,Decreased mobility,Impaired hearing  Assessment: Sharon Lyon is a 80 y o  Female who [facility] w/ c/o SOB and diagnosis of SOB  Orders for PT eval and treat received, w/ activity orders of up and OOB as tolerated and contact and hand hygiene precautions precautions  Pt presents w/ comorbidities of anemia, CAD, CKD, h/o TIA, pacemaker and personal factors including: advanced age and stair(s) to enter home  At baseline, pt mobilizes independently w/ cane as needed, and reports 0 falls in the last 6 months  Upon evaluation, pt presents w/ the following deficits: weakness, impaired balance and gait deviations  Pt requires Mod I for bed mobility, supervision for transfers, supervision for gait, and supervision for step trial  Pt's clinical presentation is unstable/unpredictable due to need for input for mobility technique, ongoing medical monitoring/management, and impaired balance  Pt is at an increased risk of falls due to impaired balance  Given the above findings, discharge recommendation is for Home w/ HHPT  During this admission, pt would benefit from continued skilled inpatient PT in the acute care setting in order to address the abovementioned deficits to maximize function and mobility before DC from acute care             PT Discharge Recommendation: Home with home health rehabilitation          See flowsheet documentation for full assessment

## 2022-01-14 NOTE — ASSESSMENT & PLAN NOTE
Patient with hx of Afib follows with [facility] cardiology Dr Ruiz      Plan  · Continue Eliquis 2 5mg BID  · Continue Cardizem 120mg 24 hour capsule  · Continue metoprolol 100mg 24 hour tablet

## 2022-01-14 NOTE — RESPIRATORY THERAPY NOTE
Home Oxygen Qualifying Test       Patient name: Jannette Adams        : 1926   Date of Test:  2022  Diagnosis:      Home Oxygen Test:    **Medicare Guidelines require item(s) 1-5 on all ambulatory patients or 1 and 2 on non-ambulatory patients  1   Baseline SPO2 on Room Air at rest 95 %  2   SPO2 during exercise on Room Air 91 %  During exercise monitor SpO2  If SPO2 increases >=89% with ambulation do not add supplemental             oxygen  If <= 88% on room air add O2 via NC and titrate patient  Patient must be ambulated with O2 and titrated to > 88% with exertion  3   SPO2 on Oxygen at rest % lpm     4   SPO2 during exercise on Oxygen  % a liter flow of  lpm     5   Exercise performed:          walking          []  Supplemental Home Oxygen is indicated  [x]  Client does not qualify for home oxygen  Respiratory Additional Notes-     Patient was walked for 200 feet with walker for home oxygen evaluation  Room air sat 95%  During walk sats remained 91-95% on room air  No oxygen required       Catina Nava, RT

## 2022-01-14 NOTE — ASSESSMENT & PLAN NOTE
Lab Results   Component Value Date    EGFR 41 01/13/2022    EGFR 31 01/12/2022    EGFR 40 12/21/2021    CREATININE 1 13 01/13/2022    CREATININE 1 43 (H) 01/12/2022    CREATININE 1 15 (H) 12/21/2021   Stage 3 CKD baseline 1-1 2  Cr slightly elevated on admission,however, does not meet criteria for CECI      Plan  · Resolved at this time Monitor UOP  · Encourage PO intake

## 2022-01-14 NOTE — DISCHARGE INSTRUCTIONS
Acute Bronchitis   WHAT YOU SHOULD KNOW:   Acute bronchitis is swelling and irritation in the air passages of your lungs  This irritation may cause you to cough or have other breathing problems  Acute bronchitis often starts because of another viral illness, such as a cold or the flu  The illness spreads from your nose and throat to your windpipe and airways  Bronchitis is often called a chest cold  Acute bronchitis lasts about 2 weeks and is usually not a serious illness  AFTER YOU LEAVE:   Medicines:   · Ibuprofen or acetaminophen:  These medicines help lower a fever  They are available without a doctor's order  Ask your healthcare provider which medicine is right for you  Ask how much to take and how often to take it  Follow directions  These medicines can cause stomach bleeding if not taken correctly  Ibuprofen can cause kidney damage  Do not take ibuprofen if you have kidney disease, an ulcer, or allergies to aspirin  Acetaminophen can cause liver damage  Do not drink alcohol if you take acetaminophen  · Cough medicine: This medicine helps loosen mucus in your lungs and make it easier to cough up  This can help you breathe easier  · Inhalers: You may need one or more inhalers to help you breathe easier and cough less  An inhaler gives your medicine in a mist form so that you can breathe it into your lungs  Ask your healthcare provider to show you how to use your inhaler correctly  · Steroid medicine:  Steroid medicine helps open your air passages so you can breathe easier  · Take your medicine as directed  Call your healthcare provider if you think your medicine is not helping or if you have side effects  Tell him if you are allergic to any medicine  Keep a list of the medicines, vitamins, and herbs you take  Include the amounts, and when and why you take them  Bring the list or the pill bottles to follow-up visits  Carry your medicine list with you in case of an emergency    How to use an inhaler:   · Shake the inhaler well to make sure you get the correct amount of medicine per puff  Remove the cover from your inhaler's mouthpiece  If you are using a spacer, connect your inhaler to the flat end of the spacer  · Exhale as much air from your lungs as you can  Put the mouthpiece in your mouth past your front teeth and rest it on the top of your tongue  Do not block the mouthpiece opening with your tongue  · Breathe in through your mouth at a slow and steady rate  As you do this, press the inhaler to release the puff of medicine  Finish breathing in slowly and deeply as you inhale the medicine  When your lungs are full, hold your breath for 10 seconds  Then breathe out slowly through puckered lips or through your nose  · If you need to take more puffs, wait at least 1 minute between each puff  · Rinse your mouth with water after you use the inhaler  This may keep you from getting a mouth infection or irritation  · Follow the instructions that come with your inhaler to clean it  You should clean your inhaler at least once a week  Ways to care for yourself:   · Avoid alcohol:  Alcohol dulls your urge to cough and sneeze  When you have bronchitis, you need to be able to cough and sneeze to clear your air passages  Alcohol also causes your body to lose fluid  This can make the mucus in your lungs thicker and harder to cough up  · Avoid irritants in the air:  Do not smoke or allow others to smoke around you  Avoid chemicals, fumes, and dust  Wear a face mask if you must work around dust or fumes  Stay inside on days when air pollution levels are high  If you have allergies, stay inside when pollen counts are high  Avoid aerosol products  This includes spray-on deodorant, bug spray, and hair spray  · Drink more liquids:  Most people should drink at least 8 eight-ounce cups of water a day  You may need to drink more liquids when you have acute bronchitis   Liquids help keep your air passages moist and help you cough up mucus  · Get more rest:  You may feel like resting more  Slowly start to do more each day  Rest when you feel it is needed  · Eat healthy foods:  Eat a variety healthy foods every day  Your diet should include fruits, vegetables, breads, and protein (such as chicken, fish, and beans)  Dairy products (such as milk, cheese, and ice cream) can sometimes increase the amount of mucus your body makes  Ask if you should decrease your intake of dairy products  · Use a humidifier:  Use a cool mist humidifier to increase air moisture in your home  This may make it easier for you to breathe and help decrease your cough  Decrease your risk of acute bronchitis:   · Get the vaccinations you need:  Ask your healthcare provider if you should get vaccinated against the flu or pneumonia  · Avoid things that may irritate your lungs:  Stay inside or cover your mouth and nose with a scarf when you are outside during cold weather  You should also stay inside on days when air pollution levels are high  If you have allergies, stay inside when pollen counts are high  Avoid using aerosol products in your home  This includes spray-on deodorant, bug spray, and hair spray  · Avoid the spread of germs:        [facility] your hands often with soap and water  Carry germ-killing gel with you  You can use the gel to clean your hands when there is no soap and water available  ¨ Do not touch your eyes, nose, or mouth unless you have washed your hands first     ¨ Always cover your mouth when you cough  Cough into a tissue or your shirtsleeve so you do not spread germs from your hands  ¨ Try to avoid people who have a cold or the flu  If you are sick, stay away from others as much as possible  Follow up with your healthcare provider as directed:  Write down questions you have so you will remember to ask them during your follow-up visits    Contact your healthcare provider if:   · You have a fever     · Your skin becomes itchy or you have a rash after you take your medicine  · Your breathing problems do not go away or get worse  · Your cough does not get better with treatment  · You cough up blood  · You have questions or concerns about your condition or care  Seek care immediately or call 911 if:   · You faint  · Your lips or fingernails turn blue  · You feel like you are not getting enough air when you breathe  · You have swelling of your lips, tongue, or throat that makes it hard to breathe or swallow  © 2014 [employer] is for End User's use only and may not be sold, redistributed or otherwise used for commercial purposes  All illustrations and images included in CareNotes® are the copyrighted property of [employer]  or Anson Oconnor  The above information is an  only  It is not intended as medical advice for individual conditions or treatments  Talk to your doctor, nurse or pharmacist before following any medical regimen to see if it is safe and effective for you

## 2022-01-14 NOTE — CASE MANAGEMENT
Case Management Assessment & Discharge Planning Note    Patient name Malcolm Salmon  Location W /W -13 MRN [id number]  : 1926 Date 2022       Current Admission Date: 2022  Current Admission Diagnosis:Shortness of breath   Patient Active Problem List    Diagnosis Date Noted    Shortness of breath 2022    Diarrhea 2021    Acute gastroenteritis 2021    Asthma exacerbation 2021    Right upper lobe pneumonia 2021    Hypoxia 2021    CKD (chronic kidney disease) stage 3, GFR 30-59 ml/min ([facility]) 2021    Sepsis ([facility] [address] ) 2021    [facility]    Current use of long term anticoagulation 2019    Hypertension, essential 2019    TIA (transient ischemic attack) 2019    Pacemaker 2013      LOS (days): 2  Geometric Mean LOS (GMLOS) (days): 3 00  Days to GMLOS:1 1     OBJECTIVE:    Risk of Unplanned Readmission Score: 16         Current admission status: Inpatient       Preferred Pharmacy:   [address], [address] [place] [address]  Phone: [phone] Fax: [phone]    Primary Care Provider: Dariel Hancock MD    Primary Insurance: MEDICARE  Secondary Insurance: AARP    ASSESSMENT:  Active Health Care Agents    There are no active Health Care Agents on file  Patient Information  Admitted from[de-identified] Home  Mental Status: Alert  During Assessment patient was accompanied by: Other-Comment (spoke to daughter Fabi Sheppard) via phone)  Assessment information provided by[de-identified] Daughter (spoke to daughter Fabi Sheppard) via phone)  Primary Caregiver: Self  Support Systems: Family members  Home entry access options   Select all that apply : Stairs  Number of steps to enter home : 2  Living Arrangements: Lives w/ Son    Activities of Daily Living Prior to Admission  Functional Status: Independent  Completes ADLs independently?: Yes  Ambulates independently?: Yes  Does patient use assisted devices?: No  Does patient currently own DME?: Yes  What DME does the patient currently own?: [facility]  Does patient have a history of Outpatient Therapy (PT/OT)?: No  Does the patient have a history of Short-Term Rehab?: No  Does patient have a history of HHC?: No  Does patient currently have [address]?: No         Patient Information Continued  Does patient receive dialysis treatments?: No         Means of Transportation  Means of Transport to Appts[de-identified] Family transport  In the past 12 months, has lack of transportation kept you from medical appointments or from getting medications?: No  In the past 12 months, has lack of transportation kept you from meetings, work, or from getting things needed for daily living?: No  Was application for public transport provided?: No        DISCHARGE DETAILS:    Discharge planning discussed with[de-identified] daughter  Freedom of Choice: Yes  Comments - Freedom of Choice: no preference for hhc   CM contacted family/caregiver?: Yes  Were Treatment Team discharge recommendations reviewed with patient/caregiver?: Yes  Did patient/caregiver verbalize understanding of patient care needs?: Yes  Were patient/caregiver advised of the risks associated with not following Treatment Team discharge recommendations?: Yes    Contacts  Patient Contacts: Guerda Bowen (Daughter)  Relationship to Patient[de-identified] Family  Contact Method: Phone  Phone Number: [phone] Kaycee Villasenor)  Reason/Outcome: Continuity of [address]         Is the patient interested in [address] at discharge?: Yes  [address] requested[de-identified] [address] Name[de-identified] Other ([address] [place] [address])  [facility] [address] Provider[de-identified] PCP  [address] Needed[de-identified] Gait/ADL Training,Evaluate Functional Status and Safety,Strengthening/Theraputic Exercises to Improve Function  Homebound Criteria Met[de-identified] Uses an Assist Device (i e  cane, walker, etc)  Supporting Clincal Findings[de-identified] Fatigues Easliy in Short Distances,Limited Endurance    DME Referral Provided  Referral made for DME?: No    Other Referral/Resources/Interventions Provided:  Interventions: HHC  Referral Comments: Patient admitted due to SOB, Community acquired pneumonia  Ready for d/c, does not qualify for home o2  PT recommendation for c  Spoke with patient at bedside to discuss assessment and d/c planning  Patient requested CM reach out to daughter re: d/c planning  CM spoke to daughter Janee Altamirano) via phone re: same  Patient lives with son with 2 [address] home  Patient's main bed and bath are on main floor  Pt is independent with ADLs, doesn't use dme but owns wheelchair, cane, walker, shower chair if needed  Pt is able to drive but duaghter providese transport to appointments  Confirmed PCP Rashad Taylor) and preferred pharmacy [facility])  Pt does not currently have living will or POA, declined further information when offered  Discussed PT recommendation for [facility], daughter relayed no preference  Referrals sent via ecin  CM reached out to daughter once responses received  Daughter chose [facility] date of 1/16/22  Daughter will provide transport home  IMM reviewed with daughter, denied any concerns for d/c today, copy provided to patient      Would you like to participate in our [address] service program?  : No - Declined    Treatment Team Recommendation: Home with [address]  Discharge Destination Plan[de-identified] Home with [address]                                IMM Given (Date):: 01/14/22  IMM Given to[de-identified] Patient

## 2022-01-14 NOTE — ASSESSMENT & PLAN NOTE
POA: Patient presents with worsening productive cough and SOB x 1 week  Recent hospitalization on 9/11/2021 for RUL PNA with sputum cultures growing Pseudomonas and was treated with ciprofloxacin x 5 days  Significant wheezing b/l noted on exam      CXR- Improved right upper lobe infiltrate  Small right effusion unchanged  1/3/0870 Echo- Systolic function was normal  Ejection fraction was estimated to be 65 %  There was mild concentric hypertrophy  Estimated peak PA pressure was 52 mmHg  The findings suggest moderate pulmonary hypertension      Work up  CBC wnl, CMP wnl  COVID-19/Flu/COVID neg  Procal negative x2  BNP 5,632, trop neg x3  Negative urine legionella and strep pneumonae    Plan  · No further antibiotics needed  · Will treat with Prednisone 40mg x 5 days 2/5  · Continue albuterol inhaler q4prn  · Bcx x 2 negative at 24 hours   · Sputum cultures ordered

## 2022-01-14 NOTE — PHYSICAL THERAPY NOTE
PHYSICAL THERAPY EVALUATION NOTE          Patient Name: Marialuisa Pham  KMSPQ'W Date: 2022     AGE:   80 y o  Mrn:   [id number]  ADMIT DX:  Shortness of breath [R06 02]  SOB (shortness of breath) [R06 02]  Community acquired pneumonia [J18 9]    Past Medical History:   Diagnosis Date    Anemia     Asthma     Atrial fibrillation ([address] ) 2019    CAD     CKD (chronic kidney disease)     Current use of long term anticoagulation 2019    Elevated troponin 2021    Hypertension, essential 2019    Pacemaker     TIA (transient ischemic attack) 2019     Length Of Stay: 2  PHYSICAL THERAPY EVALUATION :   Patient's identity confirmed via 2 patient identifiers (full name and ) at start of session       22 0828   PT Last Visit   PT Visit Date 22   Note Type   Note type Evaluation   Pain Assessment   Pain Assessment Tool 0-10   Pain Score No Pain   Restrictions/Precautions   Weight Bearing Precautions Per Order No   Other Precautions Contact/isolation; Chair Alarm; Bed Alarm; Fall Risk;Hard of hearing   Home Living   Type of [address] [place] [address] Ave One level;Performs ADLs on one level; Able to live on main level with bedroom/bathroom;Stairs to enter with rails  (2+1 MARILYNN)   Bathroom Shower/Tub Walk-in shower   Bathroom Equipment Grab bars in [address]   Additional Comments Pt resides w/ son in a 1 level house w/ 2+1 MARILYNN  Pt reports using cane as needed for ambulation    Prior Function   Level of [place] Independent with ADLs and functional mobility   Lives With Son  (Works during the day)   Receives Help From Family  (Daughter lives next door)   [address] in the last 6 months 0   General   Additional Pertinent History Pt's SpO2 remained >92% during mobility on RA   Pt denied feeling SOB throughout   Family/Caregiver Present No   Cognition Overall Cognitive Status WFL   Arousal/Participation Alert   Orientation Level Oriented X4   Memory Within functional limits   Following Commands Follows one step commands with increased time or repetition  (Pt [place])   Comments Pt ID via name and ; pt agreeable to PT eval   Subjective   Subjective "Oh it feels so good to be up"   Strength RLE   RLE Overall Strength 3+/5  (Grossly assessed w/ functional mobility)   Strength LLE   LLE Overall Strength 3+/5  (Grossly assessed w/ functional mobility)   Light Touch   RLE Light Touch Grossly intact   LLE Light Touch Grossly intact   Bed Mobility   Supine to Sit 6  Modified independent   Additional items Assist x 1;HOB elevated; Bedrails   Sit to Supine Unable to assess   Additional items   (pt OOB in recliner chair at end of session)   Transfers   Sit to Stand 5  Supervision   Additional items Increased time required   Stand to Sit 5  Supervision   Additional items Armrests; Increased time required   Additional Comments VC for hand placement   Ambulation/Elevation   Gait pattern Forward Flexion;Decreased foot clearance; Short stride   Gait Assistance 5  Supervision   Additional items Verbal cues   Assistive Device Rolling walker  (short RW)   Distance 72'   Stair Management Assistance 5  Supervision   Additional items Verbal cues   Stair Management Technique Two rails; Step to pattern   Number of Stairs 3   Ambulation/Elevation Additional Comments Pt demosntrated ability to negotiate turns w/ RW w/o LOB   Balance   Static Sitting Fair +   Dynamic Sitting Fair   Static Standing Fair   Dynamic Standing Fair -  (w/ RW)   Ambulatory Fair -  (w/ RW)   Activity Tolerance   Activity Tolerance Patient limited by fatigue   Nurse Made Aware MERYL Marquez Fat   Assessment   Prognosis Good   Problem List Decreased strength; Impaired balance;Decreased mobility; Impaired hearing   Assessment Heather Arauz is a 80 y o   Female who presents [facility] on 2022 w/ c/o SOB and diagnosis of SOB  Orders for PT eval and treat received, w/ activity orders of up and OOB as tolerated and contact and hand hygiene precautions precautions  Pt presents w/ comorbidities of anemia, CAD, CKD, h/o TIA, pacemaker and personal factors including: advanced age and stair(s) to enter home  At baseline, pt mobilizes independently w/ cane as needed, and reports 0 falls in the last 6 months  Upon evaluation, pt presents w/ the following deficits: weakness, impaired balance and gait deviations  Pt requires Mod I for bed mobility, supervision for transfers, supervision for gait, and supervision for step trial  Pt's clinical presentation is unstable/unpredictable due to need for input for mobility technique, ongoing medical monitoring/management, and impaired balance  Pt is at an increased risk of falls due to impaired balance  Given the above findings, discharge recommendation is for Home w/ HHPT  During this admission, pt would benefit from continued skilled inpatient PT in the acute care setting in order to address the abovementioned deficits to maximize function and mobility before DC from acute care  Goals   Patient Goals To go home   STG Expiration Date 01/24/22   Short Term Goal #1 Pt will: perform bed mobility independently to decrease caregiver burden; perform transfers independently to increase OOB mobility; ambulate at least 250' w/ LRAD and Mod I to increase pt's ambulatory endurance; negotiate 3 steps w/ railing and Mod I to facilitate return to previous living environment; increase LE strength by 1/2 grade to increase pt's tolerance to physical activity; increase balance ratings by 1 grade to decrease pt's risk of falls   PT Treatment Day 0   Plan   Treatment/Interventions Functional transfer training;LE strengthening/ROM; Elevations; Therapeutic exercise; Endurance training;Patient/family training;Equipment eval/education; Bed mobility;Gait training   PT Frequency Other (Comment)  (3-4x/wk)   Recommendation   PT Discharge Recommendation Home with home health rehabilitation   [place] walker   Change/add to [place]? Yes, Change Size   Walker Size [place] (Ht <5'1")   AM-PAC Basic Mobility Inpatient   Turning in Bed Without Bedrails 4   Lying on Back to Sitting on Edge of Flat Bed 4   Moving Bed to Chair 3   Standing Up From Chair 3   Walk in Room 3   Climb 3-5 Stairs 3   Basic Mobility Inpatient Raw Score 20   Basic Mobility Standardized Score 43 99   Highest Level Of Mobility   JH-HLM Goal 6: Walk 10 steps or more   JH-HLM Highest Level of Mobility 7: Walk 25 feet or more   JH-HLM Goal Achieved Yes   End of Consult   Patient Position at End of Consult Bedside chair;Bed/Chair alarm activated; All needs within reach  (LE elevated)       The patient's AM-PAC Basic Mobility Inpatient Short Form Raw Score is 20  A Raw score of greater than 16 suggests the patient may benefit from discharge to home  Please also refer to the recommendation of the Physical Therapist for safe discharge planning      Pt would benefit from skilled inpatient PT during this admission in order to facilitate progress towards goals to maximize functional independence    DC rec: home w/ HHPT      Gloria Weaver, PT, DPT  01/14/22

## 2022-01-14 NOTE — PLAN OF CARE
Problem: Knowledge Deficit  Goal: Patient/family/caregiver demonstrates understanding of disease process, treatment plan, medications, and discharge instructions  Description: Complete learning assessment and assess knowledge base    Interventions:  - Provide teaching at level of understanding  - Provide teaching via preferred learning methods  Outcome: Progressing     Problem: RESPIRATORY - ADULT  Goal: Achieves optimal ventilation and oxygenation  Description: INTERVENTIONS:  - Assess for changes in respiratory status  - Assess for changes in mentation and behavior  - Position to facilitate oxygenation and minimize respiratory effort  - Oxygen administered by appropriate delivery if ordered  - Initiate smoking cessation education as indicated  - Encourage broncho-pulmonary hygiene including cough, deep breathe, Incentive Spirometry  - Assess the need for suctioning and aspirate as needed  - Assess and instruct to report SOB or any respiratory difficulty  - Respiratory Therapy support as indicated  Outcome: Progressing

## 2022-01-14 NOTE — DISCHARGE INSTR - AVS FIRST PAGE
Dear Marialuisa Pham,     It was our pleasure to care for you here at [facility]  It is our hope that we were always able to exceed the expected standards for your care during your stay  You were hospitalized due to viral bronchitis or viral pneumonia  You were cared for on the 4th floor by Antelmo Low DO under the service of Monica Ramirez DO with the [facility]ist [facility] who covers for your primary care physician (PCP), Liudmila Carrera MD, while you were hospitalized  If you have any questions or concerns related to this hospitalization, you may contact us at [phone]  For follow up as well as any medication refills, we recommend that you follow up with your primary care physician  A registered nurse will reach out to you by phone within a few days after your discharge to answer any additional questions that you may have after going home  However, at this time we provide for you here, the most important instructions / recommendations at discharge:     Notable Medication Adjustments -   Please take 40 mg prednisone for 3 more days  Prescriptions provided for Mucinex and Tesalon Perles for symptomatic relief as needed  2 puffs of your albuterol inhaler every 4 hours as needed  Testing Required after Discharge -   None  Important follow up information -   Please follow up with your PCP within 7-10 days  Other Instructions -   None  Please review this entire after visit summary as additional general instructions including medication list, appointments, activity, diet, any pertinent wound care, and other additional recommendations from your care team that may be provided for you        Sincerely,     Antelmo Low DO

## 2022-01-14 NOTE — DISCHARGE SUMMARY
[facility]  Discharge- Marlyn Alvarado 9/7/1926, 80 y o  female MRN: [id number]  Unit/Bed#: W -01 Encounter: [id number]  Primary Care Provider: Angel Cano MD   Date and time admitted to hospital: 1/12/2022  3:45 PM    * Shortness of breath  Assessment & Plan  POA: Patient presents with worsening productive cough and SOB x 1 week  Recent hospitalization on 9/11/2021 for RUL PNA with sputum cultures growing Pseudomonas and was treated with ciprofloxacin x 5 days  Significant wheezing b/l noted on exam      CXR- Improved right upper lobe infiltrate  Small right effusion unchanged  0/8/7741 Echo- Systolic function was normal  Ejection fraction was estimated to be 65 %  There was mild concentric hypertrophy  Estimated peak PA pressure was 52 mmHg  The findings suggest moderate pulmonary hypertension  Work up  CBC wnl, CMP wnl  COVID-19/Flu/COVID neg  Procal negative x2  BNP 5,632, trop neg x3  Negative urine legionella and strep pneumonae    Plan  · No further antibiotics needed  · Will treat with Prednisone 40mg x 5 days 2/5  · Continue albuterol inhaler q4prn  · Bcx x 2 negative at 24 hours   · Sputum cultures ordered    CKD (chronic kidney disease) stage 3, GFR 30-59 ml/min [facility])  Assessment & Plan  Lab Results   Component Value Date    EGFR 41 01/13/2022    EGFR 31 01/12/2022    EGFR 40 12/21/2021    CREATININE 1 13 01/13/2022    CREATININE 1 43 (H) 01/12/2022    CREATININE 1 15 (H) 12/21/2021   Stage 3 CKD baseline 1-1 2  Cr slightly elevated on admission,however, does not meet criteria for CECI  Plan  · Resolved at this time Monitor UOP  · Encourage PO intake    Hypertension, essential  Assessment & Plan  BP stable 140/70s  Plan  · Restart lisinopril at home    A-fib [facility])  Assessment & Plan  Patient with hx of Afib follows with [facility] cardiology Dr Ruiz      Plan  · Continue Eliquis 2 5mg BID  · Continue Cardizem 120mg 24 hour capsule  · Continue metoprolol 100mg 24 hour tablet        Discharging Resident Physician: Cam Wellington DO  Attending: No att  providers found  PCP: Delgado Dodson MD  Admission Date: 1/12/2022  Discharge Date: 01/14/22    Disposition:     Home with VNA Services (Reminder: Complete face to face encounter)    Reason for Admission: Asthma exacerbation in the setting of viral bronchitis versus pneumonia    Consultations During Hospital Stay:  · None    Procedures Performed:     · None    Significant Findings / Test Results:     · Chest Xray: Improved RUL infiltrate with small R effusion unchanged    Incidental Findings:   · None     Test Results Pending at Discharge (will require follow up): · Blood cultures x2     Outpatient Tests Requested:  · None    Complications:  None    Hospital Course:     Tavo Tesfaye is a 80 y o  female patient with PMH of hypertension, atrial fibrillation on Eliquis, history of pacemaker implant x 10 years ago, CKD stage 3 who originally presented to the hospital on 1/12/2022 due to SOB and productive cough  x 1 week with white thick productive phelgm  Improved with albuterol inhaler  On date of admission patient with fever of 100 2 F at home with PCP with concern for pneumonia and was advised to come to the ED for evaluation  Upon admission patient was Afebrile  at 98 4F, satting 95% on RA, Resp 16  CXR showing improved right upper lobe infiltrate and small right effusion unchanged  Of note, patient was hospitalized back on 9/11/2021 for RUL PNA with sputum cultures growing Pseudomonas and was treated with ciprofloxacin x 5 days  Over the course of hospitalization, patient's maximum O2 requirement was 2L NC though desaturations were only to 88% overnight while sleeping  Patient was evaluated with O2 monitoring with walking on date of discharge with no need for supplemental O2  Patient was initially treated with IV Rocephin which was discontinued following procalcitonin negative x2   Patient did have improvement of symptoms with prednisone 40 mg treatment - patient to complete 5 day course  Patient to use albuterol inhaler as needed for wheezing  Prescriptions provided for Tessalon Perles and Mucinex for use as needed for symptom management  Patient to follow up with her PCP in 7-10 days  Patient to be discharged with home PT  Condition at Discharge: good     Discharge Day Visit / Exam:     Subjective:  Patient seen and evaluated at bedside  She reports that she is feeling much better  She denies CP, SOB, N/V/D/C  Vitals: Blood Pressure: 155/78 (01/14/22 0700)  Pulse: 85 (01/14/22 0700)  Temperature: 98 6 °F (37 °C) (01/14/22 0700)  Temp Source: Oral (01/14/22 0700)  Respirations: 18 (01/14/22 0700)  Height: 5' 3" (160 cm) (01/12/22 1845)  Weight - Scale: 50 7 kg (111 lb 12 4 oz) (01/12/22 1845)  SpO2: 92 % (01/14/22 0700)  Exam:   Physical Exam  Vitals and nursing note reviewed  Constitutional:       General: She is not in acute distress  Appearance: Normal appearance  She is not ill-appearing  HENT:      Head: Normocephalic and atraumatic  Nose: Nose normal       Mouth/Throat:      Mouth: Mucous membranes are moist       Pharynx: Oropharynx is clear  Eyes:      General: No scleral icterus  Right eye: No discharge  Left eye: No discharge  Conjunctiva/sclera: Conjunctivae normal    Cardiovascular:      Rate and Rhythm: Normal rate and regular rhythm  Heart sounds: No murmur heard  No friction rub  No gallop  Pulmonary:      Effort: Pulmonary effort is normal       Breath sounds: Normal breath sounds  No wheezing, rhonchi or rales  Abdominal:      General: Bowel sounds are normal       Palpations: Abdomen is soft  Tenderness: There is no abdominal tenderness  There is no guarding or rebound  Musculoskeletal:      Right lower leg: No edema  Left lower leg: No edema  Skin:     General: Skin is warm and dry     Neurological:      General: No focal deficit present  Mental Status: She is alert and oriented to person, place, and time  Psychiatric:         Mood and Affect: Mood normal          Behavior: Behavior normal          Discussion with Family: Discharge plan discussed with patient's daughter Everett First over the phone  All questions answered at this time  Discharge instructions/Information to patient and family:   See after visit summary for information provided to patient and family  Provisions for Follow-Up Care:  See after visit summary for information related to follow-up care and any pertinent home health orders  Planned Readmission: None     Discharge Medications:  See after visit summary for reconciled discharge medications provided to patient and family        ** Please Note: This note has been constructed using a voice recognition system **

## 2022-01-17 LAB
BACTERIA BLD CULT: NORMAL
BACTERIA BLD CULT: NORMAL

## 2022-07-14 DIAGNOSIS — G50.0 TRIGEMINAL NEURALGIA: Primary | ICD-10-CM

## 2022-07-14 RX ORDER — GABAPENTIN 100 MG/1
200 CAPSULE ORAL 2 TIMES DAILY
Qty: 180 CAPSULE | Refills: 0 | Status: SHIPPED | OUTPATIENT
Start: 2022-07-14 | End: 2022-07-18

## 2022-07-18 ENCOUNTER — TELEPHONE (OUTPATIENT)
Dept: FAMILY MEDICINE CLINIC | Facility: CLINIC | Age: 87
End: 2022-07-18

## 2022-07-18 DIAGNOSIS — E78.2 MIXED HYPERLIPIDEMIA: ICD-10-CM

## 2022-07-18 DIAGNOSIS — I10 PRIMARY HYPERTENSION: Primary | ICD-10-CM

## 2022-07-18 DIAGNOSIS — G50.0 TRIGEMINAL NEURALGIA: ICD-10-CM

## 2022-07-18 RX ORDER — TRAMADOL HYDROCHLORIDE 50 MG/1
50 TABLET ORAL DAILY
Qty: 90 TABLET | Refills: 0 | Status: SHIPPED | OUTPATIENT
Start: 2022-07-18

## 2022-07-18 RX ORDER — GABAPENTIN 300 MG/1
300 CAPSULE ORAL DAILY
Qty: 180 CAPSULE | Refills: 0 | Status: SHIPPED | OUTPATIENT
Start: 2022-07-18 | End: 2022-07-26

## 2022-07-18 RX ORDER — METOPROLOL SUCCINATE 100 MG/1
100 TABLET, EXTENDED RELEASE ORAL DAILY
Qty: 90 TABLET | Refills: 0 | Status: SHIPPED | OUTPATIENT
Start: 2022-07-18

## 2022-07-18 RX ORDER — LISINOPRIL 5 MG/1
5 TABLET ORAL DAILY
Qty: 90 TABLET | Refills: 0 | Status: SHIPPED | OUTPATIENT
Start: 2022-07-18

## 2022-07-18 RX ORDER — TRAMADOL HYDROCHLORIDE 50 MG/1
50 TABLET ORAL DAILY
COMMUNITY
End: 2022-07-18 | Stop reason: SDUPTHER

## 2022-07-18 RX ORDER — ROSUVASTATIN CALCIUM 5 MG/1
5 TABLET, COATED ORAL 2 TIMES WEEKLY
Qty: 30 TABLET | Refills: 0 | Status: SHIPPED | OUTPATIENT
Start: 2022-07-18 | End: 2022-09-29

## 2022-07-18 RX ORDER — DILTIAZEM HYDROCHLORIDE 120 MG/1
120 CAPSULE, COATED, EXTENDED RELEASE ORAL DAILY
Qty: 90 CAPSULE | Refills: 0 | Status: SHIPPED | OUTPATIENT
Start: 2022-07-18

## 2022-07-18 RX ORDER — GABAPENTIN 300 MG/1
300 CAPSULE ORAL 2 TIMES DAILY
COMMUNITY
End: 2022-07-18 | Stop reason: SDUPTHER

## 2022-07-18 NOTE — TELEPHONE ENCOUNTER
"Hi, it's Kallie khan  I know it's Sunday, so I didn't want to bother anybody, but all of my moms refills that  I tried to fill on [employer], which is now center, are all on hold because they cannot reach doctor Myrtle because the whole my chart thing is not set-up  So I'm getting a little nervous because we're running out of some, and I think this has been going on a couple days  So I guess I'll try and reach you in the morning  But basically, it's [address], [id number]  And it was diltiazem rosuvastatin  Gabapentin, tramadol, metropolol  And let's see, that might be it for now  But anyway, hopefully somebody will get this message  If not, I'll just try and reach you tomorrow and they would all be the 60 day anyway  [address], [address] [employer] bank [de-identified]  I know this is gonna be one heck of a week for you lamonte Alvarez, be well  Thanks  Naseem  Or call me [phone]  Thank you  Naseem "      Spoke with Kallie last evening, she needs these refills asap for her mom  Please send to [facility]  ALSo can the gabapentin be 300mg bid instead of 100mg 6 times daily? ??

## 2022-07-26 ENCOUNTER — TELEPHONE (OUTPATIENT)
Dept: FAMILY MEDICINE CLINIC | Facility: CLINIC | Age: 87
End: 2022-07-26

## 2022-07-26 DIAGNOSIS — G50.0 TRIGEMINAL NEURALGIA: Primary | ICD-10-CM

## 2022-07-26 RX ORDER — GABAPENTIN 100 MG/1
100 CAPSULE ORAL 4 TIMES DAILY
COMMUNITY
End: 2022-07-26 | Stop reason: SDUPTHER

## 2022-07-26 RX ORDER — GABAPENTIN 100 MG/1
200 CAPSULE ORAL 2 TIMES DAILY
Qty: 120 CAPSULE | Refills: 0 | Status: SHIPPED | OUTPATIENT
Start: 2022-07-26 | End: 2022-07-26 | Stop reason: SDUPTHER

## 2022-07-26 RX ORDER — GABAPENTIN 100 MG/1
200 CAPSULE ORAL 2 TIMES DAILY
Qty: 360 CAPSULE | Refills: 0 | Status: SHIPPED | OUTPATIENT
Start: 2022-07-26 | End: 2022-09-29

## 2022-07-26 NOTE — TELEPHONE ENCOUNTER
Michelle called about her mom's Gabapentin  There was a problem with [employer] home delivery which is now [facility]  Pt is running out of medication  Michelle wants it changed to 100 mg 4 times a day instead of 300 mg,states they never got the 300mg  [employer] said a new order is needed  Also needs some sent to [facility] to hold over until mail order comes    Thanks

## 2022-08-01 DIAGNOSIS — I48.91 ATRIAL FIBRILLATION, UNSPECIFIED TYPE (HCC): Primary | ICD-10-CM

## 2022-08-02 RX ORDER — APIXABAN 2.5 MG/1
TABLET, FILM COATED ORAL
Qty: 60 TABLET | Refills: 0 | Status: SHIPPED | OUTPATIENT
Start: 2022-08-02 | End: 2022-08-30

## 2022-08-02 NOTE — TELEPHONE ENCOUNTER
Requested medication(s) are due for refill today: Yes  Patient has already received a courtesy refill: Yes  Other reason request has been forwarded to provider: error

## 2022-08-24 ENCOUNTER — VBI (OUTPATIENT)
Dept: ADMINISTRATIVE | Facility: OTHER | Age: 87
End: 2022-08-24

## 2022-08-24 NOTE — TELEPHONE ENCOUNTER
Upon review of the In Basket request we were able to locate, review, and update the patient chart as requested for Medicare AW  Any additional questions or concerns should be emailed to the Practice Liaisons via [email or web address] email, please do not reply via In Basket      Thank you  Delia Beard

## 2022-08-30 DIAGNOSIS — I48.91 ATRIAL FIBRILLATION, UNSPECIFIED TYPE (HCC): ICD-10-CM

## 2022-08-30 RX ORDER — APIXABAN 2.5 MG/1
TABLET, FILM COATED ORAL
Qty: 60 TABLET | Refills: 0 | Status: SHIPPED | OUTPATIENT
Start: 2022-08-30 | End: 2022-10-03

## 2022-09-06 ENCOUNTER — RA CDI HCC (OUTPATIENT)
Dept: OTHER | Facility: HOSPITAL | Age: 87
End: 2022-09-06

## 2022-09-06 NOTE — PROGRESS NOTES
[address]  coding opportunities       Chart reviewed, no opportunity found: CHART REVIEWED, NO OPPORTUNITY FOUND        Patients Insurance     Medicare Insurance: Medicare

## 2022-09-28 DIAGNOSIS — G50.0 TRIGEMINAL NEURALGIA: ICD-10-CM

## 2022-09-28 DIAGNOSIS — E78.2 MIXED HYPERLIPIDEMIA: ICD-10-CM

## 2022-09-29 RX ORDER — GABAPENTIN 100 MG/1
CAPSULE ORAL
Qty: 360 CAPSULE | Refills: 0 | Status: SHIPPED | OUTPATIENT
Start: 2022-09-29

## 2022-09-29 RX ORDER — ROSUVASTATIN CALCIUM 5 MG/1
TABLET, COATED ORAL
Qty: 26 TABLET | Refills: 0 | Status: SHIPPED | OUTPATIENT
Start: 2022-09-29

## 2022-10-01 DIAGNOSIS — I48.91 ATRIAL FIBRILLATION, UNSPECIFIED TYPE (HCC): ICD-10-CM

## 2022-10-03 RX ORDER — APIXABAN 2.5 MG/1
TABLET, FILM COATED ORAL
Qty: 60 TABLET | Refills: 0 | Status: SHIPPED | OUTPATIENT
Start: 2022-10-03

## 2022-10-12 PROBLEM — K52.9 ACUTE GASTROENTERITIS: Status: RESOLVED | Noted: 2021-09-11 | Resolved: 2022-10-12

## 2022-10-12 PROBLEM — J18.9 RIGHT UPPER LOBE PNEUMONIA: Status: RESOLVED | Noted: 2021-09-08 | Resolved: 2022-10-12

## 2022-10-12 PROBLEM — A41.9 SEPSIS (HCC): Status: RESOLVED | Noted: 2021-09-08 | Resolved: 2022-10-12

## 2022-11-02 PROBLEM — R09.02 HYPOXIA: Status: RESOLVED | Noted: 2021-09-08 | Resolved: 2022-11-02

## 2022-11-02 PROBLEM — E55.9 VITAMIN D DEFICIENCY: Status: ACTIVE | Noted: 2022-11-02

## 2022-11-02 PROBLEM — G89.29 CHRONIC NECK PAIN: Status: ACTIVE | Noted: 2022-11-02

## 2022-11-02 PROBLEM — R06.02 SHORTNESS OF BREATH: Status: RESOLVED | Noted: 2022-01-12 | Resolved: 2022-11-02

## 2022-11-02 PROBLEM — N18.32 STAGE 3B CHRONIC KIDNEY DISEASE (HCC): Status: ACTIVE | Noted: 2021-09-08

## 2022-11-02 PROBLEM — I35.0 NONRHEUMATIC AORTIC VALVE STENOSIS: Status: ACTIVE | Noted: 2021-11-17

## 2022-11-02 PROBLEM — R19.7 DIARRHEA: Status: RESOLVED | Noted: 2021-12-20 | Resolved: 2022-11-02

## 2022-11-02 PROBLEM — M54.2 CHRONIC NECK PAIN: Status: ACTIVE | Noted: 2022-11-02

## 2022-11-02 PROBLEM — E78.2 MIXED HYPERLIPIDEMIA: Status: ACTIVE | Noted: 2022-11-02

## 2022-11-02 PROBLEM — M11.20 PSEUDOGOUT: Status: ACTIVE | Noted: 2022-11-02

## 2022-11-02 PROBLEM — J45.901 ASTHMA EXACERBATION: Status: RESOLVED | Noted: 2021-09-08 | Resolved: 2022-11-02

## 2022-11-02 PROBLEM — J45.20 MILD INTERMITTENT ASTHMA WITHOUT COMPLICATION: Status: ACTIVE | Noted: 2022-11-02

## 2022-11-03 ENCOUNTER — OFFICE VISIT (OUTPATIENT)
Dept: FAMILY MEDICINE CLINIC | Facility: CLINIC | Age: 87
End: 2022-11-03

## 2022-11-03 VITALS
DIASTOLIC BLOOD PRESSURE: 70 MMHG | HEART RATE: 70 BPM | BODY MASS INDEX: 19.53 KG/M2 | HEIGHT: 63 IN | OXYGEN SATURATION: 97 % | TEMPERATURE: 98.6 F | RESPIRATION RATE: 16 BRPM | SYSTOLIC BLOOD PRESSURE: 110 MMHG | WEIGHT: 110.2 LBS

## 2022-11-03 DIAGNOSIS — G89.29 CHRONIC NECK PAIN: ICD-10-CM

## 2022-11-03 DIAGNOSIS — N18.32 STAGE 3B CHRONIC KIDNEY DISEASE (HCC): ICD-10-CM

## 2022-11-03 DIAGNOSIS — M54.2 CHRONIC NECK PAIN: ICD-10-CM

## 2022-11-03 DIAGNOSIS — E55.9 VITAMIN D DEFICIENCY: ICD-10-CM

## 2022-11-03 DIAGNOSIS — I10 HYPERTENSION, ESSENTIAL: ICD-10-CM

## 2022-11-03 DIAGNOSIS — Z23 ENCOUNTER FOR IMMUNIZATION: Primary | ICD-10-CM

## 2022-11-03 DIAGNOSIS — I48.20 CHRONIC ATRIAL FIBRILLATION (HCC): ICD-10-CM

## 2022-11-03 DIAGNOSIS — I10 PRIMARY HYPERTENSION: ICD-10-CM

## 2022-11-03 DIAGNOSIS — J45.20 MILD INTERMITTENT ASTHMA WITHOUT COMPLICATION: ICD-10-CM

## 2022-11-03 DIAGNOSIS — E78.2 MIXED HYPERLIPIDEMIA: ICD-10-CM

## 2022-11-03 RX ORDER — DILTIAZEM HYDROCHLORIDE 120 MG/1
120 CAPSULE, COATED, EXTENDED RELEASE ORAL DAILY
Qty: 90 CAPSULE | Refills: 0 | Status: SHIPPED | OUTPATIENT
Start: 2022-11-03

## 2022-11-03 NOTE — ASSESSMENT & PLAN NOTE
Lab Results   Component Value Date    EGFR 41 01/13/2022    EGFR 31 01/12/2022    EGFR 40 12/21/2021    CREATININE 1 13 01/13/2022    CREATININE 1 43 (H) 01/12/2022    CREATININE 1 15 (H) 12/21/2021   creatinine and GFR stable we will reevaluate at next office visit

## 2022-11-03 NOTE — ASSESSMENT & PLAN NOTE
Under control  Continue current medication  We will re-evaluate at next office visit    Been followed by a cardiologist

## 2022-11-16 DIAGNOSIS — I48.91 ATRIAL FIBRILLATION, UNSPECIFIED TYPE (HCC): ICD-10-CM

## 2022-11-16 RX ORDER — APIXABAN 2.5 MG/1
TABLET, FILM COATED ORAL
Qty: 60 TABLET | Refills: 0 | Status: SHIPPED | OUTPATIENT
Start: 2022-11-16

## 2022-12-12 DIAGNOSIS — I48.91 ATRIAL FIBRILLATION, UNSPECIFIED TYPE (HCC): ICD-10-CM

## 2022-12-12 RX ORDER — APIXABAN 2.5 MG/1
TABLET, FILM COATED ORAL
Qty: 60 TABLET | Refills: 0 | Status: SHIPPED | OUTPATIENT
Start: 2022-12-12

## 2022-12-26 ENCOUNTER — APPOINTMENT (EMERGENCY)
Dept: RADIOLOGY | Facility: HOSPITAL | Age: 87
End: 2022-12-26

## 2022-12-26 ENCOUNTER — HOSPITAL ENCOUNTER (INPATIENT)
Facility: HOSPITAL | Age: 87
LOS: 4 days | Discharge: HOME HEALTH | End: 2022-12-30
Attending: EMERGENCY MEDICINE | Admitting: INTERNAL MEDICINE

## 2022-12-26 ENCOUNTER — APPOINTMENT (EMERGENCY)
Dept: CT IMAGING | Facility: HOSPITAL | Age: 87
End: 2022-12-26

## 2022-12-26 DIAGNOSIS — N18.32 STAGE 3B CHRONIC KIDNEY DISEASE (HCC): ICD-10-CM

## 2022-12-26 DIAGNOSIS — M25.562 KNEE PAIN, LEFT: ICD-10-CM

## 2022-12-26 DIAGNOSIS — R53.1 WEAKNESS: Primary | ICD-10-CM

## 2022-12-26 DIAGNOSIS — J40 BRONCHITIS: ICD-10-CM

## 2022-12-26 DIAGNOSIS — R50.9 FEVER: ICD-10-CM

## 2022-12-26 LAB
2HR DELTA HS TROPONIN: 0 NG/L
ALBUMIN SERPL BCP-MCNC: 3.6 G/DL (ref 3.5–5)
ALP SERPL-CCNC: 95 U/L (ref 34–104)
ALT SERPL W P-5'-P-CCNC: 10 U/L (ref 7–52)
ANION GAP SERPL CALCULATED.3IONS-SCNC: 11 MMOL/L (ref 4–13)
AST SERPL W P-5'-P-CCNC: 13 U/L (ref 13–39)
BACTERIA UR QL AUTO: NORMAL /HPF
BASOPHILS # BLD AUTO: 0.05 THOUSANDS/ÂΜL (ref 0–0.1)
BASOPHILS NFR BLD AUTO: 0 % (ref 0–1)
BILIRUB SERPL-MCNC: 0.82 MG/DL (ref 0.2–1)
BILIRUB UR QL STRIP: NEGATIVE
BNP SERPL-MCNC: 515 PG/ML (ref 0–100)
BUN SERPL-MCNC: 19 MG/DL (ref 5–25)
CALCIUM SERPL-MCNC: 9.1 MG/DL (ref 8.4–10.2)
CARDIAC TROPONIN I PNL SERPL HS: 10 NG/L
CARDIAC TROPONIN I PNL SERPL HS: 10 NG/L
CHLORIDE SERPL-SCNC: 102 MMOL/L (ref 96–108)
CLARITY UR: CLEAR
CO2 SERPL-SCNC: 25 MMOL/L (ref 21–32)
COLOR UR: YELLOW
CREAT SERPL-MCNC: 1.06 MG/DL (ref 0.6–1.3)
EOSINOPHIL # BLD AUTO: 0.01 THOUSAND/ÂΜL (ref 0–0.61)
EOSINOPHIL NFR BLD AUTO: 0 % (ref 0–6)
ERYTHROCYTE [DISTWIDTH] IN BLOOD BY AUTOMATED COUNT: 13.1 % (ref 11.6–15.1)
FLUAV RNA RESP QL NAA+PROBE: NEGATIVE
FLUBV RNA RESP QL NAA+PROBE: NEGATIVE
GFR SERPL CREATININE-BSD FRML MDRD: 44 ML/MIN/1.73SQ M
GLUCOSE SERPL-MCNC: 114 MG/DL (ref 65–140)
GLUCOSE UR STRIP-MCNC: NEGATIVE MG/DL
HCT VFR BLD AUTO: 34.7 % (ref 34.8–46.1)
HGB BLD-MCNC: 11.3 G/DL (ref 11.5–15.4)
HGB UR QL STRIP.AUTO: ABNORMAL
IMM GRANULOCYTES # BLD AUTO: 0.06 THOUSAND/UL (ref 0–0.2)
IMM GRANULOCYTES NFR BLD AUTO: 0 % (ref 0–2)
KETONES UR STRIP-MCNC: ABNORMAL MG/DL
LACTATE SERPL-SCNC: 1.2 MMOL/L (ref 0.5–2)
LEUKOCYTE ESTERASE UR QL STRIP: NEGATIVE
LIPASE SERPL-CCNC: <6 U/L (ref 11–82)
LYMPHOCYTES # BLD AUTO: 0.76 THOUSANDS/ÂΜL (ref 0.6–4.47)
LYMPHOCYTES NFR BLD AUTO: 5 % (ref 14–44)
MCH RBC QN AUTO: 30.8 PG (ref 26.8–34.3)
MCHC RBC AUTO-ENTMCNC: 32.6 G/DL (ref 31.4–37.4)
MCV RBC AUTO: 95 FL (ref 82–98)
MONOCYTES # BLD AUTO: 1.55 THOUSAND/ÂΜL (ref 0.17–1.22)
MONOCYTES NFR BLD AUTO: 9 % (ref 4–12)
NEUTROPHILS # BLD AUTO: 13.98 THOUSANDS/ÂΜL (ref 1.85–7.62)
NEUTS SEG NFR BLD AUTO: 86 % (ref 43–75)
NITRITE UR QL STRIP: NEGATIVE
NON-SQ EPI CELLS URNS QL MICRO: NORMAL /HPF
NRBC BLD AUTO-RTO: 0 /100 WBCS
PH UR STRIP.AUTO: 5.5 [PH]
PHOSPHATE SERPL-MCNC: 2.7 MG/DL (ref 2.3–4.1)
PLATELET # BLD AUTO: 322 THOUSANDS/UL (ref 149–390)
PMV BLD AUTO: 10.1 FL (ref 8.9–12.7)
POTASSIUM SERPL-SCNC: 4 MMOL/L (ref 3.5–5.3)
PROCALCITONIN SERPL-MCNC: 0.06 NG/ML
PROT SERPL-MCNC: 7 G/DL (ref 6.4–8.4)
PROT UR STRIP-MCNC: NEGATIVE MG/DL
RBC # BLD AUTO: 3.67 MILLION/UL (ref 3.81–5.12)
RBC #/AREA URNS AUTO: NORMAL /HPF
RSV RNA RESP QL NAA+PROBE: NEGATIVE
SARS-COV-2 RNA RESP QL NAA+PROBE: NEGATIVE
SODIUM SERPL-SCNC: 138 MMOL/L (ref 135–147)
SP GR UR STRIP.AUTO: 1.02 (ref 1–1.03)
UROBILINOGEN UR QL STRIP.AUTO: 0.2 E.U./DL
WBC # BLD AUTO: 16.41 THOUSAND/UL (ref 4.31–10.16)
WBC #/AREA URNS AUTO: NORMAL /HPF

## 2022-12-26 RX ORDER — IRON POLYSACCHARIDE COMPLEX 150 MG
150 CAPSULE ORAL DAILY
Status: DISCONTINUED | OUTPATIENT
Start: 2022-12-26 | End: 2022-12-30 | Stop reason: HOSPADM

## 2022-12-26 RX ORDER — CEFTRIAXONE 1 G/50ML
1000 INJECTION, SOLUTION INTRAVENOUS EVERY 24 HOURS
Status: DISCONTINUED | OUTPATIENT
Start: 2022-12-27 | End: 2022-12-27

## 2022-12-26 RX ORDER — SODIUM CHLORIDE, SODIUM GLUCONATE, SODIUM ACETATE, POTASSIUM CHLORIDE, MAGNESIUM CHLORIDE, SODIUM PHOSPHATE, DIBASIC, AND POTASSIUM PHOSPHATE .53; .5; .37; .037; .03; .012; .00082 G/100ML; G/100ML; G/100ML; G/100ML; G/100ML; G/100ML; G/100ML
75 INJECTION, SOLUTION INTRAVENOUS CONTINUOUS
Status: DISCONTINUED | OUTPATIENT
Start: 2022-12-26 | End: 2022-12-27

## 2022-12-26 RX ORDER — IPRATROPIUM BROMIDE AND ALBUTEROL SULFATE 2.5; .5 MG/3ML; MG/3ML
3 SOLUTION RESPIRATORY (INHALATION)
Status: DISCONTINUED | OUTPATIENT
Start: 2022-12-26 | End: 2022-12-26

## 2022-12-26 RX ORDER — ACETAMINOPHEN 325 MG/1
650 TABLET ORAL ONCE
Status: COMPLETED | OUTPATIENT
Start: 2022-12-26 | End: 2022-12-26

## 2022-12-26 RX ORDER — ONDANSETRON 2 MG/ML
4 INJECTION INTRAMUSCULAR; INTRAVENOUS EVERY 6 HOURS PRN
Status: DISCONTINUED | OUTPATIENT
Start: 2022-12-26 | End: 2022-12-30 | Stop reason: HOSPADM

## 2022-12-26 RX ORDER — CEFTRIAXONE 1 G/50ML
1000 INJECTION, SOLUTION INTRAVENOUS ONCE
Status: COMPLETED | OUTPATIENT
Start: 2022-12-26 | End: 2022-12-27

## 2022-12-26 RX ORDER — HEPARIN SODIUM 5000 [USP'U]/ML
5000 INJECTION, SOLUTION INTRAVENOUS; SUBCUTANEOUS EVERY 8 HOURS SCHEDULED
Status: DISCONTINUED | OUTPATIENT
Start: 2022-12-26 | End: 2022-12-26

## 2022-12-26 RX ORDER — ACETAMINOPHEN 325 MG/1
650 TABLET ORAL EVERY 6 HOURS PRN
Status: DISCONTINUED | OUTPATIENT
Start: 2022-12-26 | End: 2022-12-28

## 2022-12-26 RX ORDER — TRAMADOL HYDROCHLORIDE 50 MG/1
50 TABLET ORAL EVERY 8 HOURS PRN
Status: DISCONTINUED | OUTPATIENT
Start: 2022-12-26 | End: 2022-12-28

## 2022-12-26 RX ORDER — LISINOPRIL 5 MG/1
5 TABLET ORAL DAILY
Status: DISCONTINUED | OUTPATIENT
Start: 2022-12-26 | End: 2022-12-29

## 2022-12-26 RX ORDER — GABAPENTIN 100 MG/1
200 CAPSULE ORAL 2 TIMES DAILY
Status: DISCONTINUED | OUTPATIENT
Start: 2022-12-26 | End: 2022-12-30 | Stop reason: HOSPADM

## 2022-12-26 RX ORDER — LEVALBUTEROL 1.25 MG/.5ML
1.25 SOLUTION, CONCENTRATE RESPIRATORY (INHALATION)
Status: DISCONTINUED | OUTPATIENT
Start: 2022-12-26 | End: 2022-12-30 | Stop reason: HOSPADM

## 2022-12-26 RX ORDER — SODIUM CHLORIDE 9 MG/ML
150 INJECTION, SOLUTION INTRAVENOUS CONTINUOUS
Status: DISCONTINUED | OUTPATIENT
Start: 2022-12-26 | End: 2022-12-26

## 2022-12-26 RX ORDER — DILTIAZEM HYDROCHLORIDE 120 MG/1
120 CAPSULE, COATED, EXTENDED RELEASE ORAL DAILY
Status: DISCONTINUED | OUTPATIENT
Start: 2022-12-26 | End: 2022-12-30 | Stop reason: HOSPADM

## 2022-12-26 RX ORDER — METOPROLOL SUCCINATE 100 MG/1
100 TABLET, EXTENDED RELEASE ORAL DAILY
Status: DISCONTINUED | OUTPATIENT
Start: 2022-12-26 | End: 2022-12-30 | Stop reason: HOSPADM

## 2022-12-26 RX ADMIN — SODIUM CHLORIDE 500 ML: 0.9 INJECTION, SOLUTION INTRAVENOUS at 10:26

## 2022-12-26 RX ADMIN — CEFTRIAXONE 1000 MG: 1 INJECTION, SOLUTION INTRAVENOUS at 13:30

## 2022-12-26 RX ADMIN — SODIUM CHLORIDE, SODIUM GLUCONATE, SODIUM ACETATE, POTASSIUM CHLORIDE, MAGNESIUM CHLORIDE, SODIUM PHOSPHATE, DIBASIC, AND POTASSIUM PHOSPHATE 75 ML/HR: .53; .5; .37; .037; .03; .012; .00082 INJECTION, SOLUTION INTRAVENOUS at 15:41

## 2022-12-26 RX ADMIN — LEVALBUTEROL HYDROCHLORIDE 1.25 MG: 1.25 SOLUTION, CONCENTRATE RESPIRATORY (INHALATION) at 21:28

## 2022-12-26 RX ADMIN — METOPROLOL SUCCINATE 100 MG: 100 TABLET, EXTENDED RELEASE ORAL at 15:41

## 2022-12-26 RX ADMIN — IPRATROPIUM BROMIDE 0.5 MG: 0.5 SOLUTION RESPIRATORY (INHALATION) at 21:28

## 2022-12-26 RX ADMIN — IPRATROPIUM BROMIDE 0.5 MG: 0.5 SOLUTION RESPIRATORY (INHALATION) at 16:25

## 2022-12-26 RX ADMIN — ACETAMINOPHEN 650 MG: 325 TABLET, FILM COATED ORAL at 10:58

## 2022-12-26 RX ADMIN — IPRATROPIUM BROMIDE AND ALBUTEROL SULFATE 3 ML: .5; 2.5 SOLUTION RESPIRATORY (INHALATION) at 10:32

## 2022-12-26 RX ADMIN — TRAMADOL HYDROCHLORIDE 50 MG: 50 TABLET, COATED ORAL at 18:56

## 2022-12-26 RX ADMIN — LEVALBUTEROL HYDROCHLORIDE 1.25 MG: 1.25 SOLUTION, CONCENTRATE RESPIRATORY (INHALATION) at 16:25

## 2022-12-26 RX ADMIN — GABAPENTIN 200 MG: 100 CAPSULE ORAL at 17:46

## 2022-12-26 RX ADMIN — LISINOPRIL 5 MG: 5 TABLET ORAL at 15:41

## 2022-12-26 RX ADMIN — APIXABAN 2.5 MG: 2.5 TABLET, FILM COATED ORAL at 17:46

## 2022-12-26 RX ADMIN — POLYSACCHARIDE-IRON COMPLEX 150 MG: 150 CAPSULE ORAL at 15:41

## 2022-12-26 RX ADMIN — DILTIAZEM HYDROCHLORIDE 120 MG: 120 CAPSULE, COATED, EXTENDED RELEASE ORAL at 15:41

## 2022-12-26 NOTE — ASSESSMENT & PLAN NOTE
· Continue Home Medication:  · Cardizem and Toprol-XL  · A/C with Eliquis  · Continue Outpatient follow up with Primary Cardiologist

## 2022-12-26 NOTE — ASSESSMENT & PLAN NOTE
· Likely secondary to acute illness  · Initiate fall precautions  · Continue Supportive Care  · OOB as tolerated  · PT/OT eval: Pending

## 2022-12-26 NOTE — H&P
Luis U  66   H&P- Kristopher Barron 9/7/1926, 80 y o  female MRN: [id number]  Unit/Bed#: -01 Encounter: [id number]  Primary Care Provider: Joseph Casey MD   Date and time admitted to hospital: 12/26/2022  9:34 AM    * Sepsis [facility])  Assessment & Plan  Patient presented with complaints of abdominal pain, nausea and shortness of breath over the last two day  Per patient's daughter/caregiver, patient with generalized weakness/fatigue and fevers while at home  · SIRs criteria: Tachycardia (HR >90), Tachypnea (RR >20), and Leukocytosis (WBC >16k)   · Likely secondary bacterial vs viral etiology  · CXR: Chronic small amount of right basilar pleural fluid or thickening  Large hiatal hernia  No evidence of acute abnormality in the chest    · CT A/P: Right greater than left basilar bronchial thickening suggestive of bronchitis without pulmonary infiltrate identified  Cardiomegaly and small pericardial effusion similar to prior exam  Right inguinal hernia containing fat and ileal loops, nondilated and nonobstructed, stable     · Lactic/Procal: Negative  · COVID/Flu/RSV: Negative  · BC x2: Pending  · U/A: (+): Blood/Ketones  · Fluids: S/p 1L NSS Bolus in the ED  · C/w IVF resuscitation  · Abx: S/p IV Rocephin while in the ED  · C/w IV Rocephin while admitted  · Monitor CBC/Fever Curve  · Continue supportive care  · Antiemetics, Tylenol PRN  · Follow up final culture results    Generalized weakness  Assessment & Plan  · Likely secondary to acute illness  · Initiate fall precautions  · Continue Supportive Care  · OOB as tolerated  · PT/OT eval: Pending    Stage 3b chronic kidney disease [facility])  Assessment & Plan  Lab Results   Component Value Date    EGFR 44 12/26/2022    EGFR 41 01/13/2022    EGFR 31 01/12/2022    CREATININE 1 06 12/26/2022    CREATININE 1 13 01/13/2022    CREATININE 1 43 (H) 01/12/2022     · Baseline Crt appears to be around 1 0-1 2  · Stable on admission  · Continue to monitor while admitted    Chronic atrial fibrillation (HCC)  Assessment & Plan  · Continue Home Medication:  · Cardizem and Toprol-XL  · A/C with Eliquis  · Continue Outpatient follow up with Primary Cardiologist    Hypertension, essential  Assessment & Plan  · BP reviewed and remains stable  · Continue Home medication:  · Lisinopril    Mixed hyperlipidemia  Assessment & Plan  · Continue statin therapy      VTE Pharmacologic Prophylaxis: VTE Score: 7 High Risk (Score >/= 5) - Pharmacological DVT Prophylaxis Ordered: heparin  Sequential Compression Devices Ordered  Code Status: Level 1 - Full Code   Discussion with family: Updated  (daughter) at bedside  Anticipated Length of Stay: Patient will be admitted on an inpatient basis with an anticipated length of stay of greater than 2 midnights secondary to Viral bronchitis/intractable nausea retching, severe left knee pain  Total Time for Visit, including Counseling / Coordination of Care: 45 minutes Greater than 50% of this total time spent on direct patient counseling and coordination of care  Chief Complaint: Nausea, recent food poisoning, left knee pain,    History of Present Illness:  Kasi Mcknight is a 80 y o  female with a PMH of atrial fibrillation, on anticoagulation, hypertension, dyslipidemia, severe osteoarthritis of the lower extremity presents with complaints of nausea  Patient has been having vomiting and diarrhea last week since Wednesday to Friday  She states that she had lunch at a restaurant and following that she started to have abdominal cramping and vomiting  Most of the history was obtained from the daughter at bedside  Patient denies of chest pain or shortness of breath but however has been having cough with minimal expectoration for the past 3 days  Patient was febrile in the ED with rectal temperature of 100 5  Patient has been having generalized fatigue and weakness    Patient also complains of severe pain on the left knee and she was supposed to get knee injections by her orthopedician for severe bone-on-bone osteoarthritis    Review of Systems:  Review of Systems   Constitutional: Positive for appetite change, fatigue and fever  Respiratory: Positive for cough  Gastrointestinal: Positive for diarrhea, nausea and vomiting  Musculoskeletal: Positive for arthralgias  All other systems reviewed and are negative  Past Medical and Surgical History:   Past Medical History:   Diagnosis Date   • Anemia    • Asthma    • Atrial fibrillation ([address] ) 11/08/2019   • CAD    • CKD (chronic kidney disease)    • Current use of long term anticoagulation 11/08/2019   • Degenerative joint disease    • Elevated troponin 09/08/2021   • Hyperlipidemia    • Hypertension, essential 11/08/2019   • ALYSSA (iron deficiency anemia)    • Osteopenia    • Pacemaker    • TIA (transient ischemic attack) 11/08/2019       Past Surgical History:   Procedure Laterality Date   • CARDIAC PACEMAKER PLACEMENT         Meds/Allergies:  Prior to Admission medications    Medication Sig Start Date End Date Taking?  Authorizing Provider   albuterol (PROVENTIL HFA,VENTOLIN HFA) 90 mcg/act inhaler Inhale 2 puffs every 4 (four) hours as needed for wheezing 1/14/22   Ronald Hernandez DO   diltiazem (CARDIZEM CD) 120 mg 24 hr capsule Take 1 capsule (120 mg total) by mouth daily 11/3/22   Nicci Santillan MD   Eliquis 2 5 MG TAKE 1 TABLET BY MOUTH 2 TIMES DAILY 12/12/22   Nicci Santillan MD   gabapentin (NEURONTIN) 100 mg capsule TAKE 2 CAPSULES TWICE DAILY 9/29/22   Nicci Santillan MD   iron polysaccharides (FERREX) 150 mg capsule Take 150 mg by mouth daily    Historical Provider, MD   lisinopril (ZESTRIL) 5 mg tablet Take 1 tablet (5 mg total) by mouth daily 7/18/22   Nicci Santillan MD   metoprolol succinate (TOPROL-XL) 100 mg 24 hr tablet Take 1 tablet (100 mg total) by mouth daily 7/18/22   Nicci Santillan MD   rosuvastatin (CRESTOR) 5 mg tablet TAKE 1 TABLET (5 MG TOTAL) BY MOUTH 2 (TWO) TIMES A WEEK ON TUESDAY AND SATURDAY 9/29/22   Dane Elizabeth MD   traMADol Clemente Leoncio) 50 mg tablet Take 1 tablet (50 mg total) by mouth daily 7/18/22   Dane Elizabeth MD     I have reviewed home medications with patient personally  Allergies: Allergies   Allergen Reactions   • Amoxicillin-Pot Clavulanate GI Intolerance   • Silver Sulfadiazine Other (See Comments)       Social History:  Marital Status:    Occupation: retired  Patient Pre-hospital Living Situation: Home  Patient Pre-hospital Level of Mobility: walks with cane  Patient Pre-hospital Diet Restrictions: nil  Substance Use History:   Social History     Substance and Sexual Activity   Alcohol Use Not Currently    Comment: occasionaly      Social History     Tobacco Use   Smoking Status Never   Smokeless Tobacco Never     Social History     Substance and Sexual Activity   Drug Use Never       Family History:  Family History   Problem Relation Age of Onset   • Other Mother         Respiratory problem       Physical Exam:     Vitals:   Blood Pressure: 137/72 (12/26/22 1415)  Pulse: (!) 107 (12/26/22 1415)  Temperature: 100 1 °F (37 8 °C) (12/26/22 1415)  Temp Source: Oral (12/26/22 1415)  Respirations: 20 (12/26/22 1415)  Height: 5' 2" (157 5 cm) (12/26/22 1415)  Weight - Scale: 47 7 kg (105 lb 2 6 oz) (12/26/22 1415)  SpO2: 96 % (12/26/22 1415)    Physical Exam   HEENT-PERRLA, moist oral mucosa  Neck-supple, no JVD elevation   Respiratory-decreased air entry bilaterally, with expiratory rhonchi heard  Cardiovascular system-S1, S2 heard, systolic murmur heard  Abdomen-soft, nontender, no guarding or rigidity, bowel sounds heard  Extremities-no pedal edema  Peripheral pulses palpable  Musculoskeletal-no contractures  Central nervous system-no acute focal neurological deficit ,no sensory or motor deficit noted    Skin-no rash noted        Additional Data:     Lab Results:  Results from last 7 days   Lab Units 12/26/22  1006   WBC Thousand/uL 16 41*   HEMOGLOBIN g/dL 11 3*   HEMATOCRIT % 34 7*   PLATELETS Thousands/uL 322   NEUTROS PCT % 86*   LYMPHS PCT % 5*   MONOS PCT % 9   EOS PCT % 0     Results from last 7 days   Lab Units 12/26/22  1006   SODIUM mmol/L 138   POTASSIUM mmol/L 4 0   CHLORIDE mmol/L 102   CO2 mmol/L 25   BUN mg/dL 19   CREATININE mg/dL 1 06   ANION GAP mmol/L 11   CALCIUM mg/dL 9 1   ALBUMIN g/dL 3 6   TOTAL BILIRUBIN mg/dL 0 82   ALK PHOS U/L 95   ALT U/L 10   AST U/L 13   GLUCOSE RANDOM mg/dL 114                 Results from last 7 days   Lab Units 12/26/22  1006   LACTIC ACID mmol/L 1 2   PROCALCITONIN ng/ml 0 06       Lines/Drains:  Invasive Devices     Peripheral Intravenous Line  Duration           Peripheral IV 12/26/22 Right;Ventral (anterior) Forearm <1 day                    Imaging: Reviewed radiology reports from this admission including: chest xray and abdominal/pelvic CT  CT abdomen pelvis wo contrast   Final Result by Walter Coronel MD (12/26 1301)   1  Right greater than left basilar bronchial thickening suggestive of bronchitis without pulmonary infiltrate identified  2   Cardiomegaly and small pericardial effusion similar to prior exam    3   Large sliding-type hiatal hernia  4   Right inguinal hernia containing fat and ileal loops, nondilated and nonobstructed, stable  Workstation performed: [id number]         XR chest portable   ED Interpretation by Rico Schwartz PA-C (12/26 1024)   Cm, chronic changes      Final Result by Triny Gimenez MD (12/26 1121)      1  Chronic small amount of right basilar pleural fluid or thickening  2   Large hiatal hernia  3   No evidence of acute abnormality in the chest                   Workstation performed: [id number]             EKG and Other Studies Reviewed on Admission:   · EKG: Atrial fibrillation    ** Please Note: This note has been constructed using a voice recognition system   **

## 2022-12-26 NOTE — ASSESSMENT & PLAN NOTE
Lab Results   Component Value Date    EGFR 44 12/26/2022    EGFR 41 01/13/2022    EGFR 31 01/12/2022    CREATININE 1 06 12/26/2022    CREATININE 1 13 01/13/2022    CREATININE 1 43 (H) 01/12/2022     · Baseline Crt appears to be around 1 0-1 2  · Stable on admission  · Continue to monitor while admitted

## 2022-12-26 NOTE — ED PROVIDER NOTES
History  Chief Complaint   Patient presents with   • Nausea     Pt c/o nausea and SOB x 2 days  Past Medical History: Asthma, Atrial fibrillation, CAD, CKD, Current use of long term anticoagulation, Degenerative joint disease-knees, Hyperlipidemi , HTN, Iron deficiency anemia, TIA  PSH: PM    Pt presents to ED from home, pt lives with son, daughter lives next door and is mostly the caregiver, who helps with history for 2 days of symptoms cw fever, temp here 100 5 rectal, congestin/coarse, dry cough, nausea, vomiting yesterday, arthralgias-knee pain, fatigue/weakness  Pt denies pain in ED, no cp, abd pain, no bowel changes, no urinary complaints          Prior to Admission Medications   Prescriptions Last Dose Informant Patient Reported? Taking?    Eliquis 2 5 MG   No No   Sig: TAKE 1 TABLET BY MOUTH 2 TIMES DAILY   albuterol (PROVENTIL HFA,VENTOLIN HFA) 90 mcg/act inhaler   No No   Sig: Inhale 2 puffs every 4 (four) hours as needed for wheezing   diltiazem (CARDIZEM CD) 120 mg 24 hr capsule   No No   Sig: Take 1 capsule (120 mg total) by mouth daily   gabapentin (NEURONTIN) 100 mg capsule   No No   Sig: TAKE 2 CAPSULES TWICE DAILY   iron polysaccharides (FERREX) 150 mg capsule   Yes No   Sig: Take 150 mg by mouth daily   lisinopril (ZESTRIL) 5 mg tablet   No No   Sig: Take 1 tablet (5 mg total) by mouth daily   metoprolol succinate (TOPROL-XL) 100 mg 24 hr tablet   No No   Sig: Take 1 tablet (100 mg total) by mouth daily   rosuvastatin (CRESTOR) 5 mg tablet   No No   Sig: TAKE 1 TABLET (5 MG TOTAL) BY MOUTH 2 (TWO) TIMES A WEEK ON TUESDAY AND SATURDAY   traMADol (ULTRAM) 50 mg tablet   No No   Sig: Take 1 tablet (50 mg total) by mouth daily      Facility-Administered Medications: None       Past Medical History:   Diagnosis Date   • Anemia    • Asthma    • Atrial fibrillation (HCC) 11/08/2019   • CAD    • CKD (chronic kidney disease)    • Current use of long term anticoagulation 11/08/2019   • Degenerative joint disease    • Elevated troponin 09/08/2021   • Hyperlipidemia    • Hypertension, essential 11/08/2019   • ALYSSA (iron deficiency anemia)    • Osteopenia    • Pacemaker    • TIA (transient ischemic attack) 11/08/2019       Past Surgical History:   Procedure Laterality Date   • CARDIAC PACEMAKER PLACEMENT         Family History   Problem Relation Age of Onset   • Other Mother         Respiratory problem     I have reviewed and agree with the history as documented  E-Cigarette/Vaping   • E-Cigarette Use Never User      E-Cigarette/Vaping Substances   • Nicotine No    • THC No    • CBD No    • Flavoring No    • Other No    • Unknown No      Social History     Tobacco Use   • Smoking status: Never   • Smokeless tobacco: Never   Vaping Use   • Vaping Use: Never used   Substance Use Topics   • Alcohol use: Not Currently     Comment: occasionaly    • Drug use: Never       Review of Systems   Constitutional: Positive for chills, fatigue and fever  HENT: Positive for congestion and rhinorrhea  Negative for ear pain, facial swelling, hearing loss, sore throat and trouble swallowing  Eyes: Negative for visual disturbance  Respiratory: Positive for cough  Negative for shortness of breath  Cardiovascular: Negative for chest pain and leg swelling  Gastrointestinal: Positive for nausea and vomiting  Negative for abdominal pain, constipation and diarrhea  Genitourinary: Negative for dysuria, frequency, hematuria, vaginal bleeding and vaginal discharge  Musculoskeletal: Positive for arthralgias and myalgias  Skin: Negative for color change and pallor  Neurological: Positive for weakness  Negative for dizziness and headaches  Psychiatric/Behavioral: Negative for behavioral problems  All other systems reviewed and are negative  Physical Exam  Physical Exam  Vitals and nursing note reviewed  Constitutional:       General: She is in acute distress (mild)  Appearance: She is well-developed   She is ill-appearing  HENT:      Head: Normocephalic and atraumatic  Right Ear: External ear normal       Left Ear: External ear normal       Nose: Congestion and rhinorrhea (clear) present  Mouth/Throat:      Mouth: Mucous membranes are moist       Pharynx: Oropharynx is clear  Eyes:      General:         Right eye: No discharge  Left eye: No discharge  Conjunctiva/sclera: Conjunctivae normal    Cardiovascular:      Rate and Rhythm: Regular rhythm  Tachycardia present  Pulmonary:      Effort: Respiratory distress (mild) present  Breath sounds: Wheezing and rhonchi present  Comments: Decreased BS at base  Abdominal:      General: Bowel sounds are normal       Palpations: Abdomen is soft  Tenderness: There is no abdominal tenderness  There is no guarding or rebound  Musculoskeletal:         General: Normal range of motion  Cervical back: Normal range of motion and neck supple  No tenderness  Right lower leg: No edema  Left lower leg: No edema  Lymphadenopathy:      Cervical: No cervical adenopathy  Skin:     General: Skin is warm and dry  Findings: No rash  Neurological:      Mental Status: She is alert and oriented to person, place, and time     Psychiatric:         Behavior: Behavior normal          Vital Signs  ED Triage Vitals   Temperature Pulse Respirations Blood Pressure SpO2   12/26/22 0934 12/26/22 0934 12/26/22 0934 12/26/22 0934 12/26/22 0934   98 6 °F (37 °C) 101 18 168/71 98 %      Temp Source Heart Rate Source Patient Position - Orthostatic VS BP Location FiO2 (%)   12/26/22 0934 12/26/22 0934 12/26/22 0934 12/26/22 0934 --   Oral Monitor Lying Left arm       Pain Score       12/26/22 1058       Med Not Given for Pain - for MAR use only           Vitals:    12/26/22 0934 12/26/22 1026 12/26/22 1216 12/26/22 1301   BP: 168/71 155/84 129/66 139/54   Pulse: 101 103 104 102   Patient Position - Orthostatic VS: Lying Lying           Visual Acuity      ED Medications  Medications   sodium chloride 0 9 % infusion (150 mL/hr Intravenous Restarted 12/26/22 1146)   ipratropium-albuterol (DUO-NEB) 0 5-2 5 mg/3 mL inhalation solution 3 mL (3 mL Nebulization Given 12/26/22 1032)   cefTRIAXone (ROCEPHIN) IVPB (premix in dextrose) 1,000 mg 50 mL (1,000 mg Intravenous New Bag 12/26/22 1330)   sodium chloride 0 9 % bolus 500 mL (0 mL Intravenous Stopped 12/26/22 1145)   acetaminophen (TYLENOL) tablet 650 mg (650 mg Oral Given 12/26/22 1058)       Diagnostic Studies  Results Reviewed     Procedure Component Value Units Date/Time    HS Troponin I 2hr [[id number]]  (Normal) Collected: 12/26/22 1301    Lab Status: Final result Specimen: Blood from Arm, Right Updated: 12/26/22 1328     hs TnI 2hr 10 ng/L      Delta 2hr hsTnI 0 ng/L     Urine Microscopic [[id number]]  (Normal) Collected: 12/26/22 1152    Lab Status: Final result Specimen: Urine, Clean Catch Updated: 12/26/22 1243     RBC, UA 0-1 /hpf      WBC, UA 0-1 /hpf      Epithelial Cells Occasional /hpf      Bacteria, UA Occasional /hpf     UA w Reflex to Microscopic w Reflex to Culture [[id number]]  (Abnormal) Collected: 12/26/22 1152    Lab Status: Final result Specimen: Urine, Clean Catch Updated: 12/26/22 1219     Color, UA Yellow     Clarity, UA Clear     Specific Gravity, UA 1 025     pH, UA 5 5     Leukocytes, UA Negative     Nitrite, UA Negative     Protein, UA Negative mg/dl      Glucose, UA Negative mg/dl      Ketones, UA 15 (1+) mg/dl      Urobilinogen, UA 0 2 E U /dl      Bilirubin, UA Negative     Occult Blood, UA Trace-Intact    HS Troponin I 4hr [[id number]]     Lab Status: No result Specimen: Blood     HS Troponin 0hr (reflex protocol) [[id number]]  (Normal) Collected: 12/26/22 1104    Lab Status: Final result Specimen: Blood from Arm, Right Updated: 12/26/22 1141     hs TnI 0hr 10 ng/L     FLU/RSV/COVID - if FLU/RSV clinically relevant [[id number]]  (Normal) Collected: 12/26/22 1006    Lab Status: Final result Specimen: Nares from Nasopharyngeal Swab Updated: 12/26/22 1103     SARS-CoV-2 Negative     INFLUENZA A PCR Negative     INFLUENZA B PCR Negative     RSV PCR Negative    Narrative:      FOR PEDIATRIC PATIENTS - copy/paste COVID Guidelines URL to browser: [email or web address]    SARS-CoV-2 assay is a Nucleic Acid Amplification assay intended for the  qualitative detection of nucleic acid from SARS-CoV-2 in nasopharyngeal  swabs  Results are for the presumptive identification of SARS-CoV-2 RNA  Positive results are indicative of infection with SARS-CoV-2, the virus  causing COVID-19, but do not rule out bacterial infection or co-infection  with other viruses  Laboratories within the [place] and its  territories are required to report all positive results to the appropriate  public health authorities  Negative results do not preclude SARS-CoV-2  infection and should not be used as the sole basis for treatment or other  patient management decisions  Negative results must be combined with  clinical observations, patient history, and epidemiological information  This test has not been FDA cleared or approved  This test has been authorized by FDA under an Emergency Use Authorization  (EUA)  This test is only authorized for the duration of time the  declaration that circumstances exist justifying the authorization of the  emergency use of an in vitro diagnostic tests for detection of SARS-CoV-2  virus and/or diagnosis of COVID-19 infection under section 564(b)(1) of  the Act, 21 U  S C  437XSL-6(P)(3), unless the authorization is terminated  or revoked sooner  The test has been validated but independent review by FDA  and CLIA is pending  Test performed using [employer] Gene[employer]pert: This RT-PCR assay targets N2,  a region unique to SARS-CoV-2   A conserved region in the E-gene was chosen  for pan-Sarbecovirus detection which includes SARS-CoV-2  According to CMS-2020-01-R, this platform meets the definition of high-throughput technology      B-Type Natriuretic Peptide(BNP) AN, CA, EA Campuses Only [[id number]]  (Abnormal) Collected: 12/26/22 1006    Lab Status: Final result Specimen: Blood from Arm, Right Updated: 12/26/22 1044      pg/mL     Procalcitonin [[id number]]  (Normal) Collected: 12/26/22 1006    Lab Status: Final result Specimen: Blood from Arm, Right Updated: 12/26/22 1042     Procalcitonin 0 06 ng/ml     Lipase [[id number]]  (Abnormal) Collected: 12/26/22 1006    Lab Status: Final result Specimen: Blood from Arm, Right Updated: 12/26/22 1039     Lipase <6 u/L     Comprehensive metabolic panel [[id number]] Collected: 12/26/22 1006    Lab Status: Final result Specimen: Blood from Arm, Right Updated: 12/26/22 1039     Sodium 138 mmol/L      Potassium 4 0 mmol/L      Chloride 102 mmol/L      CO2 25 mmol/L      ANION GAP 11 mmol/L      BUN 19 mg/dL      Creatinine 1 06 mg/dL      Glucose 114 mg/dL      Calcium 9 1 mg/dL      AST 13 U/L      ALT 10 U/L      Alkaline Phosphatase 95 U/L      Total Protein 7 0 g/dL      Albumin 3 6 g/dL      Total Bilirubin 0 82 mg/dL      eGFR 44 ml/min/1 73sq m     Narrative:      [place] guidelines for Chronic Kidney Disease (CKD):   •  Stage 1 with normal or high GFR (GFR > 90 mL/min/1 73 square meters)  •  Stage 2 Mild CKD (GFR = 60-89 mL/min/1 73 square meters)  •  Stage 3A Moderate CKD (GFR = 45-59 mL/min/1 73 square meters)  •  Stage 3B Moderate CKD (GFR = 30-44 mL/min/1 73 square meters)  •  Stage 4 Severe CKD (GFR = 15-29 mL/min/1 73 square meters)  •  Stage 5 End Stage CKD (GFR <15 mL/min/1 73 square meters)  Note: GFR calculation is accurate only with a steady state creatinine    Lactic acid [[id number]]  (Normal) Collected: 12/26/22 1006    Lab Status: Final result Specimen: Blood from Arm, Right Updated: 12/26/22 1034     LACTIC ACID 1 2 mmol/L     Narrative: Result may be elevated if tourniquet was used during collection  Blood culture #1 [[id number]] Collected: 12/26/22 1006    Lab Status: In process Specimen: Blood from Arm, Right Updated: 12/26/22 1033    Blood culture #2 [[id number]] Collected: 12/26/22 1026    Lab Status: In process Specimen: Blood from Arm, Left Updated: 12/26/22 1029    CBC and differential [[id number]]  (Abnormal) Collected: 12/26/22 1006    Lab Status: Final result Specimen: Blood from Arm, Right Updated: 12/26/22 1015     WBC 16 41 Thousand/uL      RBC 3 67 Million/uL      Hemoglobin 11 3 g/dL      Hematocrit 34 7 %      MCV 95 fL      MCH 30 8 pg      MCHC 32 6 g/dL      RDW 13 1 %      MPV 10 1 fL      Platelets 081 Thousands/uL      nRBC 0 /100 WBCs      Neutrophils Relative 86 %      Immat GRANS % 0 %      Lymphocytes Relative 5 %      Monocytes Relative 9 %      Eosinophils Relative 0 %      Basophils Relative 0 %      Neutrophils Absolute 13 98 Thousands/µL      Immature Grans Absolute 0 06 Thousand/uL      Lymphocytes Absolute 0 76 Thousands/µL      Monocytes Absolute 1 55 Thousand/µL      Eosinophils Absolute 0 01 Thousand/µL      Basophils Absolute 0 05 Thousands/µL                  CT abdomen pelvis wo contrast   Final Result by Elio Suresh MD (12/26 1301)   1  Right greater than left basilar bronchial thickening suggestive of bronchitis without pulmonary infiltrate identified  2   Cardiomegaly and small pericardial effusion similar to prior exam    3   Large sliding-type hiatal hernia  4   Right inguinal hernia containing fat and ileal loops, nondilated and nonobstructed, stable  Workstation performed: [id number]         XR chest portable   ED Interpretation by Tristin Thomas PA-C (12/26 1024)   Cm, chronic changes      Final Result by Kiera Mendez MD (12/26 1121)      1  Chronic small amount of right basilar pleural fluid or thickening  2   Large hiatal hernia        3   No evidence of acute abnormality in the chest                   Workstation performed: [id number]                    Procedures  Procedures         ED Course  ED Course as of 22 1339   Mon Dec 26, 2022   1306 TT hospitalist for admission                                               MDM  Number of Diagnoses or Management Options  Bronchitis  Fever  Weakness  Diagnosis management comments: Patient with fever, weakness, leukocytosis  No obvious source of fever identified, could be viral versus lung infection we will give 1 dose of Rocephin here to cover possible early pneumonia, blood cultures, urine cultures ordered  Spoke to hospitalist will admit       Amount and/or Complexity of Data Reviewed  Clinical lab tests: ordered and reviewed  Tests in the radiology section of CPT®: ordered and reviewed  Decide to obtain previous medical records or to obtain history from someone other than the patient: yes  Obtain history from someone other than the patient: yes  Review and summarize past medical records: yes  Discuss the patient with other providers: yes        Disposition  Final diagnoses:   Weakness   Fever - SIRS   Bronchitis - cough, bronchospasm     Time reflects when diagnosis was documented in both MDM as applicable and the Disposition within this note     Time User Action Codes Description Comment    2022 12:14 PM Valri Yessi Add [R53 1] Weakness     2022 12:14 PM Valri Yessi Add [R50 9] Fever     2022 12:14 PM Darinel Benigno [R50 9] Fever SIRS    2022  1:24 PM Valri Yessi Add [J40] Bronchitis     2022  1:24 PM Valri Yessi Modify [J40] Bronchitis cough, bronchospasm      ED Disposition     ED Disposition   Admit    Condition   Stable    Date/Time   Mon Dec 26, 2022  1:23 PM    Comment   Case was discussed with Fay Perrin and the patient's admission status was agreed to be Admission Status: inpatient status to the service of Dr Fay Perrin             Follow-up Information None         Patient's Medications   Discharge Prescriptions    No medications on file       No discharge procedures on file      PDMP Review       Value Time User    PDMP Reviewed  Yes 9/10/2021  9:14 AM Jose Acharya MD          ED Provider  Electronically Signed by           Toro Shetty PA-C  12/26/22 [place] Joannie Goodell, PA-C  12/26/22 1494

## 2022-12-26 NOTE — ASSESSMENT & PLAN NOTE
Patient presented with complaints of abdominal pain, nausea and shortness of breath over the last two day  Per patient's daughter/caregiver, patient with generalized weakness/fatigue and fevers while at home  · SIRs criteria: Tachycardia (HR >90), Tachypnea (RR >20), and Leukocytosis (WBC >16k)   · Likely secondary bacterial vs viral etiology  · CXR: Chronic small amount of right basilar pleural fluid or thickening  Large hiatal hernia  No evidence of acute abnormality in the chest    · CT A/P: Right greater than left basilar bronchial thickening suggestive of bronchitis without pulmonary infiltrate identified  Cardiomegaly and small pericardial effusion similar to prior exam  Right inguinal hernia containing fat and ileal loops, nondilated and nonobstructed, stable     · Lactic/Procal: Negative  · COVID/Flu/RSV: Negative  · BC x2: Pending  · U/A: (+): Blood/Ketones  · Fluids: S/p 1L NSS Bolus in the ED  · C/w IVF resuscitation  · Abx: S/p IV Rocephin while in the ED  · C/w IV Rocephin while admitted  · Monitor CBC/Fever Curve  · Continue supportive care  · Antiemetics, Tylenol PRN  · Follow up final culture results

## 2022-12-26 NOTE — RESPIRATORY THERAPY NOTE
RT Protocol Note  Joanna Johnson 80 y o  female MRN: [id number]  Unit/Bed#: -01 Encounter: [id number]    Assessment    Principal Problem:    Sepsis ([facility] [address] )  Active Problems:    Chronic atrial fibrillation ([facility] )    Hypertension, essential    Stage 3b chronic kidney disease ([facility] )    Mixed hyperlipidemia    Generalized weakness      Home Pulmonary Medications:  Albuterol MDI Q4prn       Past Medical History:   Diagnosis Date    Anemia     Asthma     Atrial fibrillation ([facility] ) 11/08/2019    CAD     CKD (chronic kidney disease)     Current use of long term anticoagulation 11/08/2019    Degenerative joint disease     Elevated troponin 09/08/2021    Hyperlipidemia     Hypertension, essential 11/08/2019    ALYSSA (iron deficiency anemia)     Osteopenia     Pacemaker     TIA (transient ischemic attack) 11/08/2019     Social History     Socioeconomic History    Marital status:      Spouse name: None    Number of children: None    Years of education: None    Highest education level: None   Occupational History    None   Tobacco Use    Smoking status: Never    Smokeless tobacco: Never   Vaping Use    Vaping Use: Never used   Substance and Sexual Activity    Alcohol use: Not Currently     Comment: occasionaly     Drug use: Never    Sexual activity: Not Currently     Birth control/protection: Post-menopausal   Other Topics Concern    None   Social History Narrative    None     Social Determinants of Health     Financial Resource Strain: Not on file   Food Insecurity: Not on file   Transportation Needs: No Transportation Needs    Lack of Transportation (Medical): No    Lack of Transportation (Non-Medical):  No   Physical Activity: Not on file   Stress: Not on file   Social Connections: Not on file   Intimate Partner Violence: Not on file   Housing Stability: Not on file       Subjective         Objective    Physical Exam:   Assessment Type: Pre-treatment  General Appearance: Alert, Awake  Respiratory Pattern: Normal  Chest Assessment: Chest expansion symmetrical  Bilateral Breath Sounds: Diminished, Rhonchi, Expiratory wheezes  Cough: Non-productive, Congested, Moist, Strong    Vitals:  Blood pressure 149/72, pulse 98, temperature 97 9 °F (36 6 °C), temperature source Tympanic, resp  rate 20, height 5' 2" (1 575 m), weight 47 7 kg (105 lb 2 6 oz), SpO2 99 %  Imaging and other studies: I have personally reviewed pertinent reports  Plan    Respiratory Plan: Mild Distress pathway        Resp Comments: (P) Patient assessed per Respiratory Assessment  Patient has a history of asthma  Current Lungs are diminished with rhonchi and expiratroy wheezes and she has a strong productive cough  She is on room air and saturating at 99%  Treatments ordered per protocol and Respiratory to follow

## 2022-12-26 NOTE — PLAN OF CARE
Problem: PAIN - ADULT  Goal: Verbalizes/displays adequate comfort level or baseline comfort level  Description: Interventions:  - Encourage patient to monitor pain and request assistance  - Assess pain using appropriate pain scale  - Administer analgesics based on type and severity of pain and evaluate response  - Implement non-pharmacological measures as appropriate and evaluate response  - Consider cultural and social influences on pain and pain management  - Notify physician/advanced practitioner if interventions unsuccessful or patient reports new pain  Outcome: Progressing     Problem: INFECTION - ADULT  Goal: Absence or prevention of progression during hospitalization  Description: INTERVENTIONS:  - Assess and monitor for signs and symptoms of infection  - Monitor lab/diagnostic results  - Monitor all insertion sites, i e  indwelling lines, tubes, and drains  - Monitor endotracheal if appropriate and nasal secretions for changes in amount and color  - [place] appropriate cooling/warming therapies per order  - Administer medications as ordered  - Instruct and encourage patient and family to use good hand hygiene technique  - Identify and instruct in appropriate isolation precautions for identified infection/condition  Outcome: Progressing  Goal: Absence of fever/infection during neutropenic period  Description: INTERVENTIONS:  - Monitor WBC    Outcome: Progressing

## 2022-12-27 ENCOUNTER — APPOINTMENT (INPATIENT)
Dept: RADIOLOGY | Facility: HOSPITAL | Age: 87
End: 2022-12-27

## 2022-12-27 PROBLEM — E44.0 MODERATE PROTEIN-CALORIE MALNUTRITION (HCC): Status: ACTIVE | Noted: 2022-12-27

## 2022-12-27 LAB
ALBUMIN SERPL BCP-MCNC: 3.2 G/DL (ref 3.5–5)
ALP SERPL-CCNC: 88 U/L (ref 34–104)
ALT SERPL W P-5'-P-CCNC: 10 U/L (ref 7–52)
ANION GAP SERPL CALCULATED.3IONS-SCNC: 7 MMOL/L (ref 4–13)
AST SERPL W P-5'-P-CCNC: 13 U/L (ref 13–39)
BILIRUB SERPL-MCNC: 0.84 MG/DL (ref 0.2–1)
BUN SERPL-MCNC: 15 MG/DL (ref 5–25)
CALCIUM ALBUM COR SERPL-MCNC: 9.4 MG/DL (ref 8.3–10.1)
CALCIUM SERPL-MCNC: 8.8 MG/DL (ref 8.4–10.2)
CHLORIDE SERPL-SCNC: 100 MMOL/L (ref 96–108)
CO2 SERPL-SCNC: 26 MMOL/L (ref 21–32)
CREAT SERPL-MCNC: 0.91 MG/DL (ref 0.6–1.3)
ERYTHROCYTE [DISTWIDTH] IN BLOOD BY AUTOMATED COUNT: 13.2 % (ref 11.6–15.1)
GFR SERPL CREATININE-BSD FRML MDRD: 53 ML/MIN/1.73SQ M
GLUCOSE SERPL-MCNC: 82 MG/DL (ref 65–140)
HCT VFR BLD AUTO: 32.6 % (ref 34.8–46.1)
HGB BLD-MCNC: 10.1 G/DL (ref 11.5–15.4)
HISTIOCYTES NFR SNV MANUAL: 4 %
LYMPHOCYTES # SNV MANUAL: 1 %
MCH RBC QN AUTO: 30.4 PG (ref 26.8–34.3)
MCHC RBC AUTO-ENTMCNC: 31 G/DL (ref 31.4–37.4)
MCV RBC AUTO: 98 FL (ref 82–98)
NEUTROPHILS NFR SNV MANUAL: 95 %
PLATELET # BLD AUTO: 298 THOUSANDS/UL (ref 149–390)
PMV BLD AUTO: 10.4 FL (ref 8.9–12.7)
POTASSIUM SERPL-SCNC: 4.1 MMOL/L (ref 3.5–5.3)
PROT SERPL-MCNC: 6.2 G/DL (ref 6.4–8.4)
RBC # BLD AUTO: 3.32 MILLION/UL (ref 3.81–5.12)
SODIUM SERPL-SCNC: 133 MMOL/L (ref 135–147)
TOTAL CELLS COUNTED SPEC: 100
WBC # BLD AUTO: 14.68 THOUSAND/UL (ref 4.31–10.16)
WBC # FLD MANUAL: 9582 /UL (ref 0–200)

## 2022-12-27 RX ORDER — TRAMADOL HYDROCHLORIDE 50 MG/1
50 TABLET ORAL ONCE
Status: COMPLETED | OUTPATIENT
Start: 2022-12-27 | End: 2022-12-27

## 2022-12-27 RX ORDER — CEFTRIAXONE 1 G/50ML
1000 INJECTION, SOLUTION INTRAVENOUS ONCE
Status: COMPLETED | OUTPATIENT
Start: 2022-12-27 | End: 2022-12-27

## 2022-12-27 RX ADMIN — GABAPENTIN 200 MG: 100 CAPSULE ORAL at 09:15

## 2022-12-27 RX ADMIN — LEVALBUTEROL HYDROCHLORIDE 1.25 MG: 1.25 SOLUTION, CONCENTRATE RESPIRATORY (INHALATION) at 19:51

## 2022-12-27 RX ADMIN — TRAMADOL HYDROCHLORIDE 50 MG: 50 TABLET, COATED ORAL at 14:56

## 2022-12-27 RX ADMIN — APIXABAN 2.5 MG: 2.5 TABLET, FILM COATED ORAL at 09:15

## 2022-12-27 RX ADMIN — DILTIAZEM HYDROCHLORIDE 120 MG: 120 CAPSULE, COATED, EXTENDED RELEASE ORAL at 09:15

## 2022-12-27 RX ADMIN — METOPROLOL SUCCINATE 100 MG: 100 TABLET, EXTENDED RELEASE ORAL at 09:15

## 2022-12-27 RX ADMIN — GABAPENTIN 200 MG: 100 CAPSULE ORAL at 18:03

## 2022-12-27 RX ADMIN — LISINOPRIL 5 MG: 5 TABLET ORAL at 09:15

## 2022-12-27 RX ADMIN — ACETAMINOPHEN 650 MG: 325 TABLET, FILM COATED ORAL at 09:15

## 2022-12-27 RX ADMIN — IPRATROPIUM BROMIDE 0.5 MG: 0.5 SOLUTION RESPIRATORY (INHALATION) at 15:07

## 2022-12-27 RX ADMIN — TRAMADOL HYDROCHLORIDE 50 MG: 50 TABLET, COATED ORAL at 04:41

## 2022-12-27 RX ADMIN — IPRATROPIUM BROMIDE 0.5 MG: 0.5 SOLUTION RESPIRATORY (INHALATION) at 19:51

## 2022-12-27 RX ADMIN — IPRATROPIUM BROMIDE 0.5 MG: 0.5 SOLUTION RESPIRATORY (INHALATION) at 08:39

## 2022-12-27 RX ADMIN — POLYSACCHARIDE-IRON COMPLEX 150 MG: 150 CAPSULE ORAL at 09:15

## 2022-12-27 RX ADMIN — ACETAMINOPHEN 650 MG: 325 TABLET, FILM COATED ORAL at 23:54

## 2022-12-27 RX ADMIN — SODIUM CHLORIDE, SODIUM GLUCONATE, SODIUM ACETATE, POTASSIUM CHLORIDE, MAGNESIUM CHLORIDE, SODIUM PHOSPHATE, DIBASIC, AND POTASSIUM PHOSPHATE 75 ML/HR: .53; .5; .37; .037; .03; .012; .00082 INJECTION, SOLUTION INTRAVENOUS at 04:41

## 2022-12-27 RX ADMIN — TRAMADOL HYDROCHLORIDE 50 MG: 50 TABLET, COATED ORAL at 19:13

## 2022-12-27 RX ADMIN — CEFTRIAXONE 1000 MG: 1 INJECTION, SOLUTION INTRAVENOUS at 14:56

## 2022-12-27 RX ADMIN — LEVALBUTEROL HYDROCHLORIDE 1.25 MG: 1.25 SOLUTION, CONCENTRATE RESPIRATORY (INHALATION) at 15:07

## 2022-12-27 RX ADMIN — ACETAMINOPHEN 650 MG: 325 TABLET, FILM COATED ORAL at 16:06

## 2022-12-27 RX ADMIN — LEVALBUTEROL HYDROCHLORIDE 1.25 MG: 1.25 SOLUTION, CONCENTRATE RESPIRATORY (INHALATION) at 08:39

## 2022-12-27 RX ADMIN — APIXABAN 2.5 MG: 2.5 TABLET, FILM COATED ORAL at 18:03

## 2022-12-27 NOTE — CONSULTS
Orthopedics   Toyin Aguilar 80 y o  female MRN: [id number]  Unit/Bed#: EA XRAY      Chief Complaint:   Left knee pain    HPI:  80 y o  female walker ambulator being seen in consult for left knee pain  She reports a history of left knee pain for several years  She sees Dr Jose Napier for her knees  I did discuss this also with the patient's daughter who says she had an injection from Dr Jessica Jin out 4 to 5 months ago in both knees  She continues with left knee pain and swelling worsening over the last few days  She was admitted to the hospital for abdominal pain nausea and found to have bronchitis and sepsis work-up positive  She denies any fall or trauma to the knee  She reports she has pain at rest but it is worse if she tries to move or stand on the knee  She did have a low grade fever in the ED,  tmax 100 5  Review Of Systems:   · Skin: Normal no erythema to the knee  · Neuro: See HPI  · Musculoskeletal: See HPI  · 14 point review of systems negative except as stated above     Past Medical History:   Past Medical History:   Diagnosis Date   • Anemia    • Asthma    • Atrial fibrillation ([address] ) 11/08/2019   • CAD    • CKD (chronic kidney disease)    • Current use of long term anticoagulation 11/08/2019   • Degenerative joint disease    • Elevated troponin 09/08/2021   • Hyperlipidemia    • Hypertension, essential 11/08/2019   • ALYSSA (iron deficiency anemia)    • Osteopenia    • Pacemaker    • TIA (transient ischemic attack) 11/08/2019       Past Surgical History:   Past Surgical History:   Procedure Laterality Date   • CARDIAC PACEMAKER PLACEMENT         Family History:  Family history reviewed and non-contributory  Family History   Problem Relation Age of Onset   • Other Mother         Respiratory problem       Social History:  Social History     Socioeconomic History   • Marital status:       Spouse name: None   • Number of children: None   • Years of education: None   • Highest education level: None   Occupational History   • None   Tobacco Use   • Smoking status: Never   • Smokeless tobacco: Never   Vaping Use   • Vaping Use: Never used   Substance and Sexual Activity   • Alcohol use: Not Currently     Comment: occasionaly    • Drug use: Never   • Sexual activity: Not Currently     Birth control/protection: Post-menopausal   Other Topics Concern   • None   Social History Narrative   • None     Social Determinants of Health     Financial Resource Strain: Not on file   Food Insecurity: Not on file   Transportation Needs: No Transportation Needs   • Lack of Transportation (Medical): No   • Lack of Transportation (Non-Medical): No   Physical Activity: Not on file   Stress: Not on file   Social Connections: Not on file   Intimate Partner Violence: Not on file   Housing Stability: Not on file       Allergies:    Allergies   Allergen Reactions   • Amoxicillin-Pot Clavulanate GI Intolerance   • Silver Sulfadiazine Other (See Comments)           Labs:  0   Lab Value Date/Time    HCT 32 6 (L) 12/27/2022 0500    HCT 34 7 (L) 12/26/2022 1006    HCT 37 7 01/13/2022 0541    HGB 10 1 (L) 12/27/2022 0500    HGB 11 3 (L) 12/26/2022 1006    HGB 12 3 01/13/2022 0541    INR 0 98 09/08/2021 0457    WBC 14 68 (H) 12/27/2022 0500    WBC 16 41 (H) 12/26/2022 1006    WBC 7 87 01/13/2022 0541       Meds:    Current Facility-Administered Medications:   •  acetaminophen (TYLENOL) tablet 650 mg, 650 mg, Oral, Q6H PRN, Doug Roman MD, 650 mg at 12/27/22 0915  •  apixaban (ELIQUIS) tablet 2 5 mg, 2 5 mg, Oral, BID, Doug Roman MD, 2 5 mg at 12/27/22 0915  •  diltiazem (CARDIZEM CD) 24 hr capsule 120 mg, 120 mg, Oral, Daily, Doug Roman MD, 120 mg at 12/27/22 0915  •  gabapentin (NEURONTIN) capsule 200 mg, 200 mg, Oral, BID, Doug Roman MD, 200 mg at 12/27/22 0915  •  ipratropium (ATROVENT) 0 02 % inhalation solution 0 5 mg, 0 5 mg, Nebulization, TID, Ignacia Toribioa Darek Ureña MD, 0 5 mg at 12/27/22 5178  •  iron polysaccharides (FERREX) capsule 150 mg, 150 mg, Oral, Daily, Yahaira Arteaga MD, 150 mg at 12/27/22 0915  •  levalbuterol [facility]) inhalation solution 1 25 mg, 1 25 mg, Nebulization, TID, Yahaira Arteaga MD, 1 25 mg at 12/27/22 0839  •  lisinopril (ZESTRIL) tablet 5 mg, 5 mg, Oral, Daily, Yahaira Arteaga MD, 5 mg at 12/27/22 0915  •  metoprolol succinate (TOPROL-XL) 24 hr tablet 100 mg, 100 mg, Oral, Daily, Yahaira Arteaga MD, 100 mg at 12/27/22 0915  •  multi-electrolyte (PLASMALYTE-A/ISOLYTE-S PH 7 4) IV solution, 75 mL/hr, Intravenous, Continuous, Yahaira Arteaga MD, Last Rate: 75 mL/hr at 12/27/22 0441, 75 mL/hr at 12/27/22 0441  •  ondansetron (ZOFRAN) injection 4 mg, 4 mg, Intravenous, Q6H PRN, Yahaira Arteaga MD  •  traMADol (ULTRAM) tablet 50 mg, 50 mg, Oral, Q8H PRN, Yahaira Arteaga MD, 50 mg at 12/27/22 0441    Blood Culture:   Lab Results   Component Value Date    BLOODCX Received in Microbiology Lab  Culture in Progress  12/26/2022       Wound Culture:   No results found for: WOUNDCULT    Ins and Outs:  I/O last 24 hours: In: 780 [P O :280; IV Piggyback:500]  Out: 150 [Urine:150]          Physical Exam:   /65 (BP Location: Right arm)   Pulse 64   Temp 99 2 °F (37 3 °C) (Oral)   Resp 20   Ht 5' 2" (1 575 m)   Wt 47 4 kg (104 lb 6 4 oz)   SpO2 100%   BMI 19 10 kg/m²   Gen: No acute distress, resting comfortably in bed  When questioned regarding her knee pain she becomes more upset  HEENT: Eyes clear, moist mucus membranes, hearing intact  Respiratory: No audible wheezing or stridor  Cardiovascular: Well Perfused peripherally, 2+ distal pulse  Abdomen: nondistended, no peritoneal signs    Musculoskeletal: left lower extremity  · Skin no erythema or warmth to the knee, no open wound  · TTP about the knee with effusion  · ROM 20-90, painful throughout  · Unable to assess laxity due to guarding/pain  · SILT s/s/sp/dp/t  · Motor intact 5/5 strength with hip flexion/extension, knee flexion/extension, ankle dorsi/plantar flexion, EHL/FHL  · 2+ DP/PT pulse  · Musculature is soft and compressible, no pain with passive stretch    Tertiary: no tenderness over all other joints/long bones as except already stated  Radiology:   I personally reviewed the films  XR of the left knee reviewed demonstrates severe degenerative changes tricompartmental with loss of joint space and osteophyte formation  This has progressed compared to images in 2019  After consent was obtained, using sterile technique the left knee was prepped and 3 ml's of 1% plain Lidocaine used to anesthetize the needle tract into the joint from superior lateral approach  The knee joint was entered and 12 ml's of blood tinged joint fluid was withdrawn and sent for crystals, cell count and culture  The procedure was well tolerated  The patient is asked to continue to rest the knee for a few more days before resuming regular activities  It may be more painful for the first 1-2 days  Watch for fever, or increased swelling or persistent pain in knee  Call or return to clinic prn if such symptoms occur or the knee fails to improve as anticipated  Assessment:  80 y  o female with left knee severe DJD and effusion  This was aspirated  Will hold off on steroid injection as she may have had one recently with Dr Dalia Le as well as concern for systemic infection/sepsis  Aspirate sent for analysis  This was discussed with the patient and her daughter  Plan:   · WBAT LLE  · PT/OT  · Ice/analgesia  · Will await aspirate results  · DVT ppx per primary team  · Body mass index is 19 1 kg/m²  · Dispo: will follow cultures/aspirate       Elio Meade PA-C

## 2022-12-27 NOTE — PLAN OF CARE
Problem: Potential for Falls  Goal: Patient will remain free of falls  Description: INTERVENTIONS:  - Educate patient/family on patient safety including physical limitations  - Instruct patient to call for assistance with activity   - Consult OT/PT to assist with strengthening/mobility   - Keep Call bell within reach  - Keep bed low and locked with side rails adjusted as appropriate  - Keep care items and personal belongings within reach  - Initiate and maintain comfort rounds  - Make Fall Risk Sign visible to staff  - Offer Toileting every 3 Hours, in advance of need  - Initiate/Maintain bed alarm  - Obtain necessary fall risk management equipment:   - Apply yellow socks and bracelet for high fall risk patients  - Consider moving patient to room near nurses station  Outcome: Progressing     Problem: Nutrition/Hydration-ADULT  Goal: Nutrient/Hydration intake appropriate for improving, restoring or maintaining nutritional needs  Description: Monitor and assess patient's nutrition/hydration status for malnutrition  Collaborate with interdisciplinary team and initiate plan and interventions as ordered  Monitor patient's weight and dietary intake as ordered or per policy  Utilize nutrition screening tool and intervene as necessary  Determine patient's food preferences and provide high-protein, high-caloric foods as appropriate       INTERVENTIONS:  - Monitor oral intake, urinary output, labs, and treatment plans  - Assess nutrition and hydration status and recommend course of action  - Evaluate amount of meals eaten  - Assist patient with eating if necessary   - Allow adequate time for meals  - Recommend/ encourage appropriate diets, oral nutritional supplements, and vitamin/mineral supplements  - Order, calculate, and assess calorie counts as needed  - Recommend, monitor, and adjust tube feedings and TPN/PPN based on assessed needs  - Assess need for intravenous fluids  - Provide specific nutrition/hydration education as appropriate  - Include patient/family/caregiver in decisions related to nutrition  Outcome: Progressing     Problem: MOBILITY - ADULT  Goal: Maintain or return to baseline ADL function  Description: INTERVENTIONS:  -  Assess patient's ability to carry out ADLs; assess patient's baseline for ADL function and identify physical deficits which impact ability to perform ADLs (bathing, care of mouth/teeth, toileting, grooming, dressing, etc )  - Assess/evaluate cause of self-care deficits   - Assess range of motion  - Assess patient's mobility; develop plan if impaired  - Assess patient's need for assistive devices and provide as appropriate  - Encourage maximum independence but intervene and supervise when necessary  - Involve family in performance of ADLs  - Assess for home care needs following discharge   - Consider OT consult to assist with ADL evaluation and planning for discharge  - Provide patient education as appropriate  Outcome: Progressing     Problem: PAIN - ADULT  Goal: Verbalizes/displays adequate comfort level or baseline comfort level  Description: Interventions:  - Encourage patient to monitor pain and request assistance  - Assess pain using appropriate pain scale  - Administer analgesics based on type and severity of pain and evaluate response  - Implement non-pharmacological measures as appropriate and evaluate response  - Consider cultural and social influences on pain and pain management  - Notify physician/advanced practitioner if interventions unsuccessful or patient reports new pain  Outcome: Progressing     Problem: INFECTION - ADULT  Goal: Absence or prevention of progression during hospitalization  Description: INTERVENTIONS:  - Assess and monitor for signs and symptoms of infection  - Monitor lab/diagnostic results  - Monitor all insertion sites, i e  indwelling lines, tubes, and drains  - Monitor endotracheal if appropriate and nasal secretions for changes in amount and color  - [place] appropriate cooling/warming therapies per order  - Administer medications as ordered  - Instruct and encourage patient and family to use good hand hygiene technique  - Identify and instruct in appropriate isolation precautions for identified infection/condition  Outcome: Progressing

## 2022-12-27 NOTE — PLAN OF CARE
Problem: Potential for Falls  Goal: Patient will remain free of falls  Description: INTERVENTIONS:  - Educate patient/family on patient safety including physical limitations  - Instruct patient to call for assistance with activity   - Consult OT/PT to assist with strengthening/mobility   - Keep Call bell within reach  - Keep bed low and locked with side rails adjusted as appropriate  - Keep care items and personal belongings within reach  - Initiate and maintain comfort rounds  - Make Fall Risk Sign visible to staff  - Offer Toileting every 2 Hours, in advance of need  - Initiate/Maintain  bed alarm  - Obtain necessary fall risk management equipment: n/a  - Apply yellow socks and bracelet for high fall risk patients  - Consider moving patient to room near nurses station  Outcome: Not Progressing     Problem: Nutrition/Hydration-ADULT  Goal: Nutrient/Hydration intake appropriate for improving, restoring or maintaining nutritional needs  Description: Monitor and assess patient's nutrition/hydration status for malnutrition  Collaborate with interdisciplinary team and initiate plan and interventions as ordered  Monitor patient's weight and dietary intake as ordered or per policy  Utilize nutrition screening tool and intervene as necessary  Determine patient's food preferences and provide high-protein, high-caloric foods as appropriate       INTERVENTIONS:  - Monitor oral intake, urinary output, labs, and treatment plans  - Assess nutrition and hydration status and recommend course of action  - Evaluate amount of meals eaten  - Assist patient with eating if necessary   - Allow adequate time for meals  - Recommend/ encourage appropriate diets, oral nutritional supplements, and vitamin/mineral supplements  - Order, calculate, and assess calorie counts as needed  - Recommend, monitor, and adjust tube feedings and TPN/PPN based on assessed needs  - Assess need for intravenous fluids  - Provide specific nutrition/hydration education as appropriate  - Include patient/family/caregiver in decisions related to nutrition  Outcome: Not Progressing     Problem: MOBILITY - ADULT  Goal: Maintain or return to baseline ADL function  Description: INTERVENTIONS:  -  Assess patient's ability to carry out ADLs; assess patient's baseline for ADL function and identify physical deficits which impact ability to perform ADLs (bathing, care of mouth/teeth, toileting, grooming, dressing, etc )  - Assess/evaluate cause of self-care deficits   - Assess range of motion  - Assess patient's mobility; develop plan if impaired  - Assess patient's need for assistive devices and provide as appropriate  - Encourage maximum independence but intervene and supervise when necessary  - Involve family in performance of ADLs  - Assess for home care needs following discharge   - Consider OT consult to assist with ADL evaluation and planning for discharge  - Provide patient education as appropriate  Outcome: Not Progressing  Goal: Maintains/Returns to pre admission functional level  Description: INTERVENTIONS:  - Perform BMAT or MOVE assessment daily    - Set and communicate daily mobility goal to care team and patient/family/caregiver  - Collaborate with rehabilitation services on mobility goals if consulted  - Perform Range of Motion prn times a day  - Reposition patient every 2 hours    - Dangle patient prn times a day  - Stand patient prn times a day  - Ambulate patient prn times a day  - Out of bed to chair prn times a day   - Out of bed for meals prn times a day  - Out of bed for toileting  - Record patient progress and toleration of activity level   Outcome: Not Progressing     Problem: PAIN - ADULT  Goal: Verbalizes/displays adequate comfort level or baseline comfort level  Description: Interventions:  - Encourage patient to monitor pain and request assistance  - Assess pain using appropriate pain scale  - Administer analgesics based on type and severity of pain and evaluate response  - Implement non-pharmacological measures as appropriate and evaluate response  - Consider cultural and social influences on pain and pain management  - Notify physician/advanced practitioner if interventions unsuccessful or patient reports new pain  Outcome: Not Progressing     Problem: INFECTION - ADULT  Goal: Absence or prevention of progression during hospitalization  Description: INTERVENTIONS:  - Assess and monitor for signs and symptoms of infection  - Monitor lab/diagnostic results  - Monitor all insertion sites, i e  indwelling lines, tubes, and drains  - Monitor endotracheal if appropriate and nasal secretions for changes in amount and color  - [place] appropriate cooling/warming therapies per order  - Administer medications as ordered  - Instruct and encourage patient and family to use good hand hygiene technique  - Identify and instruct in appropriate isolation precautions for identified infection/condition  Outcome: Not Progressing  Goal: Absence of fever/infection during neutropenic period  Description: INTERVENTIONS:  - Monitor WBC    Outcome: Not Progressing

## 2022-12-27 NOTE — ASSESSMENT & PLAN NOTE
Lab Results   Component Value Date    EGFR 53 12/27/2022    EGFR 44 12/26/2022    EGFR 41 01/13/2022    CREATININE 0 91 12/27/2022    CREATININE 1 06 12/26/2022    CREATININE 1 13 01/13/2022     · Baseline Crt appears to be around 1 0-1 2  · Stable on admission  · Continue to monitor while admitted

## 2022-12-27 NOTE — CASE MANAGEMENT
Case Management Assessment & Discharge Planning Note    Patient name Mayela Lawson  Location /-92 MRN [id number]  : 1926 Date 2022       Current Admission Date: 2022  Current Admission Diagnosis:Sepsis [facility])   Patient Active Problem List    Diagnosis Date Noted   • Generalized weakness 2022   • Mild intermittent asthma without complication    • Pseudogout 2022   • Mixed hyperlipidemia 2022   • Chronic neck pain 2022   • Vitamin D deficiency 2022   • Nonrheumatic aortic valve stenosis 2021   • Stage 3b chronic kidney disease ([facility] [address] ) 2021   • Sepsis ([facility] ) 2021   • Chronic atrial fibrillation ([facility] ) 2019   • Current use of long term anticoagulation 2019   • Hypertension, essential 2019   • TIA (transient ischemic attack) 2019   • Pacemaker 2013      LOS (days): 1  Geometric Mean LOS (GMLOS) (days): 3 50  Days to GMLOS:2 4     OBJECTIVE:    Risk of Unplanned Readmission Score: 11 93         Current admission status: Inpatient       Preferred Pharmacy:   [address], [address] [place] [address]  Phone: [phone] Fax: [place] [address]  Phone: [phone] Fax: [phone]    Primary Care Provider: Clarence Cano MD    Primary Insurance: MEDICARE  Secondary Insurance: AARP    ASSESSMENT:  Mckenzie Roth Proxies    There are no active Health Care Proxies on file                        Patient Information  Admitted from[de-identified] Home  Mental Status: Confused  During Assessment patient was accompanied by: Not accompanied during assessment  Assessment information provided by[de-identified] Daughter  Primary Caregiver: Family  Caregiver's Name[de-identified] Luis Danielalykumar Finder  Caregiver's Relationship to Patient[de-identified] Family Member  Caregiver's Telephone Number[de-identified] [phone] (M)  Support Systems: Daughter, Son  [place] of Residence: [address] [facility] [address] do you live in?: [employer] entry access options   Select all that apply : Stairs  Number of steps to enter home : 2  Do the steps have railings?: Yes  Type of Current Residence: [place]  In the last 12 months, was there a time when you were not able to pay the mortgage or rent on time?: No  In the last 12 months, was there a time when you did not have a steady place to sleep or slept in a shelter (including now)?: No  Homeless/housing insecurity resource given?: No  Living Arrangements: Lives w/ Son    Activities of Daily Living Prior to Admission  Functional Status: Independent  Completes ADLs independently?: No  Level of ADL dependence: Assistance (dtr assists pt with ADLs as she lives right next door to pt)  Ambulates independently?: Yes  Does patient use assisted devices?: Yes  Assisted Devices (DME) used: Zollie Bevel, Wheelchair, Rollator, Straight Cane, Shower Chair  Does patient currently own DME?: Yes  What DME does the patient currently own?: Shower Chair, Rollator, Wheelchair, Straight Melodye Redbird  Does patient have a history of Outpatient Therapy (PT/OT)?: No  Does the patient have a history of Short-Term Rehab?: No  Does patient have a history of HHC?: Yes  Does patient currently have [address]?: No         Patient Information Continued  Within the past 12 months, you worried that your food would run out before you got the money to buy more : Never true  Within the past 12 months, the food you bought just didn't last and you didn't have money to get more : Never true         Means of [address] of Transport to Appts[de-identified] Family transport  In the past 12 months, has lack of transportation kept you from medical appointments or from getting medications?: No  In the past 12 months, has lack of transportation kept you from meetings, work, or from getting things needed for daily living?: No        DISCHARGE DETAILS:    Discharge planning discussed with[de-identified] patient's dtr, Tawana [place] of Choice: Yes  Comments - Freedom of Choice: pt's dtr relayed choice for [facility] as pt utilized agency previously - [facility] confirmed via aidin  CM contacted family/caregiver?: Yes  Were Treatment Team discharge recommendations reviewed with patient/caregiver?: Yes  Did patient/caregiver verbalize understanding of patient care needs?: Yes  Were patient/caregiver advised of the risks associated with not following Treatment Team discharge recommendations?: Yes    Contacts  Patient Contacts: Danny Arciniega (Daughter)  Relationship to Patient[de-identified] Family  Contact Method: Phone  Phone Number: [phone] Radha Ardon  Reason/Outcome: Continuity of Care, Referral, Discharge Planning, Emergency [address]         Is the patient interested in Boris Bazan at discharge?: Yes  [address] requested[de-identified] Nursing, Occupational Therapy, Physical [address] Name[de-identified] Other (Maria Luisasltpr ) [address] Provider[de-identified] PCP  Home Health Services Needed[de-identified] Evaluate Functional Status and Safety, Gait/ADL Training, Strengthening/Theraputic Exercises to Improve Function  Homebound Criteria Met[de-identified] Requires the Assistance of Another Person for Safe Ambulation or to Leave the Home, Uses an Assist Device (i e  cane, walker, etc)  Supporting Clincal Findings[de-identified] Fatigues Easliy in [employer], Limited Endurance    DME Referral Provided  Referral made for DME?: No    Other Referral/Resources/Interventions Provided:  Interventions: HHC  Referral Comments: PT [facility]cmd HHC  Per pt's dtr choice is for [facility] if available, otherwise open to other agencies if needed  Referral sent via aidin - [facility] confirmed for [facility] upon d/c  AVS updated  CM remains available for any further CM d/c needs           [facility]: [facility]  Discharge Destination Plan[de-identified] Home with Reyes fermin Discharge : Family

## 2022-12-27 NOTE — PROGRESS NOTES
-- Patient: Wilton Ramos  -- MRN: [de-identified]  -- Aidin Request ID: [id number]  -- Level of care reserved: [facility]  -- Partner Reserved: [address] [facility] , [address] [place] [address] [phone]  -- Clinical needs requested:  -- Geography searched: [phone]  -- Start of Service:  -- Request sent: 1:53pm EST on 12/27/2022 by Shant Gordillo  -- Partner reserved: 4:24pm EST on 12/27/2022 by Shant Gordillo  -- Choice list shared: 2:50pm EST on 12/27/2022 by Shant Gordillo

## 2022-12-27 NOTE — ASSESSMENT & PLAN NOTE
· Rate controlled on home meds  · Cardizem and Toprol-XL  · A/C with Eliquis  · Continue Outpatient follow up with Primary Cardiologist

## 2022-12-27 NOTE — PHYSICAL THERAPY NOTE
PHYSICAL THERAPY EVALUATION  DATE: 12/27/22  TIME: 2126-9323    NAME:  Belen Navarro  AGE:   80 y o  Mrn:   [id number]  Length Of Stay: 1    ADMIT DX:  Nausea [R11 0]  Weakness [R53 1]  Bronchitis [J40]  Fever [R50 9]    Past Medical History:   Diagnosis Date    Anemia     Asthma     Atrial fibrillation ([address] ) 11/08/2019    CAD     CKD (chronic kidney disease)     Current use of long term anticoagulation 11/08/2019    Degenerative joint disease     Elevated troponin 09/08/2021    Hyperlipidemia     Hypertension, essential 11/08/2019    ALYSSA (iron deficiency anemia)     Osteopenia     Pacemaker     TIA (transient ischemic attack) 11/08/2019     Past Surgical History:   Procedure Laterality Date    CARDIAC PACEMAKER PLACEMENT         Performed at least 2 patient identifiers during session: Name, Deepa King, and ID bracelet     12/27/22 0836   PT Last Visit   PT Visit Date 12/27/22   Note Type   Note type Evaluation   Pain Assessment   Pain Assessment Tool 0-10   Pain Score 10 - Worst Possible Pain   Pain Location/Orientation Orientation: Bilateral;Location: Knee  (left greater than right)   Pain Onset/Description Frequency: Constant/Continuous; Descriptor: Discomfort; Descriptor: [place]   Effect of Pain on Daily Activities limits comfort and positioning, limits WBing tolerance and mobility tolerance   Patient's Stated Pain Goal No pain   Hospital Pain Intervention(s) Repositioned; Ambulation/increased activity; Emotional support;Elevated   Multiple Pain Sites No   Restrictions/Precautions   Weight Bearing Precautions Per Order No   Other Precautions Chair Alarm; Bed Alarm;Multiple lines; Fall Risk;Pain;Hard of hearing   Home Living   Type of [address] [place] [address] One level;Performs ADLs on one level; Able to live on main level with bedroom/bathroom;Stairs to enter with rails  (2+1 MARILYNN w/ HR, single level living)   Bathroom Shower/Tub Walk-in shower   Bathroom Toilet Standard   Bathroom Equipment Grab bars in shower Bathroom Accessibility Accessible   Home Equipment Walker;Cane   Additional Comments Questionable transport WC vs rollator walker, pt with difficulty explaining difference  Prior Function   Level of [place] Independent with ADLs; Independent with functional mobility; Needs assistance with IADLS   Lives With Son   Receives Help From Family  (daughter lives next door and is pt's primary caregiver, comes over daily )   IADLs Family/Friend/Other provides transportation; Family/Friend/Other provides meals; Family/Friend/Other provides medication management   Falls in the last 6 months 0  (pt denies)   Vocational Retired   Comments Pt reports living at home with her son, Aishwarya Hood after 2+1 MARILYNN  Pt ambulates with RW vs no AD (admits to holding onto furniture), always uses RW for community mobility however reports that she doesn't go out of the home much anymore  Pt's son works during the daytime, but her daughter lives next door and comes over daily to assist pt  Pt reports her daughter is "my caregiver and my hearing aides "   General   Additional Pertinent History Pt admitted 12/26/2022 with nausea, weakness, fever, SOB  Dx: Sepsis  Pt also with high level B knee pain, reports she was supposed to get knee injections today, ortho consult pending     Family/Caregiver Present No   Cognition   Overall Cognitive Status WFL   Arousal/Participation Alert   Orientation Level Oriented X4   Memory Within functional limits   Following Commands Follows multistep commands with increased time or repetition  (due to hearing impairment)   Subjective   Subjective "I'm not a sissy, but this hurts like a B'itch!"   RUE Assessment   RUE Assessment WFL   RUE Strength   RUE Overall Strength Within Functional Limits - able to perform ADL tasks with strength   LUE Assessment   LUE Assessment WFL   LUE Strength   LUE Overall Strength Within Functional Limits - able to perform ADL tasks with strength   RLE Assessment   RLE Assessment X  (WFL, however severely limited at knee due to knee pain)   LLE Assessment   LLE Assessment X  (WFL, however severely limited at knee due to knee pain)   Coordination   Movements are Fluid and Coordinated 1   Sensation [facility]   Light Touch   RLE Light Touch Grossly intact   LLE Light Touch Grossly intact   Proprioception   RLE Proprioception Grossly intact   LLE Proprioception Grossly Intact   Bed Mobility   Supine to Sit 3  Moderate assistance   Additional items Assist x 1;HOB elevated; Bedrails; Increased time required;Verbal cues   Sit to Supine Unable to assess   Additional Comments Pt with good sitting balance at EOB, limited tolerance due to B knee pain when knees flexed  Transfers   Sit to Stand 4  Minimal assistance   Additional items Assist x 1; Increased time required;Verbal cues   Stand to Sit 4  Minimal assistance   Additional items Assist x 1; Increased time required;Verbal cues   Stand pivot 3  Moderate assistance   Additional items Assist x 1; Increased time required;Verbal cues;Armrests  (from EOB to recliner chair)   Additional Comments No AD used for transfers, next session recommend use of RW for additional transfers and ambulation  Pt tolerates standing on scale for ~1 minute, supported with UEs on scale and close S  Pt reports knee pain is improved with knee extension, despite WBing  Ambulation/Elevation   Gait pattern Decreased foot clearance; Forward Flexion; Short stride  (limited tolerance of gait due to B knee pain, will further assess when pain improves )   Gait Assistance 3  Moderate assist   Additional items Assist x 1;Verbal cues; Tactile cues   Assistive Device None   Distance 3ft during EOB to recliner chair ambulatory transition  (pt unable to tolerate additional ambulation distance or trials on this date due to excessive B knee pain)   Stair Management Assistance Not tested  (pt has 2+1 MARILYNN her home)   Balance   Static Sitting Good   Dynamic Sitting Fair   Static Standing Fair -   Dynamic Standing Fair -   Ambulatory Fair -   Endurance Deficit   Endurance Deficit Yes   Activity Tolerance   Activity Tolerance Patient limited by pain   Medical Staff Made Aware Spoke with GENEVIEVE OJEDA   Nurse Made Aware Spoke with RN Dwayne Gutierrez and PCA Sonia/Radha   Assessment   Prognosis Good   Problem List Decreased strength;Decreased range of motion;Decreased endurance; Impaired balance;Decreased mobility;Pain   Assessment Pt seen for PT evaluation for mobility assessment & discharge needs  Activity orders: ambulate patient  Pt admitted 12/26/2022 w/ weakness, fever, SOB, nausea, dx Sepsis ([facility] [address] )  Comorbidities affecting pt's fnxl performance include: CKD, HTN, TIA, Pacemaker, pseudogout, chronic neck pain, Afib  During PT IE, pt requires JONAH for bed mobility and STS transfers, MODA for ambulatory transition between surfaces with no AD  Pt primarily limited due to B knee pain  Expect pt to progress with mobility pending pain relief  The AM-PAC & Barthel Index outcome tools were used to assist in determining pt safety w/ mobility/self care & appropriate d/c recommendations, see above for scores  Pt is at risk of falls d/t multiple comorbidities, impaired balance, use of ambulatory aid, varying levels of pain , advanced age, acuity of medical illness, ongoing medical treatment of primary dx and abnormal lab values  Pt's clinical presentation is currently unstable/unpredictable as seen in pt's presentation of changing level of pain, increased fall risk, new onset of impairment of functional mobility, decreased endurance and new onset of weakness, and requires high complexity clinical decision making  Pt will benefit from continued PT services in order to address impairments, decrease risk of falls, maximize independence w/ fnxl mobility, & ensure safety w/ mobility for transition to next level of care   Based on pt presentation & impairments, pt would most appropriately benefit from return to home with home health PT services, pending progress w/ pain relief + ambulation + elevations training  Barriers to Discharge None  (pending progress with B knee pain control, ability to ambulate at minimum 25ft, and negotiate 3 stairs with assist)   Goals   Patient Goals "to have less pain and walk better again"   [facility] Expiration Date 01/06/23   Short Term Goal #1 Patient PT goals established in order to address patient self reported goal of "to have less pain and walk better again"  Pt will: complete all bed mobility independently in flat bed in order to promote increased OOB functional mobility to improve overall activity tolerance; complete all transfers with LRAD at LORENA level in order to increase safety with functional mobility; ambulate >50ft with LRAD and S in order to increase safety with household distance functional mobility; negotiate 2+1 standard height steps with no greater than JONAH in order to facilitate safe access to her home; demonstrate understanding and independence with LE strengthening HEP; improve ambulatory balance to >/= fair+ grade with LRAD in order to promote safety and increased independence with mobility; tolerate >3hrs OOB in upright position, in order to improve muscular endurance and respiratory status; improve AM-PAC score to >/= 20/24 in order to increase independence with mobility and decrease burden of care; improve Barthel Index score to >/= 45/100 in order to increase independence and decrease risk of falls  PT Treatment Day 0   Plan   Treatment/Interventions Functional transfer training;LE strengthening/ROM; Elevations; Therapeutic exercise; Endurance training;Patient/family training;Equipment eval/education; Bed mobility;Gait training;Spoke to MD;Spoke to nursing;OT   PT Frequency 3-5x/wk   Recommendation   PT Discharge Recommendation Home with home health rehabilitation   Equipment Recommended Other (Comment)  (pt may benefit from transport [facility] for longer community distance mobility due to severe B knee pain/OA)   AM-PAC Basic Mobility Inpatient   Turning in Bed Without Bedrails 3   Lying on Back to Sitting on Edge of Flat Bed 3   Moving Bed to Chair 2   Standing Up From Chair 3   Walk in Room 2   Climb 3-5 Stairs 2   Basic Mobility Inpatient Raw Score 15   Basic Mobility Standardized Score 36 97   Highest Level Of Mobility   -M Goal 4: Move to chair/commode   JH-HLM Achieved 4: Move to chair/commode   Modified [place] Scale   Modified [place] Scale 4   Barthel Index   Feeding 10   Bathing 0   Grooming Score 0   Dressing Score 5   Bladder Score 0   Bowels Score 10   Toilet Use Score 5   Transfers (Bed/Chair) Score 5   Mobility (Level Surface) Score 0   Stairs Score 0   Barthel Index Score 35   End of Consult   Patient Position at End of Consult Bedside chair;Bed/Chair alarm activated; All needs within reach   End of Consult Comments Based on patient's [facility] Level of Mobility scores today, patient currently has a goal of -[facility] Levels: 4: MOVE TO CHAIR/COMMODE, to be completed with RN staffing each shift, in order to improve overall activity tolerance and mobility, combat hospital related deconditioning, and maximize outcomes for d/c from the acute care setting  The patient's AM-PAC Basic Mobility Inpatient Short Form Raw Score is 15  A Raw score of less than or equal to 16 suggests the patient may benefit from discharge to post-acute rehabilitation services  Please also refer to the recommendation of the Physical Therapist for safe discharge planning        Gordy Azevedo PT, DPT   Available via [employer]  NPI # [id number]  [place] License - [id number]  02/35/3691

## 2022-12-27 NOTE — PROGRESS NOTES
Luis U  66   Progress Note - Torrey Justin 9/7/1926, 80 y o  female MRN: [id number]  Unit/Bed#: -Anup Encounter: [id number]  Primary Care Provider: Travis Quiñones MD   Date and time admitted to hospital: 12/26/2022  9:34 AM    Generalized weakness  Assessment & Plan  · Likely secondary to acute illness  · Initiate fall precautions  · Continue Supportive Care  · OOB as tolerated  · PT/OT eval: Pending    Mixed hyperlipidemia  Assessment & Plan  · Continue statin therapy    Stage 3b chronic kidney disease [facility])  Assessment & Plan  Lab Results   Component Value Date    EGFR 53 12/27/2022    EGFR 44 12/26/2022    EGFR 41 01/13/2022    CREATININE 0 91 12/27/2022    CREATININE 1 06 12/26/2022    CREATININE 1 13 01/13/2022     · Baseline Crt appears to be around 1 0-1 2  · Stable on admission  · Continue to monitor while admitted    Hypertension, essential  Assessment & Plan  · BP reviewed and remains stable  · Continue Home medication:  · Lisinopril    Chronic atrial fibrillation (HCC)  Assessment & Plan  · Rate controlled on home meds  · Cardizem and Toprol-XL  · A/C with Eliquis  · Continue Outpatient follow up with Primary Cardiologist    * Sepsis [facility])  Assessment & Plan  Patient presented with complaints of abdominal pain, nausea and shortness of breath over the last two day  Per patient's daughter/caregiver, patient with generalized weakness/fatigue and fevers while at home  · SIRs criteria: Tachycardia (HR >90), Tachypnea (RR >20), and Leukocytosis (WBC >16k)   · Likely secondary to viral etiology  · CXR: Chronic small amount of right basilar pleural fluid or thickening  Large hiatal hernia  No evidence of acute abnormality in the chest    · CT A/P: Right greater than left basilar bronchial thickening suggestive of bronchitis without pulmonary infiltrate identified    Cardiomegaly and small pericardial effusion similar to prior exam  Right inguinal hernia containing fat and ileal loops, nondilated and nonobstructed, stable  · Lactic/Procal: Negative  · COVID/Flu/RSV: Negative  · BC x2: Pending, no prelim yet  · U/A: (+): Blood/Ketones  · Hold abx this morning and monitor        VTE Pharmacologic Prophylaxis: VTE Score: 7 Moderate Risk (Score 3-4) - Pharmacological DVT Prophylaxis Ordered: apixaban (Eliquis)  Patient Centered Rounds: I performed bedside rounds with nursing staff today  Discussions with Specialists or Other Care Team Provider: N/A    Education and Discussions with Family / Patient: Updated  (daughter) at bedside  Time Spent for Care: 45 minutes  More than 50% of total time spent on counseling and coordination of care as described above  Current Length of Stay: 1 day(s)  Current Patient Status: Inpatient   Certification Statement: The patient will continue to require additional inpatient hospital stay due to sepsis  Discharge Plan: Anticipate discharge tomorrow to home with home services  Code Status: Level 1 - Full Code    Subjective:   Seen and examined at bedside this morning  Beau Herring is quite hard of hearing but states she is feeling very well  She was seen by orthopedics who aspirated her knee joint and she states the pain in her knees is much better  Denies any fever, chills, worsening of cough  No other concerns from patient or daughter who is also present in room  Objective:     Vitals:   Temp (24hrs), Av °F (37 2 °C), Min:97 9 °F (36 6 °C), Max:100 1 °F (37 8 °C)    Temp:  [97 9 °F (36 6 °C)-100 1 °F (37 8 °C)] 99 2 °F (37 3 °C)  HR:  [] 64  Resp:  [18-24] 20  BP: (119-149)/(46-72) 142/65  SpO2:  [93 %-100 %] 100 %  Body mass index is 19 1 kg/m²  Input and Output Summary (last 24 hours): Intake/Output Summary (Last 24 hours) at 2022 1349  Last data filed at 2022 0511  Gross per 24 hour   Intake 280 ml   Output 150 ml   Net 130 ml       Physical Exam:   Physical Exam  Vitals reviewed  Constitutional:       Appearance: Normal appearance  HENT:      Head: Normocephalic and atraumatic  Mouth/Throat:      Mouth: Mucous membranes are moist    Eyes:      General: No scleral icterus  Cardiovascular:      Rate and Rhythm: Normal rate and regular rhythm  Pulses: Normal pulses  Heart sounds: No murmur heard  No friction rub  No gallop  Pulmonary:      Effort: Pulmonary effort is normal  No respiratory distress  Breath sounds: No wheezing, rhonchi or rales  Abdominal:      General: Abdomen is flat  Palpations: Abdomen is soft  Tenderness: There is no abdominal tenderness  There is no right CVA tenderness, left CVA tenderness or guarding  Musculoskeletal:         General: No signs of injury  Right lower leg: No edema  Left lower leg: No edema  Skin:     General: Skin is warm  Capillary Refill: Capillary refill takes less than 2 seconds  Neurological:      General: No focal deficit present  Mental Status: She is alert and oriented to person, place, and time     Psychiatric:         Mood and Affect: Mood normal          Behavior: Behavior normal           Additional Data:     Labs:  Results from last 7 days   Lab Units 12/27/22  0500 12/26/22  1006   WBC Thousand/uL 14 68* 16 41*   HEMOGLOBIN g/dL 10 1* 11 3*   HEMATOCRIT % 32 6* 34 7*   PLATELETS Thousands/uL 298 322   NEUTROS PCT %  --  86*   LYMPHS PCT %  --  5*   MONOS PCT %  --  9   EOS PCT %  --  0     Results from last 7 days   Lab Units 12/27/22  0500   SODIUM mmol/L 133*   POTASSIUM mmol/L 4 1   CHLORIDE mmol/L 100   CO2 mmol/L 26   BUN mg/dL 15   CREATININE mg/dL 0 91   ANION GAP mmol/L 7   CALCIUM mg/dL 8 8   ALBUMIN g/dL 3 2*   TOTAL BILIRUBIN mg/dL 0 84   ALK PHOS U/L 88   ALT U/L 10   AST U/L 13   GLUCOSE RANDOM mg/dL 82                 Results from last 7 days   Lab Units 12/26/22  1006   LACTIC ACID mmol/L 1 2   PROCALCITONIN ng/ml 0 06       Lines/Drains:  Invasive Devices Peripheral Intravenous Line  Duration           Peripheral IV 12/26/22 Left;Ventral (anterior) Forearm <1 day                      Imaging: Reviewed radiology reports from this admission including: xray(s)    Recent Cultures (last 7 days):   Results from last 7 days   Lab Units 12/26/22  1026 12/26/22  1006   BLOOD CULTURE  Received in Microbiology Lab  Culture in Progress  Received in Microbiology Lab  Culture in Progress  Last 24 Hours Medication List:   Current Facility-Administered Medications   Medication Dose Route Frequency Provider Last Rate   • acetaminophen  650 mg Oral Q6H PRN Lucienne Sandhoff, MD     • apixaban  2 5 mg Oral BID Lucienne Sandhoff, MD     • diltiazem  120 mg Oral Daily Ignacia Alvarado MD     • gabapentin  200 mg Oral BID Lucienne Sandhoff, MD     • ipratropium  0 5 mg Nebulization TID Lucienne Sandhoff, MD     • iron polysaccharides  150 mg Oral Daily Lucienne Sandhoff, MD     • levalbuterol  1 25 mg Nebulization TID Lucienne Sandhoff, MD     • lisinopril  5 mg Oral Daily Lucienne Sandhoff, MD     • metoprolol succinate  100 mg Oral Daily Lucienne Sandhoff, MD     • ondansetron  4 mg Intravenous Q6H PRN Lucienne Sandhoff, MD     • traMADol  50 mg Oral Q8H PRN Lucienne Sandhoff, MD          Today, Patient Was Seen By: Mirela Huang    **Please Note: This note may have been constructed using a voice recognition system  **

## 2022-12-27 NOTE — ASSESSMENT & PLAN NOTE
Patient presented with complaints of abdominal pain, nausea and shortness of breath over the last two day  Per patient's daughter/caregiver, patient with generalized weakness/fatigue and fevers while at home  · SIRs criteria: Tachycardia (HR >90), Tachypnea (RR >20), and Leukocytosis (WBC >16k)   · Likely secondary to viral etiology  · CXR: Chronic small amount of right basilar pleural fluid or thickening  Large hiatal hernia  No evidence of acute abnormality in the chest    · CT A/P: Right greater than left basilar bronchial thickening suggestive of bronchitis without pulmonary infiltrate identified  Cardiomegaly and small pericardial effusion similar to prior exam  Right inguinal hernia containing fat and ileal loops, nondilated and nonobstructed, stable     · Lactic/Procal: Negative  · COVID/Flu/RSV: Negative  · BC x2: Pending, no prelim yet  · U/A: (+): Blood/Ketones  · Hold abx this morning and monitor

## 2022-12-27 NOTE — MALNUTRITION/BMI
This medical record reflects one or more clinical indicators suggestive of malnutrition and/or morbid obesity  Malnutrition Findings:      Adult Degree of Malnutrition: Malnutrition of moderate degree  Malnutrition Characteristics: Muscle loss, Fat loss      360 Statement: Moderate malnutrition in the context of chronic illness r/t inadequate energy intake with advanced age as evidenced by moderate muscle wasting and fat loss (cheeks, clavicles, temples, orbitals  Will treat with nutrition therapy, PO diet, possibly nutrition supplements  BMI Findings: Body mass index is 19 1 kg/m²  See Nutrition note dated 12/27/22 for additional details  Completed nutrition assessment is viewable in the nutrition documentation

## 2022-12-28 PROBLEM — M19.90 OSTEOARTHRITIS: Status: ACTIVE | Noted: 2022-12-28

## 2022-12-28 LAB
ANA SER QL IA: NEGATIVE
ANION GAP SERPL CALCULATED.3IONS-SCNC: 10 MMOL/L (ref 4–13)
BUN SERPL-MCNC: 25 MG/DL (ref 5–25)
CALCIUM SERPL-MCNC: 8.7 MG/DL (ref 8.4–10.2)
CHLORIDE SERPL-SCNC: 100 MMOL/L (ref 96–108)
CO2 SERPL-SCNC: 26 MMOL/L (ref 21–32)
CREAT SERPL-MCNC: 1.33 MG/DL (ref 0.6–1.3)
CRYSTALS SNV QL MICRO: NORMAL
ERYTHROCYTE [DISTWIDTH] IN BLOOD BY AUTOMATED COUNT: 13.2 % (ref 11.6–15.1)
ERYTHROCYTE [SEDIMENTATION RATE] IN BLOOD: 75 MM/HOUR (ref 0–30)
GFR SERPL CREATININE-BSD FRML MDRD: 33 ML/MIN/1.73SQ M
GLUCOSE SERPL-MCNC: 103 MG/DL (ref 65–140)
HCT VFR BLD AUTO: 29.9 % (ref 34.8–46.1)
HGB BLD-MCNC: 9.7 G/DL (ref 11.5–15.4)
MCH RBC QN AUTO: 30.6 PG (ref 26.8–34.3)
MCHC RBC AUTO-ENTMCNC: 32.4 G/DL (ref 31.4–37.4)
MCV RBC AUTO: 94 FL (ref 82–98)
PLATELET # BLD AUTO: 278 THOUSANDS/UL (ref 149–390)
PMV BLD AUTO: 10.6 FL (ref 8.9–12.7)
POTASSIUM SERPL-SCNC: 4.2 MMOL/L (ref 3.5–5.3)
RBC # BLD AUTO: 3.17 MILLION/UL (ref 3.81–5.12)
RHEUMATOID FACT SER QL LA: NEGATIVE
SODIUM SERPL-SCNC: 136 MMOL/L (ref 135–147)
WBC # BLD AUTO: 13.86 THOUSAND/UL (ref 4.31–10.16)

## 2022-12-28 RX ORDER — LIDOCAINE 50 MG/G
1 PATCH TOPICAL DAILY
Status: DISCONTINUED | OUTPATIENT
Start: 2022-12-28 | End: 2022-12-30 | Stop reason: HOSPADM

## 2022-12-28 RX ORDER — TRAMADOL HYDROCHLORIDE 50 MG/1
50 TABLET ORAL EVERY 6 HOURS PRN
Status: DISCONTINUED | OUTPATIENT
Start: 2022-12-28 | End: 2022-12-30 | Stop reason: HOSPADM

## 2022-12-28 RX ORDER — ACETAMINOPHEN 325 MG/1
650 TABLET ORAL EVERY 8 HOURS
Status: DISCONTINUED | OUTPATIENT
Start: 2022-12-28 | End: 2022-12-30 | Stop reason: HOSPADM

## 2022-12-28 RX ADMIN — TRAMADOL HYDROCHLORIDE 50 MG: 50 TABLET, COATED ORAL at 14:33

## 2022-12-28 RX ADMIN — ACETAMINOPHEN 650 MG: 325 TABLET, FILM COATED ORAL at 10:01

## 2022-12-28 RX ADMIN — LEVALBUTEROL HYDROCHLORIDE 1.25 MG: 1.25 SOLUTION, CONCENTRATE RESPIRATORY (INHALATION) at 09:45

## 2022-12-28 RX ADMIN — APIXABAN 2.5 MG: 2.5 TABLET, FILM COATED ORAL at 08:24

## 2022-12-28 RX ADMIN — TRAMADOL HYDROCHLORIDE 50 MG: 50 TABLET, COATED ORAL at 06:11

## 2022-12-28 RX ADMIN — POLYSACCHARIDE-IRON COMPLEX 150 MG: 150 CAPSULE ORAL at 08:24

## 2022-12-28 RX ADMIN — IPRATROPIUM BROMIDE 0.5 MG: 0.5 SOLUTION RESPIRATORY (INHALATION) at 14:03

## 2022-12-28 RX ADMIN — GABAPENTIN 200 MG: 100 CAPSULE ORAL at 08:24

## 2022-12-28 RX ADMIN — ACETAMINOPHEN 650 MG: 325 TABLET, FILM COATED ORAL at 17:02

## 2022-12-28 RX ADMIN — LIDOCAINE 5% 1 PATCH: 700 PATCH TOPICAL at 09:19

## 2022-12-28 RX ADMIN — IPRATROPIUM BROMIDE 0.5 MG: 0.5 SOLUTION RESPIRATORY (INHALATION) at 19:46

## 2022-12-28 RX ADMIN — DILTIAZEM HYDROCHLORIDE 120 MG: 120 CAPSULE, COATED, EXTENDED RELEASE ORAL at 08:24

## 2022-12-28 RX ADMIN — APIXABAN 2.5 MG: 2.5 TABLET, FILM COATED ORAL at 17:03

## 2022-12-28 RX ADMIN — IPRATROPIUM BROMIDE 0.5 MG: 0.5 SOLUTION RESPIRATORY (INHALATION) at 09:45

## 2022-12-28 RX ADMIN — LEVALBUTEROL HYDROCHLORIDE 1.25 MG: 1.25 SOLUTION, CONCENTRATE RESPIRATORY (INHALATION) at 14:03

## 2022-12-28 RX ADMIN — LEVALBUTEROL HYDROCHLORIDE 1.25 MG: 1.25 SOLUTION, CONCENTRATE RESPIRATORY (INHALATION) at 19:46

## 2022-12-28 RX ADMIN — METOPROLOL SUCCINATE 100 MG: 100 TABLET, EXTENDED RELEASE ORAL at 08:24

## 2022-12-28 RX ADMIN — GABAPENTIN 200 MG: 100 CAPSULE ORAL at 17:02

## 2022-12-28 RX ADMIN — LISINOPRIL 5 MG: 5 TABLET ORAL at 08:24

## 2022-12-28 NOTE — PROGRESS NOTES
Progress Note - Orthopedics   Clay Thibodeaux 80 y o  female MRN: [id number]  Unit/Bed#: -01      Subjective:    80 y  o female  Seen this morning  She had her left knee aspirated yesterday  She reports pain 8/10 with movement, was 10/10 yesterday  Her daughter is with her this morning  She denies fever or chills       Labs:  0   Lab Value Date/Time    HCT 29 9 (L) 12/28/2022 0522    HCT 32 6 (L) 12/27/2022 0500    HCT 34 7 (L) 12/26/2022 1006    HGB 9 7 (L) 12/28/2022 0522    HGB 10 1 (L) 12/27/2022 0500    HGB 11 3 (L) 12/26/2022 1006    INR 0 98 09/08/2021 0457    WBC 13 86 (H) 12/28/2022 0522    WBC 14 68 (H) 12/27/2022 0500    WBC 16 41 (H) 12/26/2022 1006       Meds:    Current Facility-Administered Medications:   •  acetaminophen (TYLENOL) tablet 650 mg, 650 mg, Oral, Q8H, Adron Nelda, 650 mg at 12/28/22 1001  •  apixaban (ELIQUIS) tablet 2 5 mg, 2 5 mg, Oral, BID, Kate Zhong MD, 2 5 mg at 12/28/22 5530  •  diltiazem (CARDIZEM CD) 24 hr capsule 120 mg, 120 mg, Oral, Daily, Kate Zhong MD, 120 mg at 12/28/22 2860  •  gabapentin (NEURONTIN) capsule 200 mg, 200 mg, Oral, BID, Kate Zhong MD, 200 mg at 12/28/22 0906  •  ipratropium (ATROVENT) 0 02 % inhalation solution 0 5 mg, 0 5 mg, Nebulization, TID, Kate Zhong MD, 0 5 mg at 12/28/22 0945  •  iron polysaccharides (FERREX) capsule 150 mg, 150 mg, Oral, Daily, Kate Zhong MD, 150 mg at 12/28/22 4818  •  levalbuterol (XOPENEX) inhalation solution 1 25 mg, 1 25 mg, Nebulization, TID, Kate Zhong MD, 1 25 mg at 12/28/22 0945  •  lidocaine (LIDODERM) 5 % patch 1 patch, 1 patch, Topical, Daily, Goldie Lutz, 1 patch at 12/28/22 0919  •  lisinopril (ZESTRIL) tablet 5 mg, 5 mg, Oral, Daily, Kate Zhong MD, 5 mg at 12/28/22 1566  •  metoprolol succinate (TOPROL-XL) 24 hr tablet 100 mg, 100 mg, Oral, Daily, Kate Zhong MD, 100 mg at 12/28/22 1430  •  ondansetron (ZOFRAN) injection 4 mg, 4 mg, Intravenous, Q6H PRN, Janette Bedoya MD  •  traMADol (ULTRAM) tablet 50 mg, 50 mg, Oral, Q6H PRN, Jacklyn Miranda    Blood Culture:   Lab Results   Component Value Date    BLOODCX No Growth at 24 hrs  12/26/2022       Wound Culture:   No results found for: WOUNDCULT    Ins and Outs:  I/O last 24 hours: In: 400 [P O :400]  Out: 300 [Urine:300]          Physical:  Vitals:    12/28/22 0947   BP:    Pulse:    Resp:    Temp:    SpO2: 96%     Musculoskeletal: left Lower Extremity    · Skin: No erythema or ecchymosis  · Mild effusion  · TTP about the medial and lateral joint lines worse over the medial joint line  Crepitus with range of motion  · Range of motion 10 to 90 degrees today passively  Pain with active range of motion  · Sensation intact to saphenous, sural, tibial, superficial peroneal nerve, and deep peroneal  · Motor intact to +FHL/EHL, +ankle dorsi/plantar flexion  · 2+ DP pulse, symmetric bilaterally  · Digits warm and well perfused  · Capillary refill < 2 seconds    Assessment:    89 y  o female with left knee degenerative joint disease and effusion  Her aspirate from yesterday shows synovial white blood cell count of 9582 and no crystals  Prelim gram stain was negative  She is improving    Plan:  · WBAT LLE  · Medicine ordered RF and KJ and ESR to evaluate rheumatologic etiology vs arthritis flare  · Await final cultures  Consider steroid injection if cultures negative and she continues to improve in regards to her sepsis  Her white blood cell count is trending down  Her daughter reports last injection was 7/8/22  · PT/OT  · Pain control, lidocaine patches are working  · DVT ppx  Per primary  · Dispo: ortho will follow       Karen Holloway PA-C

## 2022-12-28 NOTE — PLAN OF CARE
Problem: Potential for Falls  Goal: Patient will remain free of falls  Description: INTERVENTIONS:  - Educate patient/family on patient safety including physical limitations  - Instruct patient to call for assistance with activity   - Consult OT/PT to assist with strengthening/mobility   - Keep Call bell within reach  - Keep bed low and locked with side rails adjusted as appropriate  - Keep care items and personal belongings within reach  - Initiate and maintain comfort rounds  - Make Fall Risk Sign visible to staff  - Offer Toileting every 2 Hours, in advance of need  - Initiate/Maintain bed and chair alarm  - Apply yellow socks and bracelet for high fall risk patients  - Consider moving patient to room near nurses station  Outcome: Progressing     Problem: Nutrition/Hydration-ADULT  Goal: Nutrient/Hydration intake appropriate for improving, restoring or maintaining nutritional needs  Description: Monitor and assess patient's nutrition/hydration status for malnutrition  Collaborate with interdisciplinary team and initiate plan and interventions as ordered  Monitor patient's weight and dietary intake as ordered or per policy  Utilize nutrition screening tool and intervene as necessary  Determine patient's food preferences and provide high-protein, high-caloric foods as appropriate       INTERVENTIONS:  - Monitor oral intake, urinary output, labs, and treatment plans  - Assess nutrition and hydration status and recommend course of action  - Evaluate amount of meals eaten  - Assist patient with eating if necessary   - Allow adequate time for meals  - Recommend/ encourage appropriate diets, oral nutritional supplements, and vitamin/mineral supplements  - Order, calculate, and assess calorie counts as needed  - Assess need for intravenous fluids  - Provide specific nutrition/hydration education as appropriate  - Include patient/family/caregiver in decisions related to nutrition  Outcome: Progressing     Problem: MOBILITY - ADULT  Goal: Maintain or return to baseline ADL function  Description: INTERVENTIONS:  -  Assess patient's ability to carry out ADLs; assess patient's baseline for ADL function and identify physical deficits which impact ability to perform ADLs (bathing, care of mouth/teeth, toileting, grooming, dressing, etc )  - Assess/evaluate cause of self-care deficits   - Assess range of motion  - Assess patient's mobility; develop plan if impaired  - Assess patient's need for assistive devices and provide as appropriate  - Encourage maximum independence but intervene and supervise when necessary  - Involve family in performance of ADLs  - Assess for home care needs following discharge   - Consider OT consult to assist with ADL evaluation and planning for discharge  - Provide patient education as appropriate  Outcome: Progressing  Goal: Maintains/Returns to pre admission functional level  Description: INTERVENTIONS:  - Perform BMAT or MOVE assessment daily    - Set and communicate daily mobility goal to care team and patient/family/caregiver  - Collaborate with rehabilitation services on mobility goals if consulted  - Perform Range of Motion 3  times a day  - Reposition patient every  2  hours    - Dangle patient  3  times a day  - Stand patient  3  times a day  - Ambulate patient  3 times a day  - Out of bed to chair  3  times a day   - Out of bed for meals  3  times a day  - Out of bed for toileting  - Record patient progress and toleration of activity level   Outcome: Progressing     Problem: PAIN - ADULT  Goal: Verbalizes/displays adequate comfort level or baseline comfort level  Description: Interventions:  - Encourage patient to monitor pain and request assistance  - Assess pain using appropriate pain scale  - Administer analgesics based on type and severity of pain and evaluate response  - Implement non-pharmacological measures as appropriate and evaluate response  - Consider cultural and social influences on pain and pain management  - Notify physician/advanced practitioner if interventions unsuccessful or patient reports new pain  Outcome: Progressing     Problem: INFECTION - ADULT  Goal: Absence or prevention of progression during hospitalization  Description: INTERVENTIONS:  - Assess and monitor for signs and symptoms of infection  - Monitor lab/diagnostic results  - Administer medications as ordered  - Instruct and encourage patient and family to use good hand hygiene technique  Outcome: Progressing  Goal: Absence of fever/infection during neutropenic period  Description: INTERVENTIONS:  - Monitor WBC    Outcome: Progressing     Problem: Prexisting or High Potential for Compromised Skin Integrity  Goal: Skin integrity is maintained or improved  Description: INTERVENTIONS:  - Identify patients at risk for skin breakdown  - Assess and monitor skin integrity  - Assess and monitor nutrition and hydration status  - Monitor labs   - Assess for incontinence   - Turn and reposition patient  - Assist with mobility/ambulation  - Relieve pressure over bony prominences  - Avoid friction and shearing  - Provide appropriate hygiene as needed including keeping skin clean and dry  - Evaluate need for skin moisturizer/barrier cream  - Collaborate with interdisciplinary team   - Patient/family teaching  Outcome: Progressing

## 2022-12-28 NOTE — ASSESSMENT & PLAN NOTE
Lab Results   Component Value Date    EGFR 33 12/28/2022    EGFR 53 12/27/2022    EGFR 44 12/26/2022    CREATININE 1 33 (H) 12/28/2022    CREATININE 0 91 12/27/2022    CREATININE 1 06 12/26/2022     Creatinine increased to 1 33 today, does not appear too far out of patient's baseline  · Continue to monitor while admitted

## 2022-12-28 NOTE — PROGRESS NOTES
[address]  Progress Note - Manuel Chang 9/7/1926, 80 y o  female MRN: [id number]  Unit/Bed#: -Anup Encounter: [id number]  Primary Care Provider: Marisol Bates MD   Date and time admitted to hospital: 12/26/2022  9:34 AM    Osteoarthritis  Assessment & Plan  · Ortho following for osteoarthritis of the left knee  Joint effusion aspirated 12/27/2022  Elevated leukocyte likely related to inflammatory changes rather than infectious  Ortho considering steroid injection in case symptoms do not improve  Appreciate comanagement    Moderate protein-calorie malnutrition ([address] )  Assessment & Plan  Malnutrition Findings:      Adult Degree of Malnutrition: Malnutrition of moderate degree  Malnutrition Characteristics: Muscle loss, Fat loss                  360 Statement: Moderate malnutrition in the context of chronic illness r/t inadequate energy intake with advanced age as evidenced by moderate muscle wasting and fat loss (cheeks, clavicles, temples, orbitals  Will treat with nutrition therapy, PO diet, possibly nutrition supplements  BMI Findings: Body mass index is 19 1 kg/m²         Generalized weakness  Assessment & Plan  · Likely secondary to acute illness  · Initiate fall precautions  · Continue Supportive Care  · OOB as tolerated  · PT/OT eval- plan for home with home PT    Mixed hyperlipidemia  Assessment & Plan  · Continue statin therapy    Stage 3b chronic kidney disease [facility])  Assessment & Plan  Lab Results   Component Value Date    EGFR 33 12/28/2022    EGFR 53 12/27/2022    EGFR 44 12/26/2022    CREATININE 1 33 (H) 12/28/2022    CREATININE 0 91 12/27/2022    CREATININE 1 06 12/26/2022     Creatinine increased to 1 33 today, does not appear too far out of patient's baseline  · Continue to monitor while admitted    Hypertension, essential  Assessment & Plan  · BP reviewed and remains stable  · Continue Home medication:  · Lisinopril    Chronic atrial fibrillation [facility])  Assessment & Plan  · Rate controlled on home meds  · Cardizem and Toprol-XL  · A/C with Eliquis  · Continue Outpatient follow up with Primary Cardiologist    * Sepsis [facility])  Assessment & Plan  Patient presented with complaints of abdominal pain, nausea and shortness of breath over the last two day  Per patient's daughter/caregiver, patient with generalized weakness/fatigue and fevers while at home  · SIRs criteria: Tachycardia (HR >90), Tachypnea (RR >20), and Leukocytosis (WBC >16k)   · Likely secondary to viral etiology  · CXR: Chronic small amount of right basilar pleural fluid or thickening  Large hiatal hernia  No evidence of acute abnormality in the chest    · CT A/P: Right greater than left basilar bronchial thickening suggestive of bronchitis without pulmonary infiltrate identified  Cardiomegaly and small pericardial effusion similar to prior exam  Right inguinal hernia containing fat and ileal loops, nondilated and nonobstructed, stable  · Lactic/Procal: Negative  · COVID/Flu/RSV: Negative  · BC x2: Pending, no prelim yet  · U/A: (+): Blood/Ketones  · Hold abx this morning and monitor  · Increased leukocytes in knee aspirate, but not quite in the range of infectious arthritis  Culture with no bacteria        VTE Pharmacologic Prophylaxis: VTE Score: 7 Moderate Risk (Score 3-4) - Pharmacological DVT Prophylaxis Ordered: apixaban (Eliquis)  Patient Centered Rounds: I performed bedside rounds with nursing staff today  Discussions with Specialists or Other Care Team Provider: Orthopedics    Education and Discussions with Family / Patient: Updated  (daughter) at bedside  Time Spent for Care: 45 minutes  More than 50% of total time spent on counseling and coordination of care as described above      Current Length of Stay: 2 day(s)  Current Patient Status: Inpatient   Certification Statement: The patient will continue to require additional inpatient hospital stay due to Pain control  Discharge Plan: Anticipate discharge tomorrow to home  Code Status: Level 1 - Full Code    Subjective:   Seen and examined at bedside this morning  Patient is accompanied by her daughter  Patient reports that her knee pain is gotten worse over the last day and is as bad as 10 out of 10 at the time of exam   Tramadol helps somewhat but pain is not adequately controlled  Denies fever, chest pain, productive cough  Patient endorses some wheezing today and is asking for breathing treatments    Objective:     Vitals:   Temp (24hrs), Av 5 °F (36 9 °C), Min:98 3 °F (36 8 °C), Max:98 6 °F (37 °C)    Temp:  [98 3 °F (36 8 °C)-98 6 °F (37 °C)] 98 6 °F (37 °C)  HR:  [71-86] 86  Resp:  [16-20] 20  BP: ()/(56-71) 123/71  SpO2:  [92 %-96 %] 96 %  Body mass index is 19 1 kg/m²  Input and Output Summary (last 24 hours):   No intake or output data in the 24 hours ending 22 1221    Physical Exam:   Physical Exam  Vitals reviewed  Constitutional:       Appearance: Normal appearance  HENT:      Head: Normocephalic and atraumatic  Mouth/Throat:      Mouth: Mucous membranes are moist    Eyes:      General: No scleral icterus  Cardiovascular:      Rate and Rhythm: Normal rate and regular rhythm  Pulses: Normal pulses  Heart sounds: No murmur heard  No friction rub  No gallop  Pulmonary:      Effort: Pulmonary effort is normal  No respiratory distress  Breath sounds: Wheezing present  No rhonchi or rales  Abdominal:      General: Abdomen is flat  Palpations: Abdomen is soft  Tenderness: There is no abdominal tenderness  There is no right CVA tenderness, left CVA tenderness or guarding  Musculoskeletal:         General: No signs of injury  Right lower leg: No edema  Left lower leg: No edema  Skin:     General: Skin is warm  Capillary Refill: Capillary refill takes less than 2 seconds  Neurological:      General: No focal deficit present  Mental Status: She is alert and oriented to person, place, and time  Psychiatric:         Mood and Affect: Mood normal          Behavior: Behavior normal           Additional Data:     Labs:  Results from last 7 days   Lab Units 12/28/22  0522 12/27/22  0500 12/26/22  1006   WBC Thousand/uL 13 86*   < > 16 41*   HEMOGLOBIN g/dL 9 7*   < > 11 3*   HEMATOCRIT % 29 9*   < > 34 7*   PLATELETS Thousands/uL 278   < > 322   NEUTROS PCT %  --   --  86*   LYMPHS PCT %  --   --  5*   MONOS PCT %  --   --  9   EOS PCT %  --   --  0    < > = values in this interval not displayed  Results from last 7 days   Lab Units 12/28/22  0522 12/27/22  0500   SODIUM mmol/L 136 133*   POTASSIUM mmol/L 4 2 4 1   CHLORIDE mmol/L 100 100   CO2 mmol/L 26 26   BUN mg/dL 25 15   CREATININE mg/dL 1 33* 0 91   ANION GAP mmol/L 10 7   CALCIUM mg/dL 8 7 8 8   ALBUMIN g/dL  --  3 2*   TOTAL BILIRUBIN mg/dL  --  0 84   ALK PHOS U/L  --  88   ALT U/L  --  10   AST U/L  --  13   GLUCOSE RANDOM mg/dL 103 82                 Results from last 7 days   Lab Units 12/26/22  1006   LACTIC ACID mmol/L 1 2   PROCALCITONIN ng/ml 0 06       Lines/Drains:  Invasive Devices     Peripheral Intravenous Line  Duration           Peripheral IV 12/26/22 Left;Ventral (anterior) Forearm 1 day                      Imaging: Reviewed radiology reports from this admission including: xray(s)    Recent Cultures (last 7 days):   Results from last 7 days   Lab Units 12/27/22  1010 12/26/22  1026 12/26/22  1006   BLOOD CULTURE   --  No Growth at 24 hrs  No Growth at 24 hrs     GRAM STAIN RESULT  2+ Polys  No bacteria seen  --   --    BODY FLUID CULTURE, STERILE  No growth  --   --        Last 24 Hours Medication List:   Current Facility-Administered Medications   Medication Dose Route Frequency Provider Last Rate   • acetaminophen  650 mg Oral Q8H Mirela Huang     • apixaban  2 5 mg Oral BID Lucienne Sandhoff, MD     • diltiazem  120 mg Oral Daily Ignacia Palmer Schuyler Kimble MD     • gabapentin  200 mg Oral BID Lucienne Sandhoff, MD     • ipratropium  0 5 mg Nebulization TID Lucienne Sandhoff, MD     • iron polysaccharides  150 mg Oral Daily Lucienne Sandhoff, MD     • levalbuterol  1 25 mg Nebulization TID Lucienne Sandhoff, MD     • lidocaine  1 patch Topical Daily Mirela Huang     • lisinopril  5 mg Oral Daily Ignacia Alvarado MD     • metoprolol succinate  100 mg Oral Daily Lucienne Sandhoff, MD     • ondansetron  4 mg Intravenous Q6H PRN Lucienne Sandhoff, MD     • traMADol  50 mg Oral Q6H PRN Mirela Huang          Today, Patient Was Seen By: Mirela Huang    **Please Note: This note may have been constructed using a voice recognition system  **

## 2022-12-28 NOTE — ASSESSMENT & PLAN NOTE
· Ortho following for osteoarthritis of the left knee  Joint effusion aspirated 12/27/2022  Elevated leukocyte likely related to inflammatory changes rather than infectious  Ortho considering steroid injection in case symptoms do not improve   Appreciate comanagement

## 2022-12-28 NOTE — ASSESSMENT & PLAN NOTE
Malnutrition Findings:      Adult Degree of Malnutrition: Malnutrition of moderate degree  Malnutrition Characteristics: Muscle loss, Fat loss                  360 Statement: Moderate malnutrition in the context of chronic illness r/t inadequate energy intake with advanced age as evidenced by moderate muscle wasting and fat loss (cheeks, clavicles, temples, orbitals  Will treat with nutrition therapy, PO diet, possibly nutrition supplements  BMI Findings: Body mass index is 19 1 kg/m²

## 2022-12-28 NOTE — NURSING NOTE
Unable to weigh pt due to pain on the left knee and pt refusing to stand up,cant use the bed as well as it wasn't zeroed before  pt  placed in bed,use the chair alarm for safety, due to unable to activate bed alarm due to bed not zeroed

## 2022-12-28 NOTE — ASSESSMENT & PLAN NOTE
· Likely secondary to acute illness  · Initiate fall precautions  · Continue Supportive Care  · OOB as tolerated  · PT/OT eval- plan for home with home PT

## 2022-12-28 NOTE — ASSESSMENT & PLAN NOTE
Patient presented with complaints of abdominal pain, nausea and shortness of breath over the last two day  Per patient's daughter/caregiver, patient with generalized weakness/fatigue and fevers while at home  · SIRs criteria: Tachycardia (HR >90), Tachypnea (RR >20), and Leukocytosis (WBC >16k)   · Likely secondary to viral etiology  · CXR: Chronic small amount of right basilar pleural fluid or thickening  Large hiatal hernia  No evidence of acute abnormality in the chest    · CT A/P: Right greater than left basilar bronchial thickening suggestive of bronchitis without pulmonary infiltrate identified  Cardiomegaly and small pericardial effusion similar to prior exam  Right inguinal hernia containing fat and ileal loops, nondilated and nonobstructed, stable  · Lactic/Procal: Negative  · COVID/Flu/RSV: Negative  · BC x2: Pending, no prelim yet  · U/A: (+): Blood/Ketones  · Hold abx this morning and monitor  · Increased leukocytes in knee aspirate, but not quite in the range of infectious arthritis    Culture with no bacteria

## 2022-12-29 LAB
ANION GAP SERPL CALCULATED.3IONS-SCNC: 10 MMOL/L (ref 4–13)
ANION GAP SERPL CALCULATED.3IONS-SCNC: 8 MMOL/L (ref 4–13)
BUN SERPL-MCNC: 39 MG/DL (ref 5–25)
BUN SERPL-MCNC: 42 MG/DL (ref 5–25)
CALCIUM SERPL-MCNC: 8.3 MG/DL (ref 8.4–10.2)
CALCIUM SERPL-MCNC: 8.9 MG/DL (ref 8.4–10.2)
CHLORIDE SERPL-SCNC: 100 MMOL/L (ref 96–108)
CHLORIDE SERPL-SCNC: 100 MMOL/L (ref 96–108)
CO2 SERPL-SCNC: 25 MMOL/L (ref 21–32)
CO2 SERPL-SCNC: 28 MMOL/L (ref 21–32)
CREAT SERPL-MCNC: 1.44 MG/DL (ref 0.6–1.3)
CREAT SERPL-MCNC: 1.61 MG/DL (ref 0.6–1.3)
GFR SERPL CREATININE-BSD FRML MDRD: 26 ML/MIN/1.73SQ M
GFR SERPL CREATININE-BSD FRML MDRD: 30 ML/MIN/1.73SQ M
GLUCOSE SERPL-MCNC: 168 MG/DL (ref 65–140)
GLUCOSE SERPL-MCNC: 86 MG/DL (ref 65–140)
POTASSIUM SERPL-SCNC: 4.4 MMOL/L (ref 3.5–5.3)
POTASSIUM SERPL-SCNC: 4.8 MMOL/L (ref 3.5–5.3)
SODIUM SERPL-SCNC: 135 MMOL/L (ref 135–147)
SODIUM SERPL-SCNC: 136 MMOL/L (ref 135–147)

## 2022-12-29 RX ORDER — SODIUM CHLORIDE, SODIUM GLUCONATE, SODIUM ACETATE, POTASSIUM CHLORIDE, MAGNESIUM CHLORIDE, SODIUM PHOSPHATE, DIBASIC, AND POTASSIUM PHOSPHATE .53; .5; .37; .037; .03; .012; .00082 G/100ML; G/100ML; G/100ML; G/100ML; G/100ML; G/100ML; G/100ML
500 INJECTION, SOLUTION INTRAVENOUS ONCE
Status: COMPLETED | OUTPATIENT
Start: 2022-12-29 | End: 2022-12-29

## 2022-12-29 RX ADMIN — LIDOCAINE 5% 1 PATCH: 700 PATCH TOPICAL at 09:41

## 2022-12-29 RX ADMIN — TRAMADOL HYDROCHLORIDE 50 MG: 50 TABLET, COATED ORAL at 16:57

## 2022-12-29 RX ADMIN — ACETAMINOPHEN 650 MG: 325 TABLET, FILM COATED ORAL at 09:41

## 2022-12-29 RX ADMIN — LEVALBUTEROL HYDROCHLORIDE 1.25 MG: 1.25 SOLUTION, CONCENTRATE RESPIRATORY (INHALATION) at 14:06

## 2022-12-29 RX ADMIN — DILTIAZEM HYDROCHLORIDE 120 MG: 120 CAPSULE, COATED, EXTENDED RELEASE ORAL at 09:41

## 2022-12-29 RX ADMIN — TRAMADOL HYDROCHLORIDE 50 MG: 50 TABLET, COATED ORAL at 09:41

## 2022-12-29 RX ADMIN — LEVALBUTEROL HYDROCHLORIDE 1.25 MG: 1.25 SOLUTION, CONCENTRATE RESPIRATORY (INHALATION) at 07:14

## 2022-12-29 RX ADMIN — IPRATROPIUM BROMIDE 0.5 MG: 0.5 SOLUTION RESPIRATORY (INHALATION) at 07:14

## 2022-12-29 RX ADMIN — APIXABAN 2.5 MG: 2.5 TABLET, FILM COATED ORAL at 09:41

## 2022-12-29 RX ADMIN — GABAPENTIN 200 MG: 100 CAPSULE ORAL at 17:00

## 2022-12-29 RX ADMIN — METOPROLOL SUCCINATE 100 MG: 100 TABLET, EXTENDED RELEASE ORAL at 09:41

## 2022-12-29 RX ADMIN — ACETAMINOPHEN 650 MG: 325 TABLET, FILM COATED ORAL at 16:58

## 2022-12-29 RX ADMIN — IPRATROPIUM BROMIDE 0.5 MG: 0.5 SOLUTION RESPIRATORY (INHALATION) at 14:07

## 2022-12-29 RX ADMIN — POLYSACCHARIDE-IRON COMPLEX 150 MG: 150 CAPSULE ORAL at 09:41

## 2022-12-29 RX ADMIN — GABAPENTIN 200 MG: 100 CAPSULE ORAL at 09:41

## 2022-12-29 RX ADMIN — IPRATROPIUM BROMIDE 0.5 MG: 0.5 SOLUTION RESPIRATORY (INHALATION) at 20:24

## 2022-12-29 RX ADMIN — LEVALBUTEROL HYDROCHLORIDE 1.25 MG: 1.25 SOLUTION, CONCENTRATE RESPIRATORY (INHALATION) at 20:24

## 2022-12-29 RX ADMIN — SODIUM CHLORIDE, SODIUM GLUCONATE, SODIUM ACETATE, POTASSIUM CHLORIDE, MAGNESIUM CHLORIDE, SODIUM PHOSPHATE, DIBASIC, AND POTASSIUM PHOSPHATE 500 ML: .53; .5; .37; .037; .03; .012; .00082 INJECTION, SOLUTION INTRAVENOUS at 09:41

## 2022-12-29 RX ADMIN — APIXABAN 2.5 MG: 2.5 TABLET, FILM COATED ORAL at 17:00

## 2022-12-29 NOTE — PROGRESS NOTES
Progress Note - Orthopedics   Digna Prasad 80 y o  female MRN: [id number]  Unit/Bed#: -01      Subjective:    80 y  o female seen this morning  Reports pain stable from yesterday  She denies fever or chills  She has been off abx for 24 hours       Labs:  0   Lab Value Date/Time    HCT 29 9 (L) 12/28/2022 0522    HCT 32 6 (L) 12/27/2022 0500    HCT 34 7 (L) 12/26/2022 1006    HGB 9 7 (L) 12/28/2022 0522    HGB 10 1 (L) 12/27/2022 0500    HGB 11 3 (L) 12/26/2022 1006    INR 0 98 09/08/2021 0457    WBC 13 86 (H) 12/28/2022 0522    WBC 14 68 (H) 12/27/2022 0500    WBC 16 41 (H) 12/26/2022 1006    ESR 75 (H) 12/28/2022 1136       Meds:    Current Facility-Administered Medications:   •  acetaminophen (TYLENOL) tablet 650 mg, 650 mg, Oral, Q8H, Virgilio Richardson, 650 mg at 12/29/22 0941  •  apixaban (ELIQUIS) tablet 2 5 mg, 2 5 mg, Oral, BID, Shay Patel MD, 2 5 mg at 12/29/22 0941  •  diltiazem (CARDIZEM CD) 24 hr capsule 120 mg, 120 mg, Oral, Daily, Shay Patel MD, 120 mg at 12/29/22 0941  •  gabapentin (NEURONTIN) capsule 200 mg, 200 mg, Oral, BID, Shay Patel MD, 200 mg at 12/29/22 0941  •  ipratropium (ATROVENT) 0 02 % inhalation solution 0 5 mg, 0 5 mg, Nebulization, TID, Shay Patel MD, 0 5 mg at 12/29/22 9211  •  iron polysaccharides (FERREX) capsule 150 mg, 150 mg, Oral, Daily, Shay Patel MD, 150 mg at 12/29/22 0941  •  levalbuterol (XOPENEX) inhalation solution 1 25 mg, 1 25 mg, Nebulization, TID, Shay Patel MD, 1 25 mg at 12/29/22   •  lidocaine (LIDODERM) 5 % patch 1 patch, 1 patch, Topical, Daily, Virgilio Richardson, 1 patch at 12/29/22 0941  •  metoprolol succinate (TOPROL-XL) 24 hr tablet 100 mg, 100 mg, Oral, Daily, Shya Patel MD, 100 mg at 12/29/22 0941  •  ondansetron (ZOFRAN) injection 4 mg, 4 mg, Intravenous, Q6H PRN, [facility]ad Amaya [facility]  •  traMADol William Sosa) tablet 50 mg, 50 mg, Oral, Q6H PRN, Ciro Relic, 50 mg at 12/29/22 0941    Blood Culture:   Lab Results   Component Value Date    BLOODCX No Growth at 48 hrs  12/26/2022       Wound Culture:   No results found for: WOUNDCULT    Ins and Outs:  No intake/output data recorded  Physical:  Vitals:    12/29/22 0840   BP: 116/67   Pulse: 81   Resp: 16   Temp: 98 2 °F (36 8 °C)   SpO2: 94%     Musculoskeletal: left Lower Extremity    • Skin: No erythema or ecchymosis  • Mild effusion  • TTP about the medial and lateral joint lines worse over the medial joint line  Crepitus with range of motion  • Range of motion 10 to 90 degrees today passively  Pain with active range of motion  • Sensation intact to saphenous, sural, tibial, superficial peroneal nerve, and deep peroneal  • Motor intact to +FHL/EHL, +ankle dorsi/plantar flexion  • 2+ DP pulse, symmetric bilaterally  • Digits warm and well perfused  • Capillary refill < 2 seconds     Arthrocentesis    Date/Time: 12/29/2022 10:50 AM  Performed by: Yanet Ortiz PA-C  Authorized by: Yanet Ortiz PA-C     Location:  Bedside  Verbal consent obtained?: Yes    Consent given by:  Patient  Patient identity confirmed:  Verbally with patient  Indications:  Pain and therapeutic  Body area:  Knee  Joint:  Left knee  Local anesthesia used?: No    Needle size:  22 G  Ultrasound guidance: No    Approach: anterolateral   Triamcinolone amount (mg):  40  Lidocaine 1% amount (ml):  4  Patient tolerance:  Patient tolerated the procedure well with no immediate complications        Assessment:    80 y  o female  With left knee DJD  Steroid injection performed today as sepsis improving and negative cultures on knee aspirate    Plan:  · WBAT LLE  · PT/OT  · Pain control/lidocaine patch  · Can follow up as needed as outpatient with Dr Ousmane Shultz with whom she has established previously     · Dispo: Val [place] for discharge from [address] [facility] TERESA Rios

## 2022-12-29 NOTE — CASE MANAGEMENT
Case Management Discharge Planning Note    Patient name Clay Thibodeaux  Location /-69 MRN [id number]  : 1926 Date 2022       Current Admission Date: 2022  Current Admission Diagnosis:Sepsis [facility])   Patient Active Problem List    Diagnosis Date Noted   • Osteoarthritis 2022   • Moderate protein-calorie malnutrition ([facility] ) 2022   • Generalized weakness 2022   • Mild intermittent asthma without complication    • Pseudogout 2022   • Mixed hyperlipidemia 2022   • Chronic neck pain 2022   • Vitamin D deficiency 2022   • Nonrheumatic aortic valve stenosis 2021   • Stage 3b chronic kidney disease ([facility] ) 2021   • Sepsis ([facility] ) 2021   • Chronic [facility] fibrillation ([facility] ) 2019   • Current use of long term anticoagulation 2019   • Hypertension, essential 2019   • TIA (transient ischemic attack) 2019   • Pacemaker 2013      LOS (days): 3  Geometric Mean LOS (GMLOS) (days): 3 50  Days to GMLOS:0 4     OBJECTIVE:  Risk of Unplanned Readmission Score: 15 64         Current admission status: Inpatient   Preferred Pharmacy:   [id number] - [address] [facility], [address] [place] [address]  Phone: [phone] Fax: [phone]    [facility] [place], [facility],[address] [place] [address]  Phone: [phone] Fax: [phone]    Primary Care Provider: Demi Caceres MD    Primary Insurance: MEDICARE  Secondary Insurance: AARP    DISCHARGE DETAILS:    Discharge planning discussed with[de-identified] patient's dtrLynne of Choice: Yes  Comments - Freedom of Choice: CM contacted pt's dtr, Logan Low, via phone re: dcp - per OT rcmd STR -discussed HHC vs STR  Dcp remains for return to home with [place] and strong family supports at home   Logan Low also relayed has started the process for veterans aid assist at home as 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 - for additional support to pt  CM contacted family/caregiver?: Yes             Contacts  Patient Contacts: Ashely Seo (Daughter)  Relationship to Patient[de-identified] Family  Contact Method: Phone  Phone Number: [phone] [place]  Reason/Outcome: Continuity of Care, Referral, Discharge Planning, Emergency Contact              Other Referral/Resources/Interventions Provided:  Interventions: Transportation  Referral Comments: Patient likely to d/c tmw per slim  Tentative transport placed to [place] - requested for noon tomorrow to home, awaiting confirmation  Dtr and care team aware of same           Treatment Team Recommendation: Home with [facility]  Discharge Destination Plan[de-identified] Home with [place] at Discharge : [address]

## 2022-12-29 NOTE — ASSESSMENT & PLAN NOTE
Lab Results   Component Value Date    EGFR 26 12/29/2022    EGFR 33 12/28/2022    EGFR 53 12/27/2022    CREATININE 1 61 (H) 12/29/2022    CREATININE 1 33 (H) 12/28/2022    CREATININE 0 91 12/27/2022     Creatinine increased to 1 61 today, hold lisinopril for CECI and gently hydrate  · Continue to monitor while admitted

## 2022-12-29 NOTE — OCCUPATIONAL THERAPY NOTE
Occupational Therapy Evaluation + Treatment       Jenny Levine    12/29/2022    Patient Active Problem List   Diagnosis    Chronic [facility] fibrillation [facility] )    Current use of long term anticoagulation    Hypertension, essential    Pacemaker    TIA (transient ischemic attack)    Stage 3b chronic kidney disease ([facility] )    Sepsis ([facility] )    Mild intermittent asthma without complication    Pseudogout    Mixed hyperlipidemia    Nonrheumatic aortic valve stenosis    Chronic neck pain    Vitamin D deficiency    Generalized weakness    Moderate protein-calorie malnutrition (HCC)    Osteoarthritis       Past Medical History:   Diagnosis Date    Anemia     Asthma     Atrial fibrillation ([facility] ) 11/08/2019    CAD     CKD (chronic kidney disease)     Current use of long term anticoagulation 11/08/2019    Degenerative joint disease     Elevated troponin 09/08/2021    Hyperlipidemia     Hypertension, essential 11/08/2019    ALYSSA (iron deficiency anemia)     Osteopenia     Pacemaker     TIA (transient ischemic attack) 11/08/2019       Past Surgical History:   Procedure Laterality Date    CARDIAC PACEMAKER PLACEMENT        12/29/22 1440   OT Last Visit   OT Visit Date 12/29/22   Note Type   Note type Evaluation  (+ treatment 5302-1345)   Pain Assessment   Pain Assessment Tool 0-10   Pain Score 10 - Worst Possible Pain   Pain Location/Orientation Orientation: Left; Location: Knee   Pain Radiating Towards throughout leg   Pain Onset/Description Onset: Ongoing;Frequency: Intermittent; Descriptor: Sore;Descriptor: Throbbing  (worsens with mobility, WBing, repositioning)   Effect of Pain on Daily Activities comfort, WBing tolerance, tolerance for repositioning   Hospital Pain Intervention(s) Repositioned; Emotional support;Cold applied   Multiple Pain Sites No   Restrictions/Precautions   Weight Bearing Precautions Per Order Yes   LLE Weight Bearing Per Order WBAT  (per orthopedics)   Other Precautions Chair Alarm; Bed Alarm;Multiple lines; Fall Risk;Pain;Hard of hearing   Home Living   Type of [address] One level;Performs ADLs on one level; Able to live on main level with bedroom/bathroom; Elevator  (2+1 MARILYNN c HR)   Bathroom Shower/Tub Walk-in shower   Bathroom Toilet Standard   Bathroom Equipment Grab bars in shower   [address] Walker;Cane  (rollator used at baseline)   Additional Comments pt reports sleeping on couch at baseline   Prior Function   Level of [place] Independent with ADLs; Independent with functional mobility; Needs assistance with IADLS   Lives With Son   Receives Help From Family  (daughter lives next door, visits daily  Other daughter lives locally)   IADLs Family/Friend/Other provides transportation; Family/Friend/Other provides medication management; Independent with meal prep  (Son completes home management tasks)   Falls in the last 6 months 1 to 4  (1 fall ~3 weeks ago, denies others)   Vocational Retired   Comments Pt typically ambulates with no AD, however began using rollator approx 2 months ago per daughter and pt  Pt's son works during the day time, but her daughter lives next door and provides assisance PRN throughout the day  Pt is typically (I) with all ADLs and meal prep, only has assistance for med management and home managment   Lifestyle   Autonomy At baseline, pt is Mod (I) c rollator  (I) c ADLs, A with IADLs  Lives c son in 1 story home with 3 MARILYNN  Reciprocal Relationships 2 supportive daughters, son   Service to Others retired   Intrinsic Gratification pt expressing importance in maintaining active and independent lifestyle   General   Additional Pertinent History Pt admitted d/t N/V and bronchitis  Found to be in sepsis  Presenting today with CECI, as well   Ortho following d/t chronic R knee pain, s/p aspiration at bedside and coristone injection with no pain relief, currently WBAT   Family/Caregiver Present Yes  (daughter present)   ADL   Eating Assistance 5  Supervision/Setup   Grooming Assistance 5  Supervision/Setup   UB Bathing Assistance 5  Supervision/Setup   LB Bathing Assistance 3  Moderate Assistance   UB Dressing Assistance 3  Moderate Assistance   LB Dressing Assistance 3  Moderate Assistance   LB Dressing Deficit Don/doff R sock; Increased time to complete   Toileting Assistance  3  Moderate Assistance   Functional Assistance 3  Moderate Assistance   Additional Comments Pt limited generalized weakess, decreased activity tolerance, high amounts of pain in RLE impacting functional mobility and standing tolerance  Decreased balance   Bed Mobility   Supine to Sit 3  Moderate assistance   Additional items Assist x 1;HOB elevated; Bedrails; Increased time required;Verbal cues   Sit to Supine Unable to assess   Additional Comments sat EOB with close supervision  Transfers   Sit to Stand 4  Minimal assistance   Additional items Assist x 1; Increased time required;Verbal cues   Stand to Sit 3  Moderate assistance   Additional items Assist x 1; Increased time required;Verbal cues   Stand pivot 2  Maximal assistance   Additional items Assist x 1; Increased time required;Verbal cues;Armrests  (RW)   Additional Comments VC and increased time for proper hand / foot placements during transfers  Upon standing, pt significantly retropulsive, Max A + VC and tactile for weight shifting, improved to Mod A to maintain static standing   See below for additional transfer training in subsequent treatment session   Balance   Static Sitting Fair +   Dynamic Sitting Fair   Static Standing Poor +   Dynamic Standing Poor   Activity Tolerance   Activity Tolerance Patient limited by pain   Medical Staff Made Aware CM, SLIM   Nurse Made Aware RN Cheryle Lobe pre/post session   RUE Assessment   RUE Assessment WFL  (Full AROM, MMT 4+/5 based on functional assessment)   RUE Strength   RUE Overall Strength Within Functional Limits - able to perform ADL tasks with strength   LUE Assessment LUE Assessment WFL  (Full AROM, MMT 4+/5 based on functional assessment)   LUE Strength   LUE Overall Strength Within Functional Limits - able to perform ADL tasks with strength   Hand Function   Gross Motor Coordination Functional   Fine Motor Coordination Functional   Sensation   Light Touch No apparent deficits   Cognition   Overall Cognitive Status WFL   Arousal/Participation Alert; Cooperative   Attention Within functional limits   Orientation Level Oriented X4   Memory Within functional limits   Following Commands Follows multistep commands with increased time or repetition  (pt extremely [place])   Comments Pt agreeable to OT session, able to provide full PLOF and home set up with increased time d/t significant [place]  Pt demonstrates high motivation to return home and return to active lifestyle, stating "Im not just going to sit around"   Assessment   Limitation Decreased ADL status; Decreased UE strength;Decreased Safe judgement during ADL;Decreased endurance;Decreased self-care trans;Decreased high-level ADLs   Prognosis Good   Assessment Patient is a 80 y o  female seen for OT evaluation + treatment at [place] [address] following admission on 12/26/2022  s/p Sepsis ([address] )  Comorbidities and significant PMHx impacting functional performance include: A-FIB, HTN, CKD, mixed hyperlipidemia, generalized weakness, moderate protein calorie malnutrition, osteoarthritis, Hx of TIA, mild asthma, pseudogout  Patient presents with active orders for OT eval and treat and WBAT LLE Performed at least 2 patient identifiers during session including name and wristband  At baseline, pt is Mod (I) c rollator  (I) c ADLs, A with IADLs  Lives c son in 1 story home with 3 MARILYNN  Upon initial evaluation, patient requires supervision for UB ADLs, mod assistance for LB ADLs, and mod-max assistance for transfers and functional mobility short distance  with RW   Based on functional eval, pt presents with intact  attention, intact  safety awareness, intact  problem solving skills, and intact  memory  Occupational performance is affected by the following deficits:  decreased muscular strength , decreased standing tolerance for self care tasks , decreased dynamic balance impacting functional reach, decreased trunk control , decreased activity tolerance  and (+) pain  Based on these findings, functional performance deficits, and medical complexity pt has been identified as a high3 complexity evaluation  Personal factors impacting performance include: decreased (+) Hx of falls , steps to enter home, decreased IADL independence and High fall risk   Patient would benefit from OT services within the acute care setting to maximize level of functional independence in the following occupational areas bathing/showering, toileting, dressing , personal hygiene/grooming , bed mobility , functional mobility, transfer to all surfaces, meal preparation and fall prevention   From OT standpoint, recommendation at time of D/C would be post-acute rehabilitation  vs HHOT pending improved functional mobility status c improved pain control   Goals   Patient Goals "to get back to doing things for myself"   Plan   Treatment Interventions ADL retraining;Functional transfer training;UE strengthening/ROM; Endurance training;Cognitive reorientation;Patient/family training;Equipment evaluation/education   Goal Expiration Date 01/08/23   OT Treatment Day 0   OT Frequency 3-5x/wk   Recommendation   OT Discharge Recommendation Post acute rehabilitation services  (vs HHOT pending improved pain control and functional mobility status)   Additional Comments  If pt to D/C home, recommend increased support / assistance throughout the day   AM-PAC Daily Activity Inpatient   Lower Body Dressing 2   Bathing 2   Toileting 2   Upper Body Dressing 3   Grooming 3   Eating 4   Daily Activity Raw Score 16   Daily Activity Standardized Score (Calc for Raw Score >=11) 35 96   AM-PAC Applied Cognition Inpatient   Following a Speech/Presentation 3   Understanding Ordinary Conversation 4   Taking Medications 3   Remembering Where Things Are Placed or Put Away 4   Remembering List of 4-5 Errands 4   Taking Care of Complicated Tasks 3   Applied Cognition Raw Score 21   Applied Cognition Standardized Score 44 3   Additional Treatment Session   Start Time 1516   End Time 1540   Treatment Assessment Pt participated in tx session following IE with intervention focus on functional transfer training and challenging activity tolerance  Pt expressing high amounts of pain when WBing, however determined to attempt further mobility during session  Pt able to complete ~5 steps with Max A x1 + RW, however significantly limited d/t pain  Pt with frequent "bursts" of pain causing multiple LOBs, max assistance required for RW management, as pt with difficulty maintaining appropriate proximity  Ambulating transfer to [facility] completed, pt requiring Max A d/t need for cueing for proper hand placements, RW management, and trunk management d/t poor proximity to sitting surface  Completed pericare (I) with steadying during dynamic reaching and assistance for clothing management, continued cues for proper hand placements during transfers  Ambulating transfer completed BSC>Bed, Min A for sit>supine c increased time + bed flat  Pt guarded with all mobility tasks d/t high amounts of pain in L knee  Further mobility deferred at this time d/t decreased activity tolerance and increased fatigue  Extensive time spent with pt and daughter at bedside to provide pt education, identify current barriers for safe D/C home, and educate on differences between therapy settings ( STR vs HHOT)   Specifically, educated regarding pt's need for increased assistance required (from baseline level) to tend to self care needs such as toileting, bathing/dressing, and need for physical assistance of Mod-Max assist x1 for safe functional mobility and transfers, and need for continued training on use of AD  All questions/concerns addressed  Additional Treatment Day 1   End of Consult   Education Provided Yes;Family or social support of family present for education by provider  (DCP)   Patient Position at End of Consult Supine;Bed/Chair alarm activated; All needs within reach  (pt declining sitting OOB at this time)   Nurse Communication Nurse aware of consult  Hina Wallis)   Pt will complete UB ADLs Independent  as needed for increased ADL independence within 10 days  Pt will complete LB ADLs min A   with use of LHAE as needed for increased ADL independence within 10 days  Pt will complete toileting Supervision  with use of DME for increased ADL independence within 10 days  Pt will demonstrate proper body mechanics to complete self-care transfers and functional mobility with Min A and use of AD PRN for increased safety and functional independence within 10 days  Pt will demonstrate standing tolerance of 5 min with Supervision and use of AD PRN for increased activity tolerance during ADL/IADL tasks within 10 days  Pt will demonstrate proper body mechanics and fall prevention strategies during 100% of tx sessions for increased safety awareness during ADL/IADLs    Pt will demonstrate OOB sitting tolerance of 2-4 hr/day for increased activity tolerance and engagement in self care tasks within 10 days  Pt will demonstrate use of pain management techniques PRN for increased activity tolerance and engagement       Cecille Kayser

## 2022-12-29 NOTE — ASSESSMENT & PLAN NOTE
Malnutrition Findings:      Adult Degree of Malnutrition: Malnutrition of moderate degree  Malnutrition Characteristics: Muscle loss, Fat loss                  360 Statement: Moderate malnutrition in the context of chronic illness r/t inadequate energy intake with advanced age as evidenced by moderate muscle wasting and fat loss (cheeks, clavicles, temples, orbitals  Will treat with nutrition therapy, PO diet, possibly nutrition supplements  BMI Findings: Body mass index is 19 48 kg/m²

## 2022-12-29 NOTE — PROGRESS NOTES
Jesus 45  Progress Note - James Apgar 1926, 80 y o  female MRN: [id number]  Unit/Bed#: -Anup Encounter: [id number]  Primary Care Provider: Ryanne Moore MD   Date and time admitted to hospital: 2022  9:34 AM    Osteoarthritis  Assessment & Plan  · Ortho following for osteoarthritis of the left knee  Joint effusion aspirated 2022  Elevated leukocyte likely related to inflammatory changes rather than infectious  Ortho considering steroid injection today, appreciate comanagement  · KJ, RF negative    Moderate protein-calorie malnutrition (HCC)  Assessment & Plan  Malnutrition Findings:      Adult Degree of Malnutrition: Malnutrition of moderate degree  Malnutrition Characteristics: Muscle loss, Fat loss                  360 Statement: Moderate malnutrition in the context of chronic illness r/t inadequate energy intake with advanced age as evidenced by moderate muscle wasting and fat loss (cheeks, clavicles, temples, orbitals  Will treat with nutrition therapy, PO diet, possibly nutrition supplements  BMI Findings: Body mass index is 19 48 kg/m²  Generalized weakness  Assessment & Plan  · Likely secondary to acute illness  · Initiate fall precautions  · Continue Supportive Care  · OOB as tolerated  · PT/OT eval- plan for home with home PT    Mixed hyperlipidemia  Assessment & Plan  · Continue statin therapy    Stage 3b chronic kidney disease [facility])  Assessment & Plan  Lab Results   Component Value Date    EGFR 26 2022    EGFR 33 2022    EGFR 53 2022    CREATININE 1 61 (H) 2022    CREATININE 1 33 (H) 2022    CREATININE 0 91 2022     Creatinine increased to 1 61 today, hold lisinopril for CECI and gently hydrate  · Continue to monitor while admitted    Hypertension, essential  Assessment & Plan  · BP borderline low this morning, awaiting repeat    Holding lisinopril for CECI    Chronic atrial fibrillation [facility])  Assessment & Plan  · Rate controlled on home meds  · Cardizem and Toprol-XL  · A/C with Eliquis  · Continue Outpatient follow up with Primary Cardiologist    * Sepsis [facility])  Assessment & Plan  Patient presented with complaints of abdominal pain, nausea and shortness of breath over the last two day  Per patient's daughter/caregiver, patient with generalized weakness/fatigue and fevers while at home  · SIRs criteria: Tachycardia (HR >90), Tachypnea (RR >20), and Leukocytosis (WBC >16k)   · Likely secondary to viral etiology  · CXR: Chronic small amount of right basilar pleural fluid or thickening  Large hiatal hernia  No evidence of acute abnormality in the chest    · CT A/P: Right greater than left basilar bronchial thickening suggestive of bronchitis without pulmonary infiltrate identified  Cardiomegaly and small pericardial effusion similar to prior exam  Right inguinal hernia containing fat and ileal loops, nondilated and nonobstructed, stable  · Lactic/Procal: Negative  · COVID/Flu/RSV: Negative  · BC x2: Pending, no prelim yet  · U/A: (+): Blood/Ketones  · Hold abx this morning and monitor  · Increased leukocytes in knee aspirate, but not quite in the range of infectious arthritis  Culture with no bacteria        VTE Pharmacologic Prophylaxis: VTE Score: 7 Moderate Risk (Score 3-4) - Pharmacological DVT Prophylaxis Ordered: apixaban (Eliquis)  Patient Centered Rounds: I performed bedside rounds with nursing staff today  Discussions with Specialists or Other Care Team Provider: Ortho    Education and Discussions with Family / Patient: Updated  (daughter) at bedside  Time Spent for Care: 45 minutes  More than 50% of total time spent on counseling and coordination of care as described above      Current Length of Stay: 3 day(s)  Current Patient Status: Inpatient   Certification Statement: The patient will continue to require additional inpatient hospital stay due to Knee pain  Discharge Plan: Anticipate discharge tomorrow to home  Code Status: Level 1 - Full Code    Subjective:   Seen and examined at bedside this morning  Patient states she rested comfortably, but is experiencing excruciating 10 out of 10 knee pain at the time of exam   Patient states that her pain is generally well controlled throughout the day, it shows morning seem to be a little difficult for the knee  Patient and daughter both affirmed that this is how her knee acts at home    Objective:     Vitals:   Temp (24hrs), Av 9 °F (36 6 °C), Min:97 7 °F (36 5 °C), Max:98 2 °F (36 8 °C)    Temp:  [97 7 °F (36 5 °C)-98 2 °F (36 8 °C)] 97 9 °F (36 6 °C)  HR:  [69-81] 73  Resp:  [16] 16  BP: ()/() 137/117  SpO2:  [92 %-97 %] 97 %  Body mass index is 19 48 kg/m²  Input and Output Summary (last 24 hours):   No intake or output data in the 24 hours ending 22 1632    Physical Exam:   Physical Exam  Vitals reviewed  Constitutional:       Appearance: Normal appearance  HENT:      Head: Normocephalic and atraumatic  Mouth/Throat:      Mouth: Mucous membranes are moist    Eyes:      General: No scleral icterus  Cardiovascular:      Rate and Rhythm: Normal rate and regular rhythm  Pulses: Normal pulses  Heart sounds: No murmur heard  No friction rub  No gallop  Pulmonary:      Effort: Pulmonary effort is normal  No respiratory distress  Breath sounds: Wheezing present  No rhonchi or rales  Abdominal:      General: Abdomen is flat  Palpations: Abdomen is soft  Tenderness: There is no abdominal tenderness  There is no right CVA tenderness, left CVA tenderness or guarding  Musculoskeletal:         General: No signs of injury  Right lower leg: No edema  Left lower leg: No edema  Skin:     General: Skin is warm  Capillary Refill: Capillary refill takes less than 2 seconds  Neurological:      General: No focal deficit present        Mental Status: She is alert and oriented to person, place, and time  Psychiatric:         Mood and Affect: Mood normal          Behavior: Behavior normal           Additional Data:     Labs:  Results from last 7 days   Lab Units 12/28/22  0522 12/27/22  0500 12/26/22  1006   WBC Thousand/uL 13 86*   < > 16 41*   HEMOGLOBIN g/dL 9 7*   < > 11 3*   HEMATOCRIT % 29 9*   < > 34 7*   PLATELETS Thousands/uL 278   < > 322   NEUTROS PCT %  --   --  86*   LYMPHS PCT %  --   --  5*   MONOS PCT %  --   --  9   EOS PCT %  --   --  0    < > = values in this interval not displayed  Results from last 7 days   Lab Units 12/29/22  1435 12/28/22  0522 12/27/22  0500   SODIUM mmol/L 135   < > 133*   POTASSIUM mmol/L 4 8   < > 4 1   CHLORIDE mmol/L 100   < > 100   CO2 mmol/L 25   < > 26   BUN mg/dL 42*   < > 15   CREATININE mg/dL 1 44*   < > 0 91   ANION GAP mmol/L 10   < > 7   CALCIUM mg/dL 8 3*   < > 8 8   ALBUMIN g/dL  --   --  3 2*   TOTAL BILIRUBIN mg/dL  --   --  0 84   ALK PHOS U/L  --   --  88   ALT U/L  --   --  10   AST U/L  --   --  13   GLUCOSE RANDOM mg/dL 168*   < > 82    < > = values in this interval not displayed  Results from last 7 days   Lab Units 12/26/22  1006   LACTIC ACID mmol/L 1 2   PROCALCITONIN ng/ml 0 06       Lines/Drains:  Invasive Devices     Peripheral Intravenous Line  Duration           Peripheral IV 12/26/22 Left;Ventral (anterior) Forearm 2 days                      Imaging: Reviewed radiology reports from this admission including: xray(s)    Recent Cultures (last 7 days):   Results from last 7 days   Lab Units 12/27/22  1010 12/26/22  1026 12/26/22  1006   BLOOD CULTURE   --  No Growth at 72 hrs  No Growth at 72 hrs     GRAM STAIN RESULT  2+ Polys  No bacteria seen  --   --    BODY FLUID CULTURE, STERILE  No growth  --   --        Last 24 Hours Medication List:   Current Facility-Administered Medications   Medication Dose Route Frequency Provider Last Rate   • acetaminophen  650 mg Oral Q8H Neema Landry     • apixaban  2 5 mg Oral BID Marysol Diaz MD     • diltiazem  120 mg Oral Daily Marysol Diaz MD     • gabapentin  200 mg Oral BID Marysol Diaz MD     • ipratropium  0 5 mg Nebulization TID Marysol Diaz MD     • iron polysaccharides  150 mg Oral Daily Marysol Diaz MD     • levalbuterol  1 25 mg Nebulization TID Marysol Diaz MD     • lidocaine  1 patch Topical Daily Neema Landry     • metoprolol succinate  100 mg Oral Daily Marysol Diaz MD     • ondansetron  4 mg Intravenous Q6H PRN Marysol Diaz MD     • traMADol  50 mg Oral Q6H PRN Neema Landry          Today, Patient Was Seen By: Neema Landry    **Please Note: This note may have been constructed using a voice recognition system  **

## 2022-12-29 NOTE — CASE MANAGEMENT
Case Management Discharge Planning Note    Patient name Joanna Johnson  Location /-61 MRN [id number]  : 1926 Date 2022       Current Admission Date: 2022  Current Admission Diagnosis:Sepsis [facility])   Patient Active Problem List    Diagnosis Date Noted   • Osteoarthritis 2022   • Moderate protein-calorie malnutrition ([facility] [address] ) 2022   • Generalized weakness 2022   • Mild intermittent asthma without complication 3111   • Pseudogout 2022   • Mixed hyperlipidemia 2022   • Chronic neck pain 2022   • Vitamin D deficiency 2022   • Nonrheumatic aortic valve stenosis 2021   • Stage 3b chronic kidney disease ([facility] [address] ) 2021   • Sepsis ([facility] [address] ) 2021   • Chronic atrial fibrillation ([facility] [address] ) 2019   • Current use of long term anticoagulation 2019   • Hypertension, essential 2019   • TIA (transient ischemic attack) 2019   • Pacemaker 2013      LOS (days): 3  Geometric Mean LOS (GMLOS) (days): 3 50  Days to GMLOS:0 6     OBJECTIVE:  Risk of Unplanned Readmission Score: 14 98         Current admission status: Inpatient   Preferred Pharmacy:   [address], [address] [place] [address]  Phone: [phone] Fax: [place] [address]  Phone: [phone] Fax: [phone]    Primary Care Provider: Norris Parson MD    Primary Insurance: MEDICARE  Secondary Insurance: AARP    DISCHARGE DETAILS:                                                                                     IMM reviewed with patient's caregiver, patient's caregiver agrees with discharge determination    IMM Given (Date):: 22  IMM Given to[de-identified] Family  Family notified[de-identified] Yuri Revel (dtr)

## 2022-12-29 NOTE — ASSESSMENT & PLAN NOTE
· Ortho following for osteoarthritis of the left knee  Joint effusion aspirated 12/27/2022  Elevated leukocyte likely related to inflammatory changes rather than infectious    Ortho considering steroid injection today, appreciate comanagement  · KJ, RF negative

## 2022-12-29 NOTE — PLAN OF CARE
Problem: Potential for Falls  Goal: Patient will remain free of falls  Description: INTERVENTIONS:  - Educate patient/family on patient safety including physical limitations  - Instruct patient to call for assistance with activity   - Consult OT/PT to assist with strengthening/mobility   - Keep Call bell within reach  - Keep bed low and locked with side rails adjusted as appropriate  - Keep care items and personal belongings within reach  - Initiate and maintain comfort rounds  - Offer Toileting every 2 Hours, in advance of need  - Initiate/Maintain bed alarm  - Apply yellow socks and bracelet for high fall risk patients  - Consider moving patient to room near nurses station  Outcome: Progressing     Problem: Nutrition/Hydration-ADULT  Goal: Nutrient/Hydration intake appropriate for improving, restoring or maintaining nutritional needs  Description: Monitor and assess patient's nutrition/hydration status for malnutrition  Collaborate with interdisciplinary team and initiate plan and interventions as ordered  Monitor patient's weight and dietary intake as ordered or per policy  Utilize nutrition screening tool and intervene as necessary  Determine patient's food preferences and provide high-protein, high-caloric foods as appropriate       INTERVENTIONS:  - Monitor oral intake, urinary output, labs, and treatment plans  - Assess nutrition and hydration status and recommend course of action  - Evaluate amount of meals eaten  - Assist patient with eating if necessary   - Allow adequate time for meals  - Recommend/ encourage appropriate diets, oral nutritional supplements, and vitamin/mineral supplements  - Order, calculate, and assess calorie counts as needed  - Recommend, monitor, and adjust tube feedings and TPN/PPN based on assessed needs  - Assess need for intravenous fluids  - Provide specific nutrition/hydration education as appropriate  - Include patient/family/caregiver in decisions related to nutrition  Outcome: Progressing

## 2022-12-30 ENCOUNTER — TRANSITIONAL CARE MANAGEMENT (OUTPATIENT)
Dept: FAMILY MEDICINE CLINIC | Facility: CLINIC | Age: 87
End: 2022-12-30

## 2022-12-30 VITALS
TEMPERATURE: 97.7 F | WEIGHT: 106.5 LBS | OXYGEN SATURATION: 95 % | HEART RATE: 76 BPM | HEIGHT: 62 IN | RESPIRATION RATE: 16 BRPM | SYSTOLIC BLOOD PRESSURE: 140 MMHG | BODY MASS INDEX: 19.6 KG/M2 | DIASTOLIC BLOOD PRESSURE: 80 MMHG

## 2022-12-30 LAB
BACTERIA SPEC BFLD CULT: NO GROWTH
GRAM STN SPEC: NORMAL
GRAM STN SPEC: NORMAL
QRS AXIS: -63 DEGREES
QRSD INTERVAL: 114 MS
QT INTERVAL: 342 MS
QTC INTERVAL: 465 MS
T WAVE AXIS: 99 DEGREES
VENTRICULAR RATE: 111 BPM

## 2022-12-30 RX ADMIN — ACETAMINOPHEN 650 MG: 325 TABLET, FILM COATED ORAL at 01:24

## 2022-12-30 RX ADMIN — TRAMADOL HYDROCHLORIDE 50 MG: 50 TABLET, COATED ORAL at 06:03

## 2022-12-30 RX ADMIN — LIDOCAINE 5% 1 PATCH: 700 PATCH TOPICAL at 09:52

## 2022-12-30 RX ADMIN — LEVALBUTEROL HYDROCHLORIDE 1.25 MG: 1.25 SOLUTION, CONCENTRATE RESPIRATORY (INHALATION) at 08:15

## 2022-12-30 RX ADMIN — METOPROLOL SUCCINATE 100 MG: 100 TABLET, EXTENDED RELEASE ORAL at 09:50

## 2022-12-30 RX ADMIN — APIXABAN 2.5 MG: 2.5 TABLET, FILM COATED ORAL at 09:50

## 2022-12-30 RX ADMIN — DILTIAZEM HYDROCHLORIDE 120 MG: 120 CAPSULE, COATED, EXTENDED RELEASE ORAL at 09:50

## 2022-12-30 RX ADMIN — IPRATROPIUM BROMIDE 0.5 MG: 0.5 SOLUTION RESPIRATORY (INHALATION) at 08:15

## 2022-12-30 RX ADMIN — GABAPENTIN 200 MG: 100 CAPSULE ORAL at 09:50

## 2022-12-30 RX ADMIN — ACETAMINOPHEN 650 MG: 325 TABLET, FILM COATED ORAL at 09:50

## 2022-12-30 RX ADMIN — POLYSACCHARIDE-IRON COMPLEX 150 MG: 150 CAPSULE ORAL at 09:50

## 2022-12-30 NOTE — ASSESSMENT & PLAN NOTE
Lab Results   Component Value Date    EGFR 30 12/29/2022    EGFR 26 12/29/2022    EGFR 33 12/28/2022    CREATININE 1 44 (H) 12/29/2022    CREATININE 1 61 (H) 12/29/2022    CREATININE 1 33 (H) 12/28/2022     Creatinine increased to 1 61 today, hold lisinopril for CECI and gently hydrate  · Continue to monitor while admitted

## 2022-12-30 NOTE — DISCHARGE INSTR - AVS FIRST PAGE
Hold off on taking lisinopril until Monday, January 2, 2023    Remain hydrated and repeat BMP next week

## 2022-12-30 NOTE — DISCHARGE SUMMARY
Luis U  66   Discharge- Hayti Locks 9/7/1926, 80 y o  female MRN: [id number]  Unit/Bed#: -Anup Encounter: [id number]  Primary Care Provider: Soledad Nicole MD   Date and time admitted to hospital: 12/26/2022  9:34 AM    Osteoarthritis  Assessment & Plan  · Ortho following for osteoarthritis of the left knee  Joint effusion aspirated 12/27/2022  Elevated leukocyte likely related to inflammatory changes rather than infectious  Ortho considering steroid injection today, appreciate comanagement  · KJ, RF negative    Moderate protein-calorie malnutrition (HCC)  Assessment & Plan  Malnutrition Findings:      Adult Degree of Malnutrition: Malnutrition of moderate degree  Malnutrition Characteristics: Muscle loss, Fat loss                  360 Statement: Moderate malnutrition in the context of chronic illness r/t inadequate energy intake with advanced age as evidenced by moderate muscle wasting and fat loss (cheeks, clavicles, temples, orbitals  Will treat with nutrition therapy, PO diet, possibly nutrition supplements  BMI Findings: Body mass index is 19 48 kg/m²  Generalized weakness  Assessment & Plan  · Likely secondary to acute illness  · Initiate fall precautions  · Continue Supportive Care  · OOB as tolerated  · PT/OT eval- plan for home with home PT    Mixed hyperlipidemia  Assessment & Plan  · Continue statin therapy    Stage 3b chronic kidney disease [facility])  Assessment & Plan  Lab Results   Component Value Date    EGFR 30 12/29/2022    EGFR 26 12/29/2022    EGFR 33 12/28/2022    CREATININE 1 44 (H) 12/29/2022    CREATININE 1 61 (H) 12/29/2022    CREATININE 1 33 (H) 12/28/2022     Creatinine increased to 1 61 today, hold lisinopril for CECI and gently hydrate  · Continue to monitor while admitted    Hypertension, essential  Assessment & Plan  · BP borderline low this morning, awaiting repeat    Holding lisinopril for CECI    Chronic atrial fibrillation [facility])  Assessment & Plan  · Rate controlled on home meds  · Cardizem and Toprol-XL  · A/C with Eliquis  · Continue Outpatient follow up with Primary Cardiologist    * Sepsis [facility])  Assessment & Plan  Patient presented with complaints of abdominal pain, nausea and shortness of breath over the last two day  Per patient's daughter/caregiver, patient with generalized weakness/fatigue and fevers while at home  · SIRs criteria: Tachycardia (HR >90), Tachypnea (RR >20), and Leukocytosis (WBC >16k)   · Likely secondary to viral etiology  · CXR: Chronic small amount of right basilar pleural fluid or thickening  Large hiatal hernia  No evidence of acute abnormality in the chest    · CT A/P: Right greater than left basilar bronchial thickening suggestive of bronchitis without pulmonary infiltrate identified  Cardiomegaly and small pericardial effusion similar to prior exam  Right inguinal hernia containing fat and ileal loops, nondilated and nonobstructed, stable  · Lactic/Procal: Negative  · COVID/Flu/RSV: Negative  · BC x2: Pending, no prelim yet  · U/A: (+): Blood/Ketones  · Hold abx this morning and monitor  · Increased leukocytes in knee aspirate, but not quite in the range of infectious arthritis  Culture with no bacteria      Medical Problems     Resolved Problems  Date Reviewed: 12/27/2022   None       Discharging Physician / Practitioner: Conchita Leavitt  PCP: Sandra Hammond MD  Admission Date:   Admission Orders (From admission, onward)     Ordered        12/26/22 [address]  Once                      Discharge Date: 12/30/22    Consultations During Hospital Stay:  · Orthopedics    Procedures Performed:   · L knee steroid injection 12/29/22    Significant Findings / Test Results:   · Cre 1 61 12/29    Test Results Pending at Discharge (will require follow up):    · None     Outpatient Tests Requested:  · None    Complications:  None    Reason for Admission: Nausea, vomiting, and diarrhea    Hospital Course:   Mayela Lawson is a 80 y o  female patient who originally presented to the hospital on 12/26/2022 due to nausea, vomiting and diarrhea  She was brought in for was believed to be food poisoning but what may have been viral gastroenteritis type symptoms for about a week prior to arrival  It seemed to have started after having lunch at a restaurant and was associated with some crampy abdominal pain  In the ED patient was noted to have an elevated temperature at 100 5  There was concern for sepsis with tachypnea, tachycardia and a white count of 16,000  Chest x-ray did not demonstrate any infiltrate and CT of the abdomen pelvis showed some bronchial thickening with a question of bronchitis  Blood cultures were drawn and patient was started on empiric antibiotics with Rocephin  Additionally she was complaining of severe left knee pain due to known severe osteoarthritis  Remainder of patient's hospitalization was generally unremarkable  Patient's septic criteria improved with IV hydration and continued of supportive measures  Her culture showed no growth and eventually antibiotics were stopped because there is not felt to be any obvious source of infection  Her osteoarthritis became so severe that orthopedics was consulted for knee aspiration  There was no evidence of septic arthritis  And after sepsis was ruled out orthopedics did inject her knee with steroids with great improvement in her pain  Of note patient did develop a mild bump in her creatinine to 1 61 believed to be related to her lisinopril as well as poor p o  intake with gentle hydration her renal functions improved and she was given a lab slip for outpatient repeat BMP  She was advised to start lisinopril at a lower dose on Monday and remain hydrated  We recommended outpatient orthopedic follow-up    Of note patient did have significant PT needs but family feels she would be better served with home physical therapy  Please see above list of diagnoses and related plan for additional information  Condition at Discharge: fair    Discharge Day Visit / Exam:   Subjective: Laying in bed comfortably in no acute distress  Patient is markedly hard of hearing  Vitals: Blood Pressure: 140/80 (12/30/22 0811)  Pulse: 76 (12/30/22 0811)  Temperature: 97 7 °F (36 5 °C) (12/30/22 0811)  Temp Source: Oral (12/30/22 0811)  Respirations: 16 (12/30/22 0811)  Height: 5' 2" (157 5 cm) (12/26/22 1415)  Weight - Scale: 48 3 kg (106 lb 8 oz) (12/30/22 0600)  SpO2: 95 % (12/30/22 0815)  Exam:   Physical Exam  Vitals reviewed  Constitutional:       Appearance: Normal appearance  HENT:      Head: Normocephalic and atraumatic  Mouth/Throat:      Mouth: Mucous membranes are moist    Eyes:      General: No scleral icterus  Cardiovascular:      Rate and Rhythm: Normal rate and regular rhythm  Pulses: Normal pulses  Heart sounds: No murmur heard  No friction rub  No gallop  Pulmonary:      Effort: Pulmonary effort is normal  No respiratory distress  Breath sounds: No wheezing, rhonchi or rales  Abdominal:      General: Abdomen is flat  Palpations: Abdomen is soft  Tenderness: There is no abdominal tenderness  There is no right CVA tenderness, left CVA tenderness or guarding  Musculoskeletal:         General: No signs of injury  Right lower leg: No edema  Left lower leg: No edema  Skin:     General: Skin is warm  Capillary Refill: Capillary refill takes less than 2 seconds  Neurological:      General: No focal deficit present  Mental Status: She is alert and oriented to person, place, and time  Psychiatric:         Mood and Affect: Mood normal          Behavior: Behavior normal           Discussion with Family: Updated  (daughter) at bedside      Discharge instructions/Information to patient and family:   See after visit summary for information provided to patient and family  Provisions for Follow-Up Care:  See after visit summary for information related to follow-up care and any pertinent home health orders  Disposition:   Home    Planned Readmission: No     Discharge Statement:  I spent 45 minutes discharging the patient  This time was spent on the day of discharge  I had direct contact with the patient on the day of discharge  Greater than 50% of the total time was spent examining patient, answering all patient questions, arranging and discussing plan of care with patient as well as directly providing post-discharge instructions  Additional time then spent on discharge activities  Discharge Medications:  See after visit summary for reconciled discharge medications provided to patient and/or family        **Please Note: This note may have been constructed using a voice recognition system**

## 2022-12-30 NOTE — CASE MANAGEMENT
Case Management Discharge Planning Note    Patient name Clementina Lim  Location /-71 MRN [id number]  : 1926 Date 2022       Current Admission Date: 2022  Current Admission Diagnosis:Sepsis [facility])   Patient Active Problem List    Diagnosis Date Noted   • Osteoarthritis 2022   • Moderate protein-calorie malnutrition ([facility] [address] ) 2022   • Generalized weakness 2022   • Mild intermittent asthma without complication 465   • Pseudogout 2022   • Mixed hyperlipidemia 2022   • Chronic neck pain 2022   • Vitamin D deficiency 2022   • Nonrheumatic aortic valve stenosis 2021   • Stage 3b chronic kidney disease ([facility] [address] ) 2021   • Sepsis ([facility] [address] ) 2021   • Chronic atrial fibrillation ([facility] [address] ) 2019   • Current use of long term anticoagulation 2019   • Hypertension, essential 2019   • TIA (transient ischemic attack) 2019   • Pacemaker 2013      LOS (days): 4  Geometric Mean LOS (GMLOS) (days): 3 50  Days to GMLOS:-0 4     OBJECTIVE:  Risk of Unplanned Readmission Score: 15 8         Current admission status: Inpatient   Preferred Pharmacy:   [address], [address] [place] [address]  Phone: [phone] Fax: [facility] [place] [address]  Phone: [phone] Fax: [phone]    Primary Care Provider: Mi Saldaña MD    Primary Insurance: MEDICARE  Secondary Insurance: AARP    DISCHARGE DETAILS:                                          Other Referral/Resources/Interventions Provided:  Interventions: Transportation  Referral Comments: Transport confirmed for 1200 today to home via [facility]  All parties notified of same  Transported by [employer] and Unit #):  [facility]  ETA of Transport (Date): 22  ETA of Transport (Time): 1200

## 2022-12-30 NOTE — PLAN OF CARE
Problem: Potential for Falls  Goal: Patient will remain free of falls  Description: INTERVENTIONS:  - Educate patient/family on patient safety including physical limitations  - Instruct patient to call for assistance with activity   - Consult OT/PT to assist with strengthening/mobility   - Keep Call bell within reach  - Keep bed low and locked with side rails adjusted as appropriate  - Keep care items and personal belongings within reach  - Initiate and maintain comfort rounds  - Make Fall Risk Sign visible to staff  - Offer Toileting every 2 Hours, in advance of need  - Initiate/Maintain  bed alarm  - Obtain necessary fall risk management equipment: n/a  - Apply yellow socks and bracelet for high fall risk patients  - Consider moving patient to room near nurses station  Outcome: Not Progressing     Problem: Nutrition/Hydration-ADULT  Goal: Nutrient/Hydration intake appropriate for improving, restoring or maintaining nutritional needs  Description: Monitor and assess patient's nutrition/hydration status for malnutrition  Collaborate with interdisciplinary team and initiate plan and interventions as ordered  Monitor patient's weight and dietary intake as ordered or per policy  Utilize nutrition screening tool and intervene as necessary  Determine patient's food preferences and provide high-protein, high-caloric foods as appropriate       INTERVENTIONS:  - Monitor oral intake, urinary output, labs, and treatment plans  - Assess nutrition and hydration status and recommend course of action  - Evaluate amount of meals eaten  - Assist patient with eating if necessary   - Allow adequate time for meals  - Recommend/ encourage appropriate diets, oral nutritional supplements, and vitamin/mineral supplements  - Order, calculate, and assess calorie counts as needed  - Recommend, monitor, and adjust tube feedings and TPN/PPN based on assessed needs  - Assess need for intravenous fluids  - Provide specific nutrition/hydration education as appropriate  - Include patient/family/caregiver in decisions related to nutrition  Outcome: Not Progressing

## 2022-12-30 NOTE — PLAN OF CARE
Problem: Potential for Falls  Goal: Patient will remain free of falls  Description: INTERVENTIONS:  - Educate patient/family on patient safety including physical limitations  - Instruct patient to call for assistance with activity   - Consult OT/PT to assist with strengthening/mobility   - Keep Call bell within reach  - Keep bed low and locked with side rails adjusted as appropriate  - Keep care items and personal belongings within reach  - Initiate and maintain comfort rounds  - Make Fall Risk Sign visible to staff  - Offer Toileting every 2 Hours, in advance of need  - Initiate/Maintain bed alarm  - Obtain necessary fall risk management equipment: bed alarm  - Apply yellow socks and bracelet for high fall risk patients  - Consider moving patient to room near nurses station  Outcome: Progressing     Problem: Nutrition/Hydration-ADULT  Goal: Nutrient/Hydration intake appropriate for improving, restoring or maintaining nutritional needs  Description: Monitor and assess patient's nutrition/hydration status for malnutrition  Collaborate with interdisciplinary team and initiate plan and interventions as ordered  Monitor patient's weight and dietary intake as ordered or per policy  Utilize nutrition screening tool and intervene as necessary  Determine patient's food preferences and provide high-protein, high-caloric foods as appropriate       INTERVENTIONS:  - Monitor oral intake, urinary output, labs, and treatment plans  - Assess nutrition and hydration status and recommend course of action  - Evaluate amount of meals eaten  - Assist patient with eating if necessary   - Allow adequate time for meals  - Recommend/ encourage appropriate diets, oral nutritional supplements, and vitamin/mineral supplements  - Order, calculate, and assess calorie counts as needed  - Assess need for intravenous fluids  - Provide specific nutrition/hydration education as appropriate  - Include patient/family/caregiver in decisions related to nutrition  Outcome: Progressing     Problem: PAIN - ADULT  Goal: Verbalizes/displays adequate comfort level or baseline comfort level  Description: Interventions:  - Encourage patient to monitor pain and request assistance  - Assess pain using appropriate pain scale  - Administer analgesics based on type and severity of pain and evaluate response  - Implement non-pharmacological measures as appropriate and evaluate response  - Consider cultural and social influences on pain and pain management  - Notify physician/advanced practitioner if interventions unsuccessful or patient reports new pain  Outcome: Progressing

## 2022-12-31 LAB
BACTERIA BLD CULT: NORMAL
BACTERIA BLD CULT: NORMAL

## 2023-01-02 DIAGNOSIS — G50.0 TRIGEMINAL NEURALGIA: ICD-10-CM

## 2023-01-03 ENCOUNTER — TELEPHONE (OUTPATIENT)
Dept: CASE MANAGEMENT | Facility: HOSPITAL | Age: 88
End: 2023-01-03

## 2023-01-03 ENCOUNTER — TELEPHONE (OUTPATIENT)
Dept: FAMILY MEDICINE CLINIC | Facility: CLINIC | Age: 88
End: 2023-01-03

## 2023-01-03 RX ORDER — TRAMADOL HYDROCHLORIDE 50 MG/1
50 TABLET ORAL DAILY
Qty: 90 TABLET | Refills: 0 | Status: SHIPPED | OUTPATIENT
Start: 2023-01-03 | End: 2023-01-05 | Stop reason: SDUPTHER

## 2023-01-03 NOTE — TELEPHONE ENCOUNTER
Hi  This is in reference to [address] [facility] date of birth? [birth year]  My name 's Jensen Dominguez  I'm an RN with [facility], home health  We saw her this weekend on post a hospitalization  She was discharged, I believe it was Friday  And I just wanted to confirm that her family physician will be signing our orders  I think her doctor is doctor debra Forrester, I probably totally butchered that they ordered nursing PT and OT to follow with her  And I believe she has appointment with the family doctor on Thursday  So I just wanted to make you aware she's with us for home care  And if we fax over the orders, could he or she please sign them for us again? My name is Jensen Dominguez  I'm with [facility] home health  Our office number is two, one, five, seven, zero, three, three, five, two, two  And fax number is two, one, five, seven, zero, three, three, five, two, eight  And this is reference to Lázaro White  Thank you  Have a great day  Are you Ok to sign orders For [facility] home Care?

## 2023-01-03 NOTE — TELEPHONE ENCOUNTER
Cm received a phone call from patient's daughter Kateryna Zepeda in regards to [address] [facility] services with [place] [address]  Per daughter, there was a RN who opened the case with patient and that Rn was very helpful  However, patient was told to fast for blood work that RN from [place] [address] was suppose to draw, but they never showed up  Daughter stated that she attempted several times to call [facility] over the holidays but had no response  Ramana informed Kateryna Zepeda that she will reach out to [facility] at [place] [address] to discuss case and have her call daughter back to see if situation could be fixed  Cm called [facility] and explained situation  [facility] to call Kateryna Zepeda back at see if services could be recovered and for [facility] to continue to provide services

## 2023-01-04 NOTE — TELEPHONE ENCOUNTER
Called [phone] [facility] and notified them that we are her provider and we can sign off on orders as they come in  Spoke with Hay Gaona and gave her the appropriate fax information

## 2023-01-05 ENCOUNTER — TELEMEDICINE (OUTPATIENT)
Dept: FAMILY MEDICINE CLINIC | Facility: CLINIC | Age: 88
End: 2023-01-05

## 2023-01-05 ENCOUNTER — TELEPHONE (OUTPATIENT)
Dept: FAMILY MEDICINE CLINIC | Facility: CLINIC | Age: 88
End: 2023-01-05

## 2023-01-05 DIAGNOSIS — E78.2 MIXED HYPERLIPIDEMIA: ICD-10-CM

## 2023-01-05 DIAGNOSIS — N18.32 STAGE 3B CHRONIC KIDNEY DISEASE (HCC): ICD-10-CM

## 2023-01-05 DIAGNOSIS — I10 HYPERTENSION, ESSENTIAL: ICD-10-CM

## 2023-01-05 DIAGNOSIS — J45.20 MILD INTERMITTENT ASTHMA WITHOUT COMPLICATION: ICD-10-CM

## 2023-01-05 DIAGNOSIS — I48.20 CHRONIC ATRIAL FIBRILLATION (HCC): ICD-10-CM

## 2023-01-05 DIAGNOSIS — G50.0 TRIGEMINAL NEURALGIA: ICD-10-CM

## 2023-01-05 DIAGNOSIS — G89.29 CHRONIC NECK PAIN: ICD-10-CM

## 2023-01-05 DIAGNOSIS — M54.2 CHRONIC NECK PAIN: ICD-10-CM

## 2023-01-05 DIAGNOSIS — R53.1 GENERALIZED WEAKNESS: Primary | ICD-10-CM

## 2023-01-05 DIAGNOSIS — E44.0 MODERATE PROTEIN-CALORIE MALNUTRITION (HCC): ICD-10-CM

## 2023-01-05 PROBLEM — A41.9 SEPSIS (HCC): Status: RESOLVED | Noted: 2021-09-08 | Resolved: 2023-01-05

## 2023-01-05 RX ORDER — TRAMADOL HYDROCHLORIDE 50 MG/1
50 TABLET ORAL DAILY
Qty: 15 TABLET | Refills: 0 | Status: SHIPPED | OUTPATIENT
Start: 2023-01-05

## 2023-01-05 RX ORDER — LISINOPRIL 5 MG/1
5 TABLET ORAL DAILY
COMMUNITY
End: 2023-01-09

## 2023-01-05 NOTE — ASSESSMENT & PLAN NOTE
Malnutrition Findings:       Patient weight remains stable for long period of time her BMI is below 20 for quite many years we will continue to monitor                          BMI Findings: There is no height or weight on file to calculate BMI

## 2023-01-05 NOTE — PROGRESS NOTES
Virtual TCM Visit:    Verification of patient location:    Patient is located in the following state in which I hold an active license [place]    Assessment/Plan:        Problem List Items Addressed This Visit        Respiratory    Mild intermittent asthma without complication     Under control  Continue current medication  We will re-evaluate at next office visit  Cardiovascular and Mediastinum    Chronic atrial fibrillation (HCC)     Under control  Continue current medication including Eliquis to prevent stroke  We will re-evaluate at next office visit  Hypertension, essential     Under control  Continue current medication  We will re-evaluate at next office visit  Relevant Medications    lisinopril (ZESTRIL) 5 mg tablet       Genitourinary    Stage 3b chronic kidney disease ([facility] )     Lab Results   Component Value Date    EGFR 30 12/29/2022    EGFR 26 12/29/2022    EGFR 33 12/28/2022    CREATININE 1 44 (H) 12/29/2022    CREATININE 1 61 (H) 12/29/2022    CREATININE 1 33 (H) 12/28/2022   creatinine and GFR stable we will reevaluate at next office visit              Other    Mixed hyperlipidemia     Under control  Continue current medication  We will re-evaluate at next office visit  Chronic neck pain     Under control  Continue current medication  We will re-evaluate at next office visit  Generalized weakness - Primary     It is improving  Dehydration improved  We will continue to monitor  Moderate protein-calorie malnutrition ([facility] )     Malnutrition Findings:       Patient weight remains stable for long period of time her BMI is below 20 for quite many years we will continue to monitor                          BMI Findings: There is no height or weight on file to calculate BMI                  Reason for visit is TCM    Encounter provider Rama Menendez MD     Provider located at [address] [place] [address] [facility] PRIMARY [facility]ARE  [address]  [address] [address]  [phone]    Recent Visits  Date Type Provider Dept   01/03/23 Telephone MD [facility] Primary [facility]   Showing recent visits within past 7 days and meeting all other requirements  Today's Visits  Date Type Provider Dept   01/05/23 Telemedicine MD [facility]   Showing today's visits and meeting all other requirements  Future Appointments  No visits were found meeting these conditions  Showing future appointments within next 150 days and meeting all other requirements       After connecting through [employer], the patient was identified by name and date of birth  Ana Lilia Guo was informed that this is a telemedicine visit and that the visit is being conducted through the [address] [employer] platform  She agrees to proceed     My office door was closed  No one else was in the room  She acknowledged consent and understanding of privacy and security of the video platform  The patient has agreed to participate and understands they can discontinue the visit at any time  Patient is aware this is a billable service  Transitional Care Management Review:  Ana Lilia Guo is a 80 y o  female here for TCM follow up  During the TCM phone call patient stated:    TCM Call     Date and time call was made  12/30/2022  2:29 PM    Hospital care reviewed  Records reviewed    Patient was hospitial[facility] at  [facility]     Date of Admission  12/26/22    Date of discharge  12/30/22    Diagnosis  dizziness, nausea, SOB    Disposition  Home; Home health services    Were the patients medications reviewed and updated  Yes    Current Symptoms  None      TCM Call     Post hospital issues  Reduced activity    Should patient be enrolled in anticoag monitoring? No    Scheduled for follow up?   Yes    Did you obtain your prescribed medications  Yes    Do you need help managing your prescriptions or medications  No    Is transportation to your appointment needed  Yes    Specify why  Patient does not drive and does not mobilize will    I have advised the patient to call PCP with any new or worsening symptoms  Lary [facility] care staff    The type of support provided  Emotional    Do you have social support  Yes, as much as I need    Are you recieving any outpatient services  Yes    What type of services  PT/OT    Are you recieving home care services  Yes    Types of home care services  Nurse visit    Are you using any community resources  No    Current waiver services  No    Have you fallen in the last 12 months  Yes    How many times  once, and did not seek care    Interperter language line needed  No    Comments  Has followup appointment on 1/5/2023        Subjective:     Patient ID: Glendy White is a 80 y o  female  Patient requested virtual visit for TCM  Patient was hospitalized with shortness of breath and sepsis of unknown cause she was found to have acute kidney injury and dehydration GFR was 30 at the time of the discharge which improved to 37 post discharge her lisinopril was held for few days now she is back on lisinopril currently patient feeling okay her nausea vomiting and diarrhea resolved  She is able to drink much more liquids  Denies any chest pain or shortness of breath  No abdominal pain, nausea, vomiting, fever or chills  No lightheadedness or dizziness  No tingling numbness or weakness  Patient had a knee pain which is much better with intra-articular steroid injection  All the records from the hospital reviewed with patient and daughter    Review of Systems   Constitutional: Negative for chills, fatigue and fever  HENT: Positive for hearing loss  Negative for congestion, ear pain, rhinorrhea, sneezing and sore throat  Eyes: Negative for redness, itching and visual disturbance     Respiratory: Negative for cough, chest tightness and shortness of breath  Cardiovascular: Negative for chest pain, palpitations and leg swelling  Gastrointestinal: Negative for abdominal pain, blood in stool, diarrhea, nausea and vomiting  Endocrine: Negative for cold intolerance and heat intolerance  Genitourinary: Negative for dysuria, frequency and urgency  Musculoskeletal: Positive for arthralgias  Negative for back pain and myalgias  Skin: Negative for color change and rash  Neurological: Positive for weakness (generalized)  Negative for dizziness, light-headedness, numbness and headaches  Hematological: Does not bruise/bleed easily  Psychiatric/Behavioral: Negative for agitation, behavioral problems and confusion  Objective: There were no vitals filed for this visit  Physical Exam  Exam conducted with a chaperone present (Daughter)  Constitutional:       General: She is not in acute distress  Appearance: Normal appearance  She is not ill-appearing, toxic-appearing or diaphoretic  HENT:      Head: Normocephalic  Eyes:      General: No scleral icterus  Right eye: No discharge  Left eye: No discharge  Extraocular Movements: Extraocular movements intact  Conjunctiva/sclera: Conjunctivae normal    Pulmonary:      Effort: Pulmonary effort is normal    Neurological:      Mental Status: She is alert and oriented to person, place, and time  Psychiatric:         Mood and Affect: Mood normal          Behavior: Behavior normal          Medications have been reviewed by provider in current encounter    I spent 28 minutes with the patient during this visit  Radha Erwin MD      VIRTUAL VISIT DISCLAIMER    Ahsan Foote verbally agrees to participate in [facility]   Pt is aware that [facility] could be limited without vital signs or the ability to perform a full hands-on physical exam  Mckenna Worthy understands she or the provider may request at any time to terminate the video visit and request the patient to seek care or treatment in person

## 2023-01-05 NOTE — TELEPHONE ENCOUNTER
Please send #10 Tramadol to [facility], it's Kallie khan  [facility] the [facility] for the 10 tramadol that was called in yesterday or the day before  And we just spoke with Doctor Geri Smiley today anyway, so [facility] and it was 10 Ultram that were supposed to be called in to hold us over  [address] while [facility] arrive and that was let's see why so with physical bank [de-identified] trying to figure out that might start  OK thanks bye

## 2023-01-05 NOTE — ASSESSMENT & PLAN NOTE
Under control  Continue current medication including Eliquis to prevent stroke  We will re-evaluate at next office visit

## 2023-01-05 NOTE — ASSESSMENT & PLAN NOTE
Lab Results   Component Value Date    EGFR 30 12/29/2022    EGFR 26 12/29/2022    EGFR 33 12/28/2022    CREATININE 1 44 (H) 12/29/2022    CREATININE 1 61 (H) 12/29/2022    CREATININE 1 33 (H) 12/28/2022   creatinine and GFR stable we will reevaluate at next office visit

## 2023-01-06 DIAGNOSIS — G50.0 TRIGEMINAL NEURALGIA: ICD-10-CM

## 2023-01-06 NOTE — TELEPHONE ENCOUNTER
Hi, it's Kallie khan  I'm just trying to get these 10 tramadol called into [facility]  I did send it through on my chart  I've already been there twice and it hasn't gone through  Anyway, it's for [facility] and the birth date is [id number]  It's just a holdover pills till the center arrives and it's 10 Tramadol  OK  If you need to, call me at [phone]  Thanks so much  Bye  You received a voice mail from [facility] [place]

## 2023-01-08 DIAGNOSIS — I10 HYPERTENSION, ESSENTIAL: Primary | ICD-10-CM

## 2023-01-09 RX ORDER — LISINOPRIL 5 MG/1
TABLET ORAL
Qty: 90 TABLET | Refills: 0 | Status: SHIPPED | OUTPATIENT
Start: 2023-01-09

## 2023-01-09 RX ORDER — TRAMADOL HYDROCHLORIDE 50 MG/1
50 TABLET ORAL DAILY
Qty: 10 TABLET | Refills: 0 | OUTPATIENT
Start: 2023-01-09

## 2023-01-24 DIAGNOSIS — I48.91 ATRIAL FIBRILLATION, UNSPECIFIED TYPE (HCC): ICD-10-CM

## 2023-01-24 RX ORDER — APIXABAN 2.5 MG/1
TABLET, FILM COATED ORAL
Qty: 60 TABLET | Refills: 0 | Status: SHIPPED | OUTPATIENT
Start: 2023-01-24

## 2023-02-07 ENCOUNTER — OFFICE VISIT (OUTPATIENT)
Dept: FAMILY MEDICINE CLINIC | Facility: CLINIC | Age: 88
End: 2023-02-07

## 2023-02-07 VITALS
SYSTOLIC BLOOD PRESSURE: 128 MMHG | BODY MASS INDEX: 19.88 KG/M2 | WEIGHT: 108 LBS | HEART RATE: 88 BPM | TEMPERATURE: 97.6 F | OXYGEN SATURATION: 95 % | DIASTOLIC BLOOD PRESSURE: 70 MMHG | HEIGHT: 62 IN

## 2023-02-07 DIAGNOSIS — I10 HYPERTENSION, ESSENTIAL: Primary | ICD-10-CM

## 2023-02-07 DIAGNOSIS — G89.29 CHRONIC NECK PAIN: ICD-10-CM

## 2023-02-07 DIAGNOSIS — J45.20 MILD INTERMITTENT ASTHMA WITHOUT COMPLICATION: ICD-10-CM

## 2023-02-07 DIAGNOSIS — M54.2 CHRONIC NECK PAIN: ICD-10-CM

## 2023-02-07 DIAGNOSIS — E78.2 MIXED HYPERLIPIDEMIA: ICD-10-CM

## 2023-02-07 DIAGNOSIS — N18.32 STAGE 3B CHRONIC KIDNEY DISEASE (HCC): ICD-10-CM

## 2023-02-07 DIAGNOSIS — I48.20 CHRONIC ATRIAL FIBRILLATION (HCC): ICD-10-CM

## 2023-02-07 PROBLEM — E44.0 MODERATE PROTEIN-CALORIE MALNUTRITION (HCC): Status: RESOLVED | Noted: 2022-12-27 | Resolved: 2023-02-07

## 2023-02-07 NOTE — PROGRESS NOTES
Assessment/Plan:         Problem List Items Addressed This Visit        Respiratory    Mild intermittent asthma without complication     Under control  Continue current medication  We will re-evaluate at next office visit  Cardiovascular and Mediastinum    Chronic atrial fibrillation (HCC)     Under control  Continue current medication including anticoagulation medication to prevent stroke  We will reevaluate at next office visit  Has been followed by cardiologist            Hypertension, essential - Primary     Under control  Continue current medication  We will re-evaluate at next office visit  Genitourinary    Stage 3b chronic kidney disease ([facility] [address] )     Lab Results   Component Value Date    EGFR 30 12/29/2022    EGFR 26 12/29/2022    EGFR 33 12/28/2022    CREATININE 1 44 (H) 12/29/2022    CREATININE 1 61 (H) 12/29/2022    CREATININE 1 33 (H) 12/28/2022   creatinine and GFR stable we will reevaluate at next office visit              Other    Mixed hyperlipidemia     Under control  Continue current medication  We will re-evaluate at next office visit  Chronic neck pain     Under control  Continue current medication  We will re-evaluate at next office visit  Subjective:      Patient ID: Dexter Knapp is a 80 y o  female  Patient here for review of chronic medical problems and review of the labs and imaging if it is applicable  Currently has no specific complaints other than mentioned in the review of systems  Denies chest pain, SOB, cough, abdominal pain, nausea, vomiting, fever, chills, lightheadedness, dizziness,headache, tingling or numbness  No bowel or bladder problem          The following portions of the patient's history were reviewed and updated as appropriate:   Past Medical History:  She has a past medical history of Anemia, Arthritis, Asthma, Atrial fibrillation ([facility] [address] ) (11/08/2019), CAD, Chronic kidney disease, CKD (chronic kidney disease), Current use of long term anticoagulation (11/08/2019), Degenerative joint disease, Elevated troponin (09/08/2021), Hyperlipidemia, Hypertension, Hypertension, essential (11/08/2019), ALYSSA (iron deficiency anemia), Osteopenia, Osteoporosis, Pacemaker, and TIA (transient ischemic attack) (11/08/2019)  ,  _______________________________________________________________________  Medical Problems:  does not have any pertinent problems on file ,  _______________________________________________________________________  Past Surgical History:   has a past surgical history that includes Cardiac pacemaker placement  ,  _______________________________________________________________________  Family History:  family history includes Asthma in her mother; Hyperlipidemia in her daughter and daughter; Hypertension in her daughter and son; Other in her mother ,  _______________________________________________________________________  Social History:   reports that she has never smoked  She has never used smokeless tobacco  She reports that she does not currently use alcohol  She reports that she does not use drugs  ,  _______________________________________________________________________  Allergies:  is allergic to amoxicillin-pot clavulanate and silver sulfadiazine     _______________________________________________________________________  Current Outpatient Medications   Medication Sig Dispense Refill   • albuterol (PROVENTIL HFA,VENTOLIN HFA) 90 mcg/act inhaler Inhale 2 puffs every 4 (four) hours as needed for wheezing 18 g 0   • diltiazem (CARDIZEM CD) 120 mg 24 hr capsule Take 1 capsule (120 mg total) by mouth daily 90 capsule 0   • Eliquis 2 5 MG TAKE 1 TABLET BY MOUTH 2 TIMES DAILY 60 tablet 0   • gabapentin (NEURONTIN) 100 mg capsule TAKE 2 CAPSULES TWICE DAILY 360 capsule 0   • iron polysaccharides (FERREX) 150 mg capsule Take 150 mg by mouth daily     • lisinopril (ZESTRIL) 5 mg tablet TAKE 1 TABLET EVERY DAY 90 tablet 0   • metoprolol succinate (TOPROL-XL) 100 mg 24 hr tablet Take 1 tablet (100 mg total) by mouth daily 90 tablet 0   • rosuvastatin (CRESTOR) 5 mg tablet TAKE 1 TABLET (5 MG TOTAL) BY MOUTH 2 (TWO) TIMES A WEEK ON TUESDAY AND SATURDAY 26 tablet 0   • traMADol (ULTRAM) 50 mg tablet Take 1 tablet (50 mg total) by mouth daily 15 tablet 0     No current facility-administered medications for this visit      _______________________________________________________________________  Review of Systems   Constitutional: Negative for chills, fatigue and fever  HENT: Positive for hearing loss  Negative for congestion, ear pain, rhinorrhea, sneezing and sore throat  Eyes: Negative for redness, itching and visual disturbance  Respiratory: Negative for cough, chest tightness and shortness of breath  Cardiovascular: Negative for chest pain, palpitations and leg swelling  Gastrointestinal: Negative for abdominal pain, blood in stool, diarrhea, nausea and vomiting  Endocrine: Negative for cold intolerance and heat intolerance  Genitourinary: Negative for dysuria, frequency and urgency  Musculoskeletal: Positive for arthralgias  Negative for back pain and myalgias  Skin: Negative for color change and rash  Neurological: Negative for dizziness, weakness (generalized), light-headedness, numbness and headaches  Hematological: Does not bruise/bleed easily  Psychiatric/Behavioral: Negative for agitation, behavioral problems and confusion  Objective:  Vitals:    02/07/23 1429   BP: 128/70   BP Location: Right arm   Patient Position: Sitting   Cuff Size: Adult   Pulse: 88   Temp: 97 6 °F (36 4 °C)   TempSrc: Tympanic   SpO2: 95%   Weight: 49 kg (108 lb)   Height: 5' 2" (1 575 m)     Body mass index is 19 75 kg/m²  Physical Exam  Vitals and nursing note reviewed  Exam conducted with a chaperone present (Daughter)  Constitutional:       General: She is not in acute distress  Appearance: Normal appearance  She is not ill-appearing, toxic-appearing or diaphoretic  HENT:      Head: Normocephalic and atraumatic  Right Ear: Tympanic membrane normal       Left Ear: Tympanic membrane normal       Ears:      Comments: Bilateral sensorineural hearing loss     Nose: Nose normal  No congestion  Mouth/Throat:      Mouth: Mucous membranes are moist    Eyes:      General: No scleral icterus  Right eye: No discharge  Left eye: No discharge  Extraocular Movements: Extraocular movements intact  Conjunctiva/sclera: Conjunctivae normal       Pupils: Pupils are equal, round, and reactive to light  Cardiovascular:      Rate and Rhythm: Normal rate  Rhythm irregular  Pulses: Normal pulses  Heart sounds: Normal heart sounds  No murmur heard  No gallop  Pulmonary:      Effort: Pulmonary effort is normal  No respiratory distress  Breath sounds: Normal breath sounds  No wheezing, rhonchi or rales  Abdominal:      General: Abdomen is flat  Bowel sounds are normal  There is no distension  Palpations: Abdomen is soft  Tenderness: There is no abdominal tenderness  There is no guarding  Musculoskeletal:         General: No swelling or tenderness  Cervical back: Normal range of motion and neck supple  No rigidity  Comments: Limited ROM both knees   Lymphadenopathy:      Cervical: No cervical adenopathy  Skin:     General: Skin is warm  Capillary Refill: Capillary refill takes 2 to 3 seconds  Coloration: Skin is not jaundiced  Findings: No bruising or rash  Neurological:      General: No focal deficit present  Mental Status: She is alert and oriented to person, place, and time  Mental status is at baseline  Gait: Gait abnormal (Ambulates with walker)     Psychiatric:         Mood and Affect: Mood normal          Behavior: Behavior normal

## 2023-02-15 DIAGNOSIS — I10 PRIMARY HYPERTENSION: ICD-10-CM

## 2023-02-15 RX ORDER — DILTIAZEM HYDROCHLORIDE 120 MG/1
120 CAPSULE, COATED, EXTENDED RELEASE ORAL DAILY
Qty: 90 CAPSULE | Refills: 0 | Status: SHIPPED | OUTPATIENT
Start: 2023-02-15

## 2023-02-22 DIAGNOSIS — E78.2 MIXED HYPERLIPIDEMIA: ICD-10-CM

## 2023-02-22 RX ORDER — ROSUVASTATIN CALCIUM 5 MG/1
TABLET, COATED ORAL
Qty: 26 TABLET | Refills: 0 | Status: SHIPPED | OUTPATIENT
Start: 2023-02-22

## 2023-02-24 DIAGNOSIS — I48.91 ATRIAL FIBRILLATION, UNSPECIFIED TYPE (HCC): ICD-10-CM

## 2023-02-24 RX ORDER — APIXABAN 2.5 MG/1
TABLET, FILM COATED ORAL
Qty: 60 TABLET | Refills: 0 | Status: SHIPPED | OUTPATIENT
Start: 2023-02-24

## 2023-03-17 DIAGNOSIS — I48.91 ATRIAL FIBRILLATION, UNSPECIFIED TYPE (HCC): ICD-10-CM

## 2023-03-17 DIAGNOSIS — G50.0 TRIGEMINAL NEURALGIA: ICD-10-CM

## 2023-03-17 RX ORDER — TRAMADOL HYDROCHLORIDE 50 MG/1
50 TABLET ORAL DAILY
Qty: 15 TABLET | Refills: 0 | Status: SHIPPED | OUTPATIENT
Start: 2023-03-17 | End: 2023-03-24 | Stop reason: SDUPTHER

## 2023-03-24 DIAGNOSIS — I48.91 ATRIAL FIBRILLATION, UNSPECIFIED TYPE (HCC): ICD-10-CM

## 2023-03-24 DIAGNOSIS — G50.0 TRIGEMINAL NEURALGIA: ICD-10-CM

## 2023-03-24 RX ORDER — TRAMADOL HYDROCHLORIDE 50 MG/1
50 TABLET ORAL DAILY
Qty: 90 TABLET | Refills: 0 | Status: SHIPPED | OUTPATIENT
Start: 2023-03-24

## 2023-03-24 RX ORDER — APIXABAN 2.5 MG/1
TABLET, FILM COATED ORAL
Qty: 60 TABLET | Refills: 0 | Status: SHIPPED | OUTPATIENT
Start: 2023-03-24

## 2023-04-03 ENCOUNTER — TELEPHONE (OUTPATIENT)
Dept: FAMILY MEDICINE CLINIC | Facility: CLINIC | Age: 88
End: 2023-04-03

## 2023-04-03 NOTE — TELEPHONE ENCOUNTER
Hi there, it's Kallie khan  I can't seem to get on to my mom's my chart again but I have one quick question  I want to know with the medications that my mother is taking, can she take magnesium 500 milligrams of magnesium? She is having these muscle spasms every single day in her back and we do tramadol and we do Tylenol and I was wondering if it's just the deficiency of magnesium  So I was wondering if we could get the OK for her to start taking some magnesium to see if that helps  If you can just leave me a voicemail  Kallie Barron, [phone] and it's [facility] chart  OK Thanks so much  Bye  You received a voice mail from [facility]  Please advise  Thanks

## 2023-04-13 DIAGNOSIS — G50.0 TRIGEMINAL NEURALGIA: ICD-10-CM

## 2023-04-13 RX ORDER — TRAMADOL HYDROCHLORIDE 50 MG/1
50 TABLET ORAL 2 TIMES DAILY
Qty: 28 TABLET | Refills: 0 | Status: SHIPPED | OUTPATIENT
Start: 2023-04-13 | End: 2023-04-27 | Stop reason: SDUPTHER

## 2023-04-27 DIAGNOSIS — G50.0 TRIGEMINAL NEURALGIA: ICD-10-CM

## 2023-04-27 RX ORDER — TRAMADOL HYDROCHLORIDE 50 MG/1
50 TABLET ORAL 2 TIMES DAILY
Qty: 60 TABLET | Refills: 0 | Status: SHIPPED | OUTPATIENT
Start: 2023-04-27

## 2023-04-27 NOTE — TELEPHONE ENCOUNTER
Ephraim, this is Jaci Justice calling from my mom, Bre Conley  Her birthday is 9/7/1926  I we only have 3 Tramadol left for my mom and I don't know whether when you ordered it from [facility] it canceled out of the [facility] mail order delivery or whether we can just continue through [facility] and I can just pick it up there  I was wondering if we could just get this last quantity was 28 pills from white or ultram 50 milligrams  Anyway, I would pick it up today because we only have 3 pills  Let me know and I can be reached at [phone]  And I don't know why I'm locked out of both her [facility] and [facility]  OK  Thanks so much

## 2023-05-05 DIAGNOSIS — I10 PRIMARY HYPERTENSION: ICD-10-CM

## 2023-05-05 RX ORDER — METOPROLOL SUCCINATE 100 MG/1
100 TABLET, EXTENDED RELEASE ORAL DAILY
Qty: 90 TABLET | Refills: 1 | Status: SHIPPED | OUTPATIENT
Start: 2023-05-05

## 2023-05-05 RX ORDER — METOPROLOL SUCCINATE 100 MG/1
100 TABLET, EXTENDED RELEASE ORAL DAILY
Qty: 14 TABLET | Refills: 0 | Status: SHIPPED | OUTPATIENT
Start: 2023-05-05 | End: 2023-05-05 | Stop reason: SDUPTHER

## 2023-05-15 DIAGNOSIS — I48.91 ATRIAL FIBRILLATION, UNSPECIFIED TYPE (HCC): ICD-10-CM

## 2023-05-15 RX ORDER — APIXABAN 2.5 MG/1
TABLET, FILM COATED ORAL
Qty: 60 TABLET | Refills: 0 | Status: SHIPPED | OUTPATIENT
Start: 2023-05-15

## 2023-06-02 ENCOUNTER — TELEPHONE (OUTPATIENT)
Dept: NEUROLOGY | Facility: CLINIC | Age: 88
End: 2023-06-02

## 2023-06-02 NOTE — TELEPHONE ENCOUNTER
Ephraim Gutierrez  Patient daughter called, Akash Hammond  She wants to schedule patient with you for muscle spasm  Not sure if you see for this  Can patient be scheduled with you for NP? Do I need to triage the patient she doesn't have a referral? Thank you    She was referred by Jaimee Ching, your patient, their neighbor

## 2023-06-05 ENCOUNTER — OFFICE VISIT (OUTPATIENT)
Dept: FAMILY MEDICINE CLINIC | Facility: CLINIC | Age: 88
End: 2023-06-05
Payer: MEDICARE

## 2023-06-05 VITALS
TEMPERATURE: 97.4 F | OXYGEN SATURATION: 96 % | HEART RATE: 69 BPM | DIASTOLIC BLOOD PRESSURE: 60 MMHG | SYSTOLIC BLOOD PRESSURE: 110 MMHG | HEIGHT: 62 IN | WEIGHT: 104 LBS | BODY MASS INDEX: 19.14 KG/M2 | RESPIRATION RATE: 18 BRPM

## 2023-06-05 DIAGNOSIS — I10 PRIMARY HYPERTENSION: ICD-10-CM

## 2023-06-05 DIAGNOSIS — J45.20 MILD INTERMITTENT ASTHMA WITHOUT COMPLICATION: ICD-10-CM

## 2023-06-05 DIAGNOSIS — G89.29 CHRONIC NECK PAIN: ICD-10-CM

## 2023-06-05 DIAGNOSIS — G50.0 TRIGEMINAL NEURALGIA: ICD-10-CM

## 2023-06-05 DIAGNOSIS — M54.6 CHRONIC THORACIC BACK PAIN, UNSPECIFIED BACK PAIN LATERALITY: ICD-10-CM

## 2023-06-05 DIAGNOSIS — G89.29 CHRONIC THORACIC BACK PAIN, UNSPECIFIED BACK PAIN LATERALITY: ICD-10-CM

## 2023-06-05 DIAGNOSIS — M54.2 CHRONIC NECK PAIN: ICD-10-CM

## 2023-06-05 DIAGNOSIS — Z00.00 MEDICARE ANNUAL WELLNESS VISIT, SUBSEQUENT: Primary | ICD-10-CM

## 2023-06-05 DIAGNOSIS — I10 HYPERTENSION, ESSENTIAL: ICD-10-CM

## 2023-06-05 DIAGNOSIS — I48.20 CHRONIC ATRIAL FIBRILLATION (HCC): ICD-10-CM

## 2023-06-05 PROCEDURE — G0439 PPPS, SUBSEQ VISIT: HCPCS | Performed by: INTERNAL MEDICINE

## 2023-06-05 PROCEDURE — 99213 OFFICE O/P EST LOW 20 MIN: CPT | Performed by: INTERNAL MEDICINE

## 2023-06-05 RX ORDER — DILTIAZEM HYDROCHLORIDE 120 MG/1
CAPSULE, COATED, EXTENDED RELEASE ORAL
Qty: 90 CAPSULE | Refills: 0 | Status: SHIPPED | OUTPATIENT
Start: 2023-06-05

## 2023-06-05 RX ORDER — GABAPENTIN 100 MG/1
CAPSULE ORAL
Qty: 360 CAPSULE | Refills: 0 | Status: SHIPPED | OUTPATIENT
Start: 2023-06-05

## 2023-06-05 NOTE — LETTER
June 5, 2023     Patient: Jaimie Krishnamurthy  YOB: 1926  Date of Visit: 6/5/2023      To Whom it May Concern:    Jaimie Krishnamurthy is under my professional care  I follow Ms Pepper Conthe on a 3 month schedule for follow up appointments on the basis of the her medical management  Ms Pepper Conteh currently lives independently and can safely be on her own  If you have any questions or concerns, please don't hesitate to call           Sincerely,          Rain Butt MD

## 2023-06-05 NOTE — PROGRESS NOTES
Assessment and Plan:     Problem List Items Addressed This Visit        Respiratory    Mild intermittent asthma without complication     Controlled with albuterol - encourage to use albuterol given her wheezing            Cardiovascular and Mediastinum    Chronic atrial fibrillation (HCC)     On anticoagulation and rate controlled         Hypertension, essential     BP is excellent today  Slightly lower side but we can continue lisinopril at current dose for now            Other    Chronic neck pain     Controlled with Tramadol prn         Chronic thoracic back pain     Ok to see pain management to see options- provided some names for patient's daughter        Other Visit Diagnoses     Medicare annual wellness visit, subsequent    -  Primary           Preventive health issues were discussed with patient, and age appropriate screening tests were ordered as noted in patient's After Visit Summary  Personalized health advice and appropriate referrals for health education or preventive services given if needed, as noted in patient's After Visit Summary  History of Present Illness:     Patient presents for a Medicare Wellness Visit    She feels well except for knee pain which is chronic and thoracic back pain  Daughter is looking into finding pain management who might be able to do a botox injection or some other injection for pain relief in this area  She feels well otherwise  Denies any recent illnesses  She usually takes her medications as prescribed  She lives alone and is independent  Patient Care Team:  Augustine Finley MD as PCP - General (Internal Medicine)     Review of Systems:     Review of Systems   Constitutional: Negative for chills and fever  HENT: Positive for hearing loss  Negative for congestion and sore throat  Respiratory: Positive for wheezing  Negative for cough and shortness of breath  Cardiovascular: Negative for chest pain and leg swelling     Gastrointestinal: Negative for abdominal pain, blood in stool and diarrhea  Genitourinary: Negative for dysuria and frequency  Musculoskeletal: Positive for arthralgias, back pain and gait problem  Neurological: Negative for headaches  Problem List:     Patient Active Problem List   Diagnosis   • Chronic atrial fibrillation (HCC)   • Current use of long term anticoagulation   • Hypertension, essential   • Pacemaker   • TIA (transient ischemic attack)   • Stage 3b chronic kidney disease (HCC)   • Mild intermittent asthma without complication   • Pseudogout   • Mixed hyperlipidemia   • Nonrheumatic aortic valve stenosis   • Chronic neck pain   • Vitamin D deficiency   • Generalized weakness   • Osteoarthritis   • Chronic thoracic back pain      Past Medical and Surgical History:     Past Medical History:   Diagnosis Date   • Anemia    • Arthritis    • Asthma    • Atrial fibrillation ([address] ) 11/08/2019   • CAD    • Chronic kidney disease    • CKD (chronic kidney disease)    • Current use of long term anticoagulation 11/08/2019   • Degenerative joint disease    • Elevated troponin 09/08/2021   • Hyperlipidemia    • Hypertension    • Hypertension, essential 11/08/2019   • ALYSSA (iron deficiency anemia)    • Osteopenia    • Osteoporosis    • Pacemaker    • TIA (transient ischemic attack) 11/08/2019     Past Surgical History:   Procedure Laterality Date   • CARDIAC PACEMAKER PLACEMENT        Family History:     Family History   Problem Relation Age of Onset   • Other Mother         Respiratory problem   • Asthma Mother    • Hypertension Son    • Hypertension Daughter    • Hyperlipidemia Daughter    • Hyperlipidemia Daughter       Social History:     Social History     Socioeconomic History   • Marital status:       Spouse name: None   • Number of children: None   • Years of education: None   • Highest education level: None   Occupational History   • None   Tobacco Use   • Smoking status: Never     Passive exposure: Past   • Smokeless tobacco: Never   Vaping Use   • Vaping Use: Never used   Substance and Sexual Activity   • Alcohol use: Not Currently     Comment: occasionaly    • Drug use: Never   • Sexual activity: Not Currently     Partners: Male     Birth control/protection: Post-menopausal   Other Topics Concern   • None   Social History Narrative   • None     Social Determinants of Health     Financial Resource Strain: Low Risk  (6/5/2023)    Overall Financial Resource Strain (CARDIA)    • Difficulty of Paying Living Expenses: Not very hard   Food Insecurity: No Food Insecurity (12/27/2022)    Hunger Vital Sign    • Worried About Running Out of Food in the Last Year: Never true    • Ran Out of Food in the Last Year: Never true   Transportation Needs: No Transportation Needs (6/5/2023)    PRAPARE - Transportation    • Lack of Transportation (Medical): No    • Lack of Transportation (Non-Medical):  No   Physical Activity: Not on file   Stress: Not on file   Social Connections: Not on file   Intimate Partner Violence: Not on file   Housing Stability: Unknown (12/27/2022)    Housing Stability Vital Sign    • Unable to Pay for Housing in the Last Year: No    • Number of Places Lived in the Last Year: Not on file    • Unstable Housing in the Last Year: No      Medications and Allergies:     Current Outpatient Medications   Medication Sig Dispense Refill   • albuterol (PROVENTIL HFA,VENTOLIN HFA) 90 mcg/act inhaler Inhale 2 puffs every 4 (four) hours as needed for wheezing 18 g 0   • Eliquis 2 5 MG TAKE 1 TABLET BY MOUTH 2 TIMES DAILY 60 tablet 0   • iron polysaccharides (FERREX) 150 mg capsule Take 150 mg by mouth daily     • lisinopril (ZESTRIL) 5 mg tablet TAKE 1 TABLET EVERY DAY 90 tablet 0   • metoprolol succinate (TOPROL-XL) 100 mg 24 hr tablet Take 1 tablet (100 mg total) by mouth daily 90 tablet 1   • rosuvastatin (CRESTOR) 5 mg tablet TAKE 1 TABLET TWICE WEEKLY ON TUESDAY AND SATURDAY 26 tablet 0   • traMADol (ULTRAM) 50 mg tablet Take 1 tablet (50 mg total) by mouth 2 (two) times a day 60 tablet 0   • diltiazem (CARDIZEM CD) 120 mg 24 hr capsule TAKE 1 CAPSULE EVERY DAY 90 capsule 0   • gabapentin (NEURONTIN) 100 mg capsule TAKE 2 CAPSULES TWICE DAILY 360 capsule 0     No current facility-administered medications for this visit  Allergies   Allergen Reactions   • Amoxicillin-Pot Clavulanate GI Intolerance   • Silver Sulfadiazine Other (See Comments)      Immunizations:     Immunization History   Administered Date(s) Administered   • COVID-19 MODERNA VACC 0 5 ML IM 02/04/2021, 03/10/2021   • Influenza, high dose seasonal 0 7 mL 11/03/2022   • Pneumococcal Conjugate 13-Valent 10/21/2015   • Pneumococcal Polysaccharide PPV23 06/07/2011, 10/11/2018   • Tdap 08/22/2013      Health Maintenance: There are no preventive care reminders to display for this patient  Topic Date Due   • COVID-19 Vaccine (3 - Moderna series) 05/05/2021      Medicare Screening Tests and Risk Assessments:     Concepcion East is here for her Subsequent Wellness visit  Last Medicare Wellness visit information reviewed, patient interviewed and updates made to the record today  Health Risk Assessment:   Patient rates overall health as good  Patient feels that their physical health rating is same  Patient is satisfied with their life  Eyesight was rated as same  Hearing was rated as slightly worse  Patient feels that their emotional and mental health rating is same  Patients states they are never, rarely angry  Patient states they are sometimes unusually tired/fatigued  Pain experienced in the last 7 days has been some  Patient's pain rating has been 3/10  Patient states that she has experienced no weight loss or gain in last 6 months  Fall Risk Screening:    In the past year, patient has experienced: history of falling in past year    Number of falls: 1  Injured during fall?: No    Feels unsteady when standing or walking?: No    Worried about falling?: No      Urinary Incontinence Screening:   Patient has leaked urine accidently in the last six months  Home Safety:  Patient has trouble with stairs inside or outside of their home  Patient has working smoke alarms and has working carbon monoxide detector  Home safety hazards include: none  Nutrition:   Current diet is Limited junk food  Medications:   Patient is currently taking over-the-counter supplements  OTC medications include: see medication list  Patient is not able to manage medications  Daughter-in-law doses medication for her    Activities of Daily Living (ADLs)/Instrumental Activities of Daily Living (IADLs):   Walk and transfer into and out of bed and chair?: Yes  Dress and groom yourself?: Yes    Bathe or shower yourself?: Yes    Feed yourself?  Yes  Do your laundry/housekeeping?: Yes  Manage your money, pay your bills and track your expenses?: Yes  Make your own meals?: Yes    Do your own shopping?: Yes    Previous Hospitalizations:   Any hospitalizations or ED visits within the last 12 months?: Yes    How many hospitalizations have you had in the last year?: 1-2    Advance Care Planning:   Living will: No    Durable POA for healthcare: No    Advanced directive: No    Advanced directive counseling given: Yes    Five wishes given: Yes    Patient declined ACP directive: No    End of Life Decisions reviewed with patient: Yes    Provider agrees with end of life decisions: Yes      Cognitive Screening:   Provider or family/friend/caregiver concerned regarding cognition?: No    PREVENTIVE SCREENINGS      Cardiovascular Screening:    General: Screening Not Indicated and History Lipid Disorder      Diabetes Screening:     General: Screening Current      Colorectal Cancer Screening:     General: Screening Not Indicated      Cervical Cancer Screening:    General: Screening Not Indicated      Lung Cancer Screening:     General: Screening Not Indicated    Screening, Brief Intervention, and Referral to Treatment "(SBIRT)    Screening    Typical number of drinks in a week: 1    AUDIT-C Screenin) How often did you have a drink containing alcohol in the past year? monthly or less  2) How many drinks did you have on a typical day when you were drinking in the past year? 1 to 2  3) How often did you have 6 or more drinks on one occasion in the past year? never    AUDIT-C Score: 1  Interpretation: Score 0-2 (female): Negative screen for alcohol misuse    Single Item Drug Screening:  How often have you used an illegal drug (including marijuana) or a prescription medication for non-medical reasons in the past year? never    Single Item Drug Screen Score: 0  Interpretation: Negative screen for possible drug use disorder    No results found  Physical Exam:     /60 (BP Location: Left arm, Patient Position: Sitting, Cuff Size: Standard)   Pulse 69   Temp (!) 97 4 °F (36 3 °C) (Tympanic)   Resp 18   Ht 5' 2\" (1 575 m)   Wt 47 2 kg (104 lb)   SpO2 96%   BMI 19 02 kg/m²     Physical Exam  Vitals reviewed  Constitutional:       General: She is not in acute distress  HENT:      Right Ear: External ear normal       Left Ear: External ear normal       Ears:      Comments: Very hard of hearing     Nose: Nose normal       Mouth/Throat:      Mouth: Mucous membranes are moist    Eyes:      Extraocular Movements: Extraocular movements intact  Cardiovascular:      Rate and Rhythm: Normal rate  Rhythm irregular  Pulmonary:      Effort: Pulmonary effort is normal  No respiratory distress  Breath sounds: Wheezing ( bilaterally) present  Abdominal:      General: There is no distension  Palpations: Abdomen is soft  Tenderness: There is no abdominal tenderness  Musculoskeletal:         General: No tenderness  Right lower leg: No edema  Left lower leg: No edema  Skin:     Comments: Varicose veins present   Neurological:      Mental Status: She is alert and oriented to person, place, and time        " Gait: Gait abnormal ( uses walker to ambulate)     Psychiatric:         Mood and Affect: Mood normal           Nazario Croft MD

## 2023-06-05 NOTE — PATIENT INSTRUCTIONS
Medicare Preventive Visit Patient Instructions  Thank you for completing your Welcome to Medicare Visit or Medicare Annual Wellness Visit today  Your next wellness visit will be due in one year (6/5/2024)  The screening/preventive services that you may require over the next 5-10 years are detailed below  Some tests may not apply to you based off risk factors and/or age  Screening tests ordered at today's visit but not completed yet may show as past due  Also, please note that scanned in results may not display below  Preventive Screenings:  Service Recommendations Previous Testing/Comments   Colorectal Cancer Screening  * Colonoscopy    * Fecal Occult Blood Test (FOBT)/Fecal Immunochemical Test (FIT)  * Fecal DNA/Cologuard Test  * Flexible Sigmoidoscopy Age: 39-70 years old   Colonoscopy: every 10 years (may be performed more frequently if at higher risk)  OR  FOBT/FIT: every 1 year  OR  Cologuard: every 3 years  OR  Sigmoidoscopy: every 5 years  Screening may be recommended earlier than age 39 if at higher risk for colorectal cancer  Also, an individualized decision between you and your healthcare provider will decide whether screening between the ages of 74-80 would be appropriate  Colonoscopy: Not on file  FOBT/FIT: Not on file  Cologuard: Not on file  Sigmoidoscopy: Not on file          Breast Cancer Screening Age: 36 years old  Frequency: every 1-2 years  Not required if history of left and right mastectomy Mammogram: Not on file        Cervical Cancer Screening Between the ages of 21-29, pap smear recommended once every 3 years  Between the ages of 33-67, can perform pap smear with HPV co-testing every 5 years     Recommendations may differ for women with a history of total hysterectomy, cervical cancer, or abnormal pap smears in past  Pap Smear: Not on file        Hepatitis C Screening Once for adults born between [facility]  More frequently in patients at high risk for Hepatitis C Hep C Antibody: Not on file        Diabetes Screening 1-2 times per year if you're at risk for diabetes or have pre-diabetes Fasting glucose: 88 mg/dL (9/12/2021)  A1C: 5 7 % (9/8/2021)      Cholesterol Screening Once every 5 years if you don't have a lipid disorder  May order more often based on risk factors  Lipid panel: 09/08/2021          Other Preventive Screenings Covered by Medicare:  1  Abdominal Aortic Aneurysm (AAA) Screening: covered once if your at risk  You're considered to be at risk if you have a family history of AAA  2  Lung Cancer Screening: covers low dose CT scan once per year if you meet all of the following conditions: (1) Age 50-69; (2) No signs or symptoms of lung cancer; (3) Current smoker or have quit smoking within the last 15 years; (4) You have a tobacco smoking history of at least 20 pack years (packs per day multiplied by number of years you smoked); (5) You get a written order from a healthcare provider  3  Glaucoma Screening: covered annually if you're considered high risk: (1) You have diabetes OR (2) Family history of glaucoma OR (3)  aged 48 and older OR (3)  American aged 72 and older  3  Osteoporosis Screening: covered every 2 years if you meet one of the following conditions: (1) You're estrogen deficient and at risk for osteoporosis based off medical history and other findings; (2) Have a vertebral abnormality; (3) On glucocorticoid therapy for more than 3 months; (4) Have primary hyperparathyroidism; (5) On osteoporosis medications and need to assess response to drug therapy  · Last bone density test (DXA Scan): Not on file  5  HIV Screening: covered annually if you're between the age of 12-76  Also covered annually if you are younger than 13 and older than 72 with risk factors for HIV infection  For pregnant patients, it is covered up to 3 times per pregnancy      Immunizations:  Immunization Recommendations   Influenza Vaccine Annual influenza vaccination during flu season is recommended for all persons aged >= 6 months who do not have contraindications   Pneumococcal Vaccine   * Pneumococcal conjugate vaccine = PCV13 (Prevnar 13), PCV15 (Vaxneuvance), PCV20 (Prevnar 20)  * Pneumococcal polysaccharide vaccine = PPSV23 (Pneumovax) Adults 25-60 years old: 1-3 doses may be recommended based on certain risk factors  Adults 72 years old: 1-2 doses may be recommended based off what pneumonia vaccine you previously received   Hepatitis B Vaccine 3 dose series if at intermediate or high risk (ex: diabetes, end stage renal disease, liver disease)   Tetanus (Td) Vaccine - COST NOT COVERED BY MEDICARE PART B Following completion of primary series, a booster dose should be given every 10 years to maintain immunity against tetanus  Td may also be given as tetanus wound prophylaxis  Tdap Vaccine - COST NOT COVERED BY MEDICARE PART B Recommended at least once for all adults  For pregnant patients, recommended with each pregnancy  Shingles Vaccine (Shingrix) - COST NOT COVERED BY MEDICARE PART B  2 shot series recommended in those aged 48 and above     Health Maintenance Due:  There are no preventive care reminders to display for this patient  Immunizations Due:      Topic Date Due   • COVID-19 Vaccine (3 - Moderna series) 05/05/2021     Advance Directives   What are advance directives? Advance directives are legal documents that state your wishes and plans for medical care  These plans are made ahead of time in case you lose your ability to make decisions for yourself  Advance directives can apply to any medical decision, such as the treatments you want, and if you want to donate organs  What are the types of advance directives? There are many types of advance directives, and each state has rules about how to use them  You may choose a combination of any of the following:  · Living will: This is a written record of the treatment you want   You can also choose which treatments you do not want, which to limit, and which to stop at a certain time  This includes surgery, medicine, IV fluid, and tube feedings  · Durable power of  for healthcare [facility]): This is a written record that states who you want to make healthcare choices for you when you are unable to make them for yourself  This person, called a proxy, is usually a family member or a friend  You may choose more than 1 proxy  · Do not resuscitate (DNR) order:  A DNR order is used in case your heart stops beating or you stop breathing  It is a request not to have certain forms of treatment, such as CPR  A DNR order may be included in other types of advance directives  · Medical directive: This covers the care that you want if you are in a coma, near death, or unable to make decisions for yourself  You can list the treatments you want for each condition  Treatment may include pain medicine, surgery, blood transfusions, dialysis, IV or tube feedings, and a ventilator (breathing machine)  · Values history: This document has questions about your views, beliefs, and how you feel and think about life  This information can help others choose the care that you would choose  Why are advance directives important? An advance directive helps you control your care  Although spoken wishes may be used, it is better to have your wishes written down  Spoken wishes can be misunderstood, or not followed  Treatments may be given even if you do not want them  An advance directive may make it easier for your family to make difficult choices about your care  © Copyright [employer] 2018 Information is for End User's use only and may not be sold, redistributed or otherwise used for commercial purposes   All illustrations and images included in CareNotes® are the copyrighted property of [employer]  or [employer]

## 2023-06-12 DIAGNOSIS — I48.91 ATRIAL FIBRILLATION, UNSPECIFIED TYPE (HCC): ICD-10-CM

## 2023-06-12 RX ORDER — APIXABAN 2.5 MG/1
TABLET, FILM COATED ORAL
Qty: 60 TABLET | Refills: 0 | Status: SHIPPED | OUTPATIENT
Start: 2023-06-12

## 2023-06-18 ENCOUNTER — HOSPITAL ENCOUNTER (EMERGENCY)
Facility: HOSPITAL | Age: 88
Discharge: HOME | End: 2023-06-18
Attending: INTERNAL MEDICINE
Payer: MEDICARE

## 2023-06-18 VITALS
OXYGEN SATURATION: 96 % | TEMPERATURE: 98 F | SYSTOLIC BLOOD PRESSURE: 169 MMHG | RESPIRATION RATE: 17 BRPM | HEART RATE: 81 BPM | DIASTOLIC BLOOD PRESSURE: 84 MMHG

## 2023-06-18 DIAGNOSIS — S81.819A: Primary | ICD-10-CM

## 2023-06-18 LAB
ANION GAP SERPL CALCULATED.3IONS-SCNC: 6 MMOL/L (ref 4–13)
BASOPHILS # BLD AUTO: 0.05 THOUSANDS/ÂΜL (ref 0–0.1)
BASOPHILS NFR BLD AUTO: 1 % (ref 0–1)
BUN SERPL-MCNC: 28 MG/DL (ref 5–25)
CALCIUM SERPL-MCNC: 9.5 MG/DL (ref 8.4–10.2)
CHLORIDE SERPL-SCNC: 105 MMOL/L (ref 96–108)
CO2 SERPL-SCNC: 29 MMOL/L (ref 21–32)
CREAT SERPL-MCNC: 1.28 MG/DL (ref 0.6–1.3)
EOSINOPHIL # BLD AUTO: 0.19 THOUSAND/ÂΜL (ref 0–0.61)
EOSINOPHIL NFR BLD AUTO: 2 % (ref 0–6)
ERYTHROCYTE [DISTWIDTH] IN BLOOD BY AUTOMATED COUNT: 14.4 % (ref 11.6–15.1)
GFR SERPL CREATININE-BSD FRML MDRD: 35 ML/MIN/1.73SQ M
GLUCOSE SERPL-MCNC: 93 MG/DL (ref 65–140)
HCT VFR BLD AUTO: 39.8 % (ref 34.8–46.1)
HGB BLD-MCNC: 12.6 G/DL (ref 11.5–15.4)
IMM GRANULOCYTES # BLD AUTO: 0.04 THOUSAND/UL (ref 0–0.2)
IMM GRANULOCYTES NFR BLD AUTO: 0 % (ref 0–2)
LYMPHOCYTES # BLD AUTO: 1.68 THOUSANDS/ÂΜL (ref 0.6–4.47)
LYMPHOCYTES NFR BLD AUTO: 16 % (ref 14–44)
MCH RBC QN AUTO: 29.8 PG (ref 26.8–34.3)
MCHC RBC AUTO-ENTMCNC: 31.7 G/DL (ref 31.4–37.4)
MCV RBC AUTO: 94 FL (ref 82–98)
MONOCYTES # BLD AUTO: 0.9 THOUSAND/ÂΜL (ref 0.17–1.22)
MONOCYTES NFR BLD AUTO: 8 % (ref 4–12)
NEUTROPHILS # BLD AUTO: 7.99 THOUSANDS/ÂΜL (ref 1.85–7.62)
NEUTS SEG NFR BLD AUTO: 73 % (ref 43–75)
NRBC BLD AUTO-RTO: 0 /100 WBCS
PLATELET # BLD AUTO: 318 THOUSANDS/UL (ref 149–390)
PMV BLD AUTO: 10 FL (ref 8.9–12.7)
POTASSIUM SERPL-SCNC: 5.5 MMOL/L (ref 3.5–5.3)
RBC # BLD AUTO: 4.23 MILLION/UL (ref 3.81–5.12)
SODIUM SERPL-SCNC: 140 MMOL/L (ref 135–147)
WBC # BLD AUTO: 10.85 THOUSAND/UL (ref 4.31–10.16)

## 2023-06-18 PROCEDURE — 90715 TDAP VACCINE 7 YRS/> IM: CPT | Performed by: INTERNAL MEDICINE

## 2023-06-18 PROCEDURE — 90471 IMMUNIZATION ADMIN: CPT

## 2023-06-18 PROCEDURE — 99284 EMERGENCY DEPT VISIT MOD MDM: CPT

## 2023-06-18 PROCEDURE — 36415 COLL VENOUS BLD VENIPUNCTURE: CPT | Performed by: INTERNAL MEDICINE

## 2023-06-18 PROCEDURE — 80048 BASIC METABOLIC PNL TOTAL CA: CPT | Performed by: INTERNAL MEDICINE

## 2023-06-18 PROCEDURE — 85025 COMPLETE CBC W/AUTO DIFF WBC: CPT | Performed by: INTERNAL MEDICINE

## 2023-06-18 RX ADMIN — TETANUS TOXOID, REDUCED DIPHTHERIA TOXOID AND ACELLULAR PERTUSSIS VACCINE, ADSORBED 0.5 ML: 5; 2.5; 8; 8; 2.5 SUSPENSION INTRAMUSCULAR at 13:32

## 2023-06-18 NOTE — DISCHARGE INSTRUCTIONS
KEEP WOUND clean and dry   Take your meds as instructed  Labs Reviewed   CBC AND DIFFERENTIAL - Abnormal       Result Value Ref Range Status    WBC 10 85 (*) 4 31 - 10 16 Thousand/uL Final    RBC 4 23  3 81 - 5 12 Million/uL Final    Hemoglobin 12 6  11 5 - 15 4 g/dL Final    Hematocrit 39 8  34 8 - 46 1 % Final    MCV 94  82 - 98 fL Final    MCH 29 8  26 8 - 34 3 pg Final    MCHC 31 7  31 4 - 37 4 g/dL Final    RDW 14 4  11 6 - 15 1 % Final    MPV 10 0  8 9 - 12 7 fL Final    Platelets 044  183 - 390 Thousands/uL Final    nRBC 0  /100 WBCs Final    Neutrophils Relative 73  43 - 75 % Final    Immat GRANS % 0  0 - 2 % Final    Lymphocytes Relative 16  14 - 44 % Final    Monocytes Relative 8  4 - 12 % Final    Eosinophils Relative 2  0 - 6 % Final    Basophils Relative 1  0 - 1 % Final    Neutrophils Absolute 7 99 (*) 1 85 - 7 62 Thousands/µL Final    Immature Grans Absolute 0 04  0 00 - 0 20 Thousand/uL Final    Lymphocytes Absolute 1 68  0 60 - 4 47 Thousands/µL Final    Monocytes Absolute 0 90  0 17 - 1 22 Thousand/µL Final    Eosinophils Absolute 0 19  0 00 - 0 61 Thousand/µL Final    Basophils Absolute 0 05  0 00 - 0 10 Thousands/µL Final   BASIC METABOLIC PANEL - Abnormal    Sodium 140  135 - 147 mmol/L Final    Potassium 5 5 (*) 3 5 - 5 3 mmol/L Final    Chloride 105  96 - 108 mmol/L Final    CO2 29  21 - 32 mmol/L Final    ANION GAP 6  4 - 13 mmol/L Final    BUN 28 (*) 5 - 25 mg/dL Final    Creatinine 1 28  0 60 - 1 30 mg/dL Final    Comment: Standardized to IDMS reference method    Glucose 93  65 - 140 mg/dL Final    Comment: If the patient is fasting, the ADA then defines impaired fasting glucose as > 100 mg/dL and diabetes as > or equal to 123 mg/dL      Calcium 9 5  8 4 - 10 2 mg/dL Final    eGFR 35  ml/min/1 73sq m Final    Narrative:     [place] guidelines for Chronic Kidney Disease (CKD):     Stage 1 with normal or high GFR (GFR > 90 mL/min/1 73 square meters)    Stage 2 Mild CKD (GFR = 60-89 mL/min/1 73 square meters)    Stage 3A Moderate CKD (GFR = 45-59 mL/min/1 73 square meters)    Stage 3B Moderate CKD (GFR = 30-44 mL/min/1 73 square meters)    Stage 4 Severe CKD (GFR = 15-29 mL/min/1 73 square meters)    Stage 5 End Stage CKD (GFR <15 mL/min/1 73 square meters)  Note: GFR calculation is accurate only with a steady state creatinine

## 2023-06-18 NOTE — ED PROVIDER NOTES
History  Chief Complaint   Patient presents with   • Skin Problem     Pt presents to ed via walk in with a skin tear to the right lower shin      This is a 80years old came for having at the wound had to have right leg in the garage and she sustained a laceration of the chin or right leg  The skin detached and the flap  Patient has bleeding which stopped with pressure dressing  Patient denies falling on the head or hitting her head  Patient has history of A-fib and she is on Eliquis  Patient at the ER is awake alert oriented x3 in no distress  Patient denies CP, SOB, abdominal pain  Patient daughter at the ER watching  Prior to Admission Medications   Prescriptions Last Dose Informant Patient Reported? Taking?    Eliquis 2 5 MG   No No   Sig: TAKE 1 TABLET BY MOUTH 2 TIMES DAILY   albuterol (PROVENTIL HFA,VENTOLIN HFA) 90 mcg/act inhaler   No No   Sig: Inhale 2 puffs every 4 (four) hours as needed for wheezing   diltiazem (CARDIZEM CD) 120 mg 24 hr capsule   No No   Sig: TAKE 1 CAPSULE EVERY DAY   gabapentin (NEURONTIN) 100 mg capsule   No No   Sig: TAKE 2 CAPSULES TWICE DAILY   iron polysaccharides (FERREX) 150 mg capsule   Yes No   Sig: Take 150 mg by mouth daily   lisinopril (ZESTRIL) 5 mg tablet   No No   Sig: TAKE 1 TABLET EVERY DAY   metoprolol succinate (TOPROL-XL) 100 mg 24 hr tablet   No No   Sig: Take 1 tablet (100 mg total) by mouth daily   rosuvastatin (CRESTOR) 5 mg tablet   No No   Sig: TAKE 1 TABLET TWICE WEEKLY ON TUESDAY AND SATURDAY   traMADol (ULTRAM) 50 mg tablet   No No   Sig: Take 1 tablet (50 mg total) by mouth 2 (two) times a day      Facility-Administered Medications: None       Past Medical History:   Diagnosis Date   • Anemia    • Arthritis    • Asthma    • Atrial fibrillation (HCC) 11/08/2019   • CAD    • Chronic kidney disease    • CKD (chronic kidney disease)    • Current use of long term anticoagulation 11/08/2019   • Degenerative joint disease    • Elevated troponin 09/08/2021   • Hyperlipidemia    • Hypertension    • Hypertension, essential 11/08/2019   • ALYSSA (iron deficiency anemia)    • Osteopenia    • Osteoporosis    • Pacemaker    • TIA (transient ischemic attack) 11/08/2019       Past Surgical History:   Procedure Laterality Date   • CARDIAC PACEMAKER PLACEMENT         Family History   Problem Relation Age of Onset   • Other Mother         Respiratory problem   • Asthma Mother    • Hypertension Son    • Hypertension Daughter    • Hyperlipidemia Daughter    • Hyperlipidemia Daughter      I have reviewed and agree with the history as documented  E-Cigarette/Vaping   • E-Cigarette Use Never User      E-Cigarette/Vaping Substances   • Nicotine No    • THC No    • CBD No    • Flavoring No    • Other No    • Unknown No      Social History     Tobacco Use   • Smoking status: Never     Passive exposure: Past   • Smokeless tobacco: Never   Vaping Use   • Vaping Use: Never used   Substance Use Topics   • Alcohol use: Not Currently     Comment: occasionaly    • Drug use: Never       Review of Systems   Constitutional: Negative for fatigue and fever  Respiratory: Negative for cough and shortness of breath  Cardiovascular: Negative for chest pain and palpitations  Gastrointestinal: Negative for abdominal pain, diarrhea, nausea and vomiting  Genitourinary: Negative for difficulty urinating, dysuria, flank pain and frequency  Musculoskeletal: Negative for back pain, myalgias, neck pain and neck stiffness  Skin: Negative for color change, pallor and rash  Neurological: Negative for dizziness, light-headedness and headaches  Psychiatric/Behavioral: Negative for agitation and behavioral problems  Physical Exam  Physical Exam  Constitutional:       General: She is not in acute distress  Appearance: She is well-developed  She is not ill-appearing, toxic-appearing or diaphoretic  HENT:      Head: Normocephalic and atraumatic        Right Ear: Ear canal normal  Left Ear: Ear canal normal       Nose: Nose normal  No congestion or rhinorrhea  Mouth/Throat:      Pharynx: No oropharyngeal exudate or posterior oropharyngeal erythema  Eyes:      General:         Right eye: No discharge  Left eye: No discharge  Extraocular Movements: Extraocular movements intact  Pupils: Pupils are equal, round, and reactive to light  Neck:      Vascular: No carotid bruit  Cardiovascular:      Rate and Rhythm: Normal rate and regular rhythm  Heart sounds: Normal heart sounds  No murmur heard  No friction rub  No gallop  Pulmonary:      Effort: Pulmonary effort is normal  No respiratory distress  Breath sounds: Normal breath sounds  No stridor  No wheezing, rhonchi or rales  Chest:      Chest wall: No tenderness  Abdominal:      General: Bowel sounds are normal  There is no distension  Palpations: Abdomen is soft  There is no mass  Tenderness: There is no abdominal tenderness  There is no right CVA tenderness, left CVA tenderness, guarding or rebound  Hernia: No hernia is present  Musculoskeletal:         General: No swelling, tenderness, deformity or signs of injury  Normal range of motion  Cervical back: Normal range of motion and neck supple  No rigidity or tenderness  Right lower leg: No edema  Left lower leg: No edema  Lymphadenopathy:      Cervical: No cervical adenopathy  Skin:     General: Skin is warm and dry  Capillary Refill: Capillary refill takes less than 2 seconds  Coloration: Skin is not jaundiced or pale  Findings: No bruising, erythema, lesion or rash  Comments: Has flap laceration at the middle of the right leg at the chin  There is no active bleeding  The patient is skin is very thin and its not possible to suture it, as it would be cut with the introduction of the needle  The flap is elevated V-shaped 3 x 3 inches which is approximated with Dermabond the glue  Neurological:      Mental Status: She is alert and oriented to person, place, and time     Psychiatric:         Behavior: Behavior normal          Vital Signs  ED Triage Vitals [06/18/23 1244]   Temperature Pulse Respirations Blood Pressure SpO2   98 °F (36 7 °C) 81 17 169/84 96 %      Temp src Heart Rate Source Patient Position - Orthostatic VS BP Location FiO2 (%)   -- -- Lying Left arm --      Pain Score       --           Vitals:    06/18/23 1244   BP: 169/84   Pulse: 81   Patient Position - Orthostatic VS: Lying         Visual Acuity      ED Medications  Medications   tetanus-diphtheria-acellular pertussis (BOOSTRIX) IM injection 0 5 mL (0 5 mL Intramuscular Given 6/18/23 1332)       Diagnostic Studies  Results Reviewed     Procedure Component Value Units Date/Time    Basic metabolic panel [[id number]]  (Abnormal) Collected: 06/18/23 1305    Lab Status: Final result Specimen: Blood from Arm, Left Updated: 06/18/23 1324     Sodium 140 mmol/L      Potassium 5 5 mmol/L      Chloride 105 mmol/L      CO2 29 mmol/L      ANION GAP 6 mmol/L      BUN 28 mg/dL      Creatinine 1 28 mg/dL      Glucose 93 mg/dL      Calcium 9 5 mg/dL      eGFR 35 ml/min/1 73sq m     Narrative:      [facility] guidelines for Chronic Kidney Disease (CKD):   •  Stage 1 with normal or high GFR (GFR > 90 mL/min/1 73 square meters)  •  Stage 2 Mild CKD (GFR = 60-89 mL/min/1 73 square meters)  •  Stage 3A Moderate CKD (GFR = 45-59 mL/min/1 73 square meters)  •  Stage 3B Moderate CKD (GFR = 30-44 mL/min/1 73 square meters)  •  Stage 4 Severe CKD (GFR = 15-29 mL/min/1 73 square meters)  •  Stage 5 End Stage CKD (GFR <15 mL/min/1 73 square meters)  Note: GFR calculation is accurate only with a steady state creatinine    CBC and differential [[id number]]  (Abnormal) Collected: 06/18/23 1305    Lab Status: Final result Specimen: Blood from Arm, Left Updated: 06/18/23 1310     WBC 10 85 Thousand/uL      RBC 4 23 Million/uL Hemoglobin 12 6 g/dL      Hematocrit 39 8 %      MCV 94 fL      MCH 29 8 pg      MCHC 31 7 g/dL      RDW 14 4 %      MPV 10 0 fL      Platelets 778 Thousands/uL      nRBC 0 /100 WBCs      Neutrophils Relative 73 %      Immat GRANS % 0 %      Lymphocytes Relative 16 %      Monocytes Relative 8 %      Eosinophils Relative 2 %      Basophils Relative 1 %      Neutrophils Absolute 7 99 Thousands/µL      Immature Grans Absolute 0 04 Thousand/uL      Lymphocytes Absolute 1 68 Thousands/µL      Monocytes Absolute 0 90 Thousand/µL      Eosinophils Absolute 0 19 Thousand/µL      Basophils Absolute 0 05 Thousands/µL                  No orders to display              Procedures  Universal Protocol:  Procedure performed by:  Consent given by: patient  Patient understanding: patient states understanding of the procedure being performed  Patient consent: the patient's understanding of the procedure matches consent given  Procedure consent: procedure consent matches procedure scheduled  Relevant documents: relevant documents present and verified  Test results: test results available and properly labeled  Site marked: the operative site was marked  Radiology Images displayed and confirmed  If images not available, report reviewed: imaging studies available  Patient identity confirmed: verbally with patient, arm band, provided demographic data, hospital-assigned identification number and anonymous protocol, patient vented/unresponsive    Laceration repair    Date/Time: 6/18/2023 2:15 PM    Performed by: Bryce Walker MD  Authorized by: Bryce Walker MD  Body area: lower extremity  Location details: right lower leg  Laceration length: 7 5 cm  Tendon involvement: none  Nerve involvement: none  Vascular damage: no  Anesthesia method: none      Sedation:  Patient sedated: no      Wound Dehiscence:  Superficial Wound Dehiscence: simple closure      Procedure Details:  Irrigation solution: saline  Irrigation method: syringe  Amount of cleaning: standard  Debridement: none  Degree of undermining: none  Skin closure: glue  Approximation: close  Dressing: 4x4 sterile gauze  Comments: Has a skin is very same and the edges are approximated with the glue   We left the the wound expose for enough time to be divided and we prefer to apply a sterile dressing as it is very easy that it would be detached  ED Course                               SBIRT 22yo+    Flowsheet Row Most Recent Value   Initial Alcohol Screen: US AUDIT-C     1  How often do you have a drink containing alcohol? 0 Filed at: 06/18/2023 1246   2  How many drinks containing alcohol do you have on a typical day you are drinking? 0 Filed at: 06/18/2023 1246   3a  Male UNDER 65: How often do you have five or more drinks on one occasion? 0 Filed at: 06/18/2023 1246   3b  FEMALE Any Age, or MALE 65+: How often do you have 4 or more drinks on one occassion? 0 Filed at: 06/18/2023 1246   Audit-C Score 0 Filed at: 06/18/2023 1246   SIMÓN: How many times in the past year have you    Used an illegal drug or used a prescription medication for non-medical reasons? Never Filed at: 06/18/2023 1246                    Medical Decision Making  This is a 80years old has trauma by the table in the garage to his right leg and she has a flareup respiration at the right the chin which is 3 x 3 inches the wound cleaned the right and apply the glue to approximate it  Patient has no head trauma  Labs reviewed came back normal   Case discussed with patient daughter and at this time regarding discharging home  All question concerns have been fully addressed  Amount and/or Complexity of Data Reviewed  Labs: ordered  Risk  Prescription drug management            Disposition  Final diagnoses:   Flap laceration of lower extremity     Time reflects when diagnosis was documented in both MDM as applicable and the Disposition within this note     Time User Action Codes Description Comment    6/18/2023  2:19 PM La Nena Cartagena [[id number]] Flap laceration of lower extremity       ED Disposition     ED Disposition   Discharge    Condition   Stable    Date/Time   Sun Jun 18, 2023  2:19 PM    Comment   Liza James 54 discharge to home/self care  Follow-up Information     Follow up With Specialties Details Why Contact Info      In 1 week  follow up with your PMD          Discharge Medication List as of 6/18/2023  2:21 PM      CONTINUE these medications which have NOT CHANGED    Details   albuterol (PROVENTIL HFA,VENTOLIN HFA) 90 mcg/act inhaler Inhale 2 puffs every 4 (four) hours as needed for wheezing, Starting Fri 1/14/2022, Normal      diltiazem (CARDIZEM CD) 120 mg 24 hr capsule TAKE 1 CAPSULE EVERY DAY, Normal      Eliquis 2 5 MG TAKE 1 TABLET BY MOUTH 2 TIMES DAILY, Normal      gabapentin (NEURONTIN) 100 mg capsule TAKE 2 CAPSULES TWICE DAILY, Normal      iron polysaccharides (FERREX) 150 mg capsule Take 150 mg by mouth daily, Historical Med      lisinopril (ZESTRIL) 5 mg tablet TAKE 1 TABLET EVERY DAY, Normal      metoprolol succinate (TOPROL-XL) 100 mg 24 hr tablet Take 1 tablet (100 mg total) by mouth daily, Starting Fri 5/5/2023, Normal      rosuvastatin (CRESTOR) 5 mg tablet TAKE 1 TABLET TWICE WEEKLY ON TUESDAY AND SATURDAY, Normal      traMADol (ULTRAM) 50 mg tablet Take 1 tablet (50 mg total) by mouth 2 (two) times a day, Starting Thu 4/27/2023, Normal             No discharge procedures on file      PDMP Review       Value Time User    PDMP Reviewed  Yes 4/27/2023 11:15 AM Ron Victor MD          ED Provider  Electronically Signed by           Libby Burt MD  06/18/23 5743

## 2023-06-19 ENCOUNTER — VBI (OUTPATIENT)
Dept: ADMINISTRATIVE | Facility: OTHER | Age: 88
End: 2023-06-19

## 2023-06-19 NOTE — TELEPHONE ENCOUNTER
Linda Boss    ED Visit Information     Ed visit date: 6/18/23  Diagnosis Description:   Angioedema, initial encounter     In Network? Yes [address]  Discharge status: Home  Discharged with meds ? No  Number of ED visits to date: 1  ED Severity:N/A     Outreach Information    Outreach successful: Yes 1  Date letter mailed:n/a  Date Finalized:6/19/23    Care Coordination    Follow up appointment with pcp: no patient will call   Transportation issues ? No    [employer] type: 7 Day Outreach  [facility] PCP: Yes  Transportation: Friend/Family Transport  Ambulance - Was Pt given choice of of ED: No  If able to choose an ED  Would you have chosen [facility]: Yes  Called PCP first?: No  Feels able to call PCP for urgent problems ?: Yes  Understands what emergencies can be handled by PCP ?: Yes  Ever any problems getting appointment with PCP for minor emergency/urgency problems?: No  Practice Contacted Patient ?: No  Pt had ED follow up with pcp/staff ?: No    Seen for follow-up out of network ?: No  Reason Patient went to ED instead of Urgent Care or PCP?: Patient Transferred Out of Network  Urgent care Education?: Yes  06/19/2023 10:03 AM EDT by Steven Zepeda 06/19/2023 10:03 AM EDT by Steven Youssef (Self) [phone] ([id number])[phone] (Mobile)  [phone] (Mobile) Remove  - Left Message    Spoke with patient daughter regarding patient recent ED visit- patient is doing well   Offered to schedule f/u visit but patient daughter declined and will call when they are ready

## 2023-06-19 NOTE — TELEPHONE ENCOUNTER
Yanet Thrasher    ED Visit Information     Ed visit date: 6/18/23  Diagnosis Description:   Angioedema, initial encounter     In Network? Yes [address]  Discharge status: Home  Discharged with meds ? No  Number of ED visits to date: 1  ED Severity:N/A     Outreach Information    Outreach successful: Yes 1  Date letter mailed:n/a  Date Finalized:6/19/23    Care Coordination    Follow up appointment with pcp: no patient will call   Transportation issues ? No    [employer] type: 7 Day Outreach  [facility]CP: Yes  Transportation: Friend/Family Transport  Ambulance - Was Pt given choice of of ED: No  If able to choose an ED  Would you have chosen [facility]: Yes  Called PCP first?: No  Feels able to call PCP for urgent problems ?: Yes  Understands what emergencies can be handled by PCP ?: Yes  Ever any problems getting appointment with PCP for minor emergency/urgency problems?: No  Practice Contacted Patient ?: No  Pt had ED follow up with pcp/staff ?: No    Seen for follow-up out of network ?: No  Reason Patient went to ED instead of Urgent Care or PCP?: Patient Transferred Out of Network  Urgent care Education?: Yes  06/19/2023 10:03 AM EDT by Leyla Ogden 06/19/2023 10:03 AM EDT by Leyla Ogden  VBI David Nephew (Self) [phone] ([id number])[phone] (Mobile)  [phone] (Mobile) Remove  - Left Message    Spoke with patient daughter regarding patient recent ED visit- patient is doing well   Offered to schedule f/u visit but patient daughter declined and will call when they are ready

## 2023-06-21 ENCOUNTER — TELEPHONE (OUTPATIENT)
Dept: FAMILY MEDICINE CLINIC | Facility: CLINIC | Age: 88
End: 2023-06-21

## 2023-06-21 DIAGNOSIS — J45.41 MODERATE PERSISTENT ASTHMA WITH EXACERBATION: ICD-10-CM

## 2023-06-21 DIAGNOSIS — G50.0 TRIGEMINAL NEURALGIA: ICD-10-CM

## 2023-06-21 DIAGNOSIS — I10 HYPERTENSION, ESSENTIAL: ICD-10-CM

## 2023-06-21 RX ORDER — TRAMADOL HYDROCHLORIDE 50 MG/1
50 TABLET ORAL 2 TIMES DAILY
Qty: 60 TABLET | Refills: 0 | Status: SHIPPED | OUTPATIENT
Start: 2023-06-21

## 2023-06-21 RX ORDER — ALBUTEROL SULFATE 90 UG/1
2 AEROSOL, METERED RESPIRATORY (INHALATION) EVERY 4 HOURS PRN
Qty: 18 G | Refills: 0 | Status: SHIPPED | OUTPATIENT
Start: 2023-06-21

## 2023-06-21 RX ORDER — LISINOPRIL 5 MG/1
TABLET ORAL
Qty: 90 TABLET | Refills: 0 | Status: SHIPPED | OUTPATIENT
Start: 2023-06-21

## 2023-06-21 NOTE — TELEPHONE ENCOUNTER
Ephraim, this is Kelsey Mcintyre calling from my mom Xochitl Mosquera  Her birth date is [birth year], But there's two medicines  I don't know why [facility] did not send the Tramadol, so we are out of that completely  Can I get 30 pills called into [address] [place] [address]? And then we also need a refill for her inhaler which was I believe albuterol and that can be called into winter  Well, any questions, just give me a call  I'm going to try to get back into her chart but it's not looking so good  So anyway, so it was 30 Tramadol just called in to [address] [place] [address] and then [facility]  Well going forward and then also the albuterol inhaler  Thank you so much  You received a voice mail from [facility] [place]

## 2023-06-21 NOTE — TELEPHONE ENCOUNTER
Patient needs refill on Tramadol 50 mg send to [facility] [place] please  Inhaler was refilled by me to mail in pharmacy

## 2023-06-27 ENCOUNTER — TELEPHONE (OUTPATIENT)
Dept: OBGYN CLINIC | Facility: HOSPITAL | Age: 88
End: 2023-06-27

## 2023-06-27 NOTE — TELEPHONE ENCOUNTER
Caller: daughter    Doctor: n/a    Reason for call: called to see which provider saw patient in December   I was unable to find out    Call back#: n/a

## 2023-07-13 DIAGNOSIS — I48.91 ATRIAL FIBRILLATION, UNSPECIFIED TYPE (HCC): ICD-10-CM

## 2023-07-13 RX ORDER — APIXABAN 2.5 MG/1
TABLET, FILM COATED ORAL
Qty: 60 TABLET | Refills: 0 | Status: SHIPPED | OUTPATIENT
Start: 2023-07-13

## 2023-07-14 ENCOUNTER — NURSE TRIAGE (OUTPATIENT)
Dept: OTHER | Facility: OTHER | Age: 88
End: 2023-07-14

## 2023-07-14 ENCOUNTER — TELEMEDICINE (OUTPATIENT)
Dept: FAMILY MEDICINE CLINIC | Facility: CLINIC | Age: 88
End: 2023-07-14
Payer: MEDICARE

## 2023-07-14 DIAGNOSIS — U07.1 COVID-19: Primary | ICD-10-CM

## 2023-07-14 PROCEDURE — 99213 OFFICE O/P EST LOW 20 MIN: CPT

## 2023-07-14 RX ORDER — MOLNUPIRAVIR 200 MG/1
800 CAPSULE ORAL EVERY 12 HOURS
Qty: 40 CAPSULE | Refills: 0 | Status: SHIPPED | OUTPATIENT
Start: 2023-07-14 | End: 2023-07-19

## 2023-07-14 NOTE — TELEPHONE ENCOUNTER
Regarding: Covid positive/ fever headache body aches  ----- Message from Debra Lowry sent at 7/14/2023  7:06 AM EDT -----  "My mom tested positive for covid this morning.  She has a fever of 101.7, headache and body aches."

## 2023-07-14 NOTE — PROGRESS NOTES
COVID-19 Outpatient Progress Note    Assessment/Plan:    Problem List Items Addressed This Visit    None  Visit Diagnoses     COVID-19    -  Primary    Relevant Medications    molnupiravir (Lagevrio) 200 mg capsule         Disposition:     Discussed symptom directed medication options with patient. Discussed vitamin D, vitamin C, and/or zinc supplementation with patient. Patient meets criteria for Molnupiravir and they have been counseled according to EUA documentation provided by the FDA. After discussion, patient agrees to treatment. Carletha Marilee is an investigational medicine used to treat mild-to-moderate COVID-19 in adults with positive results of direct SARS-CoV-2 viral testing, and who are at high risk for progressing to severe COVID-19 including hospitalization or death, and for whom other COVID-19 treatment options authorized by the FDA are not accessible or clinically appropriate. The FDA has authorized the emergency use of molnupiravir for the treatment of mild-tomoderate COVID-19 in adults under an EUA. Carletha Marilee is not authorized:  - for use in people less than 25years of age.  - for prevention of COVID-19.  - for people needing hospitalization for COVID-19.  - for use for longer than 5 consecutive days    What is the most important information I should know about molnupiravir? Carletha Marilee may cause serious side effects, including:    Carletha Marilee may cause harm to your unborn baby. It is not known if molnupiravir will harm your baby if you take molnupiravir during pregnancy. - Carletha Marilee is not recommended for use in pregnancy. - Carletha Marilee has not been studied in pregnancy. Carletha Marilee was studied in pregnant animals only.  When molnupiravir was given to pregnant animals, molnupiravir caused harm to their unborn babies.  - You and your healthcare provider may decide that you should take molnupiravir duringpregnancy if there are no other COVID-19 treatment options authorized by the FDA that are accessible or clinically appropriate for you. - If you and your healthcare provider decide that you should take molnupiravir during pregnancy, you and your healthcare provider should discuss the known and potential benefits and the potential risks of taking molnupiravir during pregnancy. For individuals who are able to become pregnant:  - You should use a reliable method of birth control (contraception) consistently and correctly during treatment with molnupiravir and for 4 days after the last dose of molnupiravir. Talk to your healthcare provider about reliable birth control methods. - Before starting treatment with molnupiravir your healthcare provider may do a pregnancy test to see if you are pregnant before starting treatment with molnupiravir. - Tell your healthcare provider right away if you become pregnant or think you may be pregnant during treatment with molnupiravir. Pregnancy Surveillance Program:  - There is a pregnancy surveillance program for individuals who take molnupiravir during pregnancy. The purpose of this program is to collect information about the health of you and your baby. Talk to your healthcare provider about how to take part in this program.  - If you take molnupiravir during pregnancy and you agree to participate in the pregnancy surveillance program and allow your healthcare provider to share your information with [address] [place] [address] Anh Princevard, then your healthcare provider will report your use of molnupiravir during pregnancy to [address] [place] [address]. by calling [phone] or pregnancyreporting.Shoutlet. For individuals who are sexually active with partners who are able to become pregnant:  - It is not known if molnupiravir can affect sperm. While the risk is regarded as low, animal studies to fully assess the potential for molnupiravir to affect the babies of males treated with molnupiravir have not been completed.  A reliable method of birth control (contraception) should be used consistently and correctly during treatment with molnupiravir and for at least 3 months after the last dose. The risk to sperm beyond 3 months is not known. Studies to understand the risk to sperm beyond 3 months are ongoing. Talk to your healthcare provider about reliable birth control methods. Talk to your healthcare provider if you have questions or concerns about how molnupiravir may affect sperm. How do I take molnupiravir? - Take molnupiravir exactly as your healthcare provider tells you to take it. - Take 4 capsules of molnupiravir every 12 hours (for example, at 8 am and at 8 pm)  - Take molnupiravir for 5 days. It is important that you complete the full 5 days of treatment with molnupiravir. Do not stop taking molnupiravir before you complete the full 5 days of treatment, even if you feel better. - Take molnupiravir with or without food. - You should stay in isolation for as long as your healthcare provider tells you to. Talk to your healthcare provider if you are not sure about how to properly isolate while you have COVID-19.  - Swallow molnupiravir capsules whole. Do not open, break, or crush the capsules. If you cannot swallow capsules whole, tell your healthcare provider. What to do if you miss a dose:  - If it has been less than 10 hours since the missed dose, take it as soon as you remember  - If it has been more than 10 hours since the missed dose, skip the missed dose and take your dose at the next scheduled time. - Do not double the dose of molnupiravir to make up for a missed dose. What are the important possible side effects of molnupiravir? Possible side effects of molnupiravir are:  - See, Jonathan Villasenor is the most important information I should know about molnupiravir?”  - Diarrhea  - Nausea  - Dizziness    These are not all the possible side effects of molnupiravir. Not many people have taken molnupiravir.  Serious and unexpected side effects may happen. This medicine is still being studied, so it is possible that all of the risks are not known at this time. What other treatment choices are there? Like Drew Herrera may allow for the emergency use of other medicines to treat people with COVID-19. Go to [employer]. for more information. It is your choice to be treated or not to be treated with molnupiravir. Should you decide not to take it, it will not change your standard medical care. What if I am breastfeeding? Breastfeeding is not recommended during treatment with molnupiravir and for 4 days after the last dose of molnupiravir. If you are breastfeeding or plan to breastfeed, talk to your healthcare provider about your options and specific situation before taking molnupiravir. How do I report side effects with molnupiravir? Contact your healthcare provider if you have any side effects that bother you or do not go away. Report side effects to FDA MedWatch at www.fda.gov/medwatch or call 0-224-HZA-0853 (1-944.549.3149). How should I store [address] [place] [address]? - Store molnupiravir capsules at room temperature between 68°F to 77°F (20°C to 25°C). - Keep molnupiravir and all medicines out of the reach of children and pets. Full fact sheet for patients, parents, and caregivers can be found at: [employer]    I have spent a total time of 25 minutes on the day of the encounter for this patient including discussing prognosis, risks and benefits of treatment options, instructions for management, patient and family education, importance of treatment compliance, risk factor reductions, impressions and counseling/coordination of care.        Encounter provider: Kenia Gonzalez MD     [facility] [address] [facility]  [address] [facility]  [place] [address] [facility] [id number] [phone]     Recent Visits  No visits were found meeting these conditions. Showing recent visits within past 7 days and meeting all other requirements  Today's Visits  Date Type Provider Dept   07/14/23 Telemedicine Noman Pieter, [facility]   Showing today's visits and meeting all other requirements  Future Appointments  No visits were found meeting these conditions. Showing future appointments within next 150 days and meeting all other requirements     This virtual check-in was done via [address] [place] [address] and patient was informed that this is a secure, HIPAA-compliant platform. She agrees to proceed. Patient agrees to participate in a virtual check in via telephone or video visit instead of presenting to the office to address urgent/immediate medical needs. Patient is aware this is a billable service. She acknowledged consent and understanding of privacy and security of the video platform. The patient has agreed to participate and understands they can discontinue the visit at any time. After connecting through [facility], the patient was identified by name and date of birth. Nico Yi was informed that this was a telemedicine visit and that the exam was being conducted confidentially over secure lines. My office door was closed. No one else was in the room. Nico Yi acknowledged consent and understanding of privacy and security of the telemedicine visit. I informed the patient that I have reviewed her record in Epic and presented the opportunity for her to ask any questions regarding the visit today. The patient agreed to participate. Verification of patient location:  Patient is located in the following state in which I hold an active license: [place]    Subjective:   Nico Yi is a 80 y.o. female who is concerned about COVID-19. Patient's symptoms include fever, cough and myalgias.      - Date of symptom onset: 7/13/2023  - Date of exposure: 7/10/2023      COVID-19 vaccination status: Fully vaccinated (primary series)    Exposure:   Contact with a person who is under investigation (PUI) for or who is positive for COVID-19 within the last 14 days?: No    Hospitalized recently for fever and/or lower respiratory symptoms?: No      Currently a healthcare worker that is involved in direct patient care?: No      Works in a special setting where the risk of COVID-19 transmission may be high? (this may include long-term care, correctional and skilled nursing facilities; homeless shelters; assisted-living facilities and group homes.): No      Resident in a special setting where the risk of COVID-19 transmission may be high? (this may include long-term care, correctional and skilled nursing facilities; homeless shelters; assisted-living facilities and group homes.): No      Lab Results   Component Value Date    SARSCOV2 Negative 12/26/2022       Review of Systems   Constitutional: Positive for fever. Respiratory: Positive for cough. Musculoskeletal: Positive for myalgias. Current Outpatient Medications on File Prior to Visit   Medication Sig   • albuterol (PROVENTIL HFA,VENTOLIN HFA) 90 mcg/act inhaler Inhale 2 puffs every 4 (four) hours as needed for wheezing   • diltiazem (CARDIZEM CD) 120 mg 24 hr capsule TAKE 1 CAPSULE EVERY DAY   • Eliquis 2.5 MG TAKE 1 TABLET BY MOUTH 2 TIMES DAILY   • gabapentin (NEURONTIN) 100 mg capsule TAKE 2 CAPSULES TWICE DAILY   • iron polysaccharides (FERREX) 150 mg capsule Take 150 mg by mouth daily   • lisinopril (ZESTRIL) 5 mg tablet TAKE 1 TABLET EVERY DAY   • metoprolol succinate (TOPROL-XL) 100 mg 24 hr tablet Take 1 tablet (100 mg total) by mouth daily   • rosuvastatin (CRESTOR) 5 mg tablet TAKE 1 TABLET TWICE WEEKLY ON TUESDAY AND SATURDAY   • traMADol (ULTRAM) 50 mg tablet Take 1 tablet (50 mg total) by mouth 2 (two) times a day       Objective: There were no vitals taken for this visit.        Physical Exam  Constitutional:       General: She is not in acute distress. Appearance: Normal appearance. She is not ill-appearing, toxic-appearing or diaphoretic. HENT:      Head: Normocephalic and atraumatic. Eyes:      General: No scleral icterus. Right eye: No discharge. Left eye: No discharge. Extraocular Movements: Extraocular movements intact. Conjunctiva/sclera: Conjunctivae normal.   Pulmonary:      Effort: Pulmonary effort is normal. No respiratory distress. Musculoskeletal:      Cervical back: Normal range of motion and neck supple. Neurological:      Mental Status: She is alert. Mental status is at baseline.    Psychiatric:         Mood and Affect: Mood normal.         Behavior: Behavior normal.       Jeannie Levi MD

## 2023-07-14 NOTE — TELEPHONE ENCOUNTER
Patient's daughter, Omayra Nuñez called in to report patient positive for Covid this morning 7/14/23. Exposure from out of state friend on 7/11 and symptoms started 7/13 evening. Patient with fever 101.7, cough, headache, body aches, chest congestion, fatigue, and cough. Tylenol administered and patient is sleeping at time of triage call. This is patient's third occurrence with Covid. VV scheduled with PCP for 1010 this morning. Reason for Disposition  • HIGH RISK for severe COVID complications (e.g., weak immune system, age > 59 years, obesity with BMI > 25, pregnant, chronic lung disease or other chronic medical condition) (Exception: Already seen by PCP and no new or worsening symptoms.)    Answer Assessment - Initial Assessment Questions  Were you within 6 feet or less, for up to 15 minutes or more with a person that has a confirmed COVID-19 test? Yes; visit from [place] friend 7/11/23; What was the date of your exposure? home test positive 7/14/23; symptoms started last night 7/13  Are you experiencing any symptoms attributed to the virus?  (Assess for SOB, cough, fever, difficulty breathing) Fever 101.7 F (oral), headaches, body aches, chest congestion, cough, fatigue   HIGH RISK: Do you have any history heart or lung conditions, weakened immune system, diabetes, Asthma, CHF, HIV, COPD, Chemo, renal failure, sickle cell, etc? Asthma, Atrial fibriallation, pacemaker. Covid posititive x 2   PREGNANCY: Are you pregnant or did you recently give birth?  Post menopausal  VACCINE: "Have you gotten the COVID-19 vaccine?" If Yes ask: "Which one, how many shots, when did you get it?" Pfizer x 2; no boosters    Protocols used: CORONAVIRUS (COVID-19) DIAGNOSED OR SUSPECTED-ADULT-[place]

## 2023-07-19 DIAGNOSIS — E78.2 MIXED HYPERLIPIDEMIA: ICD-10-CM

## 2023-07-19 RX ORDER — ROSUVASTATIN CALCIUM 5 MG/1
TABLET, COATED ORAL
Qty: 26 TABLET | Refills: 0 | Status: SHIPPED | OUTPATIENT
Start: 2023-07-19

## 2023-07-24 ENCOUNTER — TELEPHONE (OUTPATIENT)
Dept: FAMILY MEDICINE CLINIC | Facility: CLINIC | Age: 88
End: 2023-07-24

## 2023-07-24 NOTE — TELEPHONE ENCOUNTER
Hi, this is [place]. I'm leaving this mother of this message about my mom, Nilda Cousin. Her YOB: 1926 And my question is, she just tested negative for COVID, but I have never seen such fatigue like this in her. And so I don't know whether he is dehydrated or whether, you know, her iron levels are low. So I was thinking, do I just take her to the and have that done? I was hoping maybe a B-15 injection or something to try and move this fatigue out. I mean, she's definitely better, but I don't. I mean, I know there is COVID 15, but I just wanted to get some kind of advice, you know, going forward. It just seems to be so much sleeping and all. Her only complaint is just tired, tired, tired. So I don't know. Call me back when you get a chance Again. It's Zi Guzman [phone]. Thank you. Naseem. Called and spoke with Daughter Little Friend, states that she is just not the same. She is sleeping all day and very lethargic. I offered her an appointment today but after speaking to her she decided to take her to the ER for evaluation.

## 2023-07-26 ENCOUNTER — HOSPITAL ENCOUNTER (EMERGENCY)
Facility: HOSPITAL | Age: 88
Discharge: HOME | End: 2023-07-26
Attending: EMERGENCY MEDICINE
Payer: MEDICARE

## 2023-07-26 ENCOUNTER — APPOINTMENT (EMERGENCY)
Dept: RADIOLOGY | Facility: HOSPITAL | Age: 88
End: 2023-07-26
Payer: MEDICARE

## 2023-07-26 VITALS
SYSTOLIC BLOOD PRESSURE: 131 MMHG | OXYGEN SATURATION: 99 % | HEART RATE: 77 BPM | DIASTOLIC BLOOD PRESSURE: 70 MMHG | TEMPERATURE: 97.8 F | RESPIRATION RATE: 18 BRPM

## 2023-07-26 DIAGNOSIS — U09.9 PERSISTENT FATIGUE AFTER COVID-19: Primary | ICD-10-CM

## 2023-07-26 DIAGNOSIS — R53.83 PERSISTENT FATIGUE AFTER COVID-19: Primary | ICD-10-CM

## 2023-07-26 LAB
2HR DELTA HS TROPONIN: 0 NG/L
ALBUMIN SERPL BCP-MCNC: 3.8 G/DL (ref 3.5–5)
ALP SERPL-CCNC: 84 U/L (ref 34–104)
ALT SERPL W P-5'-P-CCNC: 10 U/L (ref 7–52)
ANION GAP SERPL CALCULATED.3IONS-SCNC: 7 MMOL/L
AST SERPL W P-5'-P-CCNC: 12 U/L (ref 13–39)
ATRIAL RATE: 375 BPM
BACTERIA UR QL AUTO: NORMAL /HPF
BASOPHILS # BLD AUTO: 0.05 THOUSANDS/ÂΜL (ref 0–0.1)
BASOPHILS NFR BLD AUTO: 1 % (ref 0–1)
BILIRUB SERPL-MCNC: 0.68 MG/DL (ref 0.2–1)
BILIRUB UR QL STRIP: NEGATIVE
BUN SERPL-MCNC: 29 MG/DL (ref 5–25)
CALCIUM SERPL-MCNC: 9.2 MG/DL (ref 8.4–10.2)
CARDIAC TROPONIN I PNL SERPL HS: 15 NG/L
CARDIAC TROPONIN I PNL SERPL HS: 15 NG/L
CHLORIDE SERPL-SCNC: 108 MMOL/L (ref 96–108)
CLARITY UR: CLEAR
CO2 SERPL-SCNC: 25 MMOL/L (ref 21–32)
COLOR UR: YELLOW
CREAT SERPL-MCNC: 1.25 MG/DL (ref 0.6–1.3)
EOSINOPHIL # BLD AUTO: 0.13 THOUSAND/ÂΜL (ref 0–0.61)
EOSINOPHIL NFR BLD AUTO: 1 % (ref 0–6)
ERYTHROCYTE [DISTWIDTH] IN BLOOD BY AUTOMATED COUNT: 14.5 % (ref 11.6–15.1)
FERRITIN SERPL-MCNC: 67 NG/ML (ref 11–307)
GFR SERPL CREATININE-BSD FRML MDRD: 36 ML/MIN/1.73SQ M
GLUCOSE SERPL-MCNC: 85 MG/DL (ref 65–140)
GLUCOSE UR STRIP-MCNC: NEGATIVE MG/DL
HCT VFR BLD AUTO: 41.9 % (ref 34.8–46.1)
HGB BLD-MCNC: 13.2 G/DL (ref 11.5–15.4)
HGB UR QL STRIP.AUTO: NEGATIVE
IMM GRANULOCYTES # BLD AUTO: 0.05 THOUSAND/UL (ref 0–0.2)
IMM GRANULOCYTES NFR BLD AUTO: 1 % (ref 0–2)
IRON SATN MFR SERPL: 22 % (ref 15–50)
IRON SERPL-MCNC: 71 UG/DL (ref 50–170)
KETONES UR STRIP-MCNC: NEGATIVE MG/DL
LEUKOCYTE ESTERASE UR QL STRIP: ABNORMAL
LYMPHOCYTES # BLD AUTO: 1.78 THOUSANDS/ÂΜL (ref 0.6–4.47)
LYMPHOCYTES NFR BLD AUTO: 17 % (ref 14–44)
MCH RBC QN AUTO: 29.7 PG (ref 26.8–34.3)
MCHC RBC AUTO-ENTMCNC: 31.5 G/DL (ref 31.4–37.4)
MCV RBC AUTO: 94 FL (ref 82–98)
MONOCYTES # BLD AUTO: 1.11 THOUSAND/ÂΜL (ref 0.17–1.22)
MONOCYTES NFR BLD AUTO: 11 % (ref 4–12)
NEUTROPHILS # BLD AUTO: 7.32 THOUSANDS/ÂΜL (ref 1.85–7.62)
NEUTS SEG NFR BLD AUTO: 69 % (ref 43–75)
NITRITE UR QL STRIP: NEGATIVE
NON-SQ EPI CELLS URNS QL MICRO: NORMAL /HPF
NRBC BLD AUTO-RTO: 0 /100 WBCS
PH UR STRIP.AUTO: 5.5 [PH]
PLATELET # BLD AUTO: 368 THOUSANDS/UL (ref 149–390)
PMV BLD AUTO: 10.3 FL (ref 8.9–12.7)
POTASSIUM SERPL-SCNC: 4.2 MMOL/L (ref 3.5–5.3)
PROT SERPL-MCNC: 6.8 G/DL (ref 6.4–8.4)
PROT UR STRIP-MCNC: NEGATIVE MG/DL
QRS AXIS: -86 DEGREES
QRSD INTERVAL: 144 MS
QT INTERVAL: 446 MS
QTC INTERVAL: 477 MS
RBC # BLD AUTO: 4.45 MILLION/UL (ref 3.81–5.12)
RBC #/AREA URNS AUTO: NORMAL /HPF
SODIUM SERPL-SCNC: 140 MMOL/L (ref 135–147)
SP GR UR STRIP.AUTO: 1.02 (ref 1–1.03)
T WAVE AXIS: 72 DEGREES
TIBC SERPL-MCNC: 323 UG/DL (ref 250–450)
UROBILINOGEN UR QL STRIP.AUTO: 0.2 E.U./DL
VENTRICULAR RATE: 69 BPM
WBC # BLD AUTO: 10.44 THOUSAND/UL (ref 4.31–10.16)
WBC #/AREA URNS AUTO: NORMAL /HPF

## 2023-07-26 PROCEDURE — 36415 COLL VENOUS BLD VENIPUNCTURE: CPT | Performed by: PHYSICIAN ASSISTANT

## 2023-07-26 PROCEDURE — 93005 ELECTROCARDIOGRAM TRACING: CPT

## 2023-07-26 PROCEDURE — 82728 ASSAY OF FERRITIN: CPT | Performed by: PHYSICIAN ASSISTANT

## 2023-07-26 PROCEDURE — 81001 URINALYSIS AUTO W/SCOPE: CPT | Performed by: PHYSICIAN ASSISTANT

## 2023-07-26 PROCEDURE — 71045 X-RAY EXAM CHEST 1 VIEW: CPT

## 2023-07-26 PROCEDURE — 85025 COMPLETE CBC W/AUTO DIFF WBC: CPT | Performed by: PHYSICIAN ASSISTANT

## 2023-07-26 PROCEDURE — 83550 IRON BINDING TEST: CPT | Performed by: PHYSICIAN ASSISTANT

## 2023-07-26 PROCEDURE — 80053 COMPREHEN METABOLIC PANEL: CPT | Performed by: PHYSICIAN ASSISTANT

## 2023-07-26 PROCEDURE — 93010 ELECTROCARDIOGRAM REPORT: CPT | Performed by: INTERNAL MEDICINE

## 2023-07-26 PROCEDURE — 83540 ASSAY OF IRON: CPT | Performed by: PHYSICIAN ASSISTANT

## 2023-07-26 PROCEDURE — 84484 ASSAY OF TROPONIN QUANT: CPT | Performed by: PHYSICIAN ASSISTANT

## 2023-07-26 RX ORDER — DEXAMETHASONE SODIUM PHOSPHATE 10 MG/ML
10 INJECTION, SOLUTION INTRAMUSCULAR; INTRAVENOUS ONCE
Status: COMPLETED | OUTPATIENT
Start: 2023-07-26 | End: 2023-07-26

## 2023-07-26 RX ORDER — IPRATROPIUM BROMIDE AND ALBUTEROL SULFATE 2.5; .5 MG/3ML; MG/3ML
3 SOLUTION RESPIRATORY (INHALATION)
Status: DISCONTINUED | OUTPATIENT
Start: 2023-07-26 | End: 2023-07-26 | Stop reason: HOSPADM

## 2023-07-26 RX ORDER — CYANOCOBALAMIN 1000 UG/ML
1000 INJECTION, SOLUTION INTRAMUSCULAR; SUBCUTANEOUS ONCE
Status: COMPLETED | OUTPATIENT
Start: 2023-07-26 | End: 2023-07-26

## 2023-07-26 RX ADMIN — IPRATROPIUM BROMIDE AND ALBUTEROL SULFATE 3 ML: .5; 3 SOLUTION RESPIRATORY (INHALATION) at 14:41

## 2023-07-26 RX ADMIN — CYANOCOBALAMIN 1000 MCG: 1000 INJECTION, SOLUTION INTRAMUSCULAR; SUBCUTANEOUS at 16:31

## 2023-07-26 RX ADMIN — SODIUM CHLORIDE 500 ML: 0.9 INJECTION, SOLUTION INTRAVENOUS at 14:48

## 2023-07-26 RX ADMIN — DEXAMETHASONE SODIUM PHOSPHATE 10 MG: 10 INJECTION INTRAMUSCULAR; INTRAVENOUS at 14:41

## 2023-07-26 NOTE — DISCHARGE INSTRUCTIONS
Please return to the emergency department for worsening symptoms including chest pain, shortness of breath, dizziness, lightheadedness, fever greater than 103, severe pain, inability to walk, fainting episodes, etc.. Please follow-up with your family practice provider as soon as possible. Follow-up with Dr. Marshall Thompson.

## 2023-07-26 NOTE — ED PROVIDER NOTES
History  Chief Complaint   Patient presents with   • Fatigue     Reports had covid about a week ago and has been fatigued since. PcP recommended she comes to receive fluids and an iron test.      This is a 59-year-old female with past medical history significant for asthma, atrial fibrillation, CKD, hyperlipidemia, hypertension, TIA, and pacemaker placement presenting to the emergency department today for fatigue. The patient tested positive for COVID-19 approximately 1 week ago. She has since been testing negative at home. The patient denies any chest pain or shortness of breath but the patient's daughter does note that she has been more fatigued than she normally is. She has been sleeping more commonly than she normally does. The patient herself endorses no complaints. She has no headaches, dizziness, or lightheadedness. The patient notes that she does have a chronic cough but denies any nasal congestion or rhinorrhea. She denies any chest pain or shortness of breath. She has no fevers or chills. She has no abdominal pain, nausea, vomiting, diarrhea, or constipation. She denies any dysuria, hematuria, or foul-smelling urine. She denies any recent falls. The patient denies other complaints at this time.       History provided by:  Patient   used: No    Fatigue  Severity:  Mild  Onset quality:  Gradual  Duration:  1 week  Timing:  Constant  Progression:  Waxing and waning  Chronicity:  New  Context comment:  Recent COVID-19 infection  Relieved by:  Nothing  Worsened by:  Nothing  Ineffective treatments:  Sleep  Associated symptoms: cough (chronic)    Associated symptoms: no abdominal pain, no anorexia, no aphasia, no arthralgias, no ataxia, no chest pain, no diarrhea, no dizziness, no drooling, no dysphagia, no dysuria, no falls, no fever, no foul-smelling urine, no frequency, no headaches, no lethargy, no loss of consciousness, no melena, no myalgias, no nausea, no near-syncope, no seizures, no shortness of breath, no stroke symptoms, no urgency, no vision change and no vomiting        Prior to Admission Medications   Prescriptions Last Dose Informant Patient Reported? Taking?    Eliquis 2.5 MG   No No   Sig: TAKE 1 TABLET BY MOUTH 2 TIMES DAILY   albuterol (PROVENTIL HFA,VENTOLIN HFA) 90 mcg/act inhaler   No No   Sig: Inhale 2 puffs every 4 (four) hours as needed for wheezing   diltiazem (CARDIZEM CD) 120 mg 24 hr capsule   No No   Sig: TAKE 1 CAPSULE EVERY DAY   gabapentin (NEURONTIN) 100 mg capsule   No No   Sig: TAKE 2 CAPSULES TWICE DAILY   iron polysaccharides (FERREX) 150 mg capsule   Yes No   Sig: Take 150 mg by mouth daily   lisinopril (ZESTRIL) 5 mg tablet   No No   Sig: TAKE 1 TABLET EVERY DAY   metoprolol succinate (TOPROL-XL) 100 mg 24 hr tablet   No No   Sig: Take 1 tablet (100 mg total) by mouth daily   rosuvastatin (CRESTOR) 5 mg tablet   No No   Sig: TAKE 1 TABLET TWICE WEEKLY ON TUESDAY AND SATURDAY   traMADol (ULTRAM) 50 mg tablet   No No   Sig: Take 1 tablet (50 mg total) by mouth 2 (two) times a day      Facility-Administered Medications: None       Past Medical History:   Diagnosis Date   • Anemia    • Arthritis    • Asthma    • Atrial fibrillation (HCC) 11/08/2019   • CAD    • Chronic kidney disease    • CKD (chronic kidney disease)    • Current use of long term anticoagulation 11/08/2019   • Degenerative joint disease    • Elevated troponin 09/08/2021   • Hyperlipidemia    • Hypertension    • Hypertension, essential 11/08/2019   • ALYSSA (iron deficiency anemia)    • Osteopenia    • Osteoporosis    • Pacemaker    • TIA (transient ischemic attack) 11/08/2019       Past Surgical History:   Procedure Laterality Date   • CARDIAC PACEMAKER PLACEMENT         Family History   Problem Relation Age of Onset   • Other Mother         Respiratory problem   • Asthma Mother    • Hypertension Son    • Hypertension Daughter    • Hyperlipidemia Daughter    • Hyperlipidemia Daughter      I have reviewed and agree with the history as documented. E-Cigarette/Vaping   • E-Cigarette Use Never User      E-Cigarette/Vaping Substances   • Nicotine No    • THC No    • CBD No    • Flavoring No    • Other No    • Unknown No      Social History     Tobacco Use   • Smoking status: Never     Passive exposure: Past   • Smokeless tobacco: Never   Vaping Use   • Vaping Use: Never used   Substance Use Topics   • Alcohol use: Not Currently     Comment: occasionaly    • Drug use: Never       Review of Systems   Constitutional: Positive for fatigue. Negative for appetite change, chills, diaphoresis and fever. HENT: Negative for congestion, drooling, rhinorrhea, sinus pressure and sinus pain. Eyes: Negative for visual disturbance. Respiratory: Positive for cough (chronic) and wheezing. Negative for chest tightness and shortness of breath. Cardiovascular: Negative for chest pain, palpitations, leg swelling and near-syncope. Gastrointestinal: Negative for abdominal pain, anorexia, constipation, diarrhea, dysphagia, melena, nausea and vomiting. Genitourinary: Negative for dysuria, frequency, hematuria and urgency. Musculoskeletal: Negative for arthralgias, falls, myalgias, neck pain and neck stiffness. Skin: Negative for rash and wound. Neurological: Negative for dizziness, seizures, loss of consciousness, syncope, weakness, light-headedness, numbness and headaches. Psychiatric/Behavioral: Negative for confusion. All other systems reviewed and are negative. Physical Exam  Physical Exam  Vitals and nursing note reviewed. Constitutional:       General: She is not in acute distress. Appearance: Normal appearance. She is normal weight. She is not ill-appearing, toxic-appearing or diaphoretic. HENT:      Head: Normocephalic and atraumatic. Nose: Nose normal. No congestion or rhinorrhea.       Mouth/Throat:      Mouth: Mucous membranes are moist.      Pharynx: No oropharyngeal exudate or posterior oropharyngeal erythema. Eyes:      General: No scleral icterus. Right eye: No discharge. Left eye: No discharge. Extraocular Movements: Extraocular movements intact. Pupils: Pupils are equal, round, and reactive to light. Neck:      Comments: Nonmeningeal  Cardiovascular:      Rate and Rhythm: Normal rate and regular rhythm. Pulses: Normal pulses. Heart sounds: Normal heart sounds. No murmur heard. No friction rub. No gallop. Pulmonary:      Effort: Pulmonary effort is normal. No respiratory distress. Breath sounds: No stridor. Wheezing present. No rhonchi or rales. Comments: Faint wheezing to the bilateral lung bases; no evidence of respiratory distress  Chest:      Chest wall: No tenderness. Abdominal:      General: Abdomen is flat. There is no distension. Palpations: Abdomen is soft. Tenderness: There is no abdominal tenderness. There is no right CVA tenderness, left CVA tenderness, guarding or rebound. Musculoskeletal:         General: Normal range of motion. Cervical back: Normal range of motion. No tenderness. Right lower leg: No edema. Left lower leg: No edema. Comments: Negative Homans' sign bilaterally   Skin:     General: Skin is warm and dry. Capillary Refill: Capillary refill takes less than 2 seconds. Coloration: Skin is not jaundiced or pale. Neurological:      General: No focal deficit present. Mental Status: She is alert and oriented to person, place, and time. Mental status is at baseline.       Comments: 5/5 strength in bilateral upper and lower extremities  Normal sensation of bilateral upper and lower extremities  The patient is able to smile, frown, puff out cheeks, and raise eyebrows bilaterally symmetrically without difficulty  Normal finger-to-nose examination  No cerebellar signs are dysdiadochokinesia  Overall, a normal neurologic assessment   Psychiatric:         Mood and Affect: Mood normal.         Behavior: Behavior normal.         Vital Signs  ED Triage Vitals   Temperature Pulse Respirations Blood Pressure SpO2   07/26/23 1336 07/26/23 1334 07/26/23 1334 07/26/23 1334 07/26/23 1334   97.8 °F (36.6 °C) 71 18 133/76 98 %      Temp src Heart Rate Source Patient Position - Orthostatic VS BP Location FiO2 (%)   -- 07/26/23 1515 07/26/23 1334 07/26/23 1334 --    Monitor Lying Right arm       Pain Score       07/26/23 1334       No Pain           Vitals:    07/26/23 1334 07/26/23 1515 07/26/23 1720   BP: 133/76 122/64 131/70   Pulse: 71 73 77   Patient Position - Orthostatic VS: Lying Lying Lying         Visual Acuity      ED Medications  Medications   sodium chloride 0.9 % bolus 500 mL (0 mL Intravenous Stopped 7/26/23 1611)   dexamethasone (PF) (DECADRON) injection 10 mg (10 mg Intravenous Given 7/26/23 1441)   cyanocobalamin injection 1,000 mcg (1,000 mcg Intramuscular Given 7/26/23 1631)       Diagnostic Studies  Results Reviewed     Procedure Component Value Units Date/Time    Ferritin [[id number]]  (Normal) Collected: 07/26/23 1439    Lab Status: Final result Specimen: Blood from Arm, Left Updated: 07/26/23 1830     Ferritin 67 ng/mL     Iron Saturation % [[id number]]  (Normal) Collected: 07/26/23 1439    Lab Status: Final result Specimen: Blood from Arm, Left Updated: 07/26/23 1800     Iron Saturation 22 %      TIBC 323 ug/dL      Iron 71 ug/dL     HS Troponin I 2hr [[id number]]  (Normal) Collected: 07/26/23 1629    Lab Status: Final result Specimen: Blood from Line, Venous Updated: 07/26/23 1656     hs TnI 2hr 15 ng/L      Delta 2hr hsTnI 0 ng/L     Urine Microscopic [[id number]]  (Normal) Collected: 07/26/23 1629    Lab Status: Final result Specimen: Urine, Clean Catch Updated: 07/26/23 1655     RBC, UA None Seen /hpf      WBC, UA 0-5 /hpf      Epithelial Cells None Seen /hpf      Bacteria, UA None Seen /hpf     UA w Reflex to Microscopic w Reflex to Culture [[id number]] (Abnormal) Collected: 07/26/23 1629    Lab Status: Final result Specimen: Urine, Clean Catch Updated: 07/26/23 1635     Color, UA Yellow     Clarity, UA Clear     Specific Gravity, UA 1.025     pH, UA 5.5     Leukocytes, UA Trace     Nitrite, UA Negative     Protein, UA Negative mg/dl      Glucose, UA Negative mg/dl      Ketones, UA Negative mg/dl      Urobilinogen, UA 0.2 E.U./dl      Bilirubin, UA Negative     Occult Blood, UA Negative    HS Troponin 0hr (reflex protocol) [[id number]]  (Normal) Collected: 07/26/23 1439    Lab Status: Final result Specimen: Blood from Arm, Left Updated: 07/26/23 1506     hs TnI 0hr 15 ng/L     Comprehensive metabolic panel [[id number]]  (Abnormal) Collected: 07/26/23 1439    Lab Status: Final result Specimen: Blood from Arm, Left Updated: 07/26/23 1459     Sodium 140 mmol/L      Potassium 4.2 mmol/L      Chloride 108 mmol/L      CO2 25 mmol/L      ANION GAP 7 mmol/L      BUN 29 mg/dL      Creatinine 1.25 mg/dL      Glucose 85 mg/dL      Calcium 9.2 mg/dL      AST 12 U/L      ALT 10 U/L      Alkaline Phosphatase 84 U/L      Total Protein 6.8 g/dL      Albumin 3.8 g/dL      Total Bilirubin 0.68 mg/dL      eGFR 36 ml/min/1.73sq m     Narrative:      [facility] guidelines for Chronic Kidney Disease (CKD):   •  Stage 1 with normal or high GFR (GFR > 90 mL/min/1.73 square meters)  •  Stage 2 Mild CKD (GFR = 60-89 mL/min/1.73 square meters)  •  Stage 3A Moderate CKD (GFR = 45-59 mL/min/1.73 square meters)  •  Stage 3B Moderate CKD (GFR = 30-44 mL/min/1.73 square meters)  •  Stage 4 Severe CKD (GFR = 15-29 mL/min/1.73 square meters)  •  Stage 5 End Stage CKD (GFR <15 mL/min/1.73 square meters)  Note: GFR calculation is accurate only with a steady state creatinine    CBC and differential [[id number]]  (Abnormal) Collected: 07/26/23 1439    Lab Status: Final result Specimen: Blood from Arm, Left Updated: 07/26/23 1444     WBC 10.44 Thousand/uL      RBC 4.45 Million/uL Hemoglobin 13.2 g/dL      Hematocrit 41.9 %      MCV 94 fL      MCH 29.7 pg      MCHC 31.5 g/dL      RDW 14.5 %      MPV 10.3 fL      Platelets 671 Thousands/uL      nRBC 0 /100 WBCs      Neutrophils Relative 69 %      Immat GRANS % 1 %      Lymphocytes Relative 17 %      Monocytes Relative 11 %      Eosinophils Relative 1 %      Basophils Relative 1 %      Neutrophils Absolute 7.32 Thousands/µL      Immature Grans Absolute 0.05 Thousand/uL      Lymphocytes Absolute 1.78 Thousands/µL      Monocytes Absolute 1.11 Thousand/µL      Eosinophils Absolute 0.13 Thousand/µL      Basophils Absolute 0.05 Thousands/µL                  XR chest 1 view    (Results Pending)              Procedures  ECG 12 Lead Documentation Only    Date/Time: 7/26/2023 2:22 PM    Performed by: Madhuri Dale PA-C  Authorized by: Cristian Betts PA-C    Indications / Diagnosis:  Fatigue  Patient location:  ED  Previous ECG:     Previous ECG:  Unavailable  Interpretation:     Interpretation: non-specific    Rate:     ECG rate:  69    ECG rate assessment: normal    Comments:      Ventricular paced rhythm             ED Course                               SBIRT 22yo+    Flowsheet Row Most Recent Value   Initial Alcohol Screen: US AUDIT-C     1. How often do you have a drink containing alcohol? 0 Filed at: 07/26/2023 1335   2. How many drinks containing alcohol do you have on a typical day you are drinking? 0 Filed at: 07/26/2023 1335   3a. Male UNDER 65: How often do you have five or more drinks on one occasion? 0 Filed at: 07/26/2023 1335   3b. FEMALE Any Age, or MALE 65+: How often do you have 4 or more drinks on one occassion? 0 Filed at: 07/26/2023 1335   Audit-C Score 0 Filed at: 07/26/2023 1335   SIMÓN: How many times in the past year have you. .. Used an illegal drug or used a prescription medication for non-medical reasons?  Never Filed at: 07/26/2023 1335                    Medical Decision Making  This is a 80-year-old female presenting to the emergency department today for fatigue. Had COVID-19 approximately 1 week ago. She complains of a chronic cough. Vital signs are stable. She is not hypoxic nor tachycardic. On physical examination, the patient has faint wheezing to the bilateral lung fields. She has a reassuring neurologic assessment. She is nonmeningeal with a negative Homans' sign bilaterally. EKG shows ventricular paced rhythm with a rate of 69. Chest x-ray shows no acute cardiopulmonary disease on my independent interpretation. She has very mild leukocytosis. Troponin was negative x2. No urinary tract infection at this time. Iron panel was performed. The patient's daughter notes that typically she does better with a vitamin B-12 shot and therefore I gave this to the patient while here in the emergency department. The patient was also dosed with dexamethasone and a DuoNeb while here in the emergency department which improved her wheezing. The patient's pulse oximetry when she stood remained 98% and greater. The patient is stable for discharge at this time. Fatigue is likely secondary to post-COVID symptoms. Follow-up with PCP. Strict return precautions were given. Recommend PCP follow-up as soon as possible. The patient and/or patient's proxy verify their understanding and agree to the plan at this time. All questions answered to the patient and/or their proxy's satisfaction. All labs reviewed and utilized in the medical decision making process (if labs were ordered). Portions of the record may have been created with voice recognition software.  Occasional wrong word or "sound a like" substitutions may have occurred due to the inherent limitations of voice recognition software.  Read the chart carefully and recognize, using context, where substitutions have occurred.     Persistent fatigue after UGZAP-19: complicated acute illness or injury  Amount and/or Complexity of Data Reviewed  Independent Historian: caregiver     Details: Daughter  Labs: ordered. Decision-making details documented in ED Course. Radiology: ordered and independent interpretation performed. Decision-making details documented in ED Course. ECG/medicine tests: ordered and independent interpretation performed. Decision-making details documented in ED Course. Risk  Prescription drug management. Disposition  Final diagnoses:   Persistent fatigue after COVID-19     Time reflects when diagnosis was documented in both MDM as applicable and the Disposition within this note     Time User Action Codes Description Comment    7/26/2023  4:57 PM Deisy Betts Add [R53.83,  U09.9] Persistent fatigue after COVID-19       ED Disposition     ED Disposition   Discharge    Condition   Stable    Date/Time   Wed Jul 26, 2023  4:57 PM    Comment   [facility] discharge to home/self care.                Follow-up Information     Follow up With Specialties Details Why Contact Info Additional Information    Jeannie Levi MD Internal Medicine Schedule an appointment as soon as possible for a visit   [address]  [address] [facility] [phone]       [address] [facility] Emergency Medicine Go to  If symptoms worsen [address] [address] [phone] 130[address] [facility] Emergency Department, [address] [place] [address] Dr, 400 [facility]          Discharge Medication List as of 7/26/2023  4:58 PM      CONTINUE these medications which have NOT CHANGED    Details   albuterol (PROVENTIL HFA,VENTOLIN HFA) 90 mcg/act inhaler Inhale 2 puffs every 4 (four) hours as needed for wheezing, Starting Wed 6/21/2023, Normal      diltiazem (CARDIZEM CD) 120 mg 24 hr capsule TAKE 1 CAPSULE EVERY DAY, Normal      Eliquis 2.5 MG TAKE 1 TABLET BY MOUTH 2 TIMES DAILY, Normal      gabapentin (NEURONTIN) 100 mg capsule TAKE 2 CAPSULES TWICE DAILY, Normal iron polysaccharides (FERREX) 150 mg capsule Take 150 mg by mouth daily, Historical Med      lisinopril (ZESTRIL) 5 mg tablet TAKE 1 TABLET EVERY DAY, Normal      metoprolol succinate (TOPROL-XL) 100 mg 24 hr tablet Take 1 tablet (100 mg total) by mouth daily, Starting Fri 5/5/2023, Normal      rosuvastatin (CRESTOR) 5 mg tablet TAKE 1 TABLET TWICE WEEKLY ON TUESDAY AND SATURDAY, Normal      traMADol (ULTRAM) 50 mg tablet Take 1 tablet (50 mg total) by mouth 2 (two) times a day, Starting Wed 6/21/2023, Normal             No discharge procedures on file.     PDMP Review       Value Time User    PDMP Reviewed  Yes 6/21/2023 10:23 AM Vaughn Peck MD          ED Provider  Electronically Signed by           Cindy Howard PA-C  07/26/23 2005

## 2023-07-27 ENCOUNTER — VBI (OUTPATIENT)
Dept: ADMINISTRATIVE | Facility: OTHER | Age: 88
End: 2023-07-27

## 2023-07-27 NOTE — TELEPHONE ENCOUNTER
07/27/23 1:19 PM    Patient contacted post ED visit, [facility] department spoke with patient/caregiver and outreach was successful. Thank you.   Linda Gomez  [facility]

## 2023-08-07 DIAGNOSIS — I48.91 ATRIAL FIBRILLATION, UNSPECIFIED TYPE (HCC): ICD-10-CM

## 2023-08-07 RX ORDER — APIXABAN 2.5 MG/1
TABLET, FILM COATED ORAL
Qty: 60 TABLET | Refills: 0 | Status: SHIPPED | OUTPATIENT
Start: 2023-08-07

## 2023-08-23 DIAGNOSIS — J45.41 MODERATE PERSISTENT ASTHMA WITH EXACERBATION: ICD-10-CM

## 2023-08-23 RX ORDER — ALBUTEROL SULFATE 90 UG/1
2 AEROSOL, METERED RESPIRATORY (INHALATION) EVERY 4 HOURS PRN
Qty: 18 G | Refills: 0 | Status: SHIPPED | OUTPATIENT
Start: 2023-08-23

## 2023-08-23 NOTE — TELEPHONE ENCOUNTER
- Hi this is Kallie Trinh. I'm trying to get into my mom's chart to send a message but for some reason it's not allowing me. Her name is Cha Martinez birth date 9/7/1926 and what I'd like to do is I wanted to know if I can get an order [facility] inhaler. It was 90 MCG. Something is wrong with the one that we got delivery system. I need to have another one on hand. So anyway, if you could call that in to the [facility] on [address] and again it's the albuterol inhaler 90MCG or I don't know if there if there's a stronger one. Anyway, again, it's for [employer]. And this is her daughter Aron Mccoy. And I can be reached at [phone]. I'm sorry, [phone]. Thank you Bye.    -- Patients daughter LVM stating something went wrong of the patient inhaler mail order and will need an inhaler for the patient. Requested to be sent to the [employer].

## 2023-08-29 DIAGNOSIS — I48.91 ATRIAL FIBRILLATION, UNSPECIFIED TYPE (HCC): ICD-10-CM

## 2023-08-29 RX ORDER — APIXABAN 2.5 MG/1
TABLET, FILM COATED ORAL
Qty: 60 TABLET | Refills: 0 | Status: SHIPPED | OUTPATIENT
Start: 2023-08-29

## 2023-09-01 ENCOUNTER — APPOINTMENT (EMERGENCY)
Dept: RADIOLOGY | Facility: HOSPITAL | Age: 88
End: 2023-09-01
Payer: MEDICARE

## 2023-09-01 ENCOUNTER — APPOINTMENT (EMERGENCY)
Dept: CT IMAGING | Facility: HOSPITAL | Age: 88
End: 2023-09-01
Payer: MEDICARE

## 2023-09-01 ENCOUNTER — HOSPITAL ENCOUNTER (EMERGENCY)
Facility: HOSPITAL | Age: 88
Discharge: HOME | End: 2023-09-01
Attending: INTERNAL MEDICINE
Payer: MEDICARE

## 2023-09-01 VITALS
OXYGEN SATURATION: 98 % | HEART RATE: 78 BPM | DIASTOLIC BLOOD PRESSURE: 60 MMHG | TEMPERATURE: 97.8 F | RESPIRATION RATE: 16 BRPM | SYSTOLIC BLOOD PRESSURE: 115 MMHG

## 2023-09-01 DIAGNOSIS — S72.114A CLOSED NONDISPLACED FRACTURE OF GREATER TROCHANTER OF RIGHT FEMUR, INITIAL ENCOUNTER (HCC): Primary | ICD-10-CM

## 2023-09-01 LAB
ALBUMIN SERPL BCP-MCNC: 3.5 G/DL (ref 3.5–5)
ALP SERPL-CCNC: 76 U/L (ref 34–104)
ALT SERPL W P-5'-P-CCNC: 11 U/L (ref 7–52)
ANION GAP SERPL CALCULATED.3IONS-SCNC: 9 MMOL/L
APTT PPP: 31 SECONDS (ref 23–37)
AST SERPL W P-5'-P-CCNC: 13 U/L (ref 13–39)
ATRIAL RATE: 63 BPM
BASOPHILS # BLD AUTO: 0.06 THOUSANDS/ÂΜL (ref 0–0.1)
BASOPHILS NFR BLD AUTO: 1 % (ref 0–1)
BILIRUB SERPL-MCNC: 0.6 MG/DL (ref 0.2–1)
BNP SERPL-MCNC: 479 PG/ML (ref 0–100)
BUN SERPL-MCNC: 25 MG/DL (ref 5–25)
CALCIUM SERPL-MCNC: 9.2 MG/DL (ref 8.4–10.2)
CARDIAC TROPONIN I PNL SERPL HS: 6 NG/L
CHLORIDE SERPL-SCNC: 103 MMOL/L (ref 96–108)
CO2 SERPL-SCNC: 25 MMOL/L (ref 21–32)
CREAT SERPL-MCNC: 1.29 MG/DL (ref 0.6–1.3)
EOSINOPHIL # BLD AUTO: 0.4 THOUSAND/ÂΜL (ref 0–0.61)
EOSINOPHIL NFR BLD AUTO: 4 % (ref 0–6)
ERYTHROCYTE [DISTWIDTH] IN BLOOD BY AUTOMATED COUNT: 13.9 % (ref 11.6–15.1)
GFR SERPL CREATININE-BSD FRML MDRD: 35 ML/MIN/1.73SQ M
GLUCOSE SERPL-MCNC: 84 MG/DL (ref 65–140)
HCT VFR BLD AUTO: 39.4 % (ref 34.8–46.1)
HGB BLD-MCNC: 12.4 G/DL (ref 11.5–15.4)
IMM GRANULOCYTES # BLD AUTO: 0.06 THOUSAND/UL (ref 0–0.2)
IMM GRANULOCYTES NFR BLD AUTO: 1 % (ref 0–2)
INR PPP: 1.36 (ref 0.84–1.19)
LYMPHOCYTES # BLD AUTO: 1.67 THOUSANDS/ÂΜL (ref 0.6–4.47)
LYMPHOCYTES NFR BLD AUTO: 15 % (ref 14–44)
MCH RBC QN AUTO: 30.1 PG (ref 26.8–34.3)
MCHC RBC AUTO-ENTMCNC: 31.5 G/DL (ref 31.4–37.4)
MCV RBC AUTO: 96 FL (ref 82–98)
MONOCYTES # BLD AUTO: 0.95 THOUSAND/ÂΜL (ref 0.17–1.22)
MONOCYTES NFR BLD AUTO: 9 % (ref 4–12)
NEUTROPHILS # BLD AUTO: 7.88 THOUSANDS/ÂΜL (ref 1.85–7.62)
NEUTS SEG NFR BLD AUTO: 70 % (ref 43–75)
NRBC BLD AUTO-RTO: 0 /100 WBCS
PLATELET # BLD AUTO: 296 THOUSANDS/UL (ref 149–390)
PMV BLD AUTO: 10.1 FL (ref 8.9–12.7)
POTASSIUM SERPL-SCNC: 4.5 MMOL/L (ref 3.5–5.3)
PROT SERPL-MCNC: 6.4 G/DL (ref 6.4–8.4)
PROTHROMBIN TIME: 17.2 SECONDS (ref 11.6–14.5)
QRS AXIS: -85 DEGREES
QRSD INTERVAL: 132 MS
QT INTERVAL: 448 MS
QTC INTERVAL: 480 MS
RBC # BLD AUTO: 4.12 MILLION/UL (ref 3.81–5.12)
SODIUM SERPL-SCNC: 137 MMOL/L (ref 135–147)
T WAVE AXIS: 82 DEGREES
VENTRICULAR RATE: 69 BPM
WBC # BLD AUTO: 11.02 THOUSAND/UL (ref 4.31–10.16)

## 2023-09-01 PROCEDURE — 73502 X-RAY EXAM HIP UNI 2-3 VIEWS: CPT

## 2023-09-01 PROCEDURE — 99284 EMERGENCY DEPT VISIT MOD MDM: CPT

## 2023-09-01 PROCEDURE — 83880 ASSAY OF NATRIURETIC PEPTIDE: CPT | Performed by: PHYSICIAN ASSISTANT

## 2023-09-01 PROCEDURE — 99285 EMERGENCY DEPT VISIT HI MDM: CPT | Performed by: PHYSICIAN ASSISTANT

## 2023-09-01 PROCEDURE — 84484 ASSAY OF TROPONIN QUANT: CPT | Performed by: PHYSICIAN ASSISTANT

## 2023-09-01 PROCEDURE — 73564 X-RAY EXAM KNEE 4 OR MORE: CPT

## 2023-09-01 PROCEDURE — 36415 COLL VENOUS BLD VENIPUNCTURE: CPT | Performed by: PHYSICIAN ASSISTANT

## 2023-09-01 PROCEDURE — 72192 CT PELVIS W/O DYE: CPT

## 2023-09-01 PROCEDURE — 85730 THROMBOPLASTIN TIME PARTIAL: CPT | Performed by: PHYSICIAN ASSISTANT

## 2023-09-01 PROCEDURE — 85025 COMPLETE CBC W/AUTO DIFF WBC: CPT | Performed by: PHYSICIAN ASSISTANT

## 2023-09-01 PROCEDURE — 93005 ELECTROCARDIOGRAM TRACING: CPT

## 2023-09-01 PROCEDURE — 80053 COMPREHEN METABOLIC PANEL: CPT | Performed by: PHYSICIAN ASSISTANT

## 2023-09-01 PROCEDURE — 85610 PROTHROMBIN TIME: CPT | Performed by: PHYSICIAN ASSISTANT

## 2023-09-01 PROCEDURE — 93010 ELECTROCARDIOGRAM REPORT: CPT | Performed by: INTERNAL MEDICINE

## 2023-09-01 PROCEDURE — G1004 CDSM NDSC: HCPCS

## 2023-09-01 NOTE — DISCHARGE INSTRUCTIONS
Please return to the emergency department for worsening symptoms including chest pain, shortness of breath, dizziness, lightheadedness, fever greater than 103, severe pain, inability to walk, fainting episodes, etc.. Please follow-up with your family practice provider as soon as possible. Follow-up with orthopedics.

## 2023-09-02 NOTE — ED PROVIDER NOTES
History  Chief Complaint   Patient presents with   • Hip Pain     Pt tripped face forward 2 days ago, now c/o right hip pain down to her knee. Pt also states she believes her ankles are swollen. Pt denies hitting head, no LOC, on eliquis     This is a 66-year-old female with past medical history significant for atrial fibrillation on Eliquis, CKD, hypertension, and hyperlipidemia presenting to the emergency department today status post fall. The patient notes that 2 days ago she went to grab a railing but missed and fell onto her right hip. She has been ambulatory since. She notes pain to her right hip into her right knee. She is still able to ambulate at baseline with her walker but she does note some pain with this. She denies any head strike. She denies any injuries to the left lower extremity or bilateral upper extremities. She denies any back pain. She denies any prodromal symptoms prior to the fall including chest pain, shortness of breath, dizziness, or lightheadedness. She does not have a history of frequent falls. She did not lose consciousness. The patient denies other complaints at this time. History provided by:  Patient   used: No    Hip Pain  Severity:  Moderate  Onset quality:  Sudden  Duration:  2 days  Timing:  Constant  Progression:  Unchanged  Chronicity:  New  Associated symptoms: no abdominal pain, no chest pain, no congestion, no cough, no diarrhea, no ear pain, no fatigue, no fever, no headaches, no loss of consciousness, no myalgias, no nausea, no rash, no rhinorrhea, no shortness of breath, no sore throat, no vomiting and no wheezing        Prior to Admission Medications   Prescriptions Last Dose Informant Patient Reported? Taking?    Eliquis 2.5 MG   No No   Sig: TAKE 1 TABLET BY MOUTH 2 TIMES DAILY   albuterol (PROVENTIL HFA,VENTOLIN HFA) 90 mcg/act inhaler   No No   Sig: Inhale 2 puffs every 4 (four) hours as needed for wheezing   diltiazem (CARDIZEM CD) 120 mg 24 hr capsule   No No   Sig: TAKE 1 CAPSULE EVERY DAY   gabapentin (NEURONTIN) 100 mg capsule   No No   Sig: TAKE 2 CAPSULES TWICE DAILY   iron polysaccharides (FERREX) 150 mg capsule   Yes No   Sig: Take 150 mg by mouth daily   lisinopril (ZESTRIL) 5 mg tablet   No No   Sig: TAKE 1 TABLET EVERY DAY   metoprolol succinate (TOPROL-XL) 100 mg 24 hr tablet   No No   Sig: Take 1 tablet (100 mg total) by mouth daily   rosuvastatin (CRESTOR) 5 mg tablet   No No   Sig: TAKE 1 TABLET TWICE WEEKLY ON TUESDAY AND SATURDAY   traMADol (ULTRAM) 50 mg tablet   No No   Sig: Take 1 tablet (50 mg total) by mouth 2 (two) times a day      Facility-Administered Medications: None       Past Medical History:   Diagnosis Date   • Anemia    • Arthritis    • Asthma    • Atrial fibrillation (HCC) 11/08/2019   • CAD    • Chronic kidney disease    • CKD (chronic kidney disease)    • Current use of long term anticoagulation 11/08/2019   • Degenerative joint disease    • Elevated troponin 09/08/2021   • Hyperlipidemia    • Hypertension    • Hypertension, essential 11/08/2019   • ALYSSA (iron deficiency anemia)    • Osteopenia    • Osteoporosis    • Pacemaker    • TIA (transient ischemic attack) 11/08/2019       Past Surgical History:   Procedure Laterality Date   • CARDIAC PACEMAKER PLACEMENT         Family History   Problem Relation Age of Onset   • Other Mother         Respiratory problem   • Asthma Mother    • Hypertension Son    • Hypertension Daughter    • Hyperlipidemia Daughter    • Hyperlipidemia Daughter      I have reviewed and agree with the history as documented.     E-Cigarette/Vaping   • E-Cigarette Use Never User      E-Cigarette/Vaping Substances   • Nicotine No    • THC No    • CBD No    • Flavoring No    • Other No    • Unknown No      Social History     Tobacco Use   • Smoking status: Never     Passive exposure: Past   • Smokeless tobacco: Never   Vaping Use   • Vaping Use: Never used   Substance Use Topics   • Alcohol use: Not Currently     Comment: occasionaly    • Drug use: Never       Review of Systems   Constitutional: Negative for appetite change, chills, diaphoresis, fatigue and fever. HENT: Negative for congestion, ear pain, rhinorrhea and sore throat. Eyes: Negative for visual disturbance. Respiratory: Negative for cough, chest tightness, shortness of breath and wheezing. Cardiovascular: Negative for chest pain, palpitations and leg swelling. Gastrointestinal: Negative for abdominal pain, constipation, diarrhea, nausea and vomiting. Musculoskeletal: Positive for arthralgias (right hip). Negative for myalgias, neck pain and neck stiffness. Skin: Negative for rash and wound. Neurological: Negative for dizziness, seizures, loss of consciousness, syncope, weakness, light-headedness, numbness and headaches. Psychiatric/Behavioral: Negative for confusion. All other systems reviewed and are negative. Physical Exam  Physical Exam  Vitals and nursing note reviewed. Constitutional:       General: She is not in acute distress. Appearance: Normal appearance. She is normal weight. She is not ill-appearing, toxic-appearing or diaphoretic. HENT:      Head: Normocephalic and atraumatic. Right Ear: Tympanic membrane, ear canal and external ear normal. There is no impacted cerumen. Left Ear: Tympanic membrane, ear canal and external ear normal. There is no impacted cerumen. Ears:      Comments: No hemotympanum or mak sign bilaterally     Nose: Nose normal. No congestion or rhinorrhea. Mouth/Throat:      Mouth: Mucous membranes are moist.      Pharynx: No oropharyngeal exudate or posterior oropharyngeal erythema. Eyes:      General: No scleral icterus. Right eye: No discharge. Left eye: No discharge. Extraocular Movements: Extraocular movements intact. Pupils: Pupils are equal, round, and reactive to light.       Comments: Negative raccoon eyes bilaterally Neck:      Comments: No tenderness to palpation to the midline cervical spine or bilateral paracervical musculature; no step-offs or deformities to midline cervical spine  Cardiovascular:      Rate and Rhythm: Normal rate and regular rhythm. Pulses: Normal pulses. Heart sounds: Normal heart sounds. No murmur heard. No friction rub. No gallop. Pulmonary:      Effort: Pulmonary effort is normal. No respiratory distress. Breath sounds: Normal breath sounds. No stridor. No wheezing, rhonchi or rales. Chest:      Chest wall: No tenderness. Musculoskeletal:         General: Normal range of motion. Cervical back: Normal range of motion. No tenderness. Right lower leg: No edema. Left lower leg: No edema. Comments: The patient has point tenderness to palpation to the lateral aspect of the right hip; she is able to flex and internally rotate her right hip without difficulty; she has mild tenderness to palpation to the right knee joint but is able to lax and extend her right knee without difficulty; she has no tenderness to palpation to the right femur, right tibia/fibula, right ankle, or right foot; bilateral upper extremities and left lower extremity are without tenderness to palpation   Skin:     General: Skin is warm and dry. Capillary Refill: Capillary refill takes less than 2 seconds. Coloration: Skin is not jaundiced or pale. Neurological:      General: No focal deficit present. Mental Status: She is alert and oriented to person, place, and time. Mental status is at baseline.       Comments: 5 out of 5 strength to the bilateral lower extremities  Normal sensation to the bilateral lower extremities   Psychiatric:         Mood and Affect: Mood normal.         Behavior: Behavior normal.         Vital Signs  ED Triage Vitals   Temperature Pulse Respirations Blood Pressure SpO2   09/01/23 1412 09/01/23 1409 09/01/23 1409 09/01/23 1409 09/01/23 1409   97.8 °F (36.6 °C) 78 16 115/60 98 %      Temp Source Heart Rate Source Patient Position - Orthostatic VS BP Location FiO2 (%)   09/01/23 1412 -- -- -- --   Oral          Pain Score       --                  Vitals:    09/01/23 1409   BP: 115/60   Pulse: 78         Visual Acuity      ED Medications  Medications - No data to display    Diagnostic Studies  Results Reviewed     Procedure Component Value Units Date/Time    B-Type Natriuretic Peptide(BNP) [[id number]]  (Abnormal) Collected: 09/01/23 1426    Lab Status: Final result Specimen: Blood from Arm, Left Updated: 09/01/23 1508      pg/mL     HS Troponin 0hr (reflex protocol) [[id number]]  (Normal) Collected: 09/01/23 1426    Lab Status: Final result Specimen: Blood from Arm, Left Updated: 09/01/23 1459     hs TnI 0hr 6 ng/L     Protime-INR [[id number]]  (Abnormal) Collected: 09/01/23 1426    Lab Status: Final result Specimen: Blood from Arm, Left Updated: 09/01/23 1451     Protime 17.2 seconds      INR 1.36    APTT [[id number]]  (Normal) Collected: 09/01/23 1426    Lab Status: Final result Specimen: Blood from Arm, Left Updated: 09/01/23 1451     PTT 31 seconds     Comprehensive metabolic panel [[id number]] Collected: 09/01/23 1426    Lab Status: Final result Specimen: Blood from Arm, Left Updated: 09/01/23 1449     Sodium 137 mmol/L      Potassium 4.5 mmol/L      Chloride 103 mmol/L      CO2 25 mmol/L      ANION GAP 9 mmol/L      BUN 25 mg/dL      Creatinine 1.29 mg/dL      Glucose 84 mg/dL      Calcium 9.2 mg/dL      AST 13 U/L      ALT 11 U/L      Alkaline Phosphatase 76 U/L      Total Protein 6.4 g/dL      Albumin 3.5 g/dL      Total Bilirubin 0.60 mg/dL      eGFR 35 ml/min/1.73sq m     Narrative:      [place] guidelines for Chronic Kidney Disease (CKD):   •  Stage 1 with normal or high GFR (GFR > 90 mL/min/1.73 square meters)  •  Stage 2 Mild CKD (GFR = 60-89 mL/min/1.73 square meters)  •  Stage 3A Moderate CKD (GFR = 45-59 mL/min/1.73 square meters)  •  Stage 3B Moderate CKD (GFR = 30-44 mL/min/1.73 square meters)  •  Stage 4 Severe CKD (GFR = 15-29 mL/min/1.73 square meters)  •  Stage 5 End Stage CKD (GFR <15 mL/min/1.73 square meters)  Note: GFR calculation is accurate only with a steady state creatinine    CBC and differential [[id number]]  (Abnormal) Collected: 09/01/23 1426    Lab Status: Final result Specimen: Blood from Arm, Left Updated: 09/01/23 1432     WBC 11.02 Thousand/uL      RBC 4.12 Million/uL      Hemoglobin 12.4 g/dL      Hematocrit 39.4 %      MCV 96 fL      MCH 30.1 pg      MCHC 31.5 g/dL      RDW 13.9 %      MPV 10.1 fL      Platelets 551 Thousands/uL      nRBC 0 /100 WBCs      Neutrophils Relative 70 %      Immat GRANS % 1 %      Lymphocytes Relative 15 %      Monocytes Relative 9 %      Eosinophils Relative 4 %      Basophils Relative 1 %      Neutrophils Absolute 7.88 Thousands/µL      Immature Grans Absolute 0.06 Thousand/uL      Lymphocytes Absolute 1.67 Thousands/µL      Monocytes Absolute 0.95 Thousand/µL      Eosinophils Absolute 0.40 Thousand/µL      Basophils Absolute 0.06 Thousands/µL                  CT pelvis wo contrast   Final Result by Eric Hewitt MD (09/01 0640)      1. Fracture of the right greater trochanter without significant displacement. No intertrochanteric component. 2.  Ovarian cysts measuring up to 9 cm though unchanged from 2021. The study was marked in [facility] for immediate notification. Workstation performed: [id number]         XR hip/pelv 2-3 vws right   Final Result by Adeel Conde MD (09/01 2632)      Cortical irregularity at the superior aspect of the greater trochanter, this fracture is better visualized on same-day CT of the pelvis. No intertrochanteric extension. Severe degenerative changes of the bilateral hips, pubic symphysis, sacroiliac joints, and lower lumbar spine.             Workstation performed: [id number]         XR knee 4+ views Right injury   Final Result by Fuad Hawk MD (09/01 1527)   : Diffuse demineralization. No acute fracture or dislocation. Small knee joint effusion. Tricompartmental osteoarthritis. Vascular calcifications. Workstation performed: [id number]                    Procedures  ECG 12 Lead Documentation Only    Date/Time: 9/1/2023 2:58 PM    Performed by: Irma Hopper PA-C  Authorized by: Irma Hopper PA-C    Indications / Diagnosis:  Fall  Interpretation:     Interpretation: non-specific    Rate:     ECG rate:  69  Comments:      Paced rhythm             ED Course  ED Course as of 09/02/23 1328   Fri Sep 01, 2023   1602 TT to Frida Mars PA-C orthopedic advanced practitioner on call. [address], PA-C with orthopedics reviewed the case. Recommends WBAT. Will trial ambulation with walker and continue to monitor. [phone] The patient is ambulatory at her baseline with a walker per daughter. Will discharge with outpatient follow-up. Medical Decision Making  This is a 51-year-old female presenting to the emergency department today status post fall. She complains of right-sided hip pain and right-sided knee pain. Fall was mechanical.  No prodromal symptoms prior to the fall. Vitals are stable. On physical examination, the patient has point tenderness to palpation to the right lateral hip. She has normal range of motion to flexion and internal rotation of the right hip. She has mild tenderness to palpation throughout the right knee but she has normal range of motion of flexion and extension of the right knee. Patient has mild elevation to BNP but this is improved from baseline. CBC and CMP are reassuring. CT pelvis shows fracture of the right greater trochanter without significant displacement. There is no intertrochanteric component. Patient has large chronic ovarian cysts.   X-ray of the right knee negative for any acute osseous abnormalities on my independent interpretation. I discussed the case with Emiliano Hernandez PA-C with orthopedics. She reviewed the case. The patient can be weightbearing as tolerated. The patient ambulated here in the emergency department with her walker. Per daughter, she is at her baseline ambulation with a walker. The patient is stable for discharge at this time. Follow-up with orthopedics. Weightbearing as tolerated. Strict return precautions were given. Recommend PCP follow-up as soon as possible. The patient and/or patient's proxy verify their understanding and agree to the plan at this time. All questions answered to the patient and/or their proxy's satisfaction. All labs reviewed and utilized in the medical decision making process (if labs were ordered). Portions of the record may have been created with voice recognition software.  Occasional wrong word or "sound a like" substitutions may have occurred due to the inherent limitations of voice recognition software.  Read the chart carefully and recognize, using context, where substitutions have occurred. Closed nondisplaced fracture of greater trochanter of right femur, initial encounter [facility]): complicated acute illness or injury  Amount and/or Complexity of Data Reviewed  Labs: ordered. Decision-making details documented in ED Course. Radiology: ordered and independent interpretation performed. Decision-making details documented in ED Course.      Details: XR Right Hip  XR Right Knee          Disposition  Final diagnoses:   Closed nondisplaced fracture of greater trochanter of right femur, initial encounter ([address] )     Time reflects when diagnosis was documented in both MDM as applicable and the Disposition within this note     Time User Action Codes Description Comment    9/1/2023  4:45 PM Anup Betts Add [S72.114A] Closed nondisplaced fracture of greater trochanter of right femur, initial encounter [facility])       ED Disposition     ED Disposition   Discharge    Condition   Stable    Date/Time   Fri Sep 1, 2023  4:46 PM    Comment   Amie Flanagan [facility] discharge to home/self care.                Follow-up Information     Follow up With Specialties Details Why Contact Info Additional Information    Paco Dick MD Internal Medicine Schedule an appointment as soon as possible for a visit   [address]  [address] [address]  [phone]       [facility] Emergency Department Emergency Medicine Go to  If symptoms worsen [address] [phone]  [address] [facility] Emergency Department, [address] [place] [address], [address] [place] [address] Orthopedic Surgery Schedule an appointment as soon as possible for a visit   [address] [phone] [address] [facility] [address] [facility] [phone]          Discharge Medication List as of 9/1/2023  4:46 PM      CONTINUE these medications which have NOT CHANGED    Details   Eliquis 2.5 MG TAKE 1 TABLET BY MOUTH 2 TIMES DAILY, Normal      albuterol (PROVENTIL HFA,VENTOLIN HFA) 90 mcg/act inhaler Inhale 2 puffs every 4 (four) hours as needed for wheezing, Starting Wed 8/23/2023, Normal      diltiazem (CARDIZEM CD) 120 mg 24 hr capsule TAKE 1 CAPSULE EVERY DAY, Normal      gabapentin (NEURONTIN) 100 mg capsule TAKE 2 CAPSULES TWICE DAILY, Normal      iron polysaccharides (FERREX) 150 mg capsule Take 150 mg by mouth daily, Historical Med      lisinopril (ZESTRIL) 5 mg tablet TAKE 1 TABLET EVERY DAY, Normal      metoprolol succinate (TOPROL-XL) 100 mg 24 hr tablet Take 1 tablet (100 mg total) by mouth daily, Starting Fri 5/5/2023, Normal      rosuvastatin (CRESTOR) 5 mg tablet TAKE 1 TABLET TWICE WEEKLY ON TUESDAY AND SATURDAY, Normal      traMADol (ULTRAM) 50 mg tablet Take 1 tablet (50 mg total) by mouth 2 (two) times a day, Starting Wed 6/21/2023, Normal                 PDMP Review       Value Time User    PDMP Reviewed  Yes 6/21/2023 10:23 AM Zeenat Owen MD          ED Provider  Electronically Signed by           Serge Grossman PA-C  09/02/23 4412

## 2023-09-08 DIAGNOSIS — G50.0 TRIGEMINAL NEURALGIA: ICD-10-CM

## 2023-09-08 NOTE — TELEPHONE ENCOUNTER
Received a call from patients daughter in regards to refill . Advised unable to refill after hours. Advised message can be sent to office for Monday.  If script is needed prior to Monday, advised urgent care or ED

## 2023-09-11 ENCOUNTER — TELEMEDICINE (OUTPATIENT)
Dept: FAMILY MEDICINE CLINIC | Facility: CLINIC | Age: 88
End: 2023-09-11
Payer: MEDICARE

## 2023-09-11 DIAGNOSIS — E78.2 MIXED HYPERLIPIDEMIA: ICD-10-CM

## 2023-09-11 DIAGNOSIS — I48.20 CHRONIC ATRIAL FIBRILLATION (HCC): ICD-10-CM

## 2023-09-11 DIAGNOSIS — N18.32 STAGE 3B CHRONIC KIDNEY DISEASE (HCC): ICD-10-CM

## 2023-09-11 DIAGNOSIS — S72.114A CLOSED NONDISPLACED FRACTURE OF GREATER TROCHANTER OF RIGHT FEMUR, INITIAL ENCOUNTER (HCC): Primary | ICD-10-CM

## 2023-09-11 DIAGNOSIS — G50.0 TRIGEMINAL NEURALGIA: ICD-10-CM

## 2023-09-11 DIAGNOSIS — I10 HYPERTENSION, ESSENTIAL: ICD-10-CM

## 2023-09-11 PROCEDURE — 99213 OFFICE O/P EST LOW 20 MIN: CPT

## 2023-09-11 RX ORDER — TRAMADOL HYDROCHLORIDE 50 MG/1
50 TABLET ORAL 2 TIMES DAILY
Qty: 60 TABLET | Refills: 0 | OUTPATIENT
Start: 2023-09-11

## 2023-09-11 RX ORDER — TRAMADOL HYDROCHLORIDE 50 MG/1
50 TABLET ORAL 2 TIMES DAILY
Qty: 60 TABLET | Refills: 0 | Status: SHIPPED | OUTPATIENT
Start: 2023-09-11

## 2023-09-11 NOTE — PROGRESS NOTES
Virtual Regular Visit    Verification of patient location:    Patient is located at Home in the following state in which I hold an active license [place]      Assessment/Plan:    Problem List Items Addressed This Visit        Cardiovascular and Mediastinum    Chronic atrial fibrillation ([address])     Under control. Continue current medication including anticoagulation medication to prevent stroke. We will reevaluate at next office visit. Has been followed by cardiologist.           Hypertension, essential     Under control. Continue current medication. We will re-evaluate at next office visit. Musculoskeletal and Integument    Closed nondisplaced fracture of greater trochanter of right femur ([address]) - Primary     Follow-up with orthopedic surgeon. May need physical therapy. Continue tramadol and Tylenol for pain control            Genitourinary    Stage 3b chronic kidney disease [facility])     Lab Results   Component Value Date    EGFR 35 09/01/2023    EGFR 36 07/26/2023    EGFR 35 06/18/2023    CREATININE 1.29 09/01/2023    CREATININE 1.25 07/26/2023    CREATININE 1.28 06/18/2023   creatinine and GFR stable   Will need periodic BMP  Avoid NSAIDs like ibuprofen Aleve Advil etc.  Avoid high potassium diet. We will continue to monitor                 Other    Mixed hyperlipidemia     Under control. Continue current medication. We will re-evaluate at next office visit. Reason for visit is   Chief Complaint   Patient presents with   • Virtual Regular Visit        Encounter provider Narendra Anaya MD    Provider located at [id number] [facility]  [address]  [phone]      Recent Visits  No visits were found meeting these conditions.   Showing recent visits within past 7 days and meeting all other requirements  Today's Visits  Date Type Provider Dept   09/11/23 Telemedicine Narendra Anaya MD [facility]   Showing today's visits and meeting all other requirements  Future Appointments  No visits were found meeting these conditions. Showing future appointments within next 150 days and meeting all other requirements       The patient was identified by name and date of birth. Monica Joy was informed that this is a telemedicine visit and that the visit is being conducted through the [employer]. She agrees to proceed. .  My office door was closed. No one else was in the room. She acknowledged consent and understanding of privacy and security of the video platform. The patient has agreed to participate and understands they can discontinue the visit at any time. Patient is aware this is a billable service. Subjective  Monica Joy is a 80 y.o. female  . Patient requested virtual visit as she cannot come to the office because she has a fracture of right femur which is close known display fracture of greater trochanter of right femur after a fall on September 1, 2023 and we stated the emergency room although reports reviewed patient has appointment coming up with orthopedic surgeon in the next few days. Otherwise patient feeling okay. Denies any chest pain or shortness of breath. Denies any abdominal pain, nausea, vomiting, fever or chills. No lightheadedness or dizziness. No bowel or bladder issues. No other complaints.        Past Medical History:   Diagnosis Date   • Anemia    • Arthritis    • Asthma    • Atrial fibrillation ([address]) 11/08/2019   • CAD    • Chronic kidney disease    • CKD (chronic kidney disease)    • Current use of long term anticoagulation 11/08/2019   • Degenerative joint disease    • Elevated troponin 09/08/2021   • Hyperlipidemia    • Hypertension    • Hypertension, essential 11/08/2019   • ALYSSA (iron deficiency anemia)    • Osteopenia    • Osteoporosis    • Pacemaker    • TIA (transient ischemic attack) 11/08/2019       Past Surgical History:   Procedure Laterality Date   • CARDIAC PACEMAKER PLACEMENT         Current Outpatient Medications   Medication Sig Dispense Refill   • Eliquis 2.5 MG TAKE 1 TABLET BY MOUTH 2 TIMES DAILY 60 tablet 0   • albuterol (PROVENTIL HFA,VENTOLIN HFA) 90 mcg/act inhaler Inhale 2 puffs every 4 (four) hours as needed for wheezing 18 g 0   • diltiazem (CARDIZEM CD) 120 mg 24 hr capsule TAKE 1 CAPSULE EVERY DAY 90 capsule 0   • gabapentin (NEURONTIN) 100 mg capsule TAKE 2 CAPSULES TWICE DAILY 360 capsule 0   • iron polysaccharides (FERREX) 150 mg capsule Take 150 mg by mouth daily     • lisinopril (ZESTRIL) 5 mg tablet TAKE 1 TABLET EVERY DAY 90 tablet 0   • metoprolol succinate (TOPROL-XL) 100 mg 24 hr tablet Take 1 tablet (100 mg total) by mouth daily 90 tablet 1   • rosuvastatin (CRESTOR) 5 mg tablet TAKE 1 TABLET TWICE WEEKLY ON TUESDAY AND SATURDAY 26 tablet 0   • traMADol (ULTRAM) 50 mg tablet Take 1 tablet (50 mg total) by mouth 2 (two) times a day 60 tablet 0     No current facility-administered medications for this visit. Allergies   Allergen Reactions   • Amoxicillin-Pot Clavulanate GI Intolerance   • Silver Sulfadiazine Other (See Comments)       Review of Systems   Constitutional: Positive for fever. Negative for chills. HENT: Positive for hearing loss. Negative for congestion, ear pain, postnasal drip, sinus pain and sore throat. Respiratory: Positive for cough and wheezing. Negative for chest tightness and shortness of breath. Cardiovascular: Negative for chest pain and leg swelling. Gastrointestinal: Negative for abdominal pain, blood in stool and diarrhea. Genitourinary: Negative for dysuria and frequency. Musculoskeletal: Positive for arthralgias (right hip), back pain, gait problem and myalgias. Neurological: Negative for headaches. Psychiatric/Behavioral: Negative for agitation, behavioral problems and confusion.        Video Exam    There were no vitals filed for this visit.    Physical Exam  Constitutional:       General: She is not in acute distress. Appearance: Normal appearance. She is not ill-appearing, toxic-appearing or diaphoretic. HENT:      Head: Normocephalic. Eyes:      General: No scleral icterus. Right eye: No discharge. Left eye: No discharge. Extraocular Movements: Extraocular movements intact. Conjunctiva/sclera: Conjunctivae normal.   Pulmonary:      Effort: Pulmonary effort is normal.   Musculoskeletal:      Right lower leg: No edema. Left lower leg: No edema. Neurological:      Mental Status: She is alert and oriented to person, place, and time.    Psychiatric:         Mood and Affect: Mood normal.         Behavior: Behavior normal.          Visit Time  Total Visit Duration: 20 minutes

## 2023-09-11 NOTE — ASSESSMENT & PLAN NOTE
Follow-up with orthopedic surgeon. May need physical therapy.   Continue tramadol and Tylenol for pain control

## 2023-09-11 NOTE — ASSESSMENT & PLAN NOTE
Under control. Continue current medication including anticoagulation medication to prevent stroke. We will reevaluate at next office visit.   Has been followed by cardiologist.

## 2023-09-11 NOTE — PLAN OF CARE
Problem: OCCUPATIONAL THERAPY ADULT  Goal: Performs self-care activities at highest level of function for planned discharge setting  See evaluation for individualized goals  Description: Treatment Interventions: ADL retraining, Functional transfer training, UE strengthening/ROM, Endurance training, Cognitive reorientation, Patient/family training, Equipment evaluation/education          See flowsheet documentation for full assessment, interventions and recommendations  Note: Limitation: Decreased ADL status, Decreased UE strength, Decreased Safe judgement during ADL, Decreased endurance, Decreased self-care trans, Decreased high-level ADLs  Prognosis: Good  Assessment: Patient is a 80 y o  female seen for OT evaluation + treatment at [address] [place] [address] following admission on 12/26/2022  s/p Sepsis ([address] )  Comorbidities and significant PMHx impacting functional performance include: A-FIB, HTN, CKD, mixed hyperlipidemia, generalized weakness, moderate protein calorie malnutrition, osteoarthritis, Hx of TIA, mild asthma, pseudogout  Patient presents with active orders for OT eval and treat and WBAT LLE Performed at least 2 patient identifiers during session including name and wristband  At baseline, pt is Mod (I) c rollator  (I) c ADLs, A with IADLs  Lives c son in 1 story home with 3 MARILYNN  Upon initial evaluation, patient requires supervision for UB ADLs, mod assistance for LB ADLs, and mod-max assistance for transfers and functional mobility short distance  with RW  Based on functional eval, pt presents with intact  attention, intact  safety awareness, intact  problem solving skills, and intact  memory   Occupational performance is affected by the following deficits:  decreased muscular strength , decreased standing tolerance for self care tasks , decreased dynamic balance impacting functional reach, decreased trunk control , decreased activity tolerance  and (+) pain  Based on these findings, functional performance deficits, and medical complexity pt has been identified as a high3 complexity evaluation  Personal factors impacting performance include: decreased (+) Hx of falls , steps to enter home, decreased IADL independence and High fall risk   Patient would benefit from OT services within the acute care setting to maximize level of functional independence in the following occupational areas bathing/showering, toileting, dressing , personal hygiene/grooming , bed mobility , functional mobility, transfer to all surfaces, meal preparation and fall prevention   From OT standpoint, recommendation at time of D/C would be post-acute rehabilitation  vs HHOT pending improved functional mobility status c improved pain control        OT Discharge Recommendation: Post acute rehabilitation services (vs HHOT pending improved pain control and functional mobility status)  Candace Seo No

## 2023-09-11 NOTE — ASSESSMENT & PLAN NOTE
Lab Results   Component Value Date    EGFR 35 09/01/2023    EGFR 36 07/26/2023    EGFR 35 06/18/2023    CREATININE 1.29 09/01/2023    CREATININE 1.25 07/26/2023    CREATININE 1.28 06/18/2023   creatinine and GFR stable   Will need periodic BMP  Avoid NSAIDs like ibuprofen Aleve Advil etc.  Avoid high potassium diet.   We will continue to monitor

## 2023-09-14 ENCOUNTER — APPOINTMENT (OUTPATIENT)
Dept: RADIOLOGY | Facility: AMBULARY SURGERY CENTER | Age: 88
End: 2023-09-14
Attending: ORTHOPAEDIC SURGERY
Payer: MEDICARE

## 2023-09-14 ENCOUNTER — OFFICE VISIT (OUTPATIENT)
Dept: OBGYN CLINIC | Facility: CLINIC | Age: 88
End: 2023-09-14
Payer: MEDICARE

## 2023-09-14 VITALS — HEIGHT: 62 IN | WEIGHT: 104 LBS | BODY MASS INDEX: 19.14 KG/M2

## 2023-09-14 DIAGNOSIS — M25.551 RIGHT HIP PAIN: Primary | ICD-10-CM

## 2023-09-14 DIAGNOSIS — S72.114A CLOSED NONDISPLACED FRACTURE OF GREATER TROCHANTER OF RIGHT FEMUR, INITIAL ENCOUNTER (HCC): ICD-10-CM

## 2023-09-14 DIAGNOSIS — M25.551 RIGHT HIP PAIN: ICD-10-CM

## 2023-09-14 PROCEDURE — 73502 X-RAY EXAM HIP UNI 2-3 VIEWS: CPT

## 2023-09-14 PROCEDURE — 99204 OFFICE O/P NEW MOD 45 MIN: CPT | Performed by: ORTHOPAEDIC SURGERY

## 2023-09-14 NOTE — PROGRESS NOTES
Assessment:  1. Right hip pain  XR hip/pelv 2-3 vws right if performed      2. Closed nondisplaced fracture of greater trochanter of right femur, initial encounter [facility])  Ambulatory Referral to Orthopedic Surgery        Patient Active Problem List   Diagnosis   • Chronic atrial fibrillation (HCC)   • Current use of long term anticoagulation   • Hypertension, essential   • Pacemaker   • TIA (transient ischemic attack)   • Stage 3b chronic kidney disease ([address])   • Mild intermittent asthma without complication   • Pseudogout   • Mixed hyperlipidemia   • Nonrheumatic aortic valve stenosis   • Chronic neck pain   • Vitamin D deficiency   • Generalized weakness   • Osteoarthritis   • Chronic thoracic back pain   • Closed nondisplaced fracture of greater trochanter of right femur ([address])           Plan      80 y.o. female with closed nondisplaced right hip fracture of the greater trochanter   · Repeat x-ray's obtained today   · Discussed activity restriction   · Follow up 4 weeks, repeat x-ray's             Subjective:     Patient ID:    Chief Complaint:Mckenna Whitten 80 y.o. female      HPI    Patient presents for initial evaluation of right hip, closed non displaced fracture of greater trochanter. Patient fell 8/30/2023 and was seen in ED 9/1/2023. Patient presents with her daughter who states patient has been managing her pain with 600mg tylenol 2x daily. States patient over all is very active and able to ambulate on her own. Patient states she has anterior hip pain, denies any groin pain. The following portions of the patient's history were reviewed and updated as appropriate: allergies, current medications, past family history, past social history, past surgical history and problem list.        Social History     Socioeconomic History   • Marital status:       Spouse name: Not on file   • Number of children: Not on file   • Years of education: Not on file   • Highest education level: Not on file Occupational History   • Not on file   Tobacco Use   • Smoking status: Never     Passive exposure: Past   • Smokeless tobacco: Never   Vaping Use   • Vaping Use: Never used   Substance and Sexual Activity   • Alcohol use: Not Currently     Comment: occasionaly    • Drug use: Never   • Sexual activity: Not Currently     Partners: Male     Birth control/protection: Post-menopausal   Other Topics Concern   • Not on file   Social History Narrative   • Not on file     Social Determinants of Health     Financial Resource Strain: Low Risk  (6/5/2023)    Overall Financial Resource Strain (CARDIA)    • Difficulty of Paying Living Expenses: Not very hard   Food Insecurity: No Food Insecurity (12/27/2022)    Hunger Vital Sign    • Worried About Running Out of Food in the Last Year: Never true    • Ran Out of Food in the Last Year: Never true   Transportation Needs: No Transportation Needs (6/5/2023)    PRAPARE - Transportation    • Lack of Transportation (Medical): No    • Lack of Transportation (Non-Medical):  No   Physical Activity: Not on file   Stress: Not on file   Social Connections: Not on file   Intimate Partner Violence: Not on file   Housing Stability: Unknown (12/27/2022)    Housing Stability Vital Sign    • Unable to Pay for Housing in the Last Year: No    • Number of Places Lived in the Last Year: Not on file    • Unstable Housing in the Last Year: No     Past Medical History:   Diagnosis Date   • Anemia    • Arthritis    • Asthma    • Atrial fibrillation ([address]) 11/08/2019   • CAD    • Chronic kidney disease    • CKD (chronic kidney disease)    • Current use of long term anticoagulation 11/08/2019   • Degenerative joint disease    • Elevated troponin 09/08/2021   • Hyperlipidemia    • Hypertension    • Hypertension, essential 11/08/2019   • ALYSSA (iron deficiency anemia)    • Osteopenia    • Osteoporosis    • Pacemaker    • TIA (transient ischemic attack) 11/08/2019     Past Surgical History:   Procedure Laterality Date   • CARDIAC PACEMAKER PLACEMENT       Allergies   Allergen Reactions   • Amoxicillin-Pot Clavulanate GI Intolerance   • Silver Sulfadiazine Other (See Comments)     Current Outpatient Medications on File Prior to Visit   Medication Sig Dispense Refill   • Eliquis 2.5 MG TAKE 1 TABLET BY MOUTH 2 TIMES DAILY 60 tablet 0   • albuterol (PROVENTIL HFA,VENTOLIN HFA) 90 mcg/act inhaler Inhale 2 puffs every 4 (four) hours as needed for wheezing 18 g 0   • diltiazem (CARDIZEM CD) 120 mg 24 hr capsule TAKE 1 CAPSULE EVERY DAY 90 capsule 0   • gabapentin (NEURONTIN) 100 mg capsule TAKE 2 CAPSULES TWICE DAILY 360 capsule 0   • iron polysaccharides (FERREX) 150 mg capsule Take 150 mg by mouth daily     • lisinopril (ZESTRIL) 5 mg tablet TAKE 1 TABLET EVERY DAY 90 tablet 0   • metoprolol succinate (TOPROL-XL) 100 mg 24 hr tablet Take 1 tablet (100 mg total) by mouth daily 90 tablet 1   • rosuvastatin (CRESTOR) 5 mg tablet TAKE 1 TABLET TWICE WEEKLY ON TUESDAY AND SATURDAY 26 tablet 0   • traMADol (ULTRAM) 50 mg tablet Take 1 tablet (50 mg total) by mouth 2 (two) times a day 60 tablet 0     No current facility-administered medications on file prior to visit. Objective:    Review of Systems   Constitutional: Negative for chills and fever. HENT: Negative for ear pain and sore throat. Eyes: Negative for pain and visual disturbance. Respiratory: Negative for cough and shortness of breath. Cardiovascular: Negative for chest pain and palpitations. Gastrointestinal: Negative for abdominal pain and vomiting. Genitourinary: Negative for dysuria and hematuria. Musculoskeletal: Negative for arthralgias and back pain. Skin: Negative for color change and rash. Neurological: Negative for seizures and syncope. All other systems reviewed and are negative. Right Hip Exam     Tenderness   The patient is experiencing tenderness in the greater trochanter.     Other   Erythema: absent  Sensation: normal  Pulse: present    Comments:  4+/5 hip flexion   Weak with abduction  Pain with abduction             Physical Exam  Vitals and nursing note reviewed. HENT:      Head: Normocephalic. Eyes:      Extraocular Movements: Extraocular movements intact. Cardiovascular:      Rate and Rhythm: Normal rate. Pulses: Normal pulses. Pulmonary:      Effort: Pulmonary effort is normal.   Musculoskeletal:         General: Normal range of motion. Cervical back: Normal range of motion. Comments: As noted in HPI   Skin:     General: Skin is warm and dry. Neurological:      General: No focal deficit present. Mental Status: She is alert. Psychiatric:         Behavior: Behavior normal.         Procedures  No Procedures performed today    I have personally reviewed pertinent films in PACS. Scribe Attestation    I,:  Ghassan Nicholson am acting as a scribe while in the presence of the attending physician.:       I,:  Juan Wolfe, DO personally performed the services described in this documentation    as scribed in my presence.:             Portions of the record may have been created with voice recognition software.  Occasional wrong word or "sound a like" substitutions may have occurred due to the inherent limitations of voice recognition software.  Read the chart carefully and recognize, using context, where substitutions have occurred.

## 2023-10-03 DIAGNOSIS — I10 PRIMARY HYPERTENSION: ICD-10-CM

## 2023-10-03 RX ORDER — METOPROLOL SUCCINATE 100 MG/1
100 TABLET, EXTENDED RELEASE ORAL DAILY
Qty: 90 TABLET | Refills: 1 | Status: SHIPPED | OUTPATIENT
Start: 2023-10-03

## 2023-10-03 NOTE — TELEPHONE ENCOUNTER
Pharmacy called, stated they never received medication, even though chart states it was sent and confirmed by pharmacy. Can it be re-sent.

## 2023-10-09 DIAGNOSIS — G50.0 TRIGEMINAL NEURALGIA: ICD-10-CM

## 2023-10-09 RX ORDER — TRAMADOL HYDROCHLORIDE 50 MG/1
50 TABLET ORAL 2 TIMES DAILY
Qty: 60 TABLET | Refills: 0 | Status: SHIPPED | OUTPATIENT
Start: 2023-10-09

## 2023-10-22 DIAGNOSIS — I48.91 ATRIAL FIBRILLATION, UNSPECIFIED TYPE (HCC): ICD-10-CM

## 2023-10-23 ENCOUNTER — IMMUNIZATIONS (OUTPATIENT)
Dept: FAMILY MEDICINE CLINIC | Facility: CLINIC | Age: 88
End: 2023-10-23
Payer: MEDICARE

## 2023-10-23 DIAGNOSIS — Z23 ENCOUNTER FOR IMMUNIZATION: Primary | ICD-10-CM

## 2023-10-23 PROCEDURE — 90662 IIV NO PRSV INCREASED AG IM: CPT

## 2023-10-23 PROCEDURE — G0008 ADMIN INFLUENZA VIRUS VAC: HCPCS

## 2023-10-23 RX ORDER — APIXABAN 2.5 MG/1
TABLET, FILM COATED ORAL
Qty: 60 TABLET | Refills: 0 | Status: SHIPPED | OUTPATIENT
Start: 2023-10-23

## 2023-11-13 ENCOUNTER — OFFICE VISIT (OUTPATIENT)
Dept: FAMILY MEDICINE CLINIC | Facility: CLINIC | Age: 88
End: 2023-11-13
Payer: MEDICARE

## 2023-11-13 VITALS
HEIGHT: 62 IN | BODY MASS INDEX: 18.92 KG/M2 | RESPIRATION RATE: 18 BRPM | SYSTOLIC BLOOD PRESSURE: 108 MMHG | OXYGEN SATURATION: 98 % | TEMPERATURE: 98.6 F | HEART RATE: 72 BPM | DIASTOLIC BLOOD PRESSURE: 70 MMHG | WEIGHT: 102.8 LBS

## 2023-11-13 DIAGNOSIS — E78.2 MIXED HYPERLIPIDEMIA: ICD-10-CM

## 2023-11-13 DIAGNOSIS — J45.20 MILD INTERMITTENT ASTHMA WITHOUT COMPLICATION: ICD-10-CM

## 2023-11-13 DIAGNOSIS — I48.20 CHRONIC ATRIAL FIBRILLATION (HCC): ICD-10-CM

## 2023-11-13 DIAGNOSIS — N18.32 STAGE 3B CHRONIC KIDNEY DISEASE (HCC): ICD-10-CM

## 2023-11-13 DIAGNOSIS — I10 HYPERTENSION, ESSENTIAL: Primary | ICD-10-CM

## 2023-11-13 DIAGNOSIS — F11.20 CONTINUOUS OPIOID DEPENDENCE (HCC): ICD-10-CM

## 2023-11-13 PROCEDURE — 99213 OFFICE O/P EST LOW 20 MIN: CPT

## 2023-11-13 NOTE — PROGRESS NOTES
Assessment/Plan:         Problem List Items Addressed This Visit     Chronic atrial fibrillation ([address])     Under control. Continue current medication including anticoagulation medication to prevent stroke. We will reevaluate at next office visit. Has been followed by cardiologist.           Hypertension, essential - Primary     Under control. Continue metoprolol , lisinopril and diltiazem. We will continue to monitor         Stage 3b chronic kidney disease [facility])     Lab Results   Component Value Date    EGFR 35 09/01/2023    EGFR 36 07/26/2023    EGFR 35 06/18/2023    CREATININE 1.29 09/01/2023    CREATININE 1.25 07/26/2023    CREATININE 1.28 06/18/2023   creatinine and GFR stable   Will need periodic BMP  Avoid NSAIDs like ibuprofen Aleve Advil etc.  Avoid high potassium diet. We will continue to monitor            Mild intermittent asthma without complication     Under control. Continue current medication. We will re-evaluate at next office visit. Mixed hyperlipidemia     Under control. Continue rosuvastatin. We will continue to monitor. Continuous opioid dependence (HCC)     Pain under reasonable control with tramadol. We will continue to monitor. Subjective:      Patient ID: Ivonne Trevino is a 80 y.o. female. Patient here for review of chronic medical problems and  the labs and imaging if it is applicable. Currently has no specific complaints other than mentioned in the review of systems  Denies chest pain, SOB, cough, abdominal pain, nausea, vomiting, fever, chills, lightheadedness, dizziness,headache, tingling or numbness. No bowel or bladder problem.           The following portions of the patient's history were reviewed and updated as appropriate:   Past Medical History:  She has a past medical history of Anemia, Arthritis, Asthma, Atrial fibrillation ([address]) (11/08/2019), CAD, Chronic kidney disease, CKD (chronic kidney disease), Current use of long term anticoagulation (11/08/2019), Degenerative joint disease, Elevated troponin (09/08/2021), Hyperlipidemia, Hypertension, Hypertension, essential (11/08/2019), ALYSSA (iron deficiency anemia), Osteopenia, Osteoporosis, Pacemaker, and TIA (transient ischemic attack) (11/08/2019). ,  _______________________________________________________________________  Medical Problems:  does not have any pertinent problems on file.,  _______________________________________________________________________  Past Surgical History:   has a past surgical history that includes Cardiac pacemaker placement. ,  _______________________________________________________________________  Family History:  family history includes Asthma in her mother; Hyperlipidemia in her daughter and daughter; Hypertension in her daughter and son; Other in her mother.,  _______________________________________________________________________  Social History:   reports that she has never smoked. She has been exposed to tobacco smoke. She has never used smokeless tobacco. She reports that she does not currently use alcohol. She reports that she does not use drugs. ,  _______________________________________________________________________  Allergies:  is allergic to amoxicillin-pot clavulanate and silver sulfadiazine. .  _______________________________________________________________________  Current Outpatient Medications   Medication Sig Dispense Refill   • albuterol (PROVENTIL HFA,VENTOLIN HFA) 90 mcg/act inhaler Inhale 2 puffs every 4 (four) hours as needed for wheezing 18 g 0   • diltiazem (CARDIZEM CD) 120 mg 24 hr capsule TAKE 1 CAPSULE EVERY DAY 90 capsule 0   • Eliquis 2.5 MG TAKE 1 TABLET BY MOUTH 2 TIMES DAILY 60 tablet 0   • gabapentin (NEURONTIN) 100 mg capsule TAKE 2 CAPSULES TWICE DAILY 360 capsule 0   • iron polysaccharides (FERREX) 150 mg capsule Take 150 mg by mouth daily     • lisinopril (ZESTRIL) 5 mg tablet TAKE 1 TABLET EVERY DAY 90 tablet 0   • metoprolol succinate (TOPROL-XL) 100 mg 24 hr tablet Take 1 tablet (100 mg total) by mouth daily 90 tablet 1   • rosuvastatin (CRESTOR) 5 mg tablet TAKE 1 TABLET TWICE WEEKLY ON TUESDAY AND SATURDAY 26 tablet 0   • traMADol (ULTRAM) 50 mg tablet Take 1 tablet (50 mg total) by mouth 2 (two) times a day 60 tablet 0     No current facility-administered medications for this visit.     _______________________________________________________________________  Review of Systems   Constitutional:  Negative for chills and fever. HENT:  Positive for hearing loss. Negative for congestion, ear pain, postnasal drip, sinus pain and sore throat. Respiratory:  Negative for cough, chest tightness, shortness of breath and wheezing. Cardiovascular:  Negative for chest pain and leg swelling. Gastrointestinal:  Negative for abdominal pain, blood in stool and diarrhea. Genitourinary:  Negative for dysuria and frequency. Musculoskeletal:  Positive for arthralgias (right hip), back pain, gait problem and myalgias. Neurological:  Negative for headaches. Psychiatric/Behavioral:  Negative for agitation, behavioral problems and confusion. Objective:  Vitals:    11/13/23 1415   BP: 108/70   BP Location: Left arm   Patient Position: Sitting   Cuff Size: Standard   Pulse: 72   Resp: 18   Temp: 98.6 °F (37 °C)   TempSrc: Temporal   SpO2: 98%   Weight: 46.6 kg (102 lb 12.8 oz)   Height: 5' 2" (1.575 m)     Body mass index is 18.8 kg/m². Physical Exam  Vitals and nursing note reviewed. Constitutional:       General: She is not in acute distress. Appearance: Normal appearance. She is not ill-appearing, toxic-appearing or diaphoretic. HENT:      Head: Normocephalic and atraumatic. Right Ear: Tympanic membrane and ear canal normal.      Left Ear: Tympanic membrane and ear canal normal.      Ears:      Comments: Bilateral hearing loss     Nose: Nose normal. No congestion.       Mouth/Throat:      Mouth: Mucous membranes are moist.   Eyes:      General: No scleral icterus. Right eye: No discharge. Left eye: No discharge. Extraocular Movements: Extraocular movements intact. Conjunctiva/sclera: Conjunctivae normal.      Pupils: Pupils are equal, round, and reactive to light. Cardiovascular:      Rate and Rhythm: Normal rate. Rhythm irregular. Pulses: Normal pulses. Heart sounds: Normal heart sounds. No murmur heard. No gallop. Pulmonary:      Effort: Pulmonary effort is normal. No respiratory distress. Breath sounds: Normal breath sounds. No wheezing, rhonchi or rales. Abdominal:      General: Abdomen is flat. Bowel sounds are normal. There is no distension. Palpations: Abdomen is soft. Tenderness: There is no abdominal tenderness. There is no right CVA tenderness, left CVA tenderness or guarding. Musculoskeletal:         General: No swelling or tenderness. Normal range of motion. Cervical back: Normal range of motion and neck supple. No rigidity. Right lower leg: No edema. Left lower leg: No edema. Lymphadenopathy:      Cervical: No cervical adenopathy. Skin:     General: Skin is warm. Capillary Refill: Capillary refill takes 2 to 3 seconds. Coloration: Skin is not jaundiced. Findings: No bruising or rash. Neurological:      General: No focal deficit present. Mental Status: She is alert and oriented to person, place, and time. Mental status is at baseline. Motor: No weakness. Gait: Gait abnormal (Ambulates with walker).    Psychiatric:         Mood and Affect: Mood normal.         Behavior: Behavior normal.

## 2023-11-15 DIAGNOSIS — I10 HYPERTENSION, ESSENTIAL: ICD-10-CM

## 2023-11-15 RX ORDER — LISINOPRIL 5 MG/1
TABLET ORAL
Qty: 90 TABLET | Refills: 1 | Status: SHIPPED | OUTPATIENT
Start: 2023-11-15

## 2023-11-16 ENCOUNTER — OFFICE VISIT (OUTPATIENT)
Dept: OBGYN CLINIC | Facility: CLINIC | Age: 88
End: 2023-11-16
Payer: MEDICARE

## 2023-11-16 VITALS
BODY MASS INDEX: 18.79 KG/M2 | DIASTOLIC BLOOD PRESSURE: 70 MMHG | RESPIRATION RATE: 15 BRPM | SYSTOLIC BLOOD PRESSURE: 108 MMHG | WEIGHT: 102.12 LBS | HEIGHT: 62 IN | HEART RATE: 72 BPM

## 2023-11-16 DIAGNOSIS — M25.551 RIGHT HIP PAIN: Primary | ICD-10-CM

## 2023-11-16 DIAGNOSIS — S72.114A CLOSED NONDISPLACED FRACTURE OF GREATER TROCHANTER OF RIGHT FEMUR, INITIAL ENCOUNTER (HCC): ICD-10-CM

## 2023-11-16 PROCEDURE — 99213 OFFICE O/P EST LOW 20 MIN: CPT | Performed by: ORTHOPAEDIC SURGERY

## 2023-11-16 NOTE — PROGRESS NOTES
Assessment:  1. Right hip pain  XR hip/pelv 2-3 vws right if performed      2. Closed nondisplaced fracture of greater trochanter of right femur, initial encounter [facility])          Patient Active Problem List   Diagnosis    Chronic atrial fibrillation ([address])    Current use of long term anticoagulation    Hypertension, essential    Pacemaker    TIA (transient ischemic attack)    Stage 3b chronic kidney disease (HCC)    Mild intermittent asthma without complication    Pseudogout    Mixed hyperlipidemia    Nonrheumatic aortic valve stenosis    Chronic neck pain    Vitamin D deficiency    Generalized weakness    Osteoarthritis    Chronic thoracic back pain    Closed nondisplaced fracture of greater trochanter of right femur (HCC)    Continuous opioid dependence ([address])           Plan      80 y.o. female with closed non displaced greater trochanteric fracture, right. DOI 8/30/2023  Patient is doing very well, I am pleased with  her clinical exam   Activities as tolerated   Follow up if symptoms worsen or fail to improve              Subjective:     Patient ID:    Chief Donny Gotti 80 y.o. female      HPI    She presents for follow-up evaluation of right hip closed nondisplaced fracture of the greater trochanter. Patient originally injured her hip 8/30/2023, she was last seen in office 9/14/2023. Patient states she is doing well, she denies any pain. The following portions of the patient's history were reviewed and updated as appropriate: allergies, current medications, past family history, past social history, past surgical history and problem list.        Social History     Socioeconomic History    Marital status:       Spouse name: Not on file    Number of children: Not on file    Years of education: Not on file    Highest education level: Not on file   Occupational History    Not on file   Tobacco Use    Smoking status: Never     Passive exposure: Past    Smokeless tobacco: Never   Vaping Use    Vaping Use: Never used   Substance and Sexual Activity    Alcohol use: Not Currently     Comment: occasionaly     Drug use: Never    Sexual activity: Not Currently     Partners: Male     Birth control/protection: Post-menopausal   Other Topics Concern    Not on file   Social History Narrative    Not on file     Social Determinants of Health     Financial Resource Strain: Low Risk  (6/5/2023)    Overall Financial Resource Strain (CARDIA)     Difficulty of Paying Living Expenses: Not very hard   Food Insecurity: No Food Insecurity (12/27/2022)    Hunger Vital Sign     Worried About Running Out of Food in the Last Year: Never true     [address] in the Last Year: Never true   Transportation Needs: No Transportation Needs (6/5/2023)    PRAPARE - Transportation     Lack of Transportation (Medical): No     Lack of Transportation (Non-Medical):  No   Physical Activity: Not on file   Stress: Not on file   Social Connections: Not on file   Intimate Partner Violence: Not on file   Housing Stability: Unknown (12/27/2022)    Housing Stability Vital Sign     Unable to Pay for Housing in the Last Year: No     Number of Places Lived in the Last Year: Not on file     Unstable Housing in the Last Year: No     Past Medical History:   Diagnosis Date    Anemia     Arthritis     Asthma     Atrial fibrillation ([address]) 11/08/2019    CAD     Chronic kidney disease     CKD (chronic kidney disease)     Current use of long term anticoagulation 11/08/2019    Degenerative joint disease     Elevated troponin 09/08/2021    Hyperlipidemia     Hypertension     Hypertension, essential 11/08/2019    ALYSSA (iron deficiency anemia)     Osteopenia     Osteoporosis     Pacemaker     TIA (transient ischemic attack) 11/08/2019     Past Surgical History:   Procedure Laterality Date    CARDIAC PACEMAKER PLACEMENT       Allergies   Allergen Reactions    Amoxicillin-Pot Clavulanate GI Intolerance, Abdominal Pain, Diarrhea and Headache    Silver Sulfadiazine Other (See Comments)     Current Outpatient Medications on File Prior to Visit   Medication Sig Dispense Refill    albuterol (PROVENTIL HFA,VENTOLIN HFA) 90 mcg/act inhaler Inhale 2 puffs every 4 (four) hours as needed for wheezing 18 g 0    diltiazem (CARDIZEM CD) 120 mg 24 hr capsule TAKE 1 CAPSULE EVERY DAY 90 capsule 0    Eliquis 2.5 MG TAKE 1 TABLET BY MOUTH 2 TIMES DAILY 60 tablet 0    gabapentin (NEURONTIN) 100 mg capsule TAKE 2 CAPSULES TWICE DAILY 360 capsule 0    iron polysaccharides (FERREX) 150 mg capsule Take 150 mg by mouth daily      lisinopril (ZESTRIL) 5 mg tablet TAKE 1 TABLET EVERY DAY 90 tablet 1    metoprolol succinate (TOPROL-XL) 100 mg 24 hr tablet Take 1 tablet (100 mg total) by mouth daily 90 tablet 1    rosuvastatin (CRESTOR) 5 mg tablet TAKE 1 TABLET TWICE WEEKLY ON TUESDAY AND SATURDAY 26 tablet 0    traMADol (ULTRAM) 50 mg tablet Take 1 tablet (50 mg total) by mouth 2 (two) times a day 60 tablet 0     No current facility-administered medications on file prior to visit. Objective:    Review of Systems   Constitutional:  Negative for chills and fever. HENT:  Negative for ear pain and sore throat. Eyes:  Negative for pain and visual disturbance. Respiratory:  Negative for cough and shortness of breath. Cardiovascular:  Negative for chest pain and palpitations. Gastrointestinal:  Negative for abdominal pain and vomiting. Genitourinary:  Negative for dysuria and hematuria. Musculoskeletal:  Negative for arthralgias and back pain. Skin:  Negative for color change and rash. Neurological:  Negative for seizures and syncope. All other systems reviewed and are negative. Right Hip Exam     Tenderness   The patient is experiencing no tenderness. Other   Erythema: absent  Sensation: normal  Pulse: present    Comments:  No pain with resisted hip flexion             Physical Exam  Vitals and nursing note reviewed.    HENT:      Head: Normocephalic. Eyes:      Extraocular Movements: Extraocular movements intact. Cardiovascular:      Rate and Rhythm: Normal rate. Pulses: Normal pulses. Pulmonary:      Effort: Pulmonary effort is normal.   Musculoskeletal:         General: Normal range of motion. Cervical back: Normal range of motion. Comments: See ortho exam   Skin:     General: Skin is warm and dry. Neurological:      General: No focal deficit present. Mental Status: She is alert. Psychiatric:         Behavior: Behavior normal.         Procedures  No Procedures performed today    I have personally reviewed pertinent films in PACS. Scribe Attestation      I,:  Eliseo Cedeno am acting as a scribe while in the presence of the attending physician.:       I,:  Zo Rapp, DO personally performed the services described in this documentation    as scribed in my presence.:             Portions of the record may have been created with voice recognition software. Occasional wrong word or "sound a like" substitutions may have occurred due to the inherent limitations of voice recognition software. Read the chart carefully and recognize, using context, where substitutions have occurred.

## 2023-11-18 DIAGNOSIS — E78.2 MIXED HYPERLIPIDEMIA: ICD-10-CM

## 2023-11-18 DIAGNOSIS — I10 PRIMARY HYPERTENSION: ICD-10-CM

## 2023-11-20 RX ORDER — ROSUVASTATIN CALCIUM 5 MG/1
TABLET, COATED ORAL
Qty: 26 TABLET | Refills: 3 | Status: SHIPPED | OUTPATIENT
Start: 2023-11-20

## 2023-11-20 RX ORDER — DILTIAZEM HYDROCHLORIDE 120 MG/1
CAPSULE, COATED, EXTENDED RELEASE ORAL
Qty: 90 CAPSULE | Refills: 1 | Status: SHIPPED | OUTPATIENT
Start: 2023-11-20

## 2023-11-25 DIAGNOSIS — I48.91 ATRIAL FIBRILLATION, UNSPECIFIED TYPE (HCC): ICD-10-CM

## 2023-11-27 RX ORDER — APIXABAN 2.5 MG/1
TABLET, FILM COATED ORAL
Qty: 60 TABLET | Refills: 0 | Status: SHIPPED | OUTPATIENT
Start: 2023-11-27

## 2023-12-04 DIAGNOSIS — G50.0 TRIGEMINAL NEURALGIA: ICD-10-CM

## 2023-12-04 DIAGNOSIS — D50.8 OTHER IRON DEFICIENCY ANEMIA: Primary | ICD-10-CM

## 2023-12-04 NOTE — TELEPHONE ENCOUNTER
Patient called to check the status of her refill request, stated she received a message via my chart stating request didn't go through.

## 2023-12-05 RX ORDER — IRON POLYSACCHARIDE COMPLEX 150 MG
150 CAPSULE ORAL DAILY
Qty: 90 CAPSULE | Refills: 0 | Status: SHIPPED | OUTPATIENT
Start: 2023-12-05

## 2023-12-05 RX ORDER — TRAMADOL HYDROCHLORIDE 50 MG/1
50 TABLET ORAL 2 TIMES DAILY
Qty: 60 TABLET | Refills: 0 | Status: SHIPPED | OUTPATIENT
Start: 2023-12-05

## 2024-01-29 DIAGNOSIS — D50.8 OTHER IRON DEFICIENCY ANEMIA: ICD-10-CM

## 2024-01-29 DIAGNOSIS — G50.0 TRIGEMINAL NEURALGIA: ICD-10-CM

## 2024-01-30 RX ORDER — GABAPENTIN 100 MG/1
CAPSULE ORAL
Qty: 360 CAPSULE | Refills: 1 | Status: SHIPPED | OUTPATIENT
Start: 2024-01-30

## 2024-01-31 RX ORDER — IRON POLYSACCHARIDE COMPLEX 150 MG
150 CAPSULE ORAL DAILY
Qty: 90 CAPSULE | Refills: 3 | Status: SHIPPED | OUTPATIENT
Start: 2024-01-31

## 2024-02-05 DIAGNOSIS — I48.91 ATRIAL FIBRILLATION, UNSPECIFIED TYPE (HCC): ICD-10-CM

## 2024-02-05 NOTE — TELEPHONE ENCOUNTER
Reason for call:   [x] Refill   [] Prior Auth  [] Other:     Office:   [x] PCP/Provider - Park Sanders MD   [] Specialty/Provider -     Medication: Eliquis 2.5 MG     Dose/Frequency: TAKE 1 TABLET BY MOUTH 2 TIMES DAILY     Quantity: 60    Pharmacy: [id number] - [place] - [id number]     Does the patient have enough for 3 days?   [] Yes   [x] No - Send as HP to POD

## 2024-02-21 DIAGNOSIS — I10 PRIMARY HYPERTENSION: ICD-10-CM

## 2024-02-21 RX ORDER — METOPROLOL SUCCINATE 100 MG/1
100 TABLET, EXTENDED RELEASE ORAL DAILY
Qty: 90 TABLET | Refills: 1 | Status: SHIPPED | OUTPATIENT
Start: 2024-02-21

## 2024-02-27 DIAGNOSIS — G50.0 TRIGEMINAL NEURALGIA: ICD-10-CM

## 2024-02-27 RX ORDER — TRAMADOL HYDROCHLORIDE 50 MG/1
50 TABLET ORAL 2 TIMES DAILY
Qty: 60 TABLET | Refills: 0 | Status: SHIPPED | OUTPATIENT
Start: 2024-02-27 | End: 2024-02-28 | Stop reason: SDUPTHER

## 2024-02-27 NOTE — TELEPHONE ENCOUNTER
Reason for call:   [x] Refill   [] Prior Auth  [] Other:     Office:   [x] PCP/Provider -   [] Specialty/Provider -     Medication: Tramadol    Dose/Frequency: 50 mg    Quantity: #60    Pharmacy: [facility] [place]    Does the patient have enough for 3 days?   [] Yes   [x] No - Send as HP to POD

## 2024-02-28 ENCOUNTER — TELEPHONE (OUTPATIENT)
Dept: FAMILY MEDICINE CLINIC | Facility: CLINIC | Age: 89
End: 2024-02-28

## 2024-02-28 DIAGNOSIS — G50.0 TRIGEMINAL NEURALGIA: ICD-10-CM

## 2024-02-28 RX ORDER — TRAMADOL HYDROCHLORIDE 50 MG/1
50 TABLET ORAL 2 TIMES DAILY
Qty: 60 TABLET | Refills: 0 | OUTPATIENT
Start: 2024-02-28

## 2024-02-28 RX ORDER — SENNOSIDES 8.6 MG
650 CAPSULE ORAL EVERY 8 HOURS PRN
COMMUNITY

## 2024-02-28 RX ORDER — TRAMADOL HYDROCHLORIDE 50 MG/1
50 TABLET ORAL 2 TIMES DAILY
Qty: 60 TABLET | Refills: 0 | Status: SHIPPED | OUTPATIENT
Start: 2024-02-28

## 2024-02-28 NOTE — TELEPHONE ENCOUNTER
Patient Daughter called [facility].The pharmacist Ursula stated the prescription for tramadol can be called or faxed into pharmacy Please call or Fax to .  Phone [phone]  Fax  [phone]

## 2024-03-14 ENCOUNTER — OFFICE VISIT (OUTPATIENT)
Dept: FAMILY MEDICINE CLINIC | Facility: CLINIC | Age: 89
End: 2024-03-14
Payer: MEDICARE

## 2024-03-14 VITALS
SYSTOLIC BLOOD PRESSURE: 110 MMHG | OXYGEN SATURATION: 95 % | RESPIRATION RATE: 18 BRPM | TEMPERATURE: 98 F | BODY MASS INDEX: 19.03 KG/M2 | WEIGHT: 103.4 LBS | HEART RATE: 78 BPM | HEIGHT: 62 IN | DIASTOLIC BLOOD PRESSURE: 70 MMHG

## 2024-03-14 DIAGNOSIS — J45.20 MILD INTERMITTENT ASTHMA WITHOUT COMPLICATION: ICD-10-CM

## 2024-03-14 DIAGNOSIS — E55.9 VITAMIN D DEFICIENCY: ICD-10-CM

## 2024-03-14 DIAGNOSIS — N18.32 STAGE 3B CHRONIC KIDNEY DISEASE (HCC): ICD-10-CM

## 2024-03-14 DIAGNOSIS — I10 HYPERTENSION, ESSENTIAL: Primary | ICD-10-CM

## 2024-03-14 DIAGNOSIS — F11.20 CONTINUOUS OPIOID DEPENDENCE (HCC): ICD-10-CM

## 2024-03-14 DIAGNOSIS — I48.20 CHRONIC ATRIAL FIBRILLATION (HCC): ICD-10-CM

## 2024-03-14 DIAGNOSIS — E78.2 MIXED HYPERLIPIDEMIA: ICD-10-CM

## 2024-03-14 PROBLEM — S72.114A CLOSED NONDISPLACED FRACTURE OF GREATER TROCHANTER OF RIGHT FEMUR (HCC): Status: RESOLVED | Noted: 2023-09-11 | Resolved: 2024-03-14

## 2024-03-14 PROCEDURE — G2211 COMPLEX E/M VISIT ADD ON: HCPCS

## 2024-03-14 PROCEDURE — 99213 OFFICE O/P EST LOW 20 MIN: CPT

## 2024-03-14 NOTE — PROGRESS NOTES
Assessment/Plan:         Problem List Items Addressed This Visit     Chronic atrial fibrillation (HCC)     Under control.  Continue current medication including anticoagulation medication to prevent stroke.  We will reevaluate at next office visit.  Has been followed by cardiologist.           Hypertension, essential - Primary     Under control.  Continue metoprolol , lisinopril and diltiazem.  We will continue to monitor         Relevant Orders    CBC and differential    Comprehensive metabolic panel    Stage 3b chronic kidney disease (HCC)     Lab Results   Component Value Date    EGFR 35 09/01/2023    EGFR 36 07/26/2023    EGFR 35 06/18/2023    CREATININE 1.29 09/01/2023    CREATININE 1.25 07/26/2023    CREATININE 1.28 06/18/2023   creatinine and GFR stable   Will need periodic BMP  Avoid NSAIDs like ibuprofen Aleve Advil etc.  Avoid high potassium diet.  We will continue to monitor            Mild intermittent asthma without complication     Under control.    Continue current medication.    We will re-evaluate at next office visit.           Mixed hyperlipidemia     Under control.  Continue rosuvastatin.  We will continue to monitor.          Relevant Orders    Lipid Panel with Direct LDL reflex    Vitamin D deficiency    Relevant Orders    Vitamin D 25 hydroxy    Continuous opioid dependence (HCC)     Pain under reasonable control.  Continue tramadol as directed.  We will continue to monitor              Subjective:      Patient ID: Mckenna Whitten is a 89 y.o. female.    Patient here for review of chronic medical problems and  the labs and imaging if it is applicable.  Currently has no specific complaints other than mentioned in the review of systems  Denies chest pain, SOB, cough, abdominal pain, nausea, vomiting, fever, chills, lightheadedness, dizziness,headache, tingling or numbness.No bowel or bladder problem.          The following portions of the patient's history were reviewed and updated as  appropriate:   Past Medical History:  She has a past medical history of Anemia, Arthritis, Asthma, Atrial fibrillation (HCC) (11/08/2019), CAD, Chronic kidney disease, CKD (chronic kidney disease), Current use of long term anticoagulation (11/08/2019), Degenerative joint disease, Elevated troponin (09/08/2021), Hyperlipidemia, Hypertension, Hypertension, essential (11/08/2019), ALYSSA (iron deficiency anemia), Osteopenia, Osteoporosis, Pacemaker, and TIA (transient ischemic attack) (11/08/2019).,  _______________________________________________________________________  Medical Problems:  does not have any pertinent problems on file.,  _______________________________________________________________________  Past Surgical History:   has a past surgical history that includes Cardiac pacemaker placement.,  _______________________________________________________________________  Family History:  family history includes Asthma in her mother; Hyperlipidemia in her daughter and daughter; Hypertension in her daughter and son; Other in her mother.,  _______________________________________________________________________  Social History:   reports that she has never smoked. She has been exposed to tobacco smoke. She has never used smokeless tobacco. She reports that she does not currently use alcohol. She reports that she does not use drugs.,  _______________________________________________________________________  Allergies:  is allergic to amoxicillin-pot clavulanate and silver sulfadiazine..  _______________________________________________________________________  Current Outpatient Medications   Medication Sig Dispense Refill   • acetaminophen (TYLENOL) 650 mg CR tablet Take 650 mg by mouth every 8 (eight) hours as needed for mild pain     • albuterol (PROVENTIL HFA,VENTOLIN HFA) 90 mcg/act inhaler Inhale 2 puffs every 4 (four) hours as needed for wheezing 18 g 0   • apixaban (Eliquis) 2.5 mg Take 1 tablet (2.5 mg total) by  "mouth 2 (two) times a day 60 tablet 5   • diltiazem (CARDIZEM CD) 120 mg 24 hr capsule TAKE 1 CAPSULE EVERY DAY 90 capsule 1   • gabapentin (NEURONTIN) 100 mg capsule TAKE 2 CAPSULES TWICE DAILY 360 capsule 1   • iron polysaccharides (FERREX) 150 mg capsule TAKE 1 CAPSULE EVERY DAY 90 capsule 3   • lisinopril (ZESTRIL) 5 mg tablet TAKE 1 TABLET EVERY DAY 90 tablet 1   • metoprolol succinate (TOPROL-XL) 100 mg 24 hr tablet TAKE 1 TABLET EVERY DAY 90 tablet 1   • rosuvastatin (CRESTOR) 5 mg tablet TAKE 1 TABLET TWICE WEEKLY ON TUESDAY AND SATURDAY 26 tablet 3   • traMADol (ULTRAM) 50 mg tablet Take 1 tablet (50 mg total) by mouth 2 (two) times a day 60 tablet 0     No current facility-administered medications for this visit.     _______________________________________________________________________  Review of Systems   Constitutional:  Negative for chills and fever.   HENT:  Positive for hearing loss. Negative for congestion, ear pain, postnasal drip, sinus pain and sore throat.    Respiratory:  Negative for cough, chest tightness, shortness of breath and wheezing.    Cardiovascular:  Negative for chest pain and leg swelling.   Gastrointestinal:  Negative for abdominal pain, blood in stool and diarrhea.   Genitourinary:  Negative for dysuria and frequency.   Musculoskeletal:  Positive for arthralgias (right hip), back pain, gait problem and myalgias.   Neurological:  Negative for headaches.   Psychiatric/Behavioral:  Negative for agitation, behavioral problems and confusion.          Objective:  Vitals:    03/14/24 1431   BP: 110/70   BP Location: Right arm   Patient Position: Sitting   Cuff Size: Standard   Pulse: 78   Resp: 18   Temp: 98 °F (36.7 °C)   TempSrc: Temporal   SpO2: 95%   Weight: 46.9 kg (103 lb 6.4 oz)   Height: 5' 2\" (1.575 m)     Body mass index is 18.91 kg/m².     Physical Exam  Vitals and nursing note reviewed.   Constitutional:       General: She is not in acute distress.     Appearance: Normal " appearance. She is not ill-appearing, toxic-appearing or diaphoretic.   HENT:      Head: Normocephalic and atraumatic.      Ears:      Comments: Bilateral hearing loss     Nose: Nose normal. No congestion.      Mouth/Throat:      Mouth: Mucous membranes are moist.   Eyes:      General: No scleral icterus.        Right eye: No discharge.         Left eye: No discharge.      Extraocular Movements: Extraocular movements intact.      Conjunctiva/sclera: Conjunctivae normal.      Pupils: Pupils are equal, round, and reactive to light.   Cardiovascular:      Rate and Rhythm: Normal rate. Rhythm irregular.      Pulses: Normal pulses.      Heart sounds: Normal heart sounds. No murmur heard.     No gallop.   Pulmonary:      Effort: Pulmonary effort is normal. No respiratory distress.      Breath sounds: Normal breath sounds. No wheezing, rhonchi or rales.   Abdominal:      General: Abdomen is flat. Bowel sounds are normal. There is no distension.      Palpations: Abdomen is soft.      Tenderness: There is no abdominal tenderness. There is no right CVA tenderness, left CVA tenderness or guarding.   Musculoskeletal:         General: No swelling or tenderness. Normal range of motion.      Cervical back: Normal range of motion and neck supple. No rigidity.      Right lower leg: No edema.      Left lower leg: No edema.   Lymphadenopathy:      Cervical: No cervical adenopathy.   Skin:     General: Skin is warm.      Capillary Refill: Capillary refill takes 2 to 3 seconds.      Coloration: Skin is not jaundiced.      Findings: No bruising or rash.   Neurological:      General: No focal deficit present.      Mental Status: She is alert and oriented to person, place, and time. Mental status is at baseline.      Motor: No weakness.      Gait: Gait abnormal (Ambulates with walker).   Psychiatric:         Mood and Affect: Mood normal.         Behavior: Behavior normal.

## 2024-03-20 LAB
4-HYDROXY XYLAZINE: NEGATIVE NG/ML
6MAM UR QL CFM: NEGATIVE NG/ML
7AMINOCLONAZEPAM UR QL CFM: NEGATIVE NG/ML
A-OH ALPRAZ UR QL CFM: NEGATIVE NG/ML
ACCEPTABLE CREAT UR QL: NORMAL MG/DL
ACCEPTIBLE SP GR UR QL: NORMAL
AMPHET UR QL CFM: NEGATIVE NG/ML
BUPRENORPHINE UR QL CFM: NEGATIVE NG/ML
BUTALBITAL UR QL CFM: NEGATIVE NG/ML
BZE UR QL CFM: NEGATIVE NG/ML
CODEINE UR QL CFM: NEGATIVE NG/ML
EDDP UR QL CFM: NEGATIVE NG/ML
ETHYL GLUCURONIDE UR QL CFM: NEGATIVE NG/ML
ETHYL SULFATE UR QL SCN: NEGATIVE NG/ML
EUTYLONE UR QL: NEGATIVE NG/ML
FENTANYL UR QL CFM: NEGATIVE NG/ML
GLIADIN IGG SER IA-ACNC: NEGATIVE NG/ML
HYDROCODONE UR QL CFM: NEGATIVE NG/ML
HYDROMORPHONE UR QL CFM: NEGATIVE NG/ML
LORAZEPAM UR QL CFM: NEGATIVE NG/ML
ME-PHENIDATE UR QL CFM: NEGATIVE NG/ML
MEPERIDINE UR QL CFM: NEGATIVE NG/ML
METHADONE UR QL CFM: NEGATIVE NG/ML
METHAMPHET UR QL CFM: NEGATIVE NG/ML
MORPHINE UR QL CFM: NEGATIVE NG/ML
NALTREXONE UR QL CFM: NEGATIVE NG/ML
NITRITE UR QL: NORMAL UG/ML
NORBUPRENORPHINE UR QL CFM: NEGATIVE NG/ML
NORDIAZEPAM UR QL CFM: NEGATIVE NG/ML
NORFENTANYL UR QL CFM: NEGATIVE NG/ML
NORHYDROCODONE UR QL CFM: NEGATIVE NG/ML
NORMEPERIDINE UR QL CFM: NEGATIVE NG/ML
NOROXYCODONE UR QL CFM: NEGATIVE NG/ML
OXAZEPAM UR QL CFM: NEGATIVE NG/ML
OXYCODONE UR QL CFM: NEGATIVE NG/ML
OXYMORPHONE UR QL CFM: NEGATIVE NG/ML
PARA-FLUOROFENTANYL QUANTIFICATION: NORMAL NG/ML
PHENOBARB UR QL CFM: NEGATIVE NG/ML
RESULT ALL_PRESCRIBED MEDS AND SPECIAL INSTRUCTIONS: NORMAL
SECOBARBITAL UR QL CFM: NEGATIVE NG/ML
SL AMB 4-ANPP QUANTIFICATION: NORMAL NG/ML
SL AMB 5F-ADB-M7 METABOLITE QUANTIFICATION: NEGATIVE NG/ML
SL AMB 7-OH-MITRAGYNINE (KRATOM ALKALOID) QUANTIFICATION: NEGATIVE NG/ML
SL AMB AB-FUBINACA-M3 METABOLITE QUANTIFICATION: NEGATIVE NG/ML
SL AMB ACETYL FENTANYL QUANTIFICATION: NORMAL NG/ML
SL AMB ACETYL NORFENTANYL QUANTIFICATION: NORMAL NG/ML
SL AMB ACRYL FENTANYL QUANTIFICATION: NORMAL NG/ML
SL AMB CARFENTANIL QUANTIFICATION: NORMAL NG/ML
SL AMB CTHC (MARIJUANA METABOLITE) QUANTIFICATION: NEGATIVE NG/ML
SL AMB DEXTRORPHAN (DEXTROMETHORPHAN METABOLITE) QUANT: NEGATIVE NG/ML
SL AMB GABAPENTIN QUANTIFICATION: NORMAL
SL AMB JWH018 METABOLITE QUANTIFICATION: NEGATIVE NG/ML
SL AMB JWH073 METABOLITE QUANTIFICATION: NEGATIVE NG/ML
SL AMB MDMB-FUBINACA-M1 METABOLITE QUANTIFICATION: NEGATIVE NG/ML
SL AMB METHYLONE QUANTIFICATION: NEGATIVE NG/ML
SL AMB N-DESMETHYL-TRAMADOL QUANTIFICATION: NORMAL NG/ML
SL AMB PHENTERMINE QUANTIFICATION: NEGATIVE NG/ML
SL AMB PREGABALIN QUANTIFICATION: NEGATIVE
SL AMB RCS4 METABOLITE QUANTIFICATION: NEGATIVE NG/ML
SL AMB RITALINIC ACID QUANTIFICATION: NEGATIVE NG/ML
SMOOTH MUSCLE AB TITR SER IF: NEGATIVE NG/ML
SPECIMEN DRAWN SERPL: NEGATIVE NG/ML
SPECIMEN PH ACCEPTABLE UR: NORMAL
TAPENTADOL UR QL CFM: NEGATIVE NG/ML
TEMAZEPAM UR QL CFM: NEGATIVE NG/ML
TRAMADOL UR QL CFM: NORMAL NG/ML
URATE/CREAT 24H UR: NORMAL NG/ML
XYLAZINE QUANTIFICATION: NEGATIVE NG/ML

## 2024-04-15 DIAGNOSIS — I10 PRIMARY HYPERTENSION: ICD-10-CM

## 2024-04-15 RX ORDER — DILTIAZEM HYDROCHLORIDE 120 MG/1
CAPSULE, COATED, EXTENDED RELEASE ORAL
Qty: 90 CAPSULE | Refills: 1 | Status: SHIPPED | OUTPATIENT
Start: 2024-04-15

## 2024-04-26 ENCOUNTER — TELEPHONE (OUTPATIENT)
Dept: LAB | Facility: HOSPITAL | Age: 89
End: 2024-04-26

## 2024-05-07 DIAGNOSIS — G50.0 TRIGEMINAL NEURALGIA: ICD-10-CM

## 2024-05-07 RX ORDER — TRAMADOL HYDROCHLORIDE 50 MG/1
50 TABLET ORAL 2 TIMES DAILY
Qty: 60 TABLET | Refills: 0 | Status: SHIPPED | OUTPATIENT
Start: 2024-05-07

## 2024-05-09 ENCOUNTER — APPOINTMENT (OUTPATIENT)
Dept: LAB | Facility: HOSPITAL | Age: 89
End: 2024-05-09
Payer: MEDICARE

## 2024-05-09 DIAGNOSIS — E55.9 VITAMIN D DEFICIENCY: ICD-10-CM

## 2024-05-09 DIAGNOSIS — R69 DIAGNOSIS UNKNOWN: Primary | ICD-10-CM

## 2024-05-09 DIAGNOSIS — I10 HYPERTENSION, ESSENTIAL: ICD-10-CM

## 2024-05-09 DIAGNOSIS — E78.2 MIXED HYPERLIPIDEMIA: ICD-10-CM

## 2024-05-09 LAB
25(OH)D3 SERPL-MCNC: 60.8 NG/ML (ref 30–100)
ALBUMIN SERPL BCP-MCNC: 3.9 G/DL (ref 3.5–5)
ALP SERPL-CCNC: 92 U/L (ref 34–104)
ALT SERPL W P-5'-P-CCNC: 11 U/L (ref 7–52)
ANION GAP SERPL CALCULATED.3IONS-SCNC: 6 MMOL/L (ref 4–13)
AST SERPL W P-5'-P-CCNC: 16 U/L (ref 13–39)
BASOPHILS # BLD AUTO: 0.07 THOUSANDS/ÂΜL (ref 0–0.1)
BASOPHILS NFR BLD AUTO: 1 % (ref 0–1)
BILIRUB DIRECT SERPL-MCNC: 0.15 MG/DL (ref 0–0.2)
BILIRUB SERPL-MCNC: 0.61 MG/DL (ref 0.2–1)
BUN SERPL-MCNC: 28 MG/DL (ref 5–25)
CALCIUM SERPL-MCNC: 9.3 MG/DL (ref 8.4–10.2)
CHLORIDE SERPL-SCNC: 105 MMOL/L (ref 96–108)
CHOLEST SERPL-MCNC: 177 MG/DL
CK SERPL-CCNC: 51 U/L (ref 26–192)
CO2 SERPL-SCNC: 28 MMOL/L (ref 21–32)
CREAT SERPL-MCNC: 1.22 MG/DL (ref 0.6–1.3)
EOSINOPHIL # BLD AUTO: 0.34 THOUSAND/ÂΜL (ref 0–0.61)
EOSINOPHIL NFR BLD AUTO: 4 % (ref 0–6)
ERYTHROCYTE [DISTWIDTH] IN BLOOD BY AUTOMATED COUNT: 13.6 % (ref 11.6–15.1)
GFR SERPL CREATININE-BSD FRML MDRD: 37 ML/MIN/1.73SQ M
GLUCOSE P FAST SERPL-MCNC: 76 MG/DL (ref 65–99)
HCT VFR BLD AUTO: 43.5 % (ref 34.8–46.1)
HDLC SERPL-MCNC: 83 MG/DL
HGB BLD-MCNC: 13.3 G/DL (ref 11.5–15.4)
IMM GRANULOCYTES # BLD AUTO: 0.05 THOUSAND/UL (ref 0–0.2)
IMM GRANULOCYTES NFR BLD AUTO: 1 % (ref 0–2)
LDLC SERPL CALC-MCNC: 77 MG/DL (ref 0–100)
LYMPHOCYTES # BLD AUTO: 2.55 THOUSANDS/ÂΜL (ref 0.6–4.47)
LYMPHOCYTES NFR BLD AUTO: 27 % (ref 14–44)
MAGNESIUM SERPL-MCNC: 2.1 MG/DL (ref 1.9–2.7)
MCH RBC QN AUTO: 30.6 PG (ref 26.8–34.3)
MCHC RBC AUTO-ENTMCNC: 30.6 G/DL (ref 31.4–37.4)
MCV RBC AUTO: 100 FL (ref 82–98)
MONOCYTES # BLD AUTO: 0.92 THOUSAND/ÂΜL (ref 0.17–1.22)
MONOCYTES NFR BLD AUTO: 10 % (ref 4–12)
NEUTROPHILS # BLD AUTO: 5.42 THOUSANDS/ÂΜL (ref 1.85–7.62)
NEUTS SEG NFR BLD AUTO: 57 % (ref 43–75)
NRBC BLD AUTO-RTO: 0 /100 WBCS
PLATELET # BLD AUTO: 321 THOUSANDS/UL (ref 149–390)
PMV BLD AUTO: 10.8 FL (ref 8.9–12.7)
POTASSIUM SERPL-SCNC: 4.1 MMOL/L (ref 3.5–5.3)
PROT SERPL-MCNC: 7.4 G/DL (ref 6.4–8.4)
RBC # BLD AUTO: 4.34 MILLION/UL (ref 3.81–5.12)
SODIUM SERPL-SCNC: 139 MMOL/L (ref 135–147)
TRIGL SERPL-MCNC: 84 MG/DL
TSH SERPL DL<=0.05 MIU/L-ACNC: 2.15 UIU/ML (ref 0.45–4.5)
WBC # BLD AUTO: 9.35 THOUSAND/UL (ref 4.31–10.16)

## 2024-05-09 PROCEDURE — 82248 BILIRUBIN DIRECT: CPT

## 2024-05-09 PROCEDURE — 36415 COLL VENOUS BLD VENIPUNCTURE: CPT

## 2024-05-09 PROCEDURE — 84443 ASSAY THYROID STIM HORMONE: CPT

## 2024-05-09 PROCEDURE — 82550 ASSAY OF CK (CPK): CPT

## 2024-05-09 PROCEDURE — 85025 COMPLETE CBC W/AUTO DIFF WBC: CPT

## 2024-05-09 PROCEDURE — 80061 LIPID PANEL: CPT

## 2024-05-09 PROCEDURE — 80053 COMPREHEN METABOLIC PANEL: CPT

## 2024-05-09 PROCEDURE — 83735 ASSAY OF MAGNESIUM: CPT

## 2024-05-09 PROCEDURE — 82306 VITAMIN D 25 HYDROXY: CPT

## 2024-06-06 ENCOUNTER — OFFICE VISIT (OUTPATIENT)
Dept: FAMILY MEDICINE CLINIC | Facility: CLINIC | Age: 89
End: 2024-06-06
Payer: MEDICARE

## 2024-06-06 VITALS
BODY MASS INDEX: 20.76 KG/M2 | OXYGEN SATURATION: 96 % | WEIGHT: 103 LBS | DIASTOLIC BLOOD PRESSURE: 72 MMHG | HEIGHT: 59 IN | SYSTOLIC BLOOD PRESSURE: 138 MMHG | RESPIRATION RATE: 16 BRPM | TEMPERATURE: 97.6 F | HEART RATE: 69 BPM

## 2024-06-06 DIAGNOSIS — J45.20 MILD INTERMITTENT ASTHMA WITHOUT COMPLICATION: ICD-10-CM

## 2024-06-06 DIAGNOSIS — Z00.00 MEDICARE ANNUAL WELLNESS VISIT, SUBSEQUENT: ICD-10-CM

## 2024-06-06 DIAGNOSIS — E78.2 MIXED HYPERLIPIDEMIA: ICD-10-CM

## 2024-06-06 DIAGNOSIS — F11.20 CONTINUOUS OPIOID DEPENDENCE (HCC): ICD-10-CM

## 2024-06-06 DIAGNOSIS — I10 HYPERTENSION, ESSENTIAL: Primary | ICD-10-CM

## 2024-06-06 DIAGNOSIS — I48.20 CHRONIC ATRIAL FIBRILLATION (HCC): ICD-10-CM

## 2024-06-06 DIAGNOSIS — N18.32 STAGE 3B CHRONIC KIDNEY DISEASE (HCC): ICD-10-CM

## 2024-06-06 PROCEDURE — G0439 PPPS, SUBSEQ VISIT: HCPCS

## 2024-06-06 PROCEDURE — 99213 OFFICE O/P EST LOW 20 MIN: CPT

## 2024-06-06 NOTE — PATIENT INSTRUCTIONS
Medicare Preventive Visit Patient Instructions  Thank you for completing your Welcome to Medicare Visit or Medicare Annual Wellness Visit today. Your next wellness visit will be due in one year (6/7/2025).  The screening/preventive services that you may require over the next 5-10 years are detailed below. Some tests may not apply to you based off risk factors and/or age. Screening tests ordered at today's visit but not completed yet may show as past due. Also, please note that scanned in results may not display below.  Preventive Screenings:  Service Recommendations Previous Testing/Comments   Colorectal Cancer Screening  * Colonoscopy    * Fecal Occult Blood Test (FOBT)/Fecal Immunochemical Test (FIT)  * Fecal DNA/Cologuard Test  * Flexible Sigmoidoscopy Age: 45-75 years old   Colonoscopy: every 10 years (may be performed more frequently if at higher risk)  OR  FOBT/FIT: every 1 year  OR  Cologuard: every 3 years  OR  Sigmoidoscopy: every 5 years  Screening may be recommended earlier than age 45 if at higher risk for colorectal cancer. Also, an individualized decision between you and your healthcare provider will decide whether screening between the ages of 76-85 would be appropriate. Colonoscopy: Not on file  FOBT/FIT: Not on file  Cologuard: Not on file  Sigmoidoscopy: Not on file    Screening Not Indicated     Breast Cancer Screening Age: 40+ years old  Frequency: every 1-2 years  Not required if history of left and right mastectomy Mammogram: Not on file        Cervical Cancer Screening Between the ages of 21-29, pap smear recommended once every 3 years.   Between the ages of 30-65, can perform pap smear with HPV co-testing every 5 years.   Recommendations may differ for women with a history of total hysterectomy, cervical cancer, or abnormal pap smears in past. Pap Smear: Not on file    Screening Not Indicated   Hepatitis C Screening Once for adults born between 1945 and 1965  More frequently in patients at  high risk for Hepatitis C Hep C Antibody: Not on file        Diabetes Screening 1-2 times per year if you're at risk for diabetes or have pre-diabetes Fasting glucose: 76 mg/dL (5/9/2024)  A1C: 5.7 % (9/8/2021)  Screening Current   Cholesterol Screening Once every 5 years if you don't have a lipid disorder. May order more often based on risk factors. Lipid panel: 05/09/2024    Screening Not Indicated  History Lipid Disorder     Other Preventive Screenings Covered by Medicare:  Abdominal Aortic Aneurysm (AAA) Screening: covered once if your at risk. You're considered to be at risk if you have a family history of AAA.  Lung Cancer Screening: covers low dose CT scan once per year if you meet all of the following conditions: (1) Age 55-77; (2) No signs or symptoms of lung cancer; (3) Current smoker or have quit smoking within the last 15 years; (4) You have a tobacco smoking history of at least 20 pack years (packs per day multiplied by number of years you smoked); (5) You get a written order from a healthcare provider.  Glaucoma Screening: covered annually if you're considered high risk: (1) You have diabetes OR (2) Family history of glaucoma OR (3)  aged 50 and older OR (4)  American aged 65 and older  Osteoporosis Screening: covered every 2 years if you meet one of the following conditions: (1) You're estrogen deficient and at risk for osteoporosis based off medical history and other findings; (2) Have a vertebral abnormality; (3) On glucocorticoid therapy for more than 3 months; (4) Have primary hyperparathyroidism; (5) On osteoporosis medications and need to assess response to drug therapy.   Last bone density test (DXA Scan): Not on file.  HIV Screening: covered annually if you're between the age of 15-65. Also covered annually if you are younger than 15 and older than 65 with risk factors for HIV infection. For pregnant patients, it is covered up to 3 times per  pregnancy.    Immunizations:  Immunization Recommendations   Influenza Vaccine Annual influenza vaccination during flu season is recommended for all persons aged >= 6 months who do not have contraindications   Pneumococcal Vaccine   * Pneumococcal conjugate vaccine = PCV13 (Prevnar 13), PCV15 (Vaxneuvance), PCV20 (Prevnar 20)  * Pneumococcal polysaccharide vaccine = PPSV23 (Pneumovax) Adults 19-63 yo with certain risk factors or if 65+ yo  If never received any pneumonia vaccine: recommend Prevnar 20 (PCV20)  Give PCV20 if previously received 1 dose of PCV13 or PPSV23   Hepatitis B Vaccine 3 dose series if at intermediate or high risk (ex: diabetes, end stage renal disease, liver disease)   Respiratory syncytial virus (RSV) Vaccine - COVERED BY MEDICARE PART D  * RSVPreF3 (Arexvy) CDC recommends that adults 60 years of age and older may receive a single dose of RSV vaccine using shared clinical decision-making (SCDM)   Tetanus (Td) Vaccine - COST NOT COVERED BY MEDICARE PART B Following completion of primary series, a booster dose should be given every 10 years to maintain immunity against tetanus. Td may also be given as tetanus wound prophylaxis.   Tdap Vaccine - COST NOT COVERED BY MEDICARE PART B Recommended at least once for all adults. For pregnant patients, recommended with each pregnancy.   Shingles Vaccine (Shingrix) - COST NOT COVERED BY MEDICARE PART B  2 shot series recommended in those 19 years and older who have or will have weakened immune systems or those 50 years and older     Health Maintenance Due:  There are no preventive care reminders to display for this patient.  Immunizations Due:      Topic Date Due   • COVID-19 Vaccine (3 - 2023-24 season) 09/01/2023     Advance Directives   What are advance directives?  Advance directives are legal documents that state your wishes and plans for medical care. These plans are made ahead of time in case you lose your ability to make decisions for yourself.  Advance directives can apply to any medical decision, such as the treatments you want, and if you want to donate organs.   What are the types of advance directives?  There are many types of advance directives, and each state has rules about how to use them. You may choose a combination of any of the following:  Living will:  This is a written record of the treatment you want. You can also choose which treatments you do not want, which to limit, and which to stop at a certain time. This includes surgery, medicine, IV fluid, and tube feedings.   Durable power of  for healthcare (DPAHC):  This is a written record that states who you want to make healthcare choices for you when you are unable to make them for yourself. This person, called a proxy, is usually a family member or a friend. You may choose more than 1 proxy.  Do not resuscitate (DNR) order:  A DNR order is used in case your heart stops beating or you stop breathing. It is a request not to have certain forms of treatment, such as CPR. A DNR order may be included in other types of advance directives.  Medical directive:  This covers the care that you want if you are in a coma, near death, or unable to make decisions for yourself. You can list the treatments you want for each condition. Treatment may include pain medicine, surgery, blood transfusions, dialysis, IV or tube feedings, and a ventilator (breathing machine).  Values history:  This document has questions about your views, beliefs, and how you feel and think about life. This information can help others choose the care that you would choose.  Why are advance directives important?  An advance directive helps you control your care. Although spoken wishes may be used, it is better to have your wishes written down. Spoken wishes can be misunderstood, or not followed. Treatments may be given even if you do not want them. An advance directive may make it easier for your family to make difficult  choices about your care.   Fall Prevention    Fall prevention  includes ways to make your home and other areas safer. It also includes ways you can move more carefully to prevent a fall. Health conditions that cause changes in your blood pressure, vision, or muscle strength and coordination may increase your risk for falls. Medicines may also increase your risk for falls if they make you dizzy, weak, or sleepy.   Fall prevention tips:   Stand or sit up slowly.    Use assistive devices as directed.    Wear shoes that fit well and have soles that .    Wear a personal alarm.    Stay active.    Manage your medical conditions.    Home Safety Tips:  Add items to prevent falls in the bathroom.    Keep paths clear.    Install bright lights in your home.    Keep items you use often on shelves within reach.    Paint or place reflective tape on the edges of your stairs.    Urinary Incontinence   Urinary incontinence (UI)  is when you lose control of your bladder. UI develops because your bladder cannot store or empty urine properly. The 3 most common types of UI are stress incontinence, urge incontinence, or both.  Medicines:   May be given to help strengthen your bladder control. Report any side effects of medication to your healthcare provider.  Do pelvic muscle exercises often:  Your pelvic muscles help you stop urinating. Squeeze these muscles tight for 5 seconds, then relax for 5 seconds. Gradually work up to squeezing for 10 seconds. Do 3 sets of 15 repetitions a day, or as directed. This will help strengthen your pelvic muscles and improve bladder control.  Train your bladder:  Go to the bathroom at set times, such as every 2 hours, even if you do not feel the urge to go. You can also try to hold your urine when you feel the urge to go. For example, hold your urine for 5 minutes when you feel the urge to go. As that becomes easier, hold your urine for 10 minutes.   Self-care:   Keep a UI record.  Write down how  often you leak urine and how much you leak. Make a note of what you were doing when you leaked urine.  Drink liquids as directed. You may need to limit the amount of liquid you drink to help control your urine leakage. Do not drink any liquid right before you go to bed. Limit or do not have drinks that contain caffeine or alcohol.   Prevent constipation.  Eat a variety of high-fiber foods. Good examples are high-fiber cereals, beans, vegetables, and whole-grain breads. Walking is the best way to trigger your intestines to have a bowel movement.  Exercise regularly and maintain a healthy weight.  Weight loss and exercise will decrease pressure on your bladder and help you control your leakage.   Use a catheter as directed  to help empty your bladder. A catheter is a tiny, plastic tube that is put into your bladder to drain your urine.   Go to behavior therapy as directed.  Behavior therapy may be used to help you learn to control your urge to urinate.    Narcotic (Opioid) Safety    Use narcotics safely:  Take prescribed narcotics exactly as directed  Do not give narcotics to others or take narcotics that belong to someone else  Do not mix narcotics without medicines or alcohol  Do not drive or operate heavy machinery after you take the narcotic  Monitor for side effects and notify your healthcare provider if you experienced side effects such as nausea, sleepiness, itching, or trouble thinking clearly.    Manage constipation:    Constipation is the most common side effect of narcotic medicine. Constipation is when you have hard, dry bowel movements, or you go longer than usual between bowel movements. Tell your healthcare provider about all changes in your bowel movements while you are taking narcotics. He or she may recommend laxative medicine to help you have a bowel movement. He or she may also change the kind of narcotic you are taking, or change when you take it. The following are more ways you can prevent or  relieve constipation:    Drink liquids as directed.  You may need to drink extra liquids to help soften and move your bowels. Ask how much liquid to drink each day and which liquids are best for you.  Eat high-fiber foods.  This may help decrease constipation by adding bulk to your bowel movements. High-fiber foods include fruits, vegetables, whole-grain breads and cereals, and beans. Your healthcare provider or dietitian can help you create a high-fiber meal plan. Your provider may also recommend a fiber supplement if you cannot get enough fiber from food.  Exercise regularly.  Regular physical activity can help stimulate your intestines. Walking is a good exercise to prevent or relieve constipation. Ask which exercises are best for you.  Schedule a time each day to have a bowel movement.  This may help train your body to have regular bowel movements. Bend forward while you are on the toilet to help move the bowel movement out. Sit on the toilet for at least 10 minutes, even if you do not have a bowel movement.    Store narcotics safely:   Store narcotics where others cannot easily get them.  Keep them in a locked cabinet or secure area. Do not  keep them in a purse or other bag you carry with you. A person may be looking for something else and find the narcotics.  Make sure narcotics are stored out of the reach of children.  A child can easily overdose on narcotics. Narcotics may look like candy to a small child.    The best way to dispose of narcotics:      The laws vary by country and area. In the [place], the best way is to return the narcotics through a take-back program. This program is offered by the [place] Drug Enforcement Agency (FERNANDEZ). The following are options for using the program:  Take the narcotics to a FER[employer]Z collection site.  The site is often a law enforcement center. Call your local law enforcement center for scheduled take-back days in your area. You will be given information on where to go if the  collection site is in a different location.  Take the narcotics to an approved pharmacy or hospital.  A pharmacy or hospital may be set up as a collection site. You will need to ask if it is a [employer] collection site if you were not directed there. A pharmacy or doctor's office may not be able to take back narcotics unless it is a FER[employer] site.  Use a mail-back system.  This means you are given containers to put the narcotics into. You will then mail them in the containers.  Use a take-back drop box.  This is a place to leave the narcotics at any time. People and animals will not be able to get into the box. Your local law enforcement agency can tell you where to find a drop box in your area.    Other ways to manage pain:   Ask your healthcare provider about non-narcotic medicines to control pain.  Nonprescription medicines include NSAIDs (such as ibuprofen) and acetaminophen. Prescription medicines include muscle relaxers, antidepressants, and steroids.  Pain may be managed without any medicines.  Some ways to relieve pain include massage, aromatherapy, or meditation. Physical or occupational therapy may also help.    For more information:   Drug Enforcement Administration  [address] [place] [address]  Phone: 6- 822 - 240-6969  Web Address: https://www.deadiversion.[facility].gov/drug_disposal/    [place] Food and Drug Administration  [address] [place] [address]  Phone: 1- 563 - 543-5968  Web Address: http://www.fda.gov     © Copyright [employer] 2018 Information is for End User's use only and may not be sold, redistributed or otherwise used for commercial purposes. All illustrations and images included in CareNotes® are the copyrighted property of [employer] or [employer]

## 2024-06-06 NOTE — PROGRESS NOTES
Ambulatory Visit  Name: Mckenna Whitten      : 1926      MRN: [id number]  Encounter Provider: Park Sanders MD  Encounter Date: 2024   Encounter department: [facility] PRIMARY CARE    Assessment & Plan   1. Hypertension, essential  Assessment & Plan:  Under control.  Continue metoprolol , lisinopril and diltiazem.  We will continue to monitor  2. Mixed hyperlipidemia  Assessment & Plan:  Under control.  Continue rosuvastatin.  We will continue to monitor.     3. Chronic atrial fibrillation (HCC)  Assessment & Plan:  Under control.  Continue current medication including anticoagulation medication to prevent stroke.  We will reevaluate at next office visit.  Has been followed by cardiologist.    4. Mild intermittent asthma without complication  Assessment & Plan:  Under control.    Continue current medication.    We will re-evaluate at next office visit.    5. Stage 3b chronic kidney disease (HCC)  Assessment & Plan:  Lab Results   Component Value Date    EGFR 37 2024    EGFR 35 2023    EGFR 36 2023    CREATININE 1.22 2024    CREATININE 1.29 2023    CREATININE 1.25 2023   creatinine and GFR stable   Will need periodic BMP  Avoid NSAIDs like ibuprofen Aleve Advil etc.  Avoid high potassium diet.  We will continue to monitor     6. Continuous opioid dependence (HCC)  Assessment & Plan:  Pain under reasonable control.  Continue tramadol as directed.  We will continue to monitor  7. Medicare annual wellness visit, subsequent      Depression Screening and Follow-up Plan: Patient was screened for depression during today's encounter. They screened negative with a PHQ-2 score of 2.      Preventive health issues were discussed with patient, and age appropriate screening tests were ordered as noted in patient's After Visit Summary. Personalized health advice and appropriate referrals for health education or preventive services given if needed, as noted in  patient's After Visit Summary.    History of Present Illness     Patient here for review of chronic medical problems and  the labs and imaging if it is applicable.  Currently has no specific complaints other than mentioned in the review of systems  Denies chest pain, SOB, cough, abdominal pain, nausea, vomiting, fever, chills, lightheadedness, dizziness,headache, tingling or numbness.No bowel or bladder problem.         Patient Care Team:  Park Sanders MD as PCP - General (Internal Medicine)    Review of Systems   Constitutional:  Negative for chills and fever.   HENT:  Positive for hearing loss. Negative for congestion, ear pain, postnasal drip, sinus pain and sore throat.    Respiratory:  Negative for cough, chest tightness, shortness of breath and wheezing.    Cardiovascular:  Negative for chest pain and leg swelling.   Gastrointestinal:  Negative for abdominal pain, blood in stool and diarrhea.   Genitourinary:  Negative for dysuria and frequency.   Musculoskeletal:  Positive for arthralgias (right hip), back pain, gait problem and myalgias.   Neurological:  Negative for headaches.   Psychiatric/Behavioral:  Negative for agitation, behavioral problems and confusion.      Medical History Reviewed by provider this encounter:  Tobacco  Allergies  Meds  Problems  Med Hx  Surg Hx  Fam Hx       Annual Wellness Visit Questionnaire   Mckenna is here for her Subsequent Wellness visit. Last Medicare Wellness visit information reviewed, patient interviewed, no change since last AWV.     Health Risk Assessment:   Patient rates overall health as good. Patient feels that their physical health rating is slightly worse. Patient is satisfied with their life. Eyesight was rated as same. Hearing was rated as slightly worse. Patient feels that their emotional and mental health rating is same. Patients states they are never, rarely angry. Patient states they are often unusually tired/fatigued. Pain experienced in the  last 7 days has been some. Patient's pain rating has been 7/10. Patient states that she has experienced no weight loss or gain in last 6 months.     Depression Screening:   PHQ-2 Score: 2      Fall Risk Screening:   In the past year, patient has experienced: history of falling in past year    Number of falls: 1  Injured during fall?: Yes    Feels unsteady when standing or walking?: Yes    Worried about falling?: Yes      Urinary Incontinence Screening:   Patient has leaked urine accidently in the last six months.     Home Safety:  Patient does not have trouble with stairs inside or outside of their home. Patient has working smoke alarms and has working carbon monoxide detector. Home safety hazards include: none.     Nutrition:   Current diet is Regular.     Medications:   Patient is not currently taking any over-the-counter supplements. Patient is able to manage medications. My daughter does them    Activities of Daily Living (ADLs)/Instrumental Activities of Daily Living (IADLs):   Walk and transfer into and out of bed and chair?: Yes  Dress and groom yourself?: Yes    Bathe or shower yourself?: Yes    Feed yourself? Yes  Do your laundry/housekeeping?: Yes  Manage your money, pay your bills and track your expenses?: Yes  Make your own meals?: Yes    Do your own shopping?: Yes    Previous Hospitalizations:   Any hospitalizations or ED visits within the last 12 months?: Yes    How many hospitalizations have you had in the last year?: 1-2    Advance Care Planning:   Living will: No    Durable POA for healthcare: No    Advanced directive: No      Cognitive Screening:   Provider or family/friend/caregiver concerned regarding cognition?: No    PREVENTIVE SCREENINGS      Cardiovascular Screening:    General: Screening Not Indicated and History Lipid Disorder      Diabetes Screening:     General: Screening Current      Colorectal Cancer Screening:     General: Screening Not Indicated      Cervical Cancer Screening:     General: Screening Not Indicated      Lung Cancer Screening:     General: Screening Not Indicated    Screening, Brief Intervention, and Referral to Treatment (SBIRT)    Screening  Typical number of drinks in a day: 0  Typical number of drinks in a week: 0  Interpretation: Low risk drinking behavior.    AUDIT-C Screenin) How often did you have a drink containing alcohol in the past year? never  2) How many drinks did you have on a typical day when you were drinking in the past year? 0  3) How often did you have 6 or more drinks on one occasion in the past year? never    AUDIT-C Score: 0  Interpretation: Score 0-2 (female): Negative screen for alcohol misuse    Single Item Drug Screening:  How often have you used an illegal drug (including marijuana) or a prescription medication for non-medical reasons in the past year? never    Single Item Drug Screen Score: 0  Interpretation: Negative screen for possible drug use disorder    Review of Current Opioid Use    Opioid Risk Tool (ORT) Interpretation: Complete Opioid Risk Tool (ORT)    Social Determinants of Health     Financial Resource Strain: Low Risk  (2023)    Overall Financial Resource Strain (CARDIA)    • Difficulty of Paying Living Expenses: Not very hard   Food Insecurity: No Food Insecurity (2024)    Hunger Vital Sign    • Worried About Running Out of Food in the Last Year: Never true    • Ran Out of Food in the Last Year: Never true   Transportation Needs: No Transportation Needs (2024)    PRAPARE - Transportation    • Lack of Transportation (Medical): No    • Lack of Transportation (Non-Medical): No   Housing Stability: Low Risk  (2024)    Housing Stability Vital Sign    • Unable to Pay for Housing in the Last Year: No    • Number of Times Moved in the Last Year: 1    • Homeless in the Last Year: No   Utilities: Not At Risk (2024)    [facility] Utilities    • Threatened with loss of utilities: No     No results found.    Objective     BP  "138/72 (BP Location: Right arm, Patient Position: Sitting, Cuff Size: Standard)   Pulse 69   Temp 97.6 °F (36.4 °C) (Temporal)   Resp 16   Ht 4' 11\" (1.499 m)   Wt 46.7 kg (103 lb)   SpO2 96%   BMI 20.80 kg/m²     Physical Exam  Vitals and nursing note reviewed.   Constitutional:       General: She is not in acute distress.     Appearance: Normal appearance. She is well-developed. She is not ill-appearing, toxic-appearing or diaphoretic.   HENT:      Head: Normocephalic and atraumatic.      Nose: Nose normal.      Mouth/Throat:      Mouth: Mucous membranes are moist.      Pharynx: Oropharynx is clear.   Eyes:      General: No scleral icterus.        Right eye: No discharge.         Left eye: No discharge.      Extraocular Movements: Extraocular movements intact.      Conjunctiva/sclera: Conjunctivae normal.      Pupils: Pupils are equal, round, and reactive to light.   Cardiovascular:      Rate and Rhythm: Normal rate. Rhythm irregular.      Pulses: Normal pulses.      Heart sounds: Normal heart sounds. No murmur heard.  Pulmonary:      Effort: Pulmonary effort is normal. No respiratory distress.      Breath sounds: Normal breath sounds. No wheezing, rhonchi or rales.   Abdominal:      General: Abdomen is flat. Bowel sounds are normal. There is no distension.      Palpations: Abdomen is soft.      Tenderness: There is no abdominal tenderness. There is no right CVA tenderness or left CVA tenderness.   Musculoskeletal:         General: No swelling or tenderness. Normal range of motion.      Cervical back: Normal range of motion and neck supple. No rigidity.      Right lower leg: No edema.      Left lower leg: No edema.   Lymphadenopathy:      Cervical: No cervical adenopathy.   Skin:     General: Skin is warm and dry.      Capillary Refill: Capillary refill takes 2 to 3 seconds.      Coloration: Skin is not jaundiced or pale.   Neurological:      General: No focal deficit present.      Mental Status: She is " alert and oriented to person, place, and time. Mental status is at baseline.      Motor: No weakness.      Gait: Gait abnormal (Ambulates with walker).   Psychiatric:         Mood and Affect: Mood normal.         Behavior: Behavior normal.       Administrative Statements

## 2024-06-06 NOTE — PROGRESS NOTES
"Answers submitted by the patient for this visit:  Medicare Annual Wellness Visit (Submitted on 6/3/2024)  How would you rate your overall health?: good  Compared to last year, how is your physical health?: slightly worse  In general, how satisfied are you with your life?: satisfied  Compared to last year, how is your eyesight?: same  Compared to last year, how is your hearing?: slightly worse  Compared to last year, how is your emotional/mental health?: same  How often is anger a problem for you?: never, rarely  How often do you feel unusually tired/fatigued?: often  In the past 7 days, how much pain have you experienced?: some  If you answered \"some\" or \"a lot\", please rate the severity of your pain on a scale of 1 to 10 (1 being the least severe pain and 10 being the most intense pain).: 7/10  In the past 6 months, have you lost or gained 10 pounds without trying?: No  One or more falls in the last year: Yes  In the past 6 months, have you accidentally leaked urine?: Yes  Do you have trouble with the stairs inside or outside your home?: No  Does your home have working smoke alarms?: Yes  Does your home have a carbon monoxide monitor?: Yes  Which safety hazards (if any) have you experienced in your home? Please select all that apply.: none  How would you describe your current diet? Please select all that apply.: Regular  In addition to prescription medications, are you taking any over-the-counter supplements?: No  Can you manage your medications?: Yes  Additional comments : My daughter does them  Are you currently taking any opioid medications?: Yes  Can you walk and transfer into and out of your bed and chair?: Yes  Can you dress and groom yourself?: Yes  Can you bathe or shower yourself?: Yes  Can you feed yourself?: Yes  Can you do your laundry/ housekeeping?: Yes  Can you manage your money, pay your bills, and track your expenses?: Yes  Can you make your own meals?: Yes  Can you do your own shopping?: " Yes  Within the last 12 months, have you had any hospitalizations or Emergency Department visits?: Yes  If yes, how many times have you been hospitalized within the past year?: 1-2  Do you have a living will?: No  Do you have a Durable POA (Power of ) for healthcare decisions?: No  Do you have an Advanced Directive for end of life decisions?: No  How often have you used an illegal drug (including marijuana) or a prescription medication for non-medical reasons in the past year?: never  What is the typical number of drinks you consume in a day?: 0  What is the typical number of drinks you consume in a week?: 0  How often did you have a drink containing alcohol in the past year?: never  How many drinks did you have on a typical day  when you were drinking in the past year?: 0  How often did you have 6 or more drinks on one occasion in the past year?: never

## 2024-07-11 ENCOUNTER — TELEPHONE (OUTPATIENT)
Age: 89
End: 2024-07-11

## 2024-07-11 DIAGNOSIS — G50.0 TRIGEMINAL NEURALGIA: ICD-10-CM

## 2024-07-11 RX ORDER — TRAMADOL HYDROCHLORIDE 50 MG/1
50 TABLET ORAL 2 TIMES DAILY
Qty: 60 TABLET | Refills: 0 | Status: SHIPPED | OUTPATIENT
Start: 2024-07-11

## 2024-07-11 NOTE — TELEPHONE ENCOUNTER
Returned patients daughters call to inform her that mother Ultram was sent into pharmacy today and is ready for .

## 2024-07-17 DIAGNOSIS — I10 PRIMARY HYPERTENSION: ICD-10-CM

## 2024-07-17 RX ORDER — METOPROLOL SUCCINATE 100 MG/1
100 TABLET, EXTENDED RELEASE ORAL DAILY
Qty: 100 TABLET | Refills: 1 | Status: SHIPPED | OUTPATIENT
Start: 2024-07-17

## 2024-08-01 ENCOUNTER — TELEPHONE (OUTPATIENT)
Age: 89
End: 2024-08-01

## 2024-08-01 NOTE — TELEPHONE ENCOUNTER
Patients daughter Kallie called in stating [facility] will be sending over a PC Certification form for Dr. Sanders to have filled out via fax. Please advise once received. Thank you.

## 2024-08-07 ENCOUNTER — TELEPHONE (OUTPATIENT)
Age: 89
End: 2024-08-07

## 2024-08-07 NOTE — TELEPHONE ENCOUNTER
While speaking to patients daughter regarding paperwork questions she asked again if her mother can have something for muscle cramps/spasms on a prn basis.

## 2024-08-08 NOTE — TELEPHONE ENCOUNTER
Called and spoke with daughter regarding medication for muscle spasms and as per PCP only can try Tylenol, nothing stronger.

## 2024-08-17 ENCOUNTER — HOSPITAL ENCOUNTER (EMERGENCY)
Facility: HOSPITAL | Age: 89
Discharge: HOME | End: 2024-08-17
Attending: EMERGENCY MEDICINE
Payer: MEDICARE

## 2024-08-17 ENCOUNTER — APPOINTMENT (EMERGENCY)
Dept: RADIOLOGY | Facility: HOSPITAL | Age: 89
End: 2024-08-17
Payer: MEDICARE

## 2024-08-17 VITALS
RESPIRATION RATE: 18 BRPM | DIASTOLIC BLOOD PRESSURE: 79 MMHG | SYSTOLIC BLOOD PRESSURE: 176 MMHG | HEART RATE: 70 BPM | BODY MASS INDEX: 20.16 KG/M2 | WEIGHT: 100 LBS | OXYGEN SATURATION: 97 % | HEIGHT: 59 IN | TEMPERATURE: 97.9 F

## 2024-08-17 DIAGNOSIS — M10.9 GOUTY ARTHRITIS: Primary | ICD-10-CM

## 2024-08-17 PROCEDURE — 99283 EMERGENCY DEPT VISIT LOW MDM: CPT

## 2024-08-17 PROCEDURE — 73110 X-RAY EXAM OF WRIST: CPT

## 2024-08-17 PROCEDURE — 99284 EMERGENCY DEPT VISIT MOD MDM: CPT | Performed by: PHYSICIAN ASSISTANT

## 2024-08-17 PROCEDURE — 73130 X-RAY EXAM OF HAND: CPT

## 2024-08-17 RX ORDER — PREDNISONE 10 MG/1
TABLET ORAL
Qty: 30 TABLET | Refills: 0 | Status: SHIPPED | OUTPATIENT
Start: 2024-08-18 | End: 2024-08-28

## 2024-08-17 NOTE — DISCHARGE INSTRUCTIONS
Please return to the emergency department for worsening symptoms including chest pain, shortness of breath, dizziness, lightheadedness, fever greater than 103, severe pain, inability to walk, fainting episodes, etc..  Please follow-up with your family practice provider as soon as possible.  I have sent medications over to the pharmacy for your symptoms.  Please take as directed.  If symptoms do not improve, please follow-up with a hand specialist.  I have given you a referral.

## 2024-08-18 NOTE — ED PROVIDER NOTES
"History  Chief Complaint   Patient presents with    Wrist Pain     Pt complains of right lower arm/ wrist pain. States she has had this in the past \"Arthritic gout\"  which as been treated with prednisone in the past starting last evening. Denies fall or trauma, states no medications PTA     This is a 89-year-old female with past medical history significant for gouty arthritis presenting to the emergency department today for atraumatic right wrist pain.  Symptoms have been ongoing for approximately 1 day.  She has had similar episodes in the past which were diagnosed as gouty arthritis and treated with prednisone.  No known trauma or falls.  No fevers or chills.  No chest pain or shortness of breath.  No numbness or tingling.  The patient denies other complaints at this time.      History provided by:  Patient   used: No    Wrist Pain  Location:  Right Wrist  Quality:  Swollen; Red  Severity:  Moderate  Onset quality:  Gradual  Duration:  1 day  Timing:  Constant  Progression:  Worsening  Chronicity:  New  Associated symptoms: no abdominal pain, no chest pain, no congestion, no cough, no diarrhea, no ear pain, no fatigue, no fever, no headaches, no loss of consciousness, no myalgias, no nausea, no rash, no rhinorrhea, no shortness of breath, no sore throat, no vomiting and no wheezing        Prior to Admission Medications   Prescriptions Last Dose Informant Patient Reported? Taking?   acetaminophen (TYLENOL) 650 mg CR tablet   Yes No   Sig: Take 650 mg by mouth every 8 (eight) hours as needed for mild pain   albuterol (PROVENTIL HFA,VENTOLIN HFA) 90 mcg/act inhaler  Self No No   Sig: Inhale 2 puffs every 4 (four) hours as needed for wheezing   apixaban (Eliquis) 2.5 mg   No No   Sig: Take 1 tablet (2.5 mg total) by mouth 2 (two) times a day   diltiazem (CARDIZEM CD) 120 mg 24 hr capsule   No No   Sig: TAKE 1 CAPSULE EVERY DAY   gabapentin (NEURONTIN) 100 mg capsule   No No   Sig: TAKE 2 CAPSULES " TWICE DAILY   iron polysaccharides (FERREX) 150 mg capsule   No No   Sig: TAKE 1 CAPSULE EVERY DAY   lisinopril (ZESTRIL) 5 mg tablet  Self No No   Sig: TAKE 1 TABLET EVERY DAY   metoprolol succinate (TOPROL-XL) 100 mg 24 hr tablet   No No   Sig: TAKE 1 TABLET EVERY DAY   rosuvastatin (CRESTOR) 5 mg tablet   No No   Sig: TAKE 1 TABLET TWICE WEEKLY ON TUESDAY AND SATURDAY   traMADol (ULTRAM) 50 mg tablet   No No   Sig: TAKE 1 TABLET BY MOUTH 2 TIMES DAILY      Facility-Administered Medications: None       Past Medical History:   Diagnosis Date    Anemia     Arthritis     Asthma     Atrial fibrillation (HCC) 11/08/2019    CAD     Chronic kidney disease     CKD (chronic kidney disease)     Current use of long term anticoagulation 11/08/2019    Degenerative joint disease     Elevated troponin 09/08/2021    Hyperlipidemia     Hypertension     Hypertension, essential 11/08/2019    ALYSSA (iron deficiency anemia)     Osteopenia     Osteoporosis     Pacemaker     TIA (transient ischemic attack) 11/08/2019       Past Surgical History:   Procedure Laterality Date    CARDIAC PACEMAKER PLACEMENT         Family History   Problem Relation Age of Onset    Other Mother         Respiratory problem    Asthma Mother     Hypertension Son     Hypertension Daughter     Hyperlipidemia Daughter     Hyperlipidemia Daughter      I have reviewed and agree with the history as documented.    E-Cigarette/Vaping    E-Cigarette Use Never User      E-Cigarette/Vaping Substances    Nicotine No     THC No     CBD No     Flavoring No     Other No     Unknown No      Social History     Tobacco Use    Smoking status: Never     Passive exposure: Past    Smokeless tobacco: Never   Vaping Use    Vaping status: Never Used   Substance Use Topics    Alcohol use: Not Currently     Comment: occasionaly     Drug use: Never       Review of Systems   Constitutional:  Negative for appetite change, chills, diaphoresis, fatigue and fever.   HENT:  Negative for  congestion, ear pain, rhinorrhea and sore throat.    Eyes:  Negative for visual disturbance.   Respiratory:  Negative for cough, chest tightness, shortness of breath and wheezing.    Cardiovascular:  Negative for chest pain, palpitations and leg swelling.   Gastrointestinal:  Negative for abdominal pain, constipation, diarrhea, nausea and vomiting.   Musculoskeletal:  Positive for joint swelling. Negative for myalgias, neck pain and neck stiffness.   Skin:  Negative for rash and wound.   Neurological:  Negative for dizziness, seizures, loss of consciousness, syncope, weakness, light-headedness, numbness and headaches.   Psychiatric/Behavioral:  Negative for confusion.    All other systems reviewed and are negative.      Physical Exam  Physical Exam  Vitals and nursing note reviewed.   Constitutional:       General: She is not in acute distress.     Appearance: Normal appearance. She is normal weight. She is not ill-appearing, toxic-appearing or diaphoretic.   HENT:      Head: Normocephalic and atraumatic.      Nose: Nose normal. No congestion or rhinorrhea.      Mouth/Throat:      Mouth: Mucous membranes are moist.      Pharynx: No oropharyngeal exudate or posterior oropharyngeal erythema.   Eyes:      General: No scleral icterus.        Right eye: No discharge.         Left eye: No discharge.      Extraocular Movements: Extraocular movements intact.      Pupils: Pupils are equal, round, and reactive to light.   Cardiovascular:      Rate and Rhythm: Normal rate and regular rhythm.      Pulses: Normal pulses.      Heart sounds: Normal heart sounds. No murmur heard.     No friction rub. No gallop.   Pulmonary:      Effort: Pulmonary effort is normal. No respiratory distress.      Breath sounds: Normal breath sounds. No stridor. No wheezing, rhonchi or rales.   Chest:      Chest wall: No tenderness.   Musculoskeletal:         General: Normal range of motion.      Cervical back: Normal range of motion. No tenderness.       Right lower leg: No edema.      Left lower leg: No edema.   Skin:     General: Skin is warm and dry.      Capillary Refill: Capillary refill takes less than 2 seconds.      Coloration: Skin is not jaundiced or pale.      Comments: Swelling and redness noted to the right wrist.  There is slight warmth to the region.  Decreased range of motion secondary to pain.  Suspicious for gout.   Neurological:      General: No focal deficit present.      Mental Status: She is alert and oriented to person, place, and time. Mental status is at baseline.   Psychiatric:         Mood and Affect: Mood normal.         Behavior: Behavior normal.         Vital Signs  ED Triage Vitals [08/17/24 0911]   Temperature Pulse Respirations Blood Pressure SpO2   97.9 °F (36.6 °C) 70 18 (!) 176/79 97 %      Temp Source Heart Rate Source Patient Position - Orthostatic VS BP Location FiO2 (%)   Oral Monitor Lying Left arm --      Pain Score       7           Vitals:    08/17/24 0911   BP: (!) 176/79   Pulse: 70   Patient Position - Orthostatic VS: Lying         Visual Acuity      ED Medications  Medications - No data to display    Diagnostic Studies  Results Reviewed       None                   XR wrist 3+ views RIGHT    (Results Pending)   XR hand 3+ views RIGHT    (Results Pending)              Procedures  Procedures         ED Course                                 SBIRT 22yo+      Flowsheet Row Most Recent Value   Initial Alcohol Screen: US AUDIT-C     1. How often do you have a drink containing alcohol? 0 Filed at: 08/17/2024 0914   2. How many drinks containing alcohol do you have on a typical day you are drinking?  0 Filed at: 08/17/2024 0914   3b. FEMALE Any Age, or MALE 65+: How often do you have 4 or more drinks on one occassion? 0 Filed at: 08/17/2024 0914   Audit-C Score 0 Filed at: 08/17/2024 0914   SIMÓN: How many times in the past year have you...    Used an illegal drug or used a prescription medication for non-medical reasons?  "Never Filed at: 08/17/2024 0914                      Medical Decision Making  89-year-old female presenting to the emergency department today for atraumatic right wrist swelling.  Similar to prior episodes of gouty arthritis.  No fevers or chills.  No known falls or trauma.  Vital signs show hypertension.  On physical examination, the patient has swelling and redness of the right wrist.  She has decreased range of motion to the right wrist secondary to pain.  X-ray of the right wrist and hand is without acute osseous abnormalities on my independent interpretation.  Will treat as gouty arthritis.  The patient is stable for discharge at this time.  Prednisone sent to the patient's pharmacy.  Continue tramadol.  Recommend tart cherry juice.  Recommend low purine diet.  Follow-up outpatient with a hand specialist.  Referral placed.  Strict return precautions were given.  Recommend PCP follow-up as soon as possible. The patient and/or patient's proxy verify their understanding and agree to the plan at this time.  All questions answered to the patient and/or their proxy's satisfaction.  All labs reviewed and utilized in the medical decision making process (if labs were ordered).  Portions of the record may have been created with voice recognition software.  Occasional wrong word or \"sound a like\" substitutions may have occurred due to the inherent limitations of voice recognition software.  Read the chart carefully and recognize, using context, where substitutions have occurred.    I reviewed prior notes.  Case discussed with caregiver (daughter) at bedside.    Problems Addressed:  Gouty arthritis: undiagnosed new problem with uncertain prognosis    Amount and/or Complexity of Data Reviewed  Independent Historian: caregiver  External Data Reviewed: notes.  Radiology: ordered and independent interpretation performed. Decision-making details documented in ED Course.     Details: XR Right Hand  XR Right " Wrist    Risk  Prescription drug management.                 Disposition  Final diagnoses:   Gouty arthritis     Time reflects when diagnosis was documented in both MDM as applicable and the Disposition within this note       Time User Action Codes Description Comment    8/17/2024  9:42 AM Leland Betts [M10.9] Gouty arthritis           ED Disposition       ED Disposition   Discharge    Condition   Stable    Date/Time   Sat Aug 17, 2024 0942    Comment   Mckenna Whitten discharge to home/self care.                   Follow-up Information       Follow up With Specialties Details Why Contact Info Additional Information    Park Sanders MD Internal Medicine Schedule an appointment as soon as possible for a visit   [address] [place] [id number]  [phone]       [facility] Emergency Department Emergency Medicine Go to  If symptoms worsen [address] [place] [id number] [phone] [facility] Emergency Department, [address] [place] [id number]    Joe Elias MD Orthopedic Surgery, Hand Surgery Call   [address] [id number] [phone]               Discharge Medication List as of 8/17/2024  9:44 AM        START taking these medications    Details   predniSONE 10 mg tablet Multiple Dosages:Starting Sun 8/18/2024, Until Mon 8/19/2024 at 2359, THEN Starting Tue 8/20/2024, Until Wed 8/21/2024 at 2359, THEN Starting Thu 8/22/2024, Until Fri 8/23/2024 at 2359, THEN Starting Sat 8/24/2024, Until Sun 8/25/2024 at 2359, THEN  Starting Mon 8/26/2024, Until Tue 8/27/2024 at 2359Take 5 tablets (50 mg total) by mouth daily for 2 days, THEN 4 tablets (40 mg total) daily for 2 days, THEN 3 tablets (30 mg total) daily for 2 days, THEN 2 tablets (20 mg total) daily for 2 days, THEN  1 tablet (10 mg total) daily for 2 days. Do not start before August 18, 2024., Normal           CONTINUE these medications which have  NOT CHANGED    Details   acetaminophen (TYLENOL) 650 mg CR tablet Take 650 mg by mouth every 8 (eight) hours as needed for mild pain, Historical Med      albuterol (PROVENTIL HFA,VENTOLIN HFA) 90 mcg/act inhaler Inhale 2 puffs every 4 (four) hours as needed for wheezing, Starting Wed 8/23/2023, Normal      apixaban (Eliquis) 2.5 mg Take 1 tablet (2.5 mg total) by mouth 2 (two) times a day, Starting Mon 2/5/2024, Normal      diltiazem (CARDIZEM CD) 120 mg 24 hr capsule TAKE 1 CAPSULE EVERY DAY, Normal      gabapentin (NEURONTIN) 100 mg capsule TAKE 2 CAPSULES TWICE DAILY, Normal      iron polysaccharides (FERREX) 150 mg capsule TAKE 1 CAPSULE EVERY DAY, Starting Wed 1/31/2024, Normal      lisinopril (ZESTRIL) 5 mg tablet TAKE 1 TABLET EVERY DAY, Normal      metoprolol succinate (TOPROL-XL) 100 mg 24 hr tablet TAKE 1 TABLET EVERY DAY, Starting Wed 7/17/2024, Normal      rosuvastatin (CRESTOR) 5 mg tablet TAKE 1 TABLET TWICE WEEKLY ON TUESDAY AND SATURDAY, Normal      traMADol (ULTRAM) 50 mg tablet TAKE 1 TABLET BY MOUTH 2 TIMES DAILY, Starting Thu 7/11/2024, Normal                 PDMP Review         Value Time User    PDMP Reviewed  Yes 7/11/2024  3:08 PM Park Sanders MD            ED Provider  Electronically Signed by             Leland Betts PA-C  08/17/24 2056

## 2024-09-07 DIAGNOSIS — I48.91 ATRIAL FIBRILLATION, UNSPECIFIED TYPE (HCC): ICD-10-CM

## 2024-09-08 DIAGNOSIS — I10 PRIMARY HYPERTENSION: ICD-10-CM

## 2024-09-08 RX ORDER — APIXABAN 2.5 MG/1
2.5 TABLET, FILM COATED ORAL 2 TIMES DAILY
Qty: 60 TABLET | Refills: 5 | Status: SHIPPED | OUTPATIENT
Start: 2024-09-08

## 2024-09-08 RX ORDER — DILTIAZEM HYDROCHLORIDE 120 MG/1
CAPSULE, COATED, EXTENDED RELEASE ORAL
Qty: 90 CAPSULE | Refills: 1 | Status: SHIPPED | OUTPATIENT
Start: 2024-09-08

## 2024-09-09 ENCOUNTER — OFFICE VISIT (OUTPATIENT)
Age: 89
End: 2024-09-09
Payer: MEDICARE

## 2024-09-09 VITALS
HEIGHT: 59 IN | RESPIRATION RATE: 18 BRPM | DIASTOLIC BLOOD PRESSURE: 80 MMHG | WEIGHT: 99 LBS | TEMPERATURE: 98.2 F | HEART RATE: 108 BPM | BODY MASS INDEX: 19.96 KG/M2 | SYSTOLIC BLOOD PRESSURE: 138 MMHG | OXYGEN SATURATION: 96 %

## 2024-09-09 DIAGNOSIS — E78.2 MIXED HYPERLIPIDEMIA: ICD-10-CM

## 2024-09-09 DIAGNOSIS — F11.20 CONTINUOUS OPIOID DEPENDENCE (HCC): ICD-10-CM

## 2024-09-09 DIAGNOSIS — N18.32 STAGE 3B CHRONIC KIDNEY DISEASE (HCC): ICD-10-CM

## 2024-09-09 DIAGNOSIS — G50.0 TRIGEMINAL NEURALGIA: ICD-10-CM

## 2024-09-09 DIAGNOSIS — I48.20 CHRONIC ATRIAL FIBRILLATION (HCC): ICD-10-CM

## 2024-09-09 DIAGNOSIS — J45.20 MILD INTERMITTENT ASTHMA WITHOUT COMPLICATION: ICD-10-CM

## 2024-09-09 DIAGNOSIS — I10 HYPERTENSION, ESSENTIAL: Primary | ICD-10-CM

## 2024-09-09 PROCEDURE — 99214 OFFICE O/P EST MOD 30 MIN: CPT

## 2024-09-09 PROCEDURE — G2211 COMPLEX E/M VISIT ADD ON: HCPCS

## 2024-09-09 RX ORDER — CLOTRIMAZOLE AND BETAMETHASONE DIPROPIONATE 10; .64 MG/G; MG/G
CREAM TOPICAL
COMMUNITY
Start: 2024-07-17

## 2024-09-09 RX ORDER — TRAMADOL HYDROCHLORIDE 50 MG/1
50 TABLET ORAL 2 TIMES DAILY
Qty: 60 TABLET | Refills: 0 | Status: SHIPPED | OUTPATIENT
Start: 2024-09-09

## 2024-09-09 NOTE — PROGRESS NOTES
Assessment/Plan:         Problem List Items Addressed This Visit     Chronic atrial fibrillation (HCC)     Under control.  Continue current medication including anticoagulation medication to prevent stroke.  We will reevaluate at next office visit.  Has been followed by cardiologist.           Hypertension, essential - Primary     Under control.  Continue metoprolol , lisinopril and diltiazem.  We will continue to monitor         Stage 3b chronic kidney disease (HCC)     Lab Results   Component Value Date    EGFR 37 05/09/2024    EGFR 35 09/01/2023    EGFR 36 07/26/2023    CREATININE 1.22 05/09/2024    CREATININE 1.29 09/01/2023    CREATININE 1.25 07/26/2023   creatinine and GFR stable   Will need periodic BMP  Avoid NSAIDs like ibuprofen Aleve Advil etc.  Avoid high potassium diet.  We will continue to monitor            Mild intermittent asthma without complication     Under control.    Continue current medication.    We will re-evaluate at next office visit.           Mixed hyperlipidemia     Under control.  Continue rosuvastatin.  We will continue to monitor.          Continuous opioid dependence (HCC)     Pain under reasonable control.  Continue tramadol as directed.  We will continue to monitor        Other Visit Diagnoses     Trigeminal neuralgia        Relevant Medications    traMADol (ULTRAM) 50 mg tablet            Subjective:      Patient ID: Mckenna Whitten is a 89 y.o. female.    Patient here for review of chronic medical problems and  the labs and imaging if it is applicable.  Currently has no specific complaints other than mentioned in the review of systems  Denies chest pain, SOB, cough, abdominal pain, nausea, vomiting, fever, chills, lightheadedness, dizziness,headache, tingling or numbness.No bowel or bladder problem.          The following portions of the patient's history were reviewed and updated as appropriate:   Past Medical History:  She has a past medical history of Anemia, Arthritis,  Asthma, Atrial fibrillation (HCC) (11/08/2019), CAD, Chronic kidney disease, CKD (chronic kidney disease), Current use of long term anticoagulation (11/08/2019), Degenerative joint disease, Elevated troponin (09/08/2021), Hyperlipidemia, Hypertension, Hypertension, essential (11/08/2019), ALYSSA (iron deficiency anemia), Osteopenia, Osteoporosis, Pacemaker, and TIA (transient ischemic attack) (11/08/2019).,  _______________________________________________________________________  Medical Problems:  does not have any pertinent problems on file.,  _______________________________________________________________________  Past Surgical History:   has a past surgical history that includes Cardiac pacemaker placement.,  _______________________________________________________________________  Family History:  family history includes Asthma in her mother; Hyperlipidemia in her daughter and daughter; Hypertension in her daughter and son; Other in her mother.,  _______________________________________________________________________  Social History:   reports that she has never smoked. She has been exposed to tobacco smoke. She has never used smokeless tobacco. She reports that she does not currently use alcohol. She reports that she does not use drugs.,  _______________________________________________________________________  Allergies:  is allergic to amoxicillin-pot clavulanate and silver sulfadiazine..  _______________________________________________________________________  Current Outpatient Medications   Medication Sig Dispense Refill   • acetaminophen (TYLENOL) 650 mg CR tablet Take 650 mg by mouth every 8 (eight) hours as needed for mild pain     • albuterol (PROVENTIL HFA,VENTOLIN HFA) 90 mcg/act inhaler Inhale 2 puffs every 4 (four) hours as needed for wheezing 18 g 0   • apixaban (Eliquis) 2.5 mg TAKE 1 TABLET BY MOUTH 2 TIMES DAILY 60 tablet 5   • clotrimazole-betamethasone (LOTRISONE) 1-0.05 % cream      • diltiazem  "(CARDIZEM CD) 120 mg 24 hr capsule TAKE 1 CAPSULE EVERY DAY 90 capsule 1   • gabapentin (NEURONTIN) 100 mg capsule TAKE 2 CAPSULES TWICE DAILY 360 capsule 1   • iron polysaccharides (FERREX) 150 mg capsule TAKE 1 CAPSULE EVERY DAY 90 capsule 3   • lisinopril (ZESTRIL) 5 mg tablet TAKE 1 TABLET EVERY DAY 90 tablet 1   • metoprolol succinate (TOPROL-XL) 100 mg 24 hr tablet TAKE 1 TABLET EVERY  tablet 1   • rosuvastatin (CRESTOR) 5 mg tablet TAKE 1 TABLET TWICE WEEKLY ON TUESDAY AND SATURDAY 26 tablet 3   • traMADol (ULTRAM) 50 mg tablet Take 1 tablet (50 mg total) by mouth 2 (two) times a day 60 tablet 0     No current facility-administered medications for this visit.     _______________________________________________________________________  Review of Systems   Constitutional:  Negative for chills and fever.   HENT:  Positive for hearing loss. Negative for congestion, ear pain, postnasal drip, sinus pain and sore throat.    Respiratory:  Negative for cough, chest tightness, shortness of breath and wheezing.    Cardiovascular:  Negative for chest pain and leg swelling.   Gastrointestinal:  Negative for abdominal pain, blood in stool and diarrhea.   Genitourinary:  Negative for dysuria and frequency.   Musculoskeletal:  Positive for arthralgias (right hip) and gait problem. Negative for back pain and myalgias.   Neurological:  Negative for headaches.   Psychiatric/Behavioral:  Negative for agitation, behavioral problems and confusion.          Objective:  Vitals:    09/09/24 1504   BP: 138/80   BP Location: Left arm   Patient Position: Sitting   Cuff Size: Standard   Pulse: (!) 108   Resp: 18   Temp: 98.2 °F (36.8 °C)   TempSrc: Temporal   SpO2: 96%   Weight: 44.9 kg (99 lb)   Height: 4' 11\" (1.499 m)     Body mass index is 20 kg/m².     Physical Exam  Vitals and nursing note reviewed.   Constitutional:       General: She is not in acute distress.     Appearance: Normal appearance. She is not ill-appearing, " toxic-appearing or diaphoretic.   HENT:      Head: Normocephalic and atraumatic.      Ears:      Comments: Bilateral hearing loss     Nose: Nose normal. No congestion.      Mouth/Throat:      Mouth: Mucous membranes are moist.   Eyes:      General: No scleral icterus.        Right eye: No discharge.         Left eye: No discharge.      Extraocular Movements: Extraocular movements intact.      Conjunctiva/sclera: Conjunctivae normal.      Pupils: Pupils are equal, round, and reactive to light.   Cardiovascular:      Rate and Rhythm: Normal rate. Rhythm irregular.      Pulses: Normal pulses.      Heart sounds: Normal heart sounds. No murmur heard.     No gallop.   Pulmonary:      Effort: Pulmonary effort is normal. No respiratory distress.      Breath sounds: Normal breath sounds. No wheezing, rhonchi or rales.   Abdominal:      General: Abdomen is flat. Bowel sounds are normal. There is no distension.      Palpations: Abdomen is soft.      Tenderness: There is no abdominal tenderness. There is no right CVA tenderness, left CVA tenderness or guarding.   Musculoskeletal:         General: No swelling or tenderness. Normal range of motion.      Cervical back: Normal range of motion and neck supple. No rigidity.      Right lower leg: No edema.      Left lower leg: No edema.   Lymphadenopathy:      Cervical: No cervical adenopathy.   Skin:     General: Skin is warm.      Capillary Refill: Capillary refill takes 2 to 3 seconds.      Coloration: Skin is not jaundiced.      Findings: No bruising or rash.   Neurological:      General: No focal deficit present.      Mental Status: She is alert and oriented to person, place, and time. Mental status is at baseline.      Motor: No weakness.      Gait: Gait abnormal (Ambulates with walker).   Psychiatric:         Mood and Affect: Mood normal.         Behavior: Behavior normal.

## 2024-09-09 NOTE — ASSESSMENT & PLAN NOTE
Lab Results   Component Value Date    EGFR 37 05/09/2024    EGFR 35 09/01/2023    EGFR 36 07/26/2023    CREATININE 1.22 05/09/2024    CREATININE 1.29 09/01/2023    CREATININE 1.25 07/26/2023   creatinine and GFR stable   Will need periodic BMP  Avoid NSAIDs like ibuprofen Aleve Advil etc.  Avoid high potassium diet.  We will continue to monitor

## 2024-09-28 DIAGNOSIS — E78.2 MIXED HYPERLIPIDEMIA: ICD-10-CM

## 2024-09-28 RX ORDER — ROSUVASTATIN CALCIUM 5 MG/1
TABLET, COATED ORAL
Qty: 26 TABLET | Refills: 1 | Status: SHIPPED | OUTPATIENT
Start: 2024-09-28

## 2024-09-30 NOTE — ASSESSMENT & PLAN NOTE
Lab Results   Component Value Date    EGFR 34 09/08/2021    EGFR 37 08/31/2021    CREATININE 1 33 (H) 09/08/2021    CREATININE 1 26 08/31/2021     · Baseline creatinine per review of outpatient records is 1 2-1 3  · Currently remains at baseline creatinine
Lab Results   Component Value Date    EGFR 40 09/10/2021    EGFR 41 09/09/2021    EGFR 34 09/08/2021    CREATININE 1 16 (H) 09/10/2021    CREATININE 1 14 (H) 09/09/2021    CREATININE 1 33 (H) 09/08/2021     · Baseline creatinine per review of outpatient records is 1 2-1 3  · Currently remains at baseline creatinine
Lab Results   Component Value Date    EGFR 41 09/09/2021    EGFR 34 09/08/2021    EGFR 37 08/31/2021    CREATININE 1 14 (H) 09/09/2021    CREATININE 1 33 (H) 09/08/2021    CREATININE 1 26 08/31/2021     · Baseline creatinine per review of outpatient records is 1 2-1 3  · Currently remains at baseline creatinine
· Chest x-ray personally reviewed shows patchy opacities in the right lung, predominantly at the right upper lobe  · COVID-19 negative  · Likely community-acquired in nature    · Check Legionella and strep pneumo urinary antigen  · Obtain sputum culture if able to expectorates  · CC 3 days of IV ceftriaxone and azithromycin  · Will discharge on Augmentin 1 g b i d   -= total duration of 7 days
· Chest x-ray personally reviewed shows patchy opacities in the right lung, predominantly at the right upper lobe  · COVID-19 negative  · Likely community-acquired in nature    · Check Legionella and strep pneumo urinary antigen  · Obtain sputum culture if able to expectorates  · Status post IV ceftriaxone in the emergency department which will be continued  · Obtain procalcitonin  · Respiratory protocol  · Mucinex scheduled
· Chest x-ray personally reviewed shows patchy opacities in the right lung, predominantly at the right upper lobe  · COVID-19 negative  · Likely community-acquired in nature  · Check Legionella and strep pneumo urinary antigen  · Obtain sputum culture if able to expectorates  · Continue IV ceftriaxone and azithromycin 
· Continue metoprolol and Cardizem for rate control and blood pressure control  · Continue lisinopril
· Noted to be wheezing on admission to the emergency department  · Currently without wheezing at this time  · Likely related to pneumonia  · Hold further steroids    If wheezing recurs, would initiate steroids at that time  · Will schedule Xopenex TID
· Now resolved  · On presentation, patient met sepsis criteria  · Sepsis most likely due to right upper lobe pneumonia 
· On presentation, patient met sepsis criteria  · Sepsis most likely due to right upper lobe pneumonia 
· Patient meet septic criteria on admission secondary to leukocytosis and tachycardia  · Source felt to be right-sided pneumonia  · COVID-19 negative  · Follow-up on blood cultures obtained in the emergency department  · Lactic acid negative  · No evidence of end-organ dysfunction  · Continue IV ceftriaxone  · Follow-up on procalcitonin
· Patient with known permanent atrial fibrillation  · Chronically anticoagulated with 2 5 mg PO Eliquis BID secondary to age, CKD and weight  · [id number] score was   which corresponds to No [id number] score filed  Please complete smartform annual risk of stroke    · Continue metoprolol and Cardizem  · Follows with [facility]
· Patient with known permanent atrial fibrillation  · Chronically anticoagulated with 2 5 mg PO Eliquis BID secondary to age, CKD and weight  · [id number] score was 6 which corresponds to 9 8% annual risk of stroke    · Continue metoprolol and Cardizem  · Follow[facility] with [facility]
· Patient with known permanent atrial fibrillation  · Chronically anticoagulated with 2 5 mg PO Eliquis BID secondary to age, CKD and weight  · [id number] score was 6 which corresponds to 9 8% annual risk of stroke    · Continue metoprolol and Cardizem  · Follows with [facility]
· Troponin noted to be elevated to 1 17 on admission  · DDX: non-MI trop elevation secondary to PNA vs  NSTEMI  · Patient is noted to be chronically anticoagulated with Eliquis 2 5 mg PO BID secondary to permanent atrial fibrillation  Will continue at this time rather than transition to heparin - will discuss with cardiology  · Emergency department physician discussed with patient's family who would not be interested in cardiac catheterization or invasive measures  · Troponin stabilized at 1 2  · Elevated troponin most likely due to sepsis versus PNA  · Give 162 mg of aspirin x1 now    · Echo with no  RWMA, LVEF of 60%, pulmonary hypertension, moderate AV stenosis
· Troponin noted to be elevated to 1 17 on admission  · DDX: non-MI trop elevation secondary to PNA vs  NSTEMI  · Patient is noted to be chronically anticoagulated with Eliquis 2 5 mg PO BID secondary to permanent atrial fibrillation  Will continue at this time rather than transition to heparin - will discuss with cardiology  · Emergency department physician discussed with patient's family who would not be interested in cardiac catheterization or invasive measures  · Troponin stabilized at 1 2  · Elevated troponin most likely due to sepsis versus PNA  · Give 162 mg of aspirin x1 now    · Echo with no  RWMA, LVEF of 60%, pulmonary hypertension, moderate AV stenosis  · Serial EKGs
· Troponin noted to be elevated to 1 17 on admission  · DDX: non-MI trop elevation secondary to PNA vs  NSTEMI  · Patient is noted to be chronically anticoagulated with Eliquis 2 5 mg PO BID secondary to permanent atrial fibrillation  Will continue at this time rather than transition to heparin - will discuss with cardiology  · Emergency department physician discussed with patient's family who would not be interested in cardiac catheterization or invasive measures  · Will obtain echocardiogram   · Cardiology consultation  · Trend serial troponins  · Give 162 mg of aspirin x1 now    · Monitor on telemetry  · Serial EKGs
3 John in his 40s

## 2024-10-08 ENCOUNTER — TELEPHONE (OUTPATIENT)
Age: 89
End: 2024-10-08

## 2024-10-08 DIAGNOSIS — M10.00 ACUTE IDIOPATHIC GOUT, UNSPECIFIED SITE: Primary | ICD-10-CM

## 2024-10-08 RX ORDER — PREDNISONE 20 MG/1
20 TABLET ORAL DAILY
Qty: 5 TABLET | Refills: 0 | Status: SHIPPED | OUTPATIENT
Start: 2024-10-08 | End: 2024-10-13

## 2024-10-08 NOTE — TELEPHONE ENCOUNTER
Patient's daughter called stating as of yesterday, Mckenna has been experiencing gout pain in the right hand. She mentioned this is exactly the same symptoms from her most recent ER visit. I attempted to get nurse on the line for triage, however unavailable, from there, spoke with office clinical to see what best plan of action was for patient and daughter, as Natalie will have difficulty taking her to the ER. Please forward message to PCP, Natalie is requesting medication prednisone be sent to her local [facility] pharmacy on [address].

## 2024-10-29 ENCOUNTER — TELEPHONE (OUTPATIENT)
Age: 89
End: 2024-10-29

## 2024-10-29 NOTE — TELEPHONE ENCOUNTER
Patient's daughter is calling to schedule a routine apt for herself and her mom.  It was canceled on Dec 9th with Dr. Sanders per provider.  I do not see two apts together for a couple of months.  Can we call patient's daughter Natalie to get them in.    Thank you.  Office busy at time of call

## 2024-11-06 ENCOUNTER — IMMUNIZATIONS (OUTPATIENT)
Age: 89
End: 2024-11-06
Payer: MEDICARE

## 2024-11-06 DIAGNOSIS — Z23 ENCOUNTER FOR IMMUNIZATION: Primary | ICD-10-CM

## 2024-11-06 PROCEDURE — 90662 IIV NO PRSV INCREASED AG IM: CPT | Performed by: INTERNAL MEDICINE

## 2024-11-06 PROCEDURE — G0008 ADMIN INFLUENZA VIRUS VAC: HCPCS | Performed by: INTERNAL MEDICINE

## 2024-11-11 DIAGNOSIS — G50.0 TRIGEMINAL NEURALGIA: ICD-10-CM

## 2024-11-11 NOTE — TELEPHONE ENCOUNTER
Reason for call:   [x] Refill   [] Prior Auth  [] Other:     Office:   [x] PCP/Provider -   [] Specialty/Provider -     Medication: traMADol (ULTRAM) 50 mg        Dose/Frequency:  Take 1 tablet (50 mg total) by mouth 2 (two) times a day     Quantity: 60    Pharmacy: [id number] - [place] - [id number]     Does the patient have enough for 3 days?   [] Yes   [x] No - Send as HP to POD

## 2024-11-12 RX ORDER — TRAMADOL HYDROCHLORIDE 50 MG/1
50 TABLET ORAL 2 TIMES DAILY
Qty: 60 TABLET | Refills: 0 | Status: SHIPPED | OUTPATIENT
Start: 2024-11-12

## 2024-11-12 NOTE — TELEPHONE ENCOUNTER
Patient's daughter called back to check on status of her mother's Tramadol. She states her mother is completely out of Tramadol and was told yesterday it would be put in as a high priority.     It was not put in as a high priority, so I told her I would take care of sending it over as a high priority now. Please review and send in as soon as possible.

## 2024-12-10 ENCOUNTER — OFFICE VISIT (OUTPATIENT)
Age: 89
End: 2024-12-10
Payer: MEDICARE

## 2024-12-10 VITALS
TEMPERATURE: 98 F | SYSTOLIC BLOOD PRESSURE: 126 MMHG | HEIGHT: 59 IN | BODY MASS INDEX: 20.84 KG/M2 | RESPIRATION RATE: 18 BRPM | HEART RATE: 70 BPM | DIASTOLIC BLOOD PRESSURE: 80 MMHG | OXYGEN SATURATION: 97 % | WEIGHT: 103.4 LBS

## 2024-12-10 DIAGNOSIS — G89.29 CHRONIC NECK PAIN: ICD-10-CM

## 2024-12-10 DIAGNOSIS — I48.20 CHRONIC ATRIAL FIBRILLATION (HCC): ICD-10-CM

## 2024-12-10 DIAGNOSIS — M54.2 CHRONIC NECK PAIN: ICD-10-CM

## 2024-12-10 DIAGNOSIS — T78.40XA ALLERGIC REACTION, INITIAL ENCOUNTER: ICD-10-CM

## 2024-12-10 DIAGNOSIS — N18.32 STAGE 3B CHRONIC KIDNEY DISEASE (HCC): ICD-10-CM

## 2024-12-10 DIAGNOSIS — J45.20 MILD INTERMITTENT ASTHMA WITHOUT COMPLICATION: ICD-10-CM

## 2024-12-10 DIAGNOSIS — I10 HYPERTENSION, ESSENTIAL: Primary | ICD-10-CM

## 2024-12-10 DIAGNOSIS — F11.20 CONTINUOUS OPIOID DEPENDENCE (HCC): ICD-10-CM

## 2024-12-10 DIAGNOSIS — E78.2 MIXED HYPERLIPIDEMIA: ICD-10-CM

## 2024-12-10 PROCEDURE — G2211 COMPLEX E/M VISIT ADD ON: HCPCS

## 2024-12-10 PROCEDURE — 99214 OFFICE O/P EST MOD 30 MIN: CPT

## 2024-12-10 RX ORDER — PREDNISONE 20 MG/1
20 TABLET ORAL DAILY
Qty: 10 TABLET | Refills: 0 | Status: SHIPPED | OUTPATIENT
Start: 2024-12-10 | End: 2024-12-20

## 2024-12-10 RX ORDER — METHYLPREDNISOLONE 4 MG/1
TABLET ORAL
Qty: 1 EACH | Refills: 0 | Status: SHIPPED | OUTPATIENT
Start: 2024-12-10 | End: 2024-12-10 | Stop reason: ALTCHOICE

## 2024-12-10 NOTE — PROGRESS NOTES
Name: Mckenna Whitten      : 1926      MRN: [id number]  Encounter Provider: Park Sanders MD  Encounter Date: 12/10/2024   Encounter department: [facility] PRIMARY [facility]  :  Assessment & Plan  Hypertension, essential  Under control.  Continue metoprolol , lisinopril and diltiazem.  We will continue to monitor       Mixed hyperlipidemia  Under control.  Continue rosuvastatin.  We will continue to monitor.          Stage 3b chronic kidney disease (HCC)  Lab Results   Component Value Date    EGFR 37 2024    EGFR 35 2023    EGFR 36 2023    CREATININE 1.22 2024    CREATININE 1.29 2023    CREATININE 1.25 2023   creatinine and GFR stable   Will need periodic BMP  Avoid NSAIDs like ibuprofen Aleve Advil etc.  Avoid high potassium diet.  We will continue to monitor          Chronic atrial fibrillation (HCC)  Under control.  Continue current medication including anticoagulation medication to prevent stroke.  We will reevaluate at next office visit.  Has been followed by cardiologist.         Continuous opioid dependence (HCC)  Pain under reasonable control.  Continue tramadol as directed.  We will continue to monitor       Mild intermittent asthma without complication  Under control.    Continue current medication.    We will re-evaluate at next office visit.         Chronic neck pain  Under control.    Continue current medication.    We will re-evaluate at next office visit.         Allergic reaction, initial encounter    Orders:  •  predniSONE 20 mg tablet; Take 1 tablet (20 mg total) by mouth daily for 10 days           History of Present Illness     Patient here for review of chronic medical problems and  the labs and imaging if it is applicable.  Currently has no specific complaints other than mentioned in the review of systems  Denies chest pain, SOB, cough, abdominal pain, nausea, vomiting, fever, chills, lightheadedness, dizziness,headache, tingling or  "numbness.No bowel or bladder problem.        Review of Systems   Constitutional:  Negative for chills and fever.   HENT:  Positive for hearing loss. Negative for congestion, ear pain, postnasal drip, sinus pain and sore throat.    Respiratory:  Negative for cough, chest tightness, shortness of breath and wheezing.    Cardiovascular:  Negative for chest pain and leg swelling.   Gastrointestinal:  Negative for abdominal pain, blood in stool and diarrhea.   Genitourinary:  Negative for dysuria and frequency.   Musculoskeletal:  Positive for arthralgias (right hip) and gait problem. Negative for back pain and myalgias.   Neurological:  Negative for headaches.   Psychiatric/Behavioral:  Negative for agitation, behavioral problems and confusion.        Objective   /80 (BP Location: Left arm, Patient Position: Sitting, Cuff Size: Standard)   Pulse 70   Temp 98 °F (36.7 °C) (Tympanic)   Resp 18   Ht 4' 11\" (1.499 m)   Wt 46.9 kg (103 lb 6.4 oz)   SpO2 97%   BMI 20.88 kg/m²      Physical Exam  Vitals and nursing note reviewed.   Constitutional:       General: She is not in acute distress.     Appearance: Normal appearance. She is well-developed. She is not ill-appearing, toxic-appearing or diaphoretic.   HENT:      Head: Normocephalic and atraumatic.      Nose: Nose normal.      Mouth/Throat:      Mouth: Mucous membranes are moist.      Pharynx: Oropharynx is clear.   Eyes:      General: No scleral icterus.        Right eye: No discharge.         Left eye: No discharge.      Extraocular Movements: Extraocular movements intact.      Conjunctiva/sclera: Conjunctivae normal.      Pupils: Pupils are equal, round, and reactive to light.   Cardiovascular:      Rate and Rhythm: Normal rate. Rhythm irregular.      Pulses: Normal pulses.      Heart sounds: Normal heart sounds. No murmur heard.  Pulmonary:      Effort: Pulmonary effort is normal. No respiratory distress.      Breath sounds: Normal breath sounds. No " wheezing, rhonchi or rales.   Abdominal:      General: Abdomen is flat. Bowel sounds are normal. There is no distension.      Palpations: Abdomen is soft.      Tenderness: There is no abdominal tenderness. There is no right CVA tenderness or left CVA tenderness.   Musculoskeletal:         General: No swelling or tenderness. Normal range of motion.      Cervical back: Normal range of motion and neck supple. No rigidity.      Right lower leg: No edema.      Left lower leg: No edema.   Lymphadenopathy:      Cervical: No cervical adenopathy.   Skin:     General: Skin is warm and dry.      Capillary Refill: Capillary refill takes 2 to 3 seconds.      Coloration: Skin is not jaundiced or pale.   Neurological:      General: No focal deficit present.      Mental Status: She is alert and oriented to person, place, and time. Mental status is at baseline.      Motor: No weakness.      Gait: Gait abnormal (Ambulates with walker).   Psychiatric:         Mood and Affect: Mood normal.         Behavior: Behavior normal.

## 2024-12-13 DIAGNOSIS — I10 PRIMARY HYPERTENSION: ICD-10-CM

## 2024-12-13 RX ORDER — METOPROLOL SUCCINATE 100 MG/1
100 TABLET, EXTENDED RELEASE ORAL DAILY
Qty: 90 TABLET | Refills: 1 | Status: SHIPPED | OUTPATIENT
Start: 2024-12-13

## 2025-01-08 DIAGNOSIS — G50.0 TRIGEMINAL NEURALGIA: ICD-10-CM

## 2025-01-08 NOTE — TELEPHONE ENCOUNTER
Reason for call:   [x] Refill   [] Prior Auth  [] Other:     Office:   [x] PCP/Provider - Debbie  [] Specialty/Provider -     Medication:   Tramadol 50 mg, 1 bid, 60    Pharmacy:   [facility]    Does the patient have enough for 3 days?   [] Yes   [x] No - Send as HP to POD

## 2025-01-09 RX ORDER — TRAMADOL HYDROCHLORIDE 50 MG/1
50 TABLET ORAL 2 TIMES DAILY
Qty: 60 TABLET | Refills: 0 | Status: SHIPPED | OUTPATIENT
Start: 2025-01-09

## 2025-01-17 DIAGNOSIS — J45.41 MODERATE PERSISTENT ASTHMA WITH EXACERBATION: ICD-10-CM

## 2025-01-17 RX ORDER — ALBUTEROL SULFATE 90 UG/1
2 INHALANT RESPIRATORY (INHALATION) EVERY 4 HOURS PRN
Qty: 18 G | Refills: 1 | Status: SHIPPED | OUTPATIENT
Start: 2025-01-17

## 2025-01-23 DIAGNOSIS — G50.0 TRIGEMINAL NEURALGIA: ICD-10-CM

## 2025-01-24 DIAGNOSIS — I10 HYPERTENSION, ESSENTIAL: ICD-10-CM

## 2025-01-24 RX ORDER — GABAPENTIN 100 MG/1
200 CAPSULE ORAL 2 TIMES DAILY
Qty: 360 CAPSULE | Refills: 1 | Status: SHIPPED | OUTPATIENT
Start: 2025-01-24

## 2025-01-24 RX ORDER — LISINOPRIL 5 MG/1
5 TABLET ORAL DAILY
Qty: 90 TABLET | Refills: 1 | Status: SHIPPED | OUTPATIENT
Start: 2025-01-24

## 2025-02-02 DIAGNOSIS — I10 PRIMARY HYPERTENSION: ICD-10-CM

## 2025-02-03 DIAGNOSIS — D50.8 OTHER IRON DEFICIENCY ANEMIA: ICD-10-CM

## 2025-02-03 RX ORDER — DILTIAZEM HYDROCHLORIDE 120 MG/1
120 CAPSULE, COATED, EXTENDED RELEASE ORAL DAILY
Qty: 90 CAPSULE | Refills: 1 | Status: SHIPPED | OUTPATIENT
Start: 2025-02-03

## 2025-02-04 RX ORDER — IRON POLYSACCHARIDE COMPLEX 150 MG
150 CAPSULE ORAL DAILY
Qty: 90 CAPSULE | Refills: 3 | Status: SHIPPED | OUTPATIENT
Start: 2025-02-04

## 2025-02-11 DIAGNOSIS — I48.91 ATRIAL FIBRILLATION, UNSPECIFIED TYPE (HCC): ICD-10-CM

## 2025-02-24 DIAGNOSIS — E78.2 MIXED HYPERLIPIDEMIA: ICD-10-CM

## 2025-02-25 DIAGNOSIS — G50.0 TRIGEMINAL NEURALGIA: ICD-10-CM

## 2025-02-25 RX ORDER — ROSUVASTATIN CALCIUM 5 MG/1
TABLET, COATED ORAL
Qty: 26 TABLET | Refills: 1 | Status: SHIPPED | OUTPATIENT
Start: 2025-02-25

## 2025-02-26 RX ORDER — TRAMADOL HYDROCHLORIDE 50 MG/1
50 TABLET ORAL 2 TIMES DAILY
Qty: 60 TABLET | Refills: 0 | Status: SHIPPED | OUTPATIENT
Start: 2025-02-26

## 2025-03-19 ENCOUNTER — TELEPHONE (OUTPATIENT)
Dept: OTHER | Facility: OTHER | Age: OVER 89
End: 2025-03-19

## 2025-03-19 NOTE — TELEPHONE ENCOUNTER
Patient daughter called and canceled her mom appointment and is asking if the office would give her back a call to reschedule her mom appointment.

## 2025-03-24 ENCOUNTER — OFFICE VISIT (OUTPATIENT)
Age: OVER 89
End: 2025-03-24
Payer: MEDICARE

## 2025-03-24 VITALS
BODY MASS INDEX: 20.28 KG/M2 | SYSTOLIC BLOOD PRESSURE: 110 MMHG | HEIGHT: 59 IN | WEIGHT: 100.6 LBS | TEMPERATURE: 98.6 F | DIASTOLIC BLOOD PRESSURE: 70 MMHG | HEART RATE: 83 BPM | RESPIRATION RATE: 18 BRPM | OXYGEN SATURATION: 95 %

## 2025-03-24 DIAGNOSIS — N18.32 STAGE 3B CHRONIC KIDNEY DISEASE (HCC): ICD-10-CM

## 2025-03-24 DIAGNOSIS — I10 HYPERTENSION, ESSENTIAL: Primary | ICD-10-CM

## 2025-03-24 DIAGNOSIS — E78.2 MIXED HYPERLIPIDEMIA: ICD-10-CM

## 2025-03-24 DIAGNOSIS — F11.20 CONTINUOUS OPIOID DEPENDENCE (HCC): ICD-10-CM

## 2025-03-24 DIAGNOSIS — I48.20 CHRONIC ATRIAL FIBRILLATION (HCC): ICD-10-CM

## 2025-03-24 DIAGNOSIS — M62.830 MUSCLE SPASM OF BACK: ICD-10-CM

## 2025-03-24 PROCEDURE — 99214 OFFICE O/P EST MOD 30 MIN: CPT

## 2025-03-24 PROCEDURE — G2211 COMPLEX E/M VISIT ADD ON: HCPCS

## 2025-03-24 RX ORDER — CYCLOBENZAPRINE HCL 5 MG
5 TABLET ORAL 2 TIMES DAILY
Qty: 60 TABLET | Refills: 1 | Status: SHIPPED | OUTPATIENT
Start: 2025-03-24

## 2025-03-24 NOTE — ASSESSMENT & PLAN NOTE
Flexeril helped her in the past so we will retry it  Orders:  •  cyclobenzaprine (FLEXERIL) 5 mg tablet; Take 1 tablet (5 mg total) by mouth 2 (two) times a day

## 2025-03-24 NOTE — ASSESSMENT & PLAN NOTE
Lab Results   Component Value Date    EGFR  02/28/2025     Not performed on patients less than 18 years of age or greater than 97 years of age    EGFR 37 05/09/2024    EGFR 35 09/01/2023    CREATININE 1.43 (H) 02/28/2025    CREATININE 1.22 05/09/2024    CREATININE 1.29 09/01/2023   creatinine and GFR stable   Will need periodic BMP  Avoid NSAIDs like ibuprofen Aleve Advil etc.  Avoid high potassium diet.  We will continue to monitor

## 2025-03-24 NOTE — PROGRESS NOTES
Name: Mckenna Whitten      : 1926      MRN: [id number]  Encounter Provider: Park Sanders MD  Encounter Date: 3/24/2025   Encounter department: [facility] PRIMARY [facility]  :  Assessment & Plan  Hypertension, essential  Under control.  Continue metoprolol , lisinopril and diltiazem.  We will continue to monitor       Mixed hyperlipidemia  Under control.  Continue rosuvastatin.  We will continue to monitor.          Stage 3b chronic kidney disease (HCC)  Lab Results   Component Value Date    EGFR  2025     Not performed on patients less than 18 years of age or greater than 97 years of age    EGFR 37 2024    EGFR 35 2023    CREATININE 1.43 (H) 2025    CREATININE 1.22 2024    CREATININE 1.29 2023   creatinine and GFR stable   Will need periodic BMP  Avoid NSAIDs like ibuprofen Aleve Advil etc.  Avoid high potassium diet.  We will continue to monitor          Chronic atrial fibrillation (HCC)  Under control.  Continue current medication including anticoagulation medication to prevent stroke.  We will reevaluate at next office visit.  Has been followed by cardiologist.         Continuous opioid dependence (HCC)  Pain under reasonable control.  Continue tramadol as directed.  We will continue to monitor       Muscle spasm of back  Flexeril helped her in the past so we will retry it  Orders:  •  cyclobenzaprine (FLEXERIL) 5 mg tablet; Take 1 tablet (5 mg total) by mouth 2 (two) times a day           History of Present Illness   Patient here for review of chronic medical problems and  the labs and imaging if it is applicable.  Currently has no specific complaints other than mentioned in the review of systems  Denies chest pain, SOB, cough, abdominal pain, nausea, vomiting, fever, chills, lightheadedness, dizziness,headache, tingling or numbness.No bowel or bladder problem.        Review of Systems   Constitutional:  Negative for chills and fever.   HENT:  Positive  "for hearing loss. Negative for congestion, ear pain, postnasal drip, sinus pain and sore throat.    Respiratory:  Negative for cough, chest tightness, shortness of breath and wheezing.    Cardiovascular:  Negative for chest pain and leg swelling.   Gastrointestinal:  Negative for abdominal pain, blood in stool and diarrhea.   Genitourinary:  Negative for dysuria and frequency.   Musculoskeletal:  Positive for arthralgias (right hip), back pain (Muscle spasm in the lumbar area) and gait problem. Negative for myalgias.   Neurological:  Negative for headaches.   Psychiatric/Behavioral:  Negative for agitation, behavioral problems and confusion.        Objective   /70 (BP Location: Right arm, Patient Position: Sitting, Cuff Size: Standard)   Pulse 83   Temp 98.6 °F (37 °C) (Temporal)   Resp 18   Ht 4' 11\" (1.499 m)   Wt 45.6 kg (100 lb 9.6 oz)   SpO2 95%   BMI 20.32 kg/m²      Physical Exam  Vitals and nursing note reviewed.   Constitutional:       General: She is not in acute distress.     Appearance: Normal appearance. She is well-developed. She is not ill-appearing, toxic-appearing or diaphoretic.   HENT:      Head: Normocephalic and atraumatic.      Nose: Nose normal.      Mouth/Throat:      Mouth: Mucous membranes are moist.      Pharynx: Oropharynx is clear.   Eyes:      General: No scleral icterus.        Right eye: No discharge.         Left eye: No discharge.      Extraocular Movements: Extraocular movements intact.      Conjunctiva/sclera: Conjunctivae normal.      Pupils: Pupils are equal, round, and reactive to light.   Cardiovascular:      Rate and Rhythm: Normal rate. Rhythm irregular.      Pulses: Normal pulses.      Heart sounds: Normal heart sounds. No murmur heard.  Pulmonary:      Effort: Pulmonary effort is normal. No respiratory distress.      Breath sounds: Normal breath sounds. No wheezing, rhonchi or rales.   Abdominal:      General: Abdomen is flat. Bowel sounds are normal. There is " no distension.      Palpations: Abdomen is soft.      Tenderness: There is no abdominal tenderness. There is no right CVA tenderness or left CVA tenderness.   Musculoskeletal:         General: No swelling or tenderness. Normal range of motion.      Cervical back: Normal range of motion and neck supple. No rigidity.      Right lower leg: No edema.      Left lower leg: No edema.   Lymphadenopathy:      Cervical: No cervical adenopathy.   Skin:     General: Skin is warm and dry.      Capillary Refill: Capillary refill takes 2 to 3 seconds.      Coloration: Skin is not jaundiced or pale.   Neurological:      General: No focal deficit present.      Mental Status: She is alert and oriented to person, place, and time. Mental status is at baseline.      Motor: No weakness.      Gait: Gait abnormal (Ambulates with walker).   Psychiatric:         Mood and Affect: Mood normal.         Behavior: Behavior normal.

## 2025-04-30 DIAGNOSIS — G50.0 TRIGEMINAL NEURALGIA: ICD-10-CM

## 2025-04-30 RX ORDER — TRAMADOL HYDROCHLORIDE 50 MG/1
50 TABLET ORAL 2 TIMES DAILY
Qty: 60 TABLET | Refills: 0 | Status: SHIPPED | OUTPATIENT
Start: 2025-04-30 | End: 2025-05-01

## 2025-04-30 NOTE — TELEPHONE ENCOUNTER
Reason for call:   [x] Refill   [] Prior Auth  [] Other:     Office:   [x] PCP/Provider -   [] Specialty/Provider -     Medication: traMADol (ULTRAM) 50 mg tablet     Dose/Frequency: Take 1 tablet (50 mg total) by mouth 2 (two) times a day     Quantity: 60 tablet     Pharmacy:  [id number] - [place] - [id number]   Does the patient have enough for 3 days?   [] Yes   [x] No - Send as HP to POD

## 2025-05-01 ENCOUNTER — TELEPHONE (OUTPATIENT)
Age: OVER 89
End: 2025-05-01

## 2025-05-01 RX ORDER — TRAMADOL HYDROCHLORIDE 50 MG/1
50 TABLET ORAL 2 TIMES DAILY
Qty: 60 TABLET | Refills: 0 | Status: SHIPPED | OUTPATIENT
Start: 2025-05-01

## 2025-05-01 NOTE — TELEPHONE ENCOUNTER
Patient called in to see if her Tramadol refill was sent in. I let her know it was sent over yesterday morning and confirmed the pharmacy with her. She is going to contact them and call us back if she has any issues.

## 2025-05-01 NOTE — TELEPHONE ENCOUNTER
[facility] called in.      They did not receive e script order for Tramadol.  Please re send to the pharmacy.

## 2025-05-01 NOTE — TELEPHONE ENCOUNTER
Patient called to request a refill for their tramadol advised a refill was requested on 04/30/2025 and is pending approval. Patient verbalized understanding and is in agreement.     Does the patient have enough for 3 days?   [] Yes   [x] No - Send as HP to POD

## 2025-05-06 NOTE — TELEPHONE ENCOUNTER
Maliha from [facility] pharmacy called because the script for tramadol was actually received but they accidentally gave it to a different customer. She said she has contacted the customer and their doctor already. They are sorry for the mix up. If any other questions please advise. Thank you.

## 2025-05-06 NOTE — TELEPHONE ENCOUNTER
I spoke with [facility] pharmacy and they stated the patient picked up her medication. They told me that the prescription that  prescribed was given to the wrong patient, they wanted us to be aware because it has his name but they made the doctor of the patient aware of this.

## 2025-06-09 ENCOUNTER — OFFICE VISIT (OUTPATIENT)
Age: OVER 89
End: 2025-06-09
Payer: MEDICARE

## 2025-06-09 VITALS
TEMPERATURE: 97.7 F | WEIGHT: 100.8 LBS | HEIGHT: 59 IN | BODY MASS INDEX: 20.32 KG/M2 | SYSTOLIC BLOOD PRESSURE: 134 MMHG | HEART RATE: 73 BPM | DIASTOLIC BLOOD PRESSURE: 76 MMHG | OXYGEN SATURATION: 95 % | RESPIRATION RATE: 18 BRPM

## 2025-06-09 DIAGNOSIS — E78.2 MIXED HYPERLIPIDEMIA: ICD-10-CM

## 2025-06-09 DIAGNOSIS — I10 HYPERTENSION, ESSENTIAL: Primary | ICD-10-CM

## 2025-06-09 DIAGNOSIS — I48.20 CHRONIC ATRIAL FIBRILLATION (HCC): ICD-10-CM

## 2025-06-09 DIAGNOSIS — Z00.00 MEDICARE ANNUAL WELLNESS VISIT, SUBSEQUENT: ICD-10-CM

## 2025-06-09 DIAGNOSIS — J45.20 MILD INTERMITTENT ASTHMA WITHOUT COMPLICATION: ICD-10-CM

## 2025-06-09 DIAGNOSIS — N18.32 STAGE 3B CHRONIC KIDNEY DISEASE (HCC): ICD-10-CM

## 2025-06-09 DIAGNOSIS — M54.2 CHRONIC NECK PAIN: ICD-10-CM

## 2025-06-09 DIAGNOSIS — G89.29 CHRONIC NECK PAIN: ICD-10-CM

## 2025-06-09 PROCEDURE — 99214 OFFICE O/P EST MOD 30 MIN: CPT

## 2025-06-09 PROCEDURE — G0439 PPPS, SUBSEQ VISIT: HCPCS

## 2025-06-09 PROCEDURE — G2211 COMPLEX E/M VISIT ADD ON: HCPCS

## 2025-06-09 RX ORDER — MUPIROCIN 20 MG/G
OINTMENT TOPICAL
COMMUNITY
Start: 2025-05-02

## 2025-06-09 NOTE — PROGRESS NOTES
Name: Mckenna Whitten      : 1926      MRN: [id number]  Encounter Provider: Park Sanders MD  Encounter Date: 2025   Encounter department: [facility] PRIMARY CARE  :  Assessment & Plan  Hypertension, essential  Under control.  Continue metoprolol , lisinopril and diltiazem.  We will continue to monitor       Chronic atrial fibrillation (HCC)  Under control.  Continue current medication including anticoagulation medication to prevent stroke.  We will reevaluate at next office visit.  Has been followed by cardiologist.         Mild intermittent asthma without complication  Under control.    Continue current medication.    We will re-evaluate at next office visit.         Mixed hyperlipidemia  Under control.  Continue rosuvastatin.  We will continue to monitor.          Stage 3b chronic kidney disease (HCC)  Lab Results   Component Value Date    EGFR  2025     Not performed on patients less than 18 years of age or greater than 97 years of age    EGFR  2025     Not performed on patients less than 18 years of age or greater than 97 years of age    EGFR 37 2024    CREATININE 1.52 (H) 2025    CREATININE 1.43 (H) 2025    CREATININE 1.22 2024   creatinine and GFR stable   Will need periodic BMP  Avoid NSAIDs like ibuprofen Aleve Advil etc.  Avoid high potassium diet.  We will continue to monitor          Chronic neck pain  Under control.    Continue current medication.    We will re-evaluate at next office visit.         Medicare annual wellness visit, subsequent            Preventive health issues were discussed with patient, and age appropriate screening tests were ordered as noted in patient's After Visit Summary. Personalized health advice and appropriate referrals for health education or preventive services given if needed, as noted in patient's After Visit Summary.    History of Present Illness     Patient here for review of chronic medical problems  and  the labs and imaging if it is applicable.  Currently has no specific complaints other than mentioned in the review of systems  Denies chest pain, SOB, cough, abdominal pain, nausea, vomiting, fever, chills, lightheadedness, dizziness,headache, tingling or numbness.No bowel or bladder problem.         Patient Care Team:  Park Sanders MD as PCP - General (Internal Medicine)    Review of Systems   Constitutional:  Negative for chills and fever.   HENT:  Positive for hearing loss. Negative for congestion, ear pain, postnasal drip, sinus pain and sore throat.    Respiratory:  Negative for cough, chest tightness, shortness of breath and wheezing.    Cardiovascular:  Negative for chest pain and leg swelling.   Gastrointestinal:  Negative for abdominal pain, blood in stool and diarrhea.   Genitourinary:  Negative for dysuria and frequency.   Musculoskeletal:  Positive for arthralgias (right hip), back pain (Muscle spasm in the lumbar area) and gait problem. Negative for myalgias.   Neurological:  Negative for headaches.   Psychiatric/Behavioral:  Negative for agitation, behavioral problems and confusion.      Medical History Reviewed by provider this encounter:  Tobacco  Allergies  Meds  Problems  Med Hx  Surg Hx  Fam Hx       Annual Wellness Visit Questionnaire   Mckenna is here for her Subsequent Wellness visit. Last Medicare Wellness visit information reviewed, patient interviewed, no change since last AWV.     Health Risk Assessment:   Patient rates overall health as very good. Patient feels that their physical health rating is same. Patient is very satisfied with their life. Eyesight was rated as same. Hearing was rated as slightly worse. Patient feels that their emotional and mental health rating is same. Patients states they are never, rarely angry. Patient states they are sometimes unusually tired/fatigued. Pain experienced in the last 7 days has been some. Patient's pain rating has been 5/10.  Patient states that she has experienced no weight loss or gain in last 6 months.     Fall Risk Screening:   In the past year, patient has experienced: no history of falling in past year      Urinary Incontinence Screening:   Patient has leaked urine accidently in the last six months.     Home Safety:  Patient has trouble with stairs inside or outside of their home. Patient has working smoke alarms and has working carbon monoxide detector. Home safety hazards include: none.     Nutrition:   Current diet is Regular.     Medications:   Patient is currently taking over-the-counter supplements. OTC medications include: see medication list. Patient is able to manage medications.     Activities of Daily Living (ADLs)/Instrumental Activities of Daily Living (IADLs):   Walk and transfer into and out of bed and chair?: Yes  Dress and groom yourself?: Yes    Bathe or shower yourself?: Yes    Feed yourself? Yes  Do your laundry/housekeeping?: Yes  Manage your money, pay your bills and track your expenses?: Yes  Make your own meals?: Yes    Do your own shopping?: Yes    Durable Medical Equipment Suppliers  Walker/wheelchair    Previous Hospitalizations:   Any hospitalizations or ED visits within the last 12 months?: No      Advance Care Planning:   Living will: No    Durable POA for healthcare: No    Advanced directive: No      Cognitive Screening:   Provider or family/friend/caregiver concerned regarding cognition?: No    Preventive Screenings      Cardiovascular Screening:    General: Screening Not Indicated and History Lipid Disorder      Diabetes Screening:     General: Screening Current      Colorectal Cancer Screening:     General: Screening Not Indicated      Cervical Cancer Screening:    General: Screening Not Indicated      Lung Cancer Screening:     General: Screening Not Indicated    Immunizations:  - Immunizations due: Zoster (Shingrix)    Screening, Brief Intervention, and Referral to Treatment (SBIRT)  "    Screening  Typical number of drinks in a day: 0  Typical number of drinks in a week: 0  Interpretation: Low risk drinking behavior.    AUDIT-C Screenin) How often did you have a drink containing alcohol in the past year? monthly or less  2) How many drinks did you have on a typical day when you were drinking in the past year? 0  3) How often did you have 6 or more drinks on one occasion in the past year? never    AUDIT-C Score: 1  Interpretation: Score 0-2 (female): Negative screen for alcohol misuse    Single Item Drug Screening:  How often have you used an illegal drug (including marijuana) or a prescription medication for non-medical reasons in the past year? never    Single Item Drug Screen Score: 0  Interpretation: Negative screen for possible drug use disorder    Review of Current Opioid Use    Opioid Risk Tool (ORT) Interpretation: Complete Opioid Risk Tool (ORT)    Social Drivers of Health     Financial Resource Strain: Low Risk  (2023)    Overall Financial Resource Strain (CARDIA)    • Difficulty of Paying Living Expenses: Not very hard   Food Insecurity: No Food Insecurity (2025)    Nursing - Inadequate Food Risk Classification    • Worried About Running Out of Food in the Last Year: Never true    • Ran Out of Food in the Last Year: Never true   Transportation Needs: No Transportation Needs (2025)    PRAPARE - Transportation    • Lack of Transportation (Medical): No    • Lack of Transportation (Non-Medical): No   Housing Stability: Unknown (2025)    Housing Stability Vital Sign    • Unable to Pay for Housing in the Last Year: No    • Homeless in the Last Year: No   Utilities: Not At Risk (2025)    [facility] Utilities    • Threatened with loss of utilities: No     No results found.    Objective   /76 (BP Location: Right arm, Patient Position: Sitting, Cuff Size: Standard)   Pulse 73   Temp 97.7 °F (36.5 °C) (Temporal)   Resp 18   Ht 4' 11\" (1.499 m)   Wt 45.7 kg (100 lb " 12.8 oz)   SpO2 95%   BMI 20.36 kg/m²     Physical Exam  Vitals and nursing note reviewed.   Constitutional:       General: She is not in acute distress.     Appearance: Normal appearance. She is well-developed. She is not ill-appearing, toxic-appearing or diaphoretic.   HENT:      Head: Normocephalic and atraumatic.      Right Ear: Tympanic membrane, ear canal and external ear normal. There is no impacted cerumen.      Left Ear: Tympanic membrane, ear canal and external ear normal. There is no impacted cerumen.      Nose: Nose normal.      Mouth/Throat:      Mouth: Mucous membranes are moist.      Pharynx: Oropharynx is clear.     Eyes:      General: No scleral icterus.        Right eye: No discharge.         Left eye: No discharge.      Extraocular Movements: Extraocular movements intact.      Conjunctiva/sclera: Conjunctivae normal.      Pupils: Pupils are equal, round, and reactive to light.       Cardiovascular:      Rate and Rhythm: Normal rate. Rhythm irregular.      Pulses: Normal pulses.      Heart sounds: Normal heart sounds. No murmur heard.  Pulmonary:      Effort: Pulmonary effort is normal. No respiratory distress.      Breath sounds: Normal breath sounds. No wheezing, rhonchi or rales.   Abdominal:      General: Abdomen is flat. Bowel sounds are normal. There is no distension.      Palpations: Abdomen is soft.      Tenderness: There is no abdominal tenderness. There is no right CVA tenderness or left CVA tenderness.     Musculoskeletal:         General: No swelling or tenderness. Normal range of motion.      Cervical back: Normal range of motion and neck supple. No rigidity.      Right lower leg: No edema.      Left lower leg: No edema.   Lymphadenopathy:      Cervical: No cervical adenopathy.     Skin:     General: Skin is warm and dry.      Capillary Refill: Capillary refill takes 2 to 3 seconds.      Coloration: Skin is not jaundiced or pale.     Neurological:      General: No focal deficit  present.      Mental Status: She is alert and oriented to person, place, and time. Mental status is at baseline.      Motor: No weakness.      Gait: Gait abnormal (Ambulates with walker).     Psychiatric:         Mood and Affect: Mood normal.         Behavior: Behavior normal.

## 2025-06-09 NOTE — PATIENT INSTRUCTIONS
Medicare Preventive Visit Patient Instructions  Thank you for completing your Welcome to Medicare Visit or Medicare Annual Wellness Visit today. Your next wellness visit will be due in one year (6/10/2026).  The screening/preventive services that you may require over the next 5-10 years are detailed below. Some tests may not apply to you based off risk factors and/or age. Screening tests ordered at today's visit but not completed yet may show as past due. Also, please note that scanned in results may not display below.  Preventive Screenings:  Service Recommendations Previous Testing/Comments   Colorectal Cancer Screening  * Colonoscopy    * Fecal Occult Blood Test (FOBT)/Fecal Immunochemical Test (FIT)  * Fecal DNA/Cologuard Test  * Flexible Sigmoidoscopy Age: 45-75 years old   Colonoscopy: every 10 years (may be performed more frequently if at higher risk)  OR  FOBT/FIT: every 1 year  OR  Cologuard: every 3 years  OR  Sigmoidoscopy: every 5 years  Screening may be recommended earlier than age 45 if at higher risk for colorectal cancer. Also, an individualized decision between you and your healthcare provider will decide whether screening between the ages of 76-85 would be appropriate. Colonoscopy: Not on file  FOBT/FIT: Not on file  Cologuard: Not on file  Sigmoidoscopy: Not on file    Screening Not Indicated     Breast Cancer Screening Age: 40+ years old  Frequency: every 1-2 years  Not required if history of left and right mastectomy Mammogram: Not on file        Cervical Cancer Screening Between the ages of 21-29, pap smear recommended once every 3 years.   Between the ages of 30-65, can perform pap smear with HPV co-testing every 5 years.   Recommendations may differ for women with a history of total hysterectomy, cervical cancer, or abnormal pap smears in past. Pap Smear: Not on file    Screening Not Indicated   Hepatitis C Screening Once for adults born between 1945 and 1965  More frequently in patients at  high risk for Hepatitis C Hep C Antibody: Not on file        Diabetes Screening 1-2 times per year if you're at risk for diabetes or have pre-diabetes Fasting glucose: 76 mg/dL (5/9/2024)  A1C: 5.7 % (9/8/2021)  Screening Current   Cholesterol Screening Once every 5 years if you don't have a lipid disorder. May order more often based on risk factors. Lipid panel: 02/28/2025    Screening Not Indicated  History Lipid Disorder     Other Preventive Screenings Covered by Medicare:  Abdominal Aortic Aneurysm (AAA) Screening: covered once if your at risk. You're considered to be at risk if you have a family history of AAA.  Lung Cancer Screening: covers low dose CT scan once per year if you meet all of the following conditions: (1) Age 55-77; (2) No signs or symptoms of lung cancer; (3) Current smoker or have quit smoking within the last 15 years; (4) You have a tobacco smoking history of at least 20 pack years (packs per day multiplied by number of years you smoked); (5) You get a written order from a healthcare provider.  Glaucoma Screening: covered annually if you're considered high risk: (1) You have diabetes OR (2) Family history of glaucoma OR (3)  aged 50 and older OR (4)  American aged 65 and older  Osteoporosis Screening: covered every 2 years if you meet one of the following conditions: (1) You're estrogen deficient and at risk for osteoporosis based off medical history and other findings; (2) Have a vertebral abnormality; (3) On glucocorticoid therapy for more than 3 months; (4) Have primary hyperparathyroidism; (5) On osteoporosis medications and need to assess response to drug therapy.   Last bone density test (DXA Scan): Not on file.  HIV Screening: covered annually if you're between the age of 15-65. Also covered annually if you are younger than 15 and older than 65 with risk factors for HIV infection. For pregnant patients, it is covered up to 3 times per  pregnancy.    Immunizations:  Immunization Recommendations   Influenza Vaccine Annual influenza vaccination during flu season is recommended for all persons aged >= 6 months who do not have contraindications   Pneumococcal Vaccine   * Pneumococcal conjugate vaccine = PCV13 (Prevnar 13), PCV15 (Vaxneuvance), PCV20 (Prevnar 20)  * Pneumococcal polysaccharide vaccine = PPSV23 (Pneumovax) Adults 19-65 yo with certain risk factors or if 65+ yo  If never received any pneumonia vaccine: recommend Prevnar 20 (PCV20)  Give PCV20 if previously received 1 dose of PCV13 or PPSV23   Hepatitis B Vaccine 3 dose series if at intermediate or high risk (ex: diabetes, end stage renal disease, liver disease)   Respiratory syncytial virus (RSV) Vaccine - COVERED BY MEDICARE PART D  * RSVPreF3 (Arexvy) CDC recommends that adults 60 years of age and older may receive a single dose of RSV vaccine using shared clinical decision-making (SCDM)   Tetanus (Td) Vaccine - COST NOT COVERED BY MEDICARE PART B Following completion of primary series, a booster dose should be given every 10 years to maintain immunity against tetanus. Td may also be given as tetanus wound prophylaxis.   Tdap Vaccine - COST NOT COVERED BY MEDICARE PART B Recommended at least once for all adults. For pregnant patients, recommended with each pregnancy.   Shingles Vaccine (Shingrix) - COST NOT COVERED BY MEDICARE PART B  2 shot series recommended in those 19 years and older who have or will have weakened immune systems or those 50 years and older     Health Maintenance Due:  There are no preventive care reminders to display for this patient.  Immunizations Due:      Topic Date Due   • COVID-19 Vaccine (3 - 2024-25 season) 09/01/2024     Advance Directives   What are advance directives?  Advance directives are legal documents that state your wishes and plans for medical care. These plans are made ahead of time in case you lose your ability to make decisions for yourself.  Advance directives can apply to any medical decision, such as the treatments you want, and if you want to donate organs.   What are the types of advance directives?  There are many types of advance directives, and each state has rules about how to use them. You may choose a combination of any of the following:  Living will:  This is a written record of the treatment you want. You can also choose which treatments you do not want, which to limit, and which to stop at a certain time. This includes surgery, medicine, IV fluid, and tube feedings.   Durable power of  for healthcare (DPAHC):  This is a written record that states who you want to make healthcare choices for you when you are unable to make them for yourself. This person, called a proxy, is usually a family member or a friend. You may choose more than 1 proxy.  Do not resuscitate (DNR) order:  A DNR order is used in case your heart stops beating or you stop breathing. It is a request not to have certain forms of treatment, such as CPR. A DNR order may be included in other types of advance directives.  Medical directive:  This covers the care that you want if you are in a coma, near death, or unable to make decisions for yourself. You can list the treatments you want for each condition. Treatment may include pain medicine, surgery, blood transfusions, dialysis, IV or tube feedings, and a ventilator (breathing machine).  Values history:  This document has questions about your views, beliefs, and how you feel and think about life. This information can help others choose the care that you would choose.  Why are advance directives important?  An advance directive helps you control your care. Although spoken wishes may be used, it is better to have your wishes written down. Spoken wishes can be misunderstood, or not followed. Treatments may be given even if you do not want them. An advance directive may make it easier for your family to make difficult  choices about your care.   Urinary Incontinence   Urinary incontinence (UI)  is when you lose control of your bladder. UI develops because your bladder cannot store or empty urine properly. The 3 most common types of UI are stress incontinence, urge incontinence, or both.  Medicines:   May be given to help strengthen your bladder control. Report any side effects of medication to your healthcare provider.  Do pelvic muscle exercises often:  Your pelvic muscles help you stop urinating. Squeeze these muscles tight for 5 seconds, then relax for 5 seconds. Gradually work up to squeezing for 10 seconds. Do 3 sets of 15 repetitions a day, or as directed. This will help strengthen your pelvic muscles and improve bladder control.  Train your bladder:  Go to the bathroom at set times, such as every 2 hours, even if you do not feel the urge to go. You can also try to hold your urine when you feel the urge to go. For example, hold your urine for 5 minutes when you feel the urge to go. As that becomes easier, hold your urine for 10 minutes.   Self-care:   Keep a UI record.  Write down how often you leak urine and how much you leak. Make a note of what you were doing when you leaked urine.  Drink liquids as directed. You may need to limit the amount of liquid you drink to help control your urine leakage. Do not drink any liquid right before you go to bed. Limit or do not have drinks that contain caffeine or alcohol.   Prevent constipation.  Eat a variety of high-fiber foods. Good examples are high-fiber cereals, beans, vegetables, and whole-grain breads. Walking is the best way to trigger your intestines to have a bowel movement.  Exercise regularly and maintain a healthy weight.  Weight loss and exercise will decrease pressure on your bladder and help you control your leakage.   Use a catheter as directed  to help empty your bladder. A catheter is a tiny, plastic tube that is put into your bladder to drain your urine.   Go to  behavior therapy as directed.  Behavior therapy may be used to help you learn to control your urge to urinate.    Narcotic (Opioid) Safety    Use narcotics safely:  Take prescribed narcotics exactly as directed  Do not give narcotics to others or take narcotics that belong to someone else  Do not mix narcotics without medicines or alcohol  Do not drive or operate heavy machinery after you take the narcotic  Monitor for side effects and notify your healthcare provider if you experienced side effects such as nausea, sleepiness, itching, or trouble thinking clearly.    Manage constipation:    Constipation is the most common side effect of narcotic medicine. Constipation is when you have hard, dry bowel movements, or you go longer than usual between bowel movements. Tell your healthcare provider about all changes in your bowel movements while you are taking narcotics. He or she may recommend laxative medicine to help you have a bowel movement. He or she may also change the kind of narcotic you are taking, or change when you take it. The following are more ways you can prevent or relieve constipation:    Drink liquids as directed.  You may need to drink extra liquids to help soften and move your bowels. Ask how much liquid to drink each day and which liquids are best for you.  Eat high-fiber foods.  This may help decrease constipation by adding bulk to your bowel movements. High-fiber foods include fruits, vegetables, whole-grain breads and cereals, and beans. Your healthcare provider or dietitian can help you create a high-fiber meal plan. Your provider may also recommend a fiber supplement if you cannot get enough fiber from food.  Exercise regularly.  Regular physical activity can help stimulate your intestines. Walking is a good exercise to prevent or relieve constipation. Ask which exercises are best for you.  Schedule a time each day to have a bowel movement.  This may help train your body to have regular bowel  movements. Bend forward while you are on the toilet to help move the bowel movement out. Sit on the toilet for at least 10 minutes, even if you do not have a bowel movement.    Store narcotics safely:   Store narcotics where others cannot easily get them.  Keep them in a locked cabinet or secure area. Do not  keep them in a purse or other bag you carry with you. A person may be looking for something else and find the narcotics.  Make sure narcotics are stored out of the reach of children.  A child can easily overdose on narcotics. Narcotics may look like candy to a small child.    The best way to dispose of narcotics:      The laws vary by country and area. In the [place], the best way is to return the narcotics through a take-back program. This program is offered by the [place] Drug Enforcement Agency (FERNANDEZ). The following are options for using the program:  Take the narcotics to a FERNAN[employer]Z collection site.  The site is often a law enforcement center. Call your local law enforcement center for scheduled take-back days in your area. You will be given information on where to go if the collection site is in a different location.  Take the narcotics to an approved pharmacy or hospital.  A pharmacy or hospital may be set up as a collection site. You will need to ask if it is a FER[employer]Z collection site if you were not directed there. A pharmacy or doctor's office may not be able to take back narcotics unless it is a FERNAN[employer]Z site.  Use a mail-back system.  This means you are given containers to put the narcotics into. You will then mail them in the containers.  Use a take-back drop box.  This is a place to leave the narcotics at any time. People and animals will not be able to get into the box. Your local law enforcement agency can tell you where to find a drop box in your area.    Other ways to manage pain:   Ask your healthcare provider about non-narcotic medicines to control pain.  Nonprescription medicines include NSAIDs (such as  ibuprofen) and acetaminophen. Prescription medicines include muscle relaxers, antidepressants, and steroids.  Pain may be managed without any medicines.  Some ways to relieve pain include massage, aromatherapy, or meditation. Physical or occupational therapy may also help.    For more information:   Drug Enforcement Administration  [address] [place] [address]  Phone: 2- 762 - 995-8365  Web Address: https://www.deadiversion.[facility].gov/drug_disposal/    [place] Food and Drug Administration  [address] [place] [address]  Phone: 8- 931 - 694-1375  Web Address: http://www.fda.gov   © Copyright [employer] 2018 Information is for End User's use only and may not be sold, redistributed or otherwise used for commercial purposes. All illustrations and images included in CareNotes® are the copyrighted property of [employer] Inc. or [employer]

## 2025-06-09 NOTE — ASSESSMENT & PLAN NOTE
Lab Results   Component Value Date    EGFR  05/14/2025     Not performed on patients less than 18 years of age or greater than 97 years of age    EGFR  02/28/2025     Not performed on patients less than 18 years of age or greater than 97 years of age    EGFR 37 05/09/2024    CREATININE 1.52 (H) 05/14/2025    CREATININE 1.43 (H) 02/28/2025    CREATININE 1.22 05/09/2024   creatinine and GFR stable   Will need periodic BMP  Avoid NSAIDs like ibuprofen Aleve Advil etc.  Avoid high potassium diet.  We will continue to monitor

## 2025-06-24 ENCOUNTER — HOSPITAL ENCOUNTER (INPATIENT)
Facility: HOSPITAL | Age: OVER 89
LOS: 1 days | Discharge: HOME | DRG: 194 | End: 2025-06-25
Attending: EMERGENCY MEDICINE | Admitting: INTERNAL MEDICINE
Payer: MEDICARE

## 2025-06-24 ENCOUNTER — APPOINTMENT (EMERGENCY)
Dept: CT IMAGING | Facility: HOSPITAL | Age: OVER 89
DRG: 194 | End: 2025-06-24
Payer: MEDICARE

## 2025-06-24 ENCOUNTER — APPOINTMENT (EMERGENCY)
Dept: RADIOLOGY | Facility: HOSPITAL | Age: OVER 89
DRG: 194 | End: 2025-06-24
Payer: MEDICARE

## 2025-06-24 DIAGNOSIS — J18.9 PNEUMONIA: ICD-10-CM

## 2025-06-24 DIAGNOSIS — F43.20 GRIEF REACTION: ICD-10-CM

## 2025-06-24 DIAGNOSIS — I47.20 VENTRICULAR TACHYCARDIA (HCC): ICD-10-CM

## 2025-06-24 DIAGNOSIS — I47.29 NSVT (NONSUSTAINED VENTRICULAR TACHYCARDIA) (HCC): ICD-10-CM

## 2025-06-24 DIAGNOSIS — R07.9 CHEST PAIN: Primary | ICD-10-CM

## 2025-06-24 LAB
2HR DELTA HS TROPONIN: 0 NG/L
ANION GAP SERPL CALCULATED.3IONS-SCNC: 10 MMOL/L (ref 4–13)
ATRIAL RATE: 100 BPM
ATRIAL RATE: 156 BPM
ATRIAL RATE: 441 BPM
BASOPHILS # BLD AUTO: 0.06 THOUSANDS/ÂΜL (ref 0–0.1)
BASOPHILS NFR BLD AUTO: 1 % (ref 0–1)
BUN SERPL-MCNC: 32 MG/DL (ref 5–25)
CALCIUM SERPL-MCNC: 9.9 MG/DL (ref 8.4–10.2)
CARDIAC TROPONIN I PNL SERPL HS: 13 NG/L (ref ?–50)
CARDIAC TROPONIN I PNL SERPL HS: 13 NG/L (ref ?–50)
CHLORIDE SERPL-SCNC: 105 MMOL/L (ref 96–108)
CO2 SERPL-SCNC: 24 MMOL/L (ref 21–32)
CREAT SERPL-MCNC: 1.54 MG/DL (ref 0.6–1.3)
EOSINOPHIL # BLD AUTO: 0.1 THOUSAND/ÂΜL (ref 0–0.61)
EOSINOPHIL NFR BLD AUTO: 1 % (ref 0–6)
ERYTHROCYTE [DISTWIDTH] IN BLOOD BY AUTOMATED COUNT: 13.7 % (ref 11.6–15.1)
GFR SERPL CREATININE-BSD FRML MDRD: 27 ML/MIN/1.73SQ M
GLUCOSE SERPL-MCNC: 126 MG/DL (ref 65–140)
HCT VFR BLD AUTO: 38.7 % (ref 34.8–46.1)
HGB BLD-MCNC: 12.4 G/DL (ref 11.5–15.4)
IMM GRANULOCYTES # BLD AUTO: 0.03 THOUSAND/UL (ref 0–0.2)
IMM GRANULOCYTES NFR BLD AUTO: 0 % (ref 0–2)
LACTATE SERPL-SCNC: 1.1 MMOL/L (ref 0.5–2)
LYMPHOCYTES # BLD AUTO: 1.25 THOUSANDS/ÂΜL (ref 0.6–4.47)
LYMPHOCYTES NFR BLD AUTO: 14 % (ref 14–44)
MCH RBC QN AUTO: 30.2 PG (ref 26.8–34.3)
MCHC RBC AUTO-ENTMCNC: 32 G/DL (ref 31.4–37.4)
MCV RBC AUTO: 94 FL (ref 82–98)
MONOCYTES # BLD AUTO: 0.76 THOUSAND/ÂΜL (ref 0.17–1.22)
MONOCYTES NFR BLD AUTO: 9 % (ref 4–12)
NEUTROPHILS # BLD AUTO: 6.77 THOUSANDS/ÂΜL (ref 1.85–7.62)
NEUTS SEG NFR BLD AUTO: 75 % (ref 43–75)
NRBC BLD AUTO-RTO: 0 /100 WBCS
PLATELET # BLD AUTO: 291 THOUSANDS/UL (ref 149–390)
PMV BLD AUTO: 10.1 FL (ref 8.9–12.7)
POTASSIUM SERPL-SCNC: 4.2 MMOL/L (ref 3.5–5.3)
PROCALCITONIN SERPL-MCNC: <0.05 NG/ML
QRS AXIS: -74 DEGREES
QRS AXIS: -76 DEGREES
QRS AXIS: -77 DEGREES
QRSD INTERVAL: 110 MS
QRSD INTERVAL: 112 MS
QRSD INTERVAL: 114 MS
QT INTERVAL: 330 MS
QT INTERVAL: 352 MS
QT INTERVAL: 414 MS
QTC INTERVAL: 419 MS
QTC INTERVAL: 465 MS
QTC INTERVAL: 471 MS
RBC # BLD AUTO: 4.11 MILLION/UL (ref 3.81–5.12)
SODIUM SERPL-SCNC: 139 MMOL/L (ref 135–147)
T WAVE AXIS: 78 DEGREES
T WAVE AXIS: 93 DEGREES
T WAVE AXIS: 96 DEGREES
TSH SERPL DL<=0.05 MIU/L-ACNC: 0.89 UIU/ML (ref 0.45–4.5)
VENTRICULAR RATE: 108 BPM
VENTRICULAR RATE: 76 BPM
VENTRICULAR RATE: 97 BPM
WBC # BLD AUTO: 8.97 THOUSAND/UL (ref 4.31–10.16)

## 2025-06-24 PROCEDURE — 99285 EMERGENCY DEPT VISIT HI MDM: CPT | Performed by: EMERGENCY MEDICINE

## 2025-06-24 PROCEDURE — 99223 1ST HOSP IP/OBS HIGH 75: CPT | Performed by: INTERNAL MEDICINE

## 2025-06-24 PROCEDURE — 85025 COMPLETE CBC W/AUTO DIFF WBC: CPT | Performed by: EMERGENCY MEDICINE

## 2025-06-24 PROCEDURE — 84484 ASSAY OF TROPONIN QUANT: CPT | Performed by: EMERGENCY MEDICINE

## 2025-06-24 PROCEDURE — 36415 COLL VENOUS BLD VENIPUNCTURE: CPT | Performed by: EMERGENCY MEDICINE

## 2025-06-24 PROCEDURE — 83605 ASSAY OF LACTIC ACID: CPT | Performed by: EMERGENCY MEDICINE

## 2025-06-24 PROCEDURE — 80048 BASIC METABOLIC PNL TOTAL CA: CPT | Performed by: EMERGENCY MEDICINE

## 2025-06-24 PROCEDURE — 87040 BLOOD CULTURE FOR BACTERIA: CPT | Performed by: EMERGENCY MEDICINE

## 2025-06-24 PROCEDURE — 93010 ELECTROCARDIOGRAM REPORT: CPT | Performed by: INTERNAL MEDICINE

## 2025-06-24 PROCEDURE — 93005 ELECTROCARDIOGRAM TRACING: CPT

## 2025-06-24 PROCEDURE — 71045 X-RAY EXAM CHEST 1 VIEW: CPT

## 2025-06-24 PROCEDURE — 84145 PROCALCITONIN (PCT): CPT | Performed by: EMERGENCY MEDICINE

## 2025-06-24 PROCEDURE — 71250 CT THORAX DX C-: CPT

## 2025-06-24 PROCEDURE — 84443 ASSAY THYROID STIM HORMONE: CPT | Performed by: INTERNAL MEDICINE

## 2025-06-24 PROCEDURE — 96374 THER/PROPH/DIAG INJ IV PUSH: CPT

## 2025-06-24 PROCEDURE — 99285 EMERGENCY DEPT VISIT HI MDM: CPT

## 2025-06-24 RX ORDER — LIDOCAINE 50 MG/G
1 PATCH TOPICAL ONCE
Status: COMPLETED | OUTPATIENT
Start: 2025-06-24 | End: 2025-06-25

## 2025-06-24 RX ORDER — LISINOPRIL 5 MG/1
5 TABLET ORAL DAILY
Status: DISCONTINUED | OUTPATIENT
Start: 2025-06-25 | End: 2025-06-25 | Stop reason: HOSPADM

## 2025-06-24 RX ORDER — ONDANSETRON 2 MG/ML
4 INJECTION INTRAMUSCULAR; INTRAVENOUS EVERY 6 HOURS PRN
Status: DISCONTINUED | OUTPATIENT
Start: 2025-06-24 | End: 2025-06-25 | Stop reason: HOSPADM

## 2025-06-24 RX ORDER — ACETAMINOPHEN 325 MG/1
975 TABLET ORAL ONCE
Status: COMPLETED | OUTPATIENT
Start: 2025-06-24 | End: 2025-06-24

## 2025-06-24 RX ORDER — SODIUM CHLORIDE 9 MG/ML
3 INJECTION INTRAVENOUS
Status: DISCONTINUED | OUTPATIENT
Start: 2025-06-24 | End: 2025-06-24

## 2025-06-24 RX ORDER — GABAPENTIN 100 MG/1
200 CAPSULE ORAL 2 TIMES DAILY
Status: DISCONTINUED | OUTPATIENT
Start: 2025-06-24 | End: 2025-06-25 | Stop reason: HOSPADM

## 2025-06-24 RX ORDER — ALBUTEROL SULFATE 90 UG/1
2 INHALANT RESPIRATORY (INHALATION) EVERY 4 HOURS PRN
Status: DISCONTINUED | OUTPATIENT
Start: 2025-06-24 | End: 2025-06-25 | Stop reason: HOSPADM

## 2025-06-24 RX ORDER — PRAVASTATIN SODIUM 40 MG
40 TABLET ORAL
Status: DISCONTINUED | OUTPATIENT
Start: 2025-06-24 | End: 2025-06-25 | Stop reason: HOSPADM

## 2025-06-24 RX ORDER — IRON POLYSACCHARIDE COMPLEX 150 MG
150 CAPSULE ORAL DAILY
Status: DISCONTINUED | OUTPATIENT
Start: 2025-06-25 | End: 2025-06-25 | Stop reason: HOSPADM

## 2025-06-24 RX ORDER — CALCIUM CARBONATE 500 MG/1
1000 TABLET, CHEWABLE ORAL DAILY PRN
Status: DISCONTINUED | OUTPATIENT
Start: 2025-06-24 | End: 2025-06-25 | Stop reason: HOSPADM

## 2025-06-24 RX ORDER — TRAMADOL HYDROCHLORIDE 50 MG/1
50 TABLET ORAL 2 TIMES DAILY PRN
Status: DISCONTINUED | OUTPATIENT
Start: 2025-06-24 | End: 2025-06-25 | Stop reason: HOSPADM

## 2025-06-24 RX ORDER — CEFTRIAXONE 1 G/50ML
1000 INJECTION, SOLUTION INTRAVENOUS EVERY 24 HOURS
Status: DISCONTINUED | OUTPATIENT
Start: 2025-06-25 | End: 2025-06-25 | Stop reason: HOSPADM

## 2025-06-24 RX ORDER — METOPROLOL SUCCINATE 100 MG/1
100 TABLET, EXTENDED RELEASE ORAL DAILY
Status: DISCONTINUED | OUTPATIENT
Start: 2025-06-25 | End: 2025-06-25 | Stop reason: HOSPADM

## 2025-06-24 RX ORDER — CEFTRIAXONE 1 G/50ML
1000 INJECTION, SOLUTION INTRAVENOUS ONCE
Status: COMPLETED | OUTPATIENT
Start: 2025-06-24 | End: 2025-06-24

## 2025-06-24 RX ORDER — ACETAMINOPHEN 325 MG/1
650 TABLET ORAL EVERY 6 HOURS PRN
Status: DISCONTINUED | OUTPATIENT
Start: 2025-06-24 | End: 2025-06-25 | Stop reason: HOSPADM

## 2025-06-24 RX ORDER — LABETALOL HYDROCHLORIDE 5 MG/ML
10 INJECTION, SOLUTION INTRAVENOUS ONCE
Status: COMPLETED | OUTPATIENT
Start: 2025-06-24 | End: 2025-06-24

## 2025-06-24 RX ORDER — DILTIAZEM HYDROCHLORIDE 120 MG/1
120 CAPSULE, COATED, EXTENDED RELEASE ORAL DAILY
Status: DISCONTINUED | OUTPATIENT
Start: 2025-06-25 | End: 2025-06-25 | Stop reason: HOSPADM

## 2025-06-24 RX ORDER — POLYETHYLENE GLYCOL 3350 17 G/17G
17 POWDER, FOR SOLUTION ORAL DAILY PRN
Status: DISCONTINUED | OUTPATIENT
Start: 2025-06-24 | End: 2025-06-25 | Stop reason: HOSPADM

## 2025-06-24 RX ADMIN — GABAPENTIN 200 MG: 100 CAPSULE ORAL at 18:10

## 2025-06-24 RX ADMIN — LIDOCAINE 1 PATCH: 50 PATCH CUTANEOUS at 12:20

## 2025-06-24 RX ADMIN — APIXABAN 2.5 MG: 2.5 TABLET, FILM COATED ORAL at 18:09

## 2025-06-24 RX ADMIN — PRAVASTATIN SODIUM 40 MG: 40 TABLET ORAL at 18:10

## 2025-06-24 RX ADMIN — CEFTRIAXONE 1000 MG: 1 INJECTION, SOLUTION INTRAVENOUS at 15:07

## 2025-06-24 RX ADMIN — LABETALOL HYDROCHLORIDE 10 MG: 5 INJECTION, SOLUTION INTRAVENOUS at 14:11

## 2025-06-24 RX ADMIN — ACETAMINOPHEN 975 MG: 325 TABLET, FILM COATED ORAL at 13:43

## 2025-06-24 NOTE — ED NOTES
Assisted oob to commode.  Clean catch specimen obtained and at bedside     Raquel Andrews RN  06/24/25 [id number]

## 2025-06-24 NOTE — ASSESSMENT & PLAN NOTE
Pacemaker interrogated in ED. Patient with 11 episodes of VTACH yesterday  Possibly related to stress of son passing.  Monitor on tele for now  Electrolytes ok check Mag  Card eval  Continue metoprolol  Patient is DNR/DNI

## 2025-06-24 NOTE — ASSESSMENT & PLAN NOTE
Patient with RLL consolidation and mucus pluggingon CT scan.  No hypoxia  Continue IV antibiotics

## 2025-06-24 NOTE — H&P
H&P - Hospitalist   Name: Mckenna Whitten 89 y.o. female I MRN: [id number]  Unit/Bed#: [facility] I Date of Admission: 6/24/2025   Date of Service: 6/24/2025 I Hospital Day: 0     Assessment & Plan  Right lower lobe pneumonia  Patient with RLL consolidation and mucus pluggingon CT scan.  No hypoxia  Continue IV antibiotics    NSVT (nonsustained ventricular tachycardia) (HCC)  Pacemaker interrogated in ED. Patient with 11 episodes of VTACH yesterday  Possibly related to stress of son passing.  Monitor on tele for now  Electrolytes ok check Mag  Card eval  Continue metoprolol  Patient is DNR/DNI  Chronic atrial fibrillation (HCC)  On anticoagulation and cardizem  Pacemaker  Recent battery change  Not an ICD per daughter  Stage 3b chronic kidney disease (HCC)  Lab Results   Component Value Date    EGFR 27 06/24/2025    EGFR  05/14/2025     Not performed on patients less than 18 years of age or greater than 97 years of age    EGFR  02/28/2025     Not performed on patients less than 18 years of age or greater than 97 years of age    CREATININE 1.54 (H) 06/24/2025    CREATININE 1.52 (H) 05/14/2025    CREATININE 1.43 (H) 02/28/2025   Renal function at baseline  Mild intermittent asthma without complication  No acute exacerbation  Continue PRN Albuterol      VTE Pharmacologic Prophylaxis: VTE Score: 4 Moderate Risk (Score 3-4) - Pharmacological DVT Prophylaxis Contraindicated. Sequential Compression Devices Ordered.  Code Status: DNR level 3  Discussion with family: Updated  (daughter) at bedside.    Anticipated Length of Stay: Patient will be admitted on an inpatient basis with an anticipated length of stay of greater than 2 midnights secondary to Pneumonia and NSVT.    History of Present Illness   Chief Complaint: I don't feel well    Mckenna Whitten is a 89 y.o. female with a PMH of A-fib on Eliquis, mild intermittent asthma CKD stage IIIb who presents with left-sided rib pain after striking her chest.   Patient's daughter at bedside providing much of the history.  Patient's family took the patient to [place] to celebrate her upcoming 89th birthday in September.  She was doing very well using a walker and a motorized scooter for transport.  [place] and coded and  in front of her.  Patient tried to reach to help him and hit her chest on the walker suffering some right sided rib pain.  She came over the emergency room CT scan revealed a right lower lobe pneumonia and ED pacemaker interrogation revealed 11 episodes of nonsustained V. tach per the emergency room.    At time of my evaluation patient is feeling better patient's daughter does note a wet cough for the last few days.  No fevers or chills no hypoxia noted    Review of Systems   Constitutional:  Negative for appetite change, chills, diaphoresis and fatigue.   Eyes:  Negative for photophobia and visual disturbance.   Respiratory:  Positive for cough. Negative for choking, chest tightness, shortness of breath, wheezing and stridor.    Cardiovascular:  Positive for chest pain (Positive chest wall pain). Negative for palpitations and leg swelling.   Gastrointestinal:  Negative for abdominal pain, constipation, diarrhea, nausea and vomiting.   Endocrine: Negative for polydipsia, polyphagia and polyuria.   Genitourinary:  Negative for dysuria, frequency and urgency.   Neurological:  Negative for tremors and weakness.   Hematological:  Negative for adenopathy. Does not bruise/bleed easily.   Psychiatric/Behavioral:  Negative for agitation.        Historical Information   Past Medical History[1]  Past Surgical History[2]  Social History[3]  E-Cigarette/Vaping    E-Cigarette Use Never User      E-Cigarette/Vaping Substances    Nicotine No     THC No     CBD No     Flavoring No     Other No     Unknown No      Family History[4]  Social History:  Marital Status:    Occupation:   Patient Pre-hospital Living  Situation: Home  Patient Pre-hospital Level of Mobility: walks with walker  Patient Pre-hospital Diet Restrictions: none    Meds/Allergies   I have reviewed home medications with patient family member.  Prior to Admission medications    Medication Sig Start Date End Date Taking? Authorizing Provider   acetaminophen (TYLENOL) 650 mg CR tablet Take 650 mg by mouth every 8 (eight) hours as needed for mild pain    Historical Provider, MD   albuterol (PROVENTIL HFA,VENTOLIN HFA) 90 mcg/act inhaler Inhale 2 puffs every 4 (four) hours as needed for wheezing 1/17/25   Park Sanders MD   apixaban (Eliquis) 2.5 mg Take 1 tablet (2.5 mg total) by mouth 2 (two) times a day 2/12/25   Park Sanders MD   clotrimazole-betamethasone (LOTRISONE) 1-0.05 % cream  7/17/24   Historical Provider, MD   cyclobenzaprine (FLEXERIL) 5 mg tablet Take 1 tablet (5 mg total) by mouth 2 (two) times a day 3/24/25   Park Sanders MD   diltiazem (CARDIZEM CD) 120 mg 24 hr capsule TAKE 1 CAPSULE EVERY DAY 2/3/25   Park Sanders MD   gabapentin (NEURONTIN) 100 mg capsule TAKE 2 CAPSULES TWICE DAILY 1/24/25   Park Sanders MD   iron polysaccharides (FERREX) 150 mg capsule TAKE 1 CAPSULE EVERY DAY 2/4/25   Park Sanders MD   lisinopril (ZESTRIL) 5 mg tablet TAKE 1 TABLET EVERY DAY 1/24/25   Park Sanders MD   metoprolol succinate (TOPROL-XL) 100 mg 24 hr tablet TAKE 1 TABLET EVERY DAY 12/13/24   Park Sanders MD   mupirocin (BACTROBAN) 2 % ointment  5/2/25   Historical Provider, MD   rosuvastatin (CRESTOR) 5 mg tablet TAKE 1 TABLET TWICE WEEKLY ON TUESDAY AND SATURDAY 2/25/25   Park Sanders MD   traMADol (ULTRAM) 50 mg tablet TAKE 1 TABLET BY MOUTH 2 TIMES DAILY 5/1/25   Park Sanders MD     Allergies   Allergen Reactions    Augmentin [Amoxicillin-Pot Clavulanate] Diarrhea, GI Intolerance, Headache and Abdominal Pain    Silver Sulfadiazine Other (See Comments)       Objective :  Temp:  [98.1 °F (36.7 °C)] 98.1 °F (36.7 °C)  HR:  []  76  BP: (138-219)/() 157/70  Resp:  [18-20] 20  SpO2:  [94 %-98 %] 95 %  O2 Device: None (Room air)    Physical Exam  Constitutional:       General: She is not in acute distress.     Appearance: She is not diaphoretic.   HENT:      Head: Normocephalic and atraumatic.     Eyes:      General: No scleral icterus.    Neck:      Vascular: No carotid bruit.     Cardiovascular:      Rate and Rhythm: Normal rate. Rhythm irregular.      Pulses: Normal pulses.      Heart sounds: No murmur heard.     No friction rub. No gallop.   Pulmonary:      Effort: Pulmonary effort is normal. No respiratory distress.      Breath sounds: No stridor. Rhonchi (Positive right basilar rhonchi) present. No wheezing or rales.   Abdominal:      General: Abdomen is flat. There is no distension.      Palpations: Abdomen is soft.      Tenderness: There is no abdominal tenderness. There is no guarding or rebound.     Musculoskeletal:      Right lower leg: No edema.      Left lower leg: No edema.     Neurological:      General: No focal deficit present.      Mental Status: She is alert.     Psychiatric:         Mood and Affect: Mood normal.         Behavior: Behavior normal.          Lines/Drains:            Lab Results: I have reviewed the following results:  Results from last 7 days   Lab Units 06/24/25  1217   WBC Thousand/uL 8.97   HEMOGLOBIN g/dL 12.4   HEMATOCRIT % 38.7   PLATELETS Thousands/uL 291   SEGS PCT % 75   LYMPHO PCT % 14   MONO PCT % 9   EOS PCT % 1     Results from last 7 days   Lab Units 06/24/25  1217   SODIUM mmol/L 139   POTASSIUM mmol/L 4.2   CHLORIDE mmol/L 105   CO2 mmol/L 24   BUN mg/dL 32*   CREATININE mg/dL 1.54*   ANION GAP mmol/L 10   CALCIUM mg/dL 9.9   GLUCOSE RANDOM mg/dL 126             Lab Results   Component Value Date    HGBA1C 5.7 (H) 09/08/2021     Results from last 7 days   Lab Units 06/24/25  1437 06/24/25  1217   LACTIC ACID mmol/L 1.1  --    PROCALCITONIN ng/ml  --  <0.05       Imaging Results Review: I  personally reviewed the following image studies/reports in PACS and discussed pertinent findings with Radiology: chest xray and CT chest. My interpretation of the radiology images/reports is: No acute infiltrate on chest x-ray CT scan as noted.  Other Study Results Review: EKG was reviewed.  A-fib with left bundle branch block positive PVCs    Administrative Statements       ** Please Note: This note has been constructed using a voice recognition system. **         [1]   Past Medical History:  Diagnosis Date    Anemia     Arthritis     Asthma 2/2008    Atrial fibrillation (HCC) 11/08/2019    CAD     Chronic kidney disease     CKD (chronic kidney disease)     Current use of long term anticoagulation 11/08/2019    Degenerative joint disease     Elevated troponin 09/08/2021    Heart disease 6/17    Hyperlipidemia     Hypertension 5/18/17    Hypertension, essential 11/08/2019    ALYSSA (iron deficiency anemia)     Osteopenia     Osteoporosis     Pacemaker     TIA (transient ischemic attack) 11/08/2019   [2]   Past Surgical History:  Procedure Laterality Date    CARDIAC PACEMAKER PLACEMENT     [3]   Social History  Tobacco Use    Smoking status: Never     Passive exposure: Past    Smokeless tobacco: Never   Vaping Use    Vaping status: Never Used   Substance and Sexual Activity    Alcohol use: Not Currently     Comment: occasionaly     Drug use: Never    Sexual activity: Not Currently     Partners: Male     Birth control/protection: Post-menopausal   [4]   Family History  Problem Relation Name Age of Onset    Other Mother Jordyn         Respiratory problem    Asthma Mother Jordyn     Hypertension Son Arnel     Hypertension Daughter Boyd     Hyperlipidemia Daughter Boyd     Hyperlipidemia Daughter Natalie

## 2025-06-24 NOTE — PLAN OF CARE
Problem: Prexisting or High Potential for Compromised Skin Integrity  Goal: Skin integrity is maintained or improved  Description: INTERVENTIONS:  - Identify patients at risk for skin breakdown  - Assess and monitor skin integrity including under and around medical devices   - Assess and monitor nutrition and hydration status  - Monitor labs  - Assess for incontinence   - Turn and reposition patient  - Assist with mobility/ambulation  - Relieve pressure over barb prominences   - Avoid friction and shearing  - Provide appropriate hygiene as needed including keeping skin clean and dry  - Evaluate need for skin moisturizer/barrier cream  - Collaborate with interdisciplinary team  - Patient/family teaching  - Consider wound care consult    Assess:  - Review Eulogio scale daily  - Clean and moisturize skin every   - Inspect skin when repositioning, toileting, and assisting with ADLS  - Assess under medical devices such as  every   - Assess extremities for adequate circulation and sensation     Bed Management:  - Have minimal linens on bed & keep smooth, unwrinkled  - Change linens as needed when moist or perspiring  - Avoid sitting or lying in one position for more than  hours while in bed?Keep HOB at degrees   - Toileting:  - Offer bedside commode  - Assess for incontinence every   - Use incontinent care products after each incontinent episode such as     Activity:  - Mobilize patient  times a day  - Encourage activity and walks on unit  - Encourage or provide ROM exercises   - Turn and reposition patient every  Hours  - Use appropriate equipment to lift or move patient in bed  - Instruct/ Assist with weight shifting every  when out of bed in chair  - Consider limitation of chair time  hour intervals    Skin Care:  - Avoid use of baby powder, tape, friction and shearing, hot water or constrictive clothing  - Relieve pressure over bony prominences using   - Do not massage red bony areas    Next Steps:  - Teach patient  strategies to minimize risks such as   - Consider consults to  interdisciplinary teams such as  6/24/2025 1808 by Jada Chavez RN  Outcome: Progressing  6/24/2025 1808 by Jada Chavez RN  Outcome: Progressing     Problem: PAIN - ADULT  Goal: Verbalizes/displays adequate comfort level or baseline comfort level  Description: Interventions:  - Encourage patient to monitor pain and request assistance  - Assess pain using appropriate pain scale  - Administer analgesics as ordered based on type and severity of pain and evaluate response  - Implement non-pharmacological measures as appropriate and evaluate response  - Consider cultural and social influences on pain and pain management  - Notify physician/advanced practitioner if interventions unsuccessful or patient reports new pain  - Educate patient/family on pain management process including their role and importance of  reporting pain   - Provide non-pharmacologic/complimentary pain relief interventions  Outcome: Progressing     Problem: CARDIOVASCULAR - ADULT  Goal: Maintains optimal cardiac output and hemodynamic stability  Description: INTERVENTIONS:  - Monitor I/O, vital signs and rhythm  - Monitor for S/S and trends of decreased cardiac output  - Administer and titrate ordered vasoactive medications to optimize hemodynamic stability  - Assess quality of pulses, skin color and temperature  - Assess for signs of decreased coronary artery perfusion  - Instruct patient to report change in severity of symptoms  Outcome: Progressing  Goal: Absence of cardiac dysrhythmias or at baseline rhythm  Description: INTERVENTIONS:  - Continuous cardiac monitoring, vital signs, obtain 12 lead EKG if ordered  - Administer antiarrhythmic and heart rate control medications as ordered  - Monitor electrolytes and administer replacement therapy as ordered  Outcome: Progressing

## 2025-06-24 NOTE — ED PROVIDER NOTES
Time reflects when diagnosis was documented in both MDM as applicable and the Disposition within this note       Time User Action Codes Description Comment    6/24/2025  2:00 PM Ronaldo Bar [R07.9] Chest pain     6/24/2025  2:33 PM Ronaldo Bar [J18.9] Pneumonia     6/24/2025  2:33 PM Ronaldo Bar [I47.20] Ventricular tachycardia (HCC)     6/24/2025  2:34 PM Ronaldo Bar [F43.20] Grief reaction           ED Disposition       ED Disposition   Admit    Condition   Stable    Date/Time   Tue Jun 24, 2025  2:33 PM    Comment   Case was discussed with angelo and the patient's admission status was agreed to be Admission Status: inpatient status to the service of Dr. Cook .               Assessment & Plan       Medical Decision Making  89-year-old female with left-sided rib pain after striking her chest on a scooter yesterday.    On exam is pain on palpation of this area only.  Does not radiate anywhere.  Not pleuritic.  No risk factors for PE and is anticoagulated.    Differential including but not limited to rib fracture, pneumothorax, pneumonia, detachment of pacer wire, acute intrathoracic abnormality, less likely arrhythmia or ACS.    ECG with afib w rvr. T wave inv in lateral precordium. CXR w/o acute findings per my read.    Troponin within normal limits x 2.    Pacemaker was interrogated which showed 11 episodes of ventricular tachycardia yesterday.    CT scan with pneumonia versus bronchitis.  Patient has a wet cough on exam.  Not hypoxic.    Will plan on admission.        Problems Addressed:  Chest pain: acute illness or injury  Grief reaction: acute illness or injury  Pneumonia: acute illness or injury  Ventricular tachycardia (HCC): acute illness or injury    Amount and/or Complexity of Data Reviewed  Labs: ordered. Decision-making details documented in ED Course.  Radiology: ordered and independent interpretation performed.    Risk  OTC drugs.  Prescription drug  management.  Decision regarding hospitalization.        ED Course as of 06/24/25 1525   Tue Jun 24, 2025   1221 Procedure Note: EKG  Date/Time: 06/24/25 12:21 PM   Interpreted by: Ronaldo Bar  Indications / Diagnosis: Rib pain  ECG reviewed by me, the ED Provider: yes   The EKG demonstrates:  Rhythm: afib w rvr 108 bpm  Intervals: irregularly irregular  Axis: L axis  QRS/Blocks: normal QRS except q in inferior and anterior precordium unchanged from 9/1/23  ST Changes: No acute ST Changes, no STD/MARILYNN.  T wave in in v5/6 different from previous. Not paced currently compared to last ECG which is paced.   1246 Creatinine(!): 1.54  At baseline from a month ago   1247 hs TnI 0hr: 13       Medications   sodium chloride (PF) 0.9 % injection 3 mL (has no administration in time range)   lidocaine (LIDODERM) 5 % patch 1 patch (1 patch Topical Medication Applied 6/24/25 1220)   cefTRIAXone (ROCEPHIN) IVPB (premix in dextrose) 1,000 mg 50 mL (1,000 mg Intravenous New Bag 6/24/25 1507)   acetaminophen (TYLENOL) tablet 975 mg (975 mg Oral Given 6/24/25 1343)   labetalol (NORMODYNE) injection 10 mg (10 mg Intravenous Given 6/24/25 1411)       ED Risk Strat Scores   HEART Risk Score      Flowsheet Row Most Recent Value   Heart Score Risk Calculator    History 0 Filed at: 06/24/2025 1359   ECG 1 Filed at: 06/24/2025 1359   Age 2 Filed at: 06/24/2025 1359   Risk Factors 1 Filed at: 06/24/2025 1359   Troponin 1 Filed at: 06/24/2025 1359   HEART Score 5 Filed at: 06/24/2025 1359          HEART Risk Score      Flowsheet Row Most Recent Value   Heart Score Risk Calculator    History 0 Filed at: 06/24/2025 1359   ECG 1 Filed at: 06/24/2025 1359   Age 2 Filed at: 06/24/2025 1359   Risk Factors 1 Filed at: 06/24/2025 1359   Troponin 1 Filed at: 06/24/2025 1359   HEART Score 5 Filed at: 06/24/2025 1359                      No data recorded                            History of Present Illness       Chief Complaint   Patient presents with     Chest Pain     Pt fell yesterday trying to catch someone else who was falling. C/o Left sided chest wall pain today. Some SOB with hx of COPD used inhaler moments before arrival.        Past Medical History[1]   Past Surgical History[2]   Family History[3]   Social History[4]   E-Cigarette/Vaping    E-Cigarette Use Never User       E-Cigarette/Vaping Substances    Nicotine No     THC No     CBD No     Flavoring No     Other No     Unknown No       I have reviewed and agree with the history as documented.     89-year-old female previous history of permanent atrial fibrillation anticoagulated on Eliquis, asthma, hypertension, pacemaker battery replacement approximately 6 months ago with a [employer] pacemaker per review of records.    Patient presents to the emergency department after hitting her chest on a scooter.  Notes continued pain in the left anterior chest wall since hitting her chest.    [place]  [place] son suddenly had a cardiac arrest and .  She attempted to lean down to help him and struck her chest off of a mobility scooter.  Striking the left portion of her chest on the scooter.  She did not fall to the ground.  Did not lose consciousness.  She did not strike her head.    Denies neurological deficits.    She notes continued chest pain that is worse upon movement since this episode.    Worse with movement.  Worse with palpation.  Not pleuritic.    Has no other complaints in the emergency department.          Chest Pain  Pain location:  R chest  Pain radiates to:  Does not radiate  Associated symptoms: cough        Review of Systems   Respiratory:  Positive for cough.    Cardiovascular:  Positive for chest pain.   All other systems reviewed and are negative.          Objective       ED Triage Vitals [25 1204]   Temperature Pulse Blood Pressure Respirations SpO2 Patient Position - Orthostatic VS   98.1 °F (36.7 °C) (!) 118 (!) 175/91  18 98 % Lying      Temp Source Heart Rate Source BP Location FiO2 (%) Pain Score    Oral Monitor Left arm -- 6      Vitals      Date and Time Temp Pulse SpO2 Resp BP Pain Score FACES Pain Rating User   06/24/25 1430 -- 77 94 % 19 193/92 -- --    06/24/25 1400 -- 92 97 % 20 219/101 provider made aware -- --    06/24/25 1343 -- -- -- -- -- 8 --    06/24/25 1211 -- 96 -- -- -- -- --    06/24/25 1204 98.1 °F (36.7 °C) 118 98 % 18 175/91 6 -- FN            Physical Exam  Vitals and nursing note reviewed.   Constitutional:       General: She is not in acute distress.     Appearance: Normal appearance. She is not ill-appearing.   HENT:      Head: Normocephalic and atraumatic.      Right Ear: External ear normal.      Left Ear: External ear normal.      Nose: Nose normal.      Mouth/Throat:      Mouth: Mucous membranes are moist.     Eyes:      General:         Right eye: No discharge.         Left eye: No discharge.      Conjunctiva/sclera: Conjunctivae normal.       Cardiovascular:      Rate and Rhythm: Normal rate and regular rhythm.      Pulses: Normal pulses.      Heart sounds: No murmur heard.  Pulmonary:      Effort: Pulmonary effort is normal.      Breath sounds: Wheezing (minimal) present. No decreased breath sounds.   Abdominal:      General: Abdomen is flat. There is no distension.      Tenderness: There is no abdominal tenderness.     Musculoskeletal:         General: Normal range of motion.      Cervical back: Normal range of motion.      Right lower leg: No edema.      Left lower leg: No edema.     Skin:     General: Skin is warm.      Capillary Refill: Capillary refill takes less than 2 seconds.      Findings: No rash.     Neurological:      General: No focal deficit present.      Mental Status: She is alert. Mental status is at baseline.     Psychiatric:         Mood and Affect: Mood normal.         Behavior: Behavior normal.         Results Reviewed       Procedure Component Value Units Date/Time     Lactic acid, plasma (w/reflex if result > 2.0) [[id number]]  (Normal) Collected: 06/24/25 1437    Lab Status: Final result Specimen: Blood from Arm, Right Updated: 06/24/25 1458     LACTIC ACID 1.1 mmol/L     Narrative:      Result may be elevated if tourniquet was used during collection.    Blood culture #2 [[id number]] Collected: 06/24/25 1437    Lab Status: In process Specimen: Blood from Arm, Left Updated: 06/24/25 1440    Blood culture #1 [[id number]] Collected: 06/24/25 1437    Lab Status: In process Specimen: Blood from Arm, Right Updated: 06/24/25 1440    Procalcitonin [[id number]]  (Normal) Collected: 06/24/25 1217    Lab Status: Final result Specimen: Blood from Arm, Right Updated: 06/24/25 1427     Procalcitonin <0.05 ng/ml     HS Troponin I 2hr [[id number]]  (Normal) Collected: 06/24/25 1352    Lab Status: Final result Specimen: Blood from Arm, Right Updated: 06/24/25 1421     hs TnI 2hr 13 ng/L      Delta 2hr hsTnI 0 ng/L     HS Troponin I 4hr [[id number]]     Lab Status: No result Specimen: Blood     HS Troponin 0hr (reflex protocol) [[id number]]  (Normal) Collected: 06/24/25 1217    Lab Status: Final result Specimen: Blood from Arm, Right Updated: 06/24/25 1247     hs TnI 0hr 13 ng/L     Basic metabolic panel [[id number]]  (Abnormal) Collected: 06/24/25 1217    Lab Status: Final result Specimen: Blood from Arm, Right Updated: 06/24/25 1239     Sodium 139 mmol/L      Potassium 4.2 mmol/L      Chloride 105 mmol/L      CO2 24 mmol/L      ANION GAP 10 mmol/L      BUN 32 mg/dL      Creatinine 1.54 mg/dL      Glucose 126 mg/dL      Calcium 9.9 mg/dL      eGFR 27 ml/min/1.73sq m     Narrative:      National Kidney Disease Foundation guidelines for Chronic Kidney Disease (CKD):     Stage 1 with normal or high GFR (GFR > 90 mL/min/1.73 square meters)    Stage 2 Mild CKD (GFR = 60-89 mL/min/1.73 square meters)    Stage 3A Moderate CKD (GFR = 45-59 mL/min/1.73 square meters)    Stage 3B Moderate CKD (GFR = 30-44  mL/min/1.73 square meters)    Stage 4 Severe CKD (GFR = 15-29 mL/min/1.73 square meters)    Stage 5 End Stage CKD (GFR <15 mL/min/1.73 square meters)  Note: GFR calculation is accurate only with a steady state creatinine    CBC and differential [[id number]] Collected: 06/24/25 1217    Lab Status: Final result Specimen: Blood from Arm, Right Updated: 06/24/25 1224     WBC 8.97 Thousand/uL      RBC 4.11 Million/uL      Hemoglobin 12.4 g/dL      Hematocrit 38.7 %      MCV 94 fL      MCH 30.2 pg      MCHC 32.0 g/dL      RDW 13.7 %      MPV 10.1 fL      Platelets 291 Thousands/uL      nRBC 0 /100 WBCs      Segmented % 75 %      Immature Grans % 0 %      Lymphocytes % 14 %      Monocytes % 9 %      Eosinophils Relative 1 %      Basophils Relative 1 %      Absolute Neutrophils 6.77 Thousands/µL      Absolute Immature Grans 0.03 Thousand/uL      Absolute Lymphocytes 1.25 Thousands/µL      Absolute Monocytes 0.76 Thousand/µL      Eosinophils Absolute 0.10 Thousand/µL      Basophils Absolute 0.06 Thousands/µL             CT chest without contrast   Final Interpretation by Aman Hu MD (06/24 5898)      Diffuse bronchial wall thickening, with mucous plugging involving the lower lobes, right greater than left, with areas of patchy consolidation involving the right lung base, and tree-in-bud nodular opacities involving the posterior left upper lobe. These    findings likely represent a bronchitis/bronchopneumonia.      No pleural effusion or pneumothorax noted.      Biatrial enlargement. Coronary artery atherosclerotic calcifications.      Mildly enlarged main pulmonary artery indicative of pulmonary hypertension.      Large hiatal hernia.      Mild deformity of the left eighth and ninth ribs, likely sequela of prior trauma.      Computerized Assisted Algorithm (CAA) may have aided analysis of applicable images.      Workstation performed: [id number]         X-ray chest 1 view portable   ED Interpretation by Ronaldo Bar,  DO (06/24 1224)   No acute cardiopulmonary finding.          Procedures    ED Medication and Procedure Management   Prior to Admission Medications   Prescriptions Last Dose Informant Patient Reported? Taking?   acetaminophen (TYLENOL) 650 mg CR tablet   Yes No   Sig: Take 650 mg by mouth every 8 (eight) hours as needed for mild pain   albuterol (PROVENTIL HFA,VENTOLIN HFA) 90 mcg/act inhaler   No No   Sig: Inhale 2 puffs every 4 (four) hours as needed for wheezing   apixaban (Eliquis) 2.5 mg   No No   Sig: Take 1 tablet (2.5 mg total) by mouth 2 (two) times a day   clotrimazole-betamethasone (LOTRISONE) 1-0.05 % cream   Yes No   cyclobenzaprine (FLEXERIL) 5 mg tablet   No No   Sig: Take 1 tablet (5 mg total) by mouth 2 (two) times a day   diltiazem (CARDIZEM CD) 120 mg 24 hr capsule   No No   Sig: TAKE 1 CAPSULE EVERY DAY   gabapentin (NEURONTIN) 100 mg capsule   No No   Sig: TAKE 2 CAPSULES TWICE DAILY   iron polysaccharides (FERREX) 150 mg capsule   No No   Sig: TAKE 1 CAPSULE EVERY DAY   lisinopril (ZESTRIL) 5 mg tablet   No No   Sig: TAKE 1 TABLET EVERY DAY   metoprolol succinate (TOPROL-XL) 100 mg 24 hr tablet   No No   Sig: TAKE 1 TABLET EVERY DAY   mupirocin (BACTROBAN) 2 % ointment   Yes No   rosuvastatin (CRESTOR) 5 mg tablet   No No   Sig: TAKE 1 TABLET TWICE WEEKLY ON TUESDAY AND SATURDAY   traMADol (ULTRAM) 50 mg tablet   No No   Sig: TAKE 1 TABLET BY MOUTH 2 TIMES DAILY      Facility-Administered Medications: None     Patient's Medications   Discharge Prescriptions    No medications on file     No discharge procedures on file.  ED SEPSIS DOCUMENTATION   Time reflects when diagnosis was documented in both MDM as applicable and the Disposition within this note       Time User Action Codes Description Comment    6/24/2025  2:00 PM Ronaldo Bar [R07.9] Chest pain     6/24/2025  2:33 PM Ronaldo Bar [J18.9] Pneumonia     6/24/2025  2:33 PM Ronaldo Bar [I47.20] Ventricular tachycardia  ([facility])     6/24/2025  2:34 PM Ronaldo Bar Add [F43.20] Grief reaction                      [1]   Past Medical History:  Diagnosis Date    Anemia     Arthritis     Asthma 2/2008    Atrial fibrillation ([facility]) 11/08/2019    CAD     Chronic kidney disease     CKD (chronic kidney disease)     Current use of long term anticoagulation 11/08/2019    Degenerative joint disease     Elevated troponin 09/08/2021    Heart disease 6/17    Hyperlipidemia     Hypertension 5/18/17    Hypertension, essential 11/08/2019    ALYSSA (iron deficiency anemia)     Osteopenia     Osteoporosis     Pacemaker     TIA (transient ischemic attack) 11/08/2019   [2]   Past Surgical History:  Procedure Laterality Date    CARDIAC PACEMAKER PLACEMENT     [3]   Family History  Problem Relation Name Age of Onset    Other Mother Jordyn         Respiratory problem    Asthma Mother Jordyn     Hypertension Son Arnel     Hypertension Daughter Boyd     Hyperlipidemia Daughter Boyd     Hyperlipidemia Daughter Ntaalie    [4]   Social History  Tobacco Use    Smoking status: Never     Passive exposure: Past    Smokeless tobacco: Never   Vaping Use    Vaping status: Never Used   Substance Use Topics    Alcohol use: Not Currently     Comment: occasionaly     Drug use: Never        Ronaldo Bar DO  06/24/25 1526

## 2025-06-24 NOTE — ASSESSMENT & PLAN NOTE
Lab Results   Component Value Date    EGFR 27 06/24/2025    EGFR  05/14/2025     Not performed on patients less than 18 years of age or greater than 97 years of age    EGFR  02/28/2025     Not performed on patients less than 18 years of age or greater than 97 years of age    CREATININE 1.54 (H) 06/24/2025    CREATININE 1.52 (H) 05/14/2025    CREATININE 1.43 (H) 02/28/2025   Renal function at baseline

## 2025-06-24 NOTE — ED NOTES
Patients family requesting tylenol per patient request, provider made aware.      Chelsey Thomas, RN  06/24/25 9553

## 2025-06-25 ENCOUNTER — TRANSITIONAL CARE MANAGEMENT (OUTPATIENT)
Age: OVER 89
End: 2025-06-25

## 2025-06-25 VITALS
HEIGHT: 59 IN | SYSTOLIC BLOOD PRESSURE: 176 MMHG | DIASTOLIC BLOOD PRESSURE: 88 MMHG | BODY MASS INDEX: 19.91 KG/M2 | OXYGEN SATURATION: 96 % | TEMPERATURE: 98.6 F | HEART RATE: 80 BPM | RESPIRATION RATE: 16 BRPM | WEIGHT: 98.77 LBS

## 2025-06-25 PROBLEM — R07.89 LEFT-SIDED CHEST WALL PAIN: Status: ACTIVE | Noted: 2025-06-25

## 2025-06-25 LAB
ANION GAP SERPL CALCULATED.3IONS-SCNC: 8 MMOL/L (ref 4–13)
BUN SERPL-MCNC: 27 MG/DL (ref 5–25)
CALCIUM SERPL-MCNC: 9.4 MG/DL (ref 8.4–10.2)
CHLORIDE SERPL-SCNC: 108 MMOL/L (ref 96–108)
CO2 SERPL-SCNC: 27 MMOL/L (ref 21–32)
CREAT SERPL-MCNC: 1.47 MG/DL (ref 0.6–1.3)
ERYTHROCYTE [DISTWIDTH] IN BLOOD BY AUTOMATED COUNT: 13.7 % (ref 11.6–15.1)
GFR SERPL CREATININE-BSD FRML MDRD: 29 ML/MIN/1.73SQ M
GLUCOSE SERPL-MCNC: 84 MG/DL (ref 65–140)
HCT VFR BLD AUTO: 36.2 % (ref 34.8–46.1)
HGB BLD-MCNC: 11.5 G/DL (ref 11.5–15.4)
MAGNESIUM SERPL-MCNC: 1.9 MG/DL (ref 1.9–2.7)
MCH RBC QN AUTO: 30.9 PG (ref 26.8–34.3)
MCHC RBC AUTO-ENTMCNC: 31.8 G/DL (ref 31.4–37.4)
MCV RBC AUTO: 97 FL (ref 82–98)
PLATELET # BLD AUTO: 258 THOUSANDS/UL (ref 149–390)
PMV BLD AUTO: 10.6 FL (ref 8.9–12.7)
POTASSIUM SERPL-SCNC: 4 MMOL/L (ref 3.5–5.3)
PROCALCITONIN SERPL-MCNC: <0.05 NG/ML
RBC # BLD AUTO: 3.72 MILLION/UL (ref 3.81–5.12)
SODIUM SERPL-SCNC: 143 MMOL/L (ref 135–147)
WBC # BLD AUTO: 8 THOUSAND/UL (ref 4.31–10.16)

## 2025-06-25 PROCEDURE — 99239 HOSP IP/OBS DSCHRG MGMT >30: CPT | Performed by: INTERNAL MEDICINE

## 2025-06-25 PROCEDURE — 80048 BASIC METABOLIC PNL TOTAL CA: CPT | Performed by: INTERNAL MEDICINE

## 2025-06-25 PROCEDURE — 85027 COMPLETE CBC AUTOMATED: CPT | Performed by: INTERNAL MEDICINE

## 2025-06-25 PROCEDURE — 84145 PROCALCITONIN (PCT): CPT | Performed by: INTERNAL MEDICINE

## 2025-06-25 PROCEDURE — 99222 1ST HOSP IP/OBS MODERATE 55: CPT | Performed by: INTERNAL MEDICINE

## 2025-06-25 PROCEDURE — 83735 ASSAY OF MAGNESIUM: CPT | Performed by: INTERNAL MEDICINE

## 2025-06-25 RX ORDER — AZITHROMYCIN 250 MG/1
TABLET, FILM COATED ORAL
Qty: 6 TABLET | Refills: 0 | Status: SHIPPED | OUTPATIENT
Start: 2025-06-25 | End: 2025-06-29

## 2025-06-25 RX ORDER — CEFDINIR 300 MG/1
300 CAPSULE ORAL EVERY 24 HOURS
Qty: 4 CAPSULE | Refills: 0 | Status: SHIPPED | OUTPATIENT
Start: 2025-06-25 | End: 2025-06-29

## 2025-06-25 RX ADMIN — METOPROLOL SUCCINATE 100 MG: 100 TABLET, EXTENDED RELEASE ORAL at 10:58

## 2025-06-25 RX ADMIN — POLYSACCHARIDE-IRON COMPLEX 150 MG: 150 CAPSULE ORAL at 10:58

## 2025-06-25 RX ADMIN — APIXABAN 2.5 MG: 2.5 TABLET, FILM COATED ORAL at 10:58

## 2025-06-25 RX ADMIN — GABAPENTIN 200 MG: 100 CAPSULE ORAL at 10:58

## 2025-06-25 RX ADMIN — DILTIAZEM HYDROCHLORIDE 120 MG: 120 CAPSULE, COATED, EXTENDED RELEASE ORAL at 10:58

## 2025-06-25 RX ADMIN — LISINOPRIL 5 MG: 5 TABLET ORAL at 10:58

## 2025-06-25 NOTE — ASSESSMENT & PLAN NOTE
Possibly from contusion from the fall.  Daughter at bedside says patient has tramadol at home which she has been using for her generalized pain.  Continues as needed Tylenol/tramadol.

## 2025-06-25 NOTE — ASSESSMENT & PLAN NOTE
Lab Results   Component Value Date    EGFR 29 06/25/2025    EGFR 27 06/24/2025    EGFR  05/14/2025     Not performed on patients less than 18 years of age or greater than 97 years of age    CREATININE 1.47 (H) 06/25/2025    CREATININE 1.54 (H) 06/24/2025    CREATININE 1.52 (H) 05/14/2025   Creatinine at baseline

## 2025-06-25 NOTE — CASE MANAGEMENT
Case Management Discharge Planning Note    Patient name Mckenna Whitten  Location /-01 MRN [id number]  : 1926 Date 2025       Current Admission Date: 2025  Current Admission Diagnosis:Right lower lobe pneumonia   Patient Active Problem List    Diagnosis Date Noted    Left-sided chest wall pain 2025    NSVT (nonsustained ventricular tachycardia) (HCC) 2025    Muscle spasm of back 2025    Continuous opioid dependence (HCC) 2023    Chronic thoracic back pain 2023    Osteoarthritis 2022    Generalized weakness 2022    Mild intermittent asthma without complication 2022    Pseudogout 2022    Mixed hyperlipidemia 2022    Chronic neck pain 2022    Vitamin D deficiency 2022    Nonrheumatic aortic valve stenosis 2021    Right lower lobe pneumonia 2021    Stage 3b chronic kidney disease (HCC) 2021    Chronic atrial fibrillation (HCC) 2019    Current use of long term anticoagulation 2019    Hypertension, essential 2019    TIA (transient ischemic attack) 2019    Pacemaker 2013      LOS (days): 1  Geometric Mean LOS (GMLOS) (days): 2.8  Days to GMLOS:1.9     OBJECTIVE:  Risk of Unplanned Readmission Score: 17.8     Current admission status: Inpatient   Preferred Pharmacy:   [id number] - [place], [place] - [address] [id number] [facility] [address]  Phone: [phone] Fax: [phone]    [facility] - [facility] [address] [facility] [facility]  [address] [facility] [address]  Phone: [phone] Fax: [phone]    Primary Care Provider: Park Sanders MD    Primary Insurance: MEDICARE  Secondary Insurance: [facility]    DISCHARGE DETAILS:    Discharge planning discussed with:: Patient, Patient's Daughter (Kallie)  Freedom of Choice: Yes     CM contacted family/caregiver?: Yes  Were Treatment Team discharge recommendations reviewed with  patient/caregiver?: Yes  Did patient/caregiver verbalize understanding of patient care needs?: Yes  Were patient/caregiver advised of the risks associated with not following Treatment Team discharge recommendations?: Yes    Contacts  Patient Contacts: Natalie Whitten (Daughter)  Relationship to Patient:: Family  Contact Method: Phone, In Person  Phone Number: [phone] (M)  Reason/Outcome: Discharge Planning, Emergency Contact    Requested Home Health Care         Is the patient interested in HHC at discharge?: No    DME Referral Provided  Referral made for DME?: No    Would you like to participate in our [facility] service program?  : No - Declined    Treatment Team Recommendation: Home  Expected Discharge Disposition: Home or Self Care  Additional Discharge Dispositions: Home or Self Care  Transport at Discharge : Family

## 2025-06-25 NOTE — PLAN OF CARE
Problem: Prexisting or High Potential for Compromised Skin Integrity  Goal: Skin integrity is maintained or improved  Description: INTERVENTIONS:  - Identify patients at risk for skin breakdown  - Assess and monitor skin integrity including under and around medical devices   - Assess and monitor nutrition and hydration status  - Monitor labs  - Assess for incontinence   - Turn and reposition patient  - Assist with mobility/ambulation  - Relieve pressure over barb prominences   - Avoid friction and shearing  - Provide appropriate hygiene as needed including keeping skin clean and dry  - Evaluate need for skin moisturizer/barrier cream  - Collaborate with interdisciplinary team  - Patient/family teaching  - Consider wound care consult    Assess:  - Review Eulogio scale daily  - Clean and moisturize skin every   - Inspect skin when repositioning, toileting, and assisting with ADLS  - Assess under medical devices such as  every   - Assess extremities for adequate circulation and sensation     Bed Management:  - Have minimal linens on bed & keep smooth, unwrinkled  - Change linens as needed when moist or perspiring  - Avoid sitting or lying in one position for more than  hours while in bed?Keep HOB at degrees   - Toileting:  - Offer bedside commode  - Assess for incontinence every   - Use incontinent care products after each incontinent episode such as     Activity:  - Mobilize patient  times a day  - Encourage activity and walks on unit  - Encourage or provide ROM exercises   - Turn and reposition patient every  Hours  - Use appropriate equipment to lift or move patient in bed  - Instruct/ Assist with weight shifting every  when out of bed in chair  - Consider limitation of chair time  hour intervals    Skin Care:  - Avoid use of baby powder, tape, friction and shearing, hot water or constrictive clothing  - Relieve pressure over bony prominences using   - Do not massage red bony areas    Next Steps:  - Teach patient  strategies to minimize risks such as   - Consider consults to  interdisciplinary teams such as  Outcome: Adequate for Discharge     Problem: PAIN - ADULT  Goal: Verbalizes/displays adequate comfort level or baseline comfort level  Description: Interventions:  - Encourage patient to monitor pain and request assistance  - Assess pain using appropriate pain scale  - Administer analgesics as ordered based on type and severity of pain and evaluate response  - Implement non-pharmacological measures as appropriate and evaluate response  - Consider cultural and social influences on pain and pain management  - Notify physician/advanced practitioner if interventions unsuccessful or patient reports new pain  - Educate patient/family on pain management process including their role and importance of  reporting pain   - Provide non-pharmacologic/complimentary pain relief interventions  Outcome: Adequate for Discharge     Problem: INFECTION - ADULT  Goal: Absence or prevention of progression during hospitalization  Description: INTERVENTIONS:  - Assess and monitor for signs and symptoms of infection  - Monitor lab/diagnostic results  - Monitor all insertion sites, i.e. indwelling lines, tubes, and drains  - Monitor endotracheal if appropriate and nasal secretions for changes in amount and color  - [place] appropriate cooling/warming therapies per order  - Administer medications as ordered  - Instruct and encourage patient and family to use good hand hygiene technique  - Identify and instruct in appropriate isolation precautions for identified infection/condition  Outcome: Adequate for Discharge     Problem: SAFETY ADULT  Goal: Patient will remain free of falls  Description: INTERVENTIONS:  - Educate patient/family on patient safety including physical limitations  - Instruct patient to call for assistance with activity   - Consider consulting OT/PT to assist with strengthening/mobility based on AM PAC & JH-HLM score  - Consult  OT/PT to assist with strengthening/mobility   - Keep Call bell within reach  - Keep bed low and locked with side rails adjusted as appropriate  - Keep care items and personal belongings within reach  - Initiate and maintain comfort rounds  - Make Fall Risk Sign visible to staff  - Offer Toileting every 2 Hours, in advance of need  - Initiate/Maintain bedalarm  - Obtain necessary fall risk management equipment: scok  - Apply yellow socks and bracelet for high fall risk patients  - Consider moving patient to room near nurses station  Outcome: Adequate for Discharge  Goal: Maintain or return to baseline ADL function  Description: INTERVENTIONS:  -  Assess patient's ability to carry out ADLs; assess patient's baseline for ADL function and identify physical deficits which impact ability to perform ADLs (bathing, care of mouth/teeth, toileting, grooming, dressing, etc.)  - Assess/evaluate cause of self-care deficits   - Assess range of motion  - Assess patient's mobility; develop plan if impaired  - Assess patient's need for assistive devices and provide as appropriate  - Encourage maximum independence but intervene and supervise when necessary  - Involve family in performance of ADLs  - Assess for home care needs following discharge   - Consider OT consult to assist with ADL evaluation and planning for discharge  - Provide patient education as appropriate  - Monitor functional capacity and physical performance, use of AM PAC & JH-HLM   - Monitor gait, balance and fatigue with ambulation    Outcome: Adequate for Discharge  Goal: Maintains/Returns to pre admission functional level  Description: INTERVENTIONS:  - Perform AM-PAC 6 Click Basic Mobility/ Daily Activity assessment daily.  - Set and communicate daily mobility goal to care team and patient/family/caregiver.   - Collaborate with rehabilitation services on mobility goals if consulted  - Perform Range of Motion 3 times a day.  - Reposition patient every 2 hours.  -  Dangle patient 3 times a day  - Stand patient 3 times a day  - Ambulate patient 3 times a day  - Out of bed to chair 3 times a day   - Out of bed for meals 3 times a day  - Out of bed for toileting  - Record patient progress and toleration of activity level   Outcome: Adequate for Discharge     Problem: DISCHARGE PLANNING  Goal: Discharge to home or other facility with appropriate resources  Description: INTERVENTIONS:  - Identify barriers to discharge w/patient and caregiver  - Arrange for needed discharge resources and transportation as appropriate  - Identify discharge learning needs (meds, wound care, etc.)  - Arrange for interpretive services to assist at discharge as needed  - Refer to Case Management Department for coordinating discharge planning if the patient needs post-hospital services based on physician/advanced practitioner order or complex needs related to functional status, cognitive ability, or social support system  Outcome: Adequate for Discharge     Problem: CARDIOVASCULAR - ADULT  Goal: Maintains optimal cardiac output and hemodynamic stability  Description: INTERVENTIONS:  - Monitor I/O, vital signs and rhythm  - Monitor for S/S and trends of decreased cardiac output  - Administer and titrate ordered vasoactive medications to optimize hemodynamic stability  - Assess quality of pulses, skin color and temperature  - Assess for signs of decreased coronary artery perfusion  - Instruct patient to report change in severity of symptoms  Outcome: Adequate for Discharge  Goal: Absence of cardiac dysrhythmias or at baseline rhythm  Description: INTERVENTIONS:  - Continuous cardiac monitoring, vital signs, obtain 12 lead EKG if ordered  - Administer antiarrhythmic and heart rate control medications as ordered  - Monitor electrolytes and administer replacement therapy as ordered  Outcome: Adequate for Discharge

## 2025-06-25 NOTE — CONSULTS
"Consultation - Cardiology   Mckenna Whitten 89 y.o. female MRN: [id number]  Unit/Bed#: -01 Encounter: [id number]      Assessment & Plan  Right lower lobe pneumonia  Treatment per primary team.  Chronic atrial fibrillation (HCC)  NSVT (nonsustained ventricular tachycardia) (HCC)  Eliquis for a/c  On diltiazem, metoprolol for rate control.  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    Traumatic experience of loss of son just the other day, rapid ventricular response in that setting. Also had fall. No true NSVT is suspected.  No additional cardiac testing recommended as inpatient. She is eager to go home.  Discussed with daughter at bedside.  Follow up with primary cardiologist.    Pacemaker  [employer] device interrogation reviewed.  Stage 3b chronic kidney disease (HCC)  Lab Results   Component Value Date    EGFR 29 06/25/2025    EGFR 27 06/24/2025    EGFR  05/14/2025     Not performed on patients less than 18 years of age or greater than 97 years of age    CREATININE 1.47 (H) 06/25/2025    CREATININE 1.54 (H) 06/24/2025    CREATININE 1.52 (H) 05/14/2025       History of Present Illness   Physician Requesting Consult: Avi Fontana MD  Reason for Consult / Principal Problem: \"NSVT\"  HPI: Mckenna Whitten is a 89 y.o. year old female who presents after a mechanical fall the other day. Was in AC with family. Her son had cardiac arrest in front of her. She fell.  She follows at [facility]. H/o permanent afib, PPM in place, AS, HTN.    Recent gen change. [employer].    She came to ER for MSK chest pain after the fall.  Her device was interrogated, unclear if there was question of syncope.  This was reviewed. She had RVR, and it seems to be misclassified as NSVT.'    Being treated for PNA    Inpatient consult to Cardiology  Consult performed by: Diaz Cedeno MD  Consult ordered by: Kurt Cook MD          Review of Systems:  Hard of hearing. MSK chest pain.    Historical Information   Past Medical " "History[1]  Past Surgical History[2]  Social History     Substance and Sexual Activity   Alcohol Use Not Currently    Comment: occasionaly      Social History     Substance and Sexual Activity   Drug Use Never     Tobacco Use History[3]  Family History: Family history non-contributory    Meds/Allergies   Current Medications[4]  Allergies[5]    Objective   Vitals: Blood pressure (!) 176/88, pulse 80, temperature 98.6 °F (37 °C), temperature source Oral, resp. rate 16, height 4' 11\" (1.499 m), weight 44.8 kg (98 lb 12.3 oz), SpO2 96%., Body mass index is 19.95 kg/m².,   Orthostatic Blood Pressures      Flowsheet Row Most Recent Value   Blood Pressure 176/88 filed at 06/25/2025 0816   Patient Position - Orthostatic VS Lying filed at 06/25/2025 0816              Intake/Output Summary (Last 24 hours) at 6/25/2025 1043  Last data filed at 6/24/2025 2000  Gross per 24 hour   Intake 260 ml   Output 250 ml   Net 10 ml       Invasive Devices       Peripheral Intravenous Line  Duration             Peripheral IV 06/24/25 Right Antecubital <1 day                    Physical Exam:\  GEN: Mckenna Whitten is an elderly female. Hard of hearing.  HEENT: pupils equal, round, and reactive to light; extraocular muscles intact  NECK: supple, no carotid bruits   HEART: irregular. + JOHN  LUNGS: clear to auscultation bilaterally; no wheezes, rales, or rhonchi   ABDOMEN: normal bowel sounds, soft, no tenderness, no distention  EXTREMITIES: peripheral pulses normal; no clubbing, cyanosis, or edema  NEURO: no focal findings   SKIN: normal without suspicious lesions on exposed skin    Lab Results:         Results from last 7 days   Lab Units 06/25/25  0433 06/24/25  1217   WBC Thousand/uL 8.00 8.97   HEMOGLOBIN g/dL 11.5 12.4   HEMATOCRIT % 36.2 38.7   PLATELETS Thousands/uL 258 291         Results from last 7 days   Lab Units 06/25/25  0433 06/24/25  1217   POTASSIUM mmol/L 4.0 4.2   CHLORIDE mmol/L 108 105   CO2 mmol/L 27 24   BUN mg/dL " 27* 32*   CREATININE mg/dL 1.47* 1.54*   CALCIUM mg/dL 9.4 9.9         Results from last 7 days   Lab Units 06/25/25  0433   MAGNESIUM mg/dL 1.9       Imaging: Results Review Statement: I reviewed radiology reports from this admission including: chest xray.  X-ray chest 1 view portable  Result Date: 6/25/2025  Narrative: XR CHEST PORTABLE INDICATION: Chest pain. COMPARISON: Chest radiograph 7/26/2023, CT chest 9/8/2021 FINDINGS: Monitoring wires and leads project over the chest. Left chest wall intracardiac device with intact lead(s). No abandoned lead(s). Clear lungs. No pneumothorax. Chronic blunting of the right costophrenic angle. Large hiatal hernia again suggested. Enlarged cardiac silhouette, unchanged. Degenerative changes of the spine with levoscoliosis thoracolumbar junction. Osteoarthritis right shoulder. Normal upper abdomen.     Impression: No acute cardiopulmonary disease. Workstation performed: [id number]     CT chest without contrast  Result Date: 6/24/2025  Narrative: CT CHEST WITHOUT IV CONTRAST INDICATION: L sided chest pain after hitting chest on scooter. COMPARISON: 9/8/2021 TECHNIQUE: CT examination of the chest was performed without intravenous contrast. Multiplanar 2D reformatted images were created from the source data. This examination, like all CT scans performed in the [facility], was performed utilizing techniques to minimize radiation dose exposure, including the use of iterative reconstruction and automated exposure control. Radiation dose length product (DLP) for this visit: 180 mGy-cm. FINDINGS: LUNGS: There is diffuse bronchial wall thickening with mucous plugging involving the lower lobes particular on the right, with areas of patchy consolidation likely present involving the right lung base and tree-in-bud nodular opacities involving the posterior left upper lobe. PLEURA: No pleural effusion or pneumothorax noted. HEART/GREAT VESSELS: Biatrial enlargement.  Trace pericardial effusion. Coronary artery atherosclerotic calcifications and mild aortic valvular calcifications. Left-sided transvenous pacemaker device, with leads terminating in the right atrium and right ventricle. No thoracic aortic aneurysm. Main pulmonary artery is mildly enlarged indicative of pulmonary hypertension MEDIASTINUM AND ACACIA: Large hiatal hernia. CHEST WALL AND LOWER NECK: Unremarkable. VISUALIZED STRUCTURES IN THE UPPER ABDOMEN: Unremarkable. OSSEOUS STRUCTURES: No fracture identified. Multilevel thoracic spondylosis. Mild deformity involving the right eighth and ninth rib, likely sequela of prior trauma.     Impression: Diffuse bronchial wall thickening, with mucous plugging involving the lower lobes, right greater than left, with areas of patchy consolidation involving the right lung base, and tree-in-bud nodular opacities involving the posterior left upper lobe. These  findings likely represent a bronchitis/bronchopneumonia. No pleural effusion or pneumothorax noted. Biatrial enlargement. Coronary artery atherosclerotic calcifications. Mildly enlarged main pulmonary artery indicative of pulmonary hypertension. Large hiatal hernia. Mild deformity of the left eighth and ninth ribs, likely sequela of prior trauma. Computerized Assisted Algorithm (CAA) may have aided analysis of applicable images. Workstation performed: [id number]     REMOTE PM INTERROGATION  Impression: Scheduled remote BSCI dual chamber pacemaker transmission; Monitoring Period: 1/11/25 to 5/25/25; Presenting rhythm: AF- w/ apparent capture; Episode Summary: 0 VHR episodes detected since gen change. Known chronic AF. On Eliquis; Battery Status: battery voltage adequate with 7 years remaining until BETH; Lead Parameters: available lead measurements stable and adequate. RA autocaps are OFF. RV autocaps are ON. Routine yearly in-office device checks are recommended; 0% A-Paced. 66% V-Paced; Appropriately functioning  device.      EKG: Afib.    Telemetry: Afib. RVR at times. Po NSVT 7:50 AM       [1]   Past Medical History:  Diagnosis Date    Anemia     Arthritis     Asthma 2/2008    Atrial fibrillation (HCC) 11/08/2019    CAD     Chronic kidney disease     CKD (chronic kidney disease)     Current use of long term anticoagulation 11/08/2019    Degenerative joint disease     Elevated troponin 09/08/2021    Heart disease 6/17    Hyperlipidemia     Hypertension 5/18/17    Hypertension, essential 11/08/2019    ALYSSA (iron deficiency anemia)     Osteopenia     Osteoporosis     Pacemaker     TIA (transient ischemic attack) 11/08/2019   [2]   Past Surgical History:  Procedure Laterality Date    CARDIAC PACEMAKER PLACEMENT     [3]   Social History  Tobacco Use   Smoking Status Never    Passive exposure: Past   Smokeless Tobacco Never   [4]   Current Facility-Administered Medications:     acetaminophen (TYLENOL) tablet 650 mg, 650 mg, Oral, Q6H PRN, Kurt Cook MD    albuterol (PROVENTIL HFA,VENTOLIN HFA) inhaler 2 puff, 2 puff, Inhalation, Q4H PRN, Kurt Cook MD    apixaban (ELIQUIS) tablet 2.5 mg, 2.5 mg, Oral, BID, Kurt Cook MD, 2.5 mg at 06/24/25 1809    calcium carbonate (TUMS) chewable tablet 1,000 mg, 1,000 mg, Oral, Daily PRN, Kurt Cook MD    cefTRIAXone (ROCEPHIN) IVPB (premix in dextrose) 1,000 mg 50 mL, 1,000 mg, Intravenous, Q24H, Kurt Cook MD    diltiazem (CARDIZEM CD) 24 hr capsule 120 mg, 120 mg, Oral, Daily, Kurt Cook MD    gabapentin (NEURONTIN) capsule 200 mg, 200 mg, Oral, BID, Kurt Cook MD, 200 mg at 06/24/25 1810    iron polysaccharides (FERREX) capsule 150 mg, 150 mg, Oral, Daily, Kurt Cook MD    lisinopril (ZESTRIL) tablet 5 mg, 5 mg, Oral, Daily, Kurt Cook MD    metoprolol succinate (TOPROL-XL) 24 hr tablet 100 mg, 100 mg, Oral, Daily, Kurt Cook MD    ondansetron (ZOFRAN) injection 4 mg, 4 mg, Intravenous, Q6H PRN, Kurt Cook MD    polyethylene  glycol (MIRALAX) packet 17 g, 17 g, Oral, Daily PRN, Kurt Cook MD    pravastatin (PRAVACHOL) tablet 40 mg, 40 mg, Oral, Daily With Dinner, Kurt Cook MD, 40 mg at 06/24/25 1810    traMADol (ULTRAM) tablet 50 mg, 50 mg, Oral, BID PRN, Kurt Cook MD  [5]   Allergies  Allergen Reactions    Augmentin [Amoxicillin-Pot Clavulanate] Diarrhea, GI Intolerance, Headache and Abdominal Pain    Silver Sulfadiazine Other (See Comments)

## 2025-06-25 NOTE — ASSESSMENT & PLAN NOTE
Eliquis for a/c  On diltiazem, metoprolol for rate control.  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    Traumatic experience of loss of son just the other day, rapid ventricular response in that setting. Also had fall. No true NSVT is suspected.  No additional cardiac testing recommended as inpatient. She is eager to go home.  Discussed with daughter at bedside.  Follow up with primary cardiologist.

## 2025-06-25 NOTE — ASSESSMENT & PLAN NOTE
Lab Results   Component Value Date    EGFR 29 06/25/2025    EGFR 27 06/24/2025    EGFR  05/14/2025     Not performed on patients less than 18 years of age or greater than 97 years of age    CREATININE 1.47 (H) 06/25/2025    CREATININE 1.54 (H) 06/24/2025    CREATININE 1.52 (H) 05/14/2025

## 2025-06-25 NOTE — ASSESSMENT & PLAN NOTE
Pacemaker interrogated in ED. Patient with 11 episodes of VTACH   Possibly related to stress of son passing.    Evaluated by cardiology today  Episode suspected to be atrial fibrillation with RVR.  Continue Cardizem and Toprol-XL.

## 2025-06-25 NOTE — ASSESSMENT & PLAN NOTE
Patient with bronchial wall thickening, patchy consolidation and mucus plugging on CT scan.  No shortness of breath.  Saturating well on room air.  No respiratory complaints this morning.  Had some Rales on right lung on examination.  Will switch to p.o. cefdinir/azithromycin for 4 more days to complete total 5 days of antibiotics for pneumonia.

## 2025-06-25 NOTE — PLAN OF CARE
Problem: PAIN - ADULT  Goal: Verbalizes/displays adequate comfort level or baseline comfort level  Description: Interventions:  - Encourage patient to monitor pain and request assistance  - Assess pain using appropriate pain scale  - Administer analgesics as ordered based on type and severity of pain and evaluate response  - Implement non-pharmacological measures as appropriate and evaluate response  - Consider cultural and social influences on pain and pain management  - Notify physician/advanced practitioner if interventions unsuccessful or patient reports new pain  - Educate patient/family on pain management process including their role and importance of  reporting pain   - Provide non-pharmacologic/complimentary pain relief interventions  Outcome: Progressing     Problem: INFECTION - ADULT  Goal: Absence or prevention of progression during hospitalization  Description: INTERVENTIONS:  - Assess and monitor for signs and symptoms of infection  - Monitor lab/diagnostic results  - Monitor all insertion sites, i.e. indwelling lines, tubes, and drains  - Monitor endotracheal if appropriate and nasal secretions for changes in amount and color  - [place] appropriate cooling/warming therapies per order  - Administer medications as ordered  - Instruct and encourage patient and family to use good hand hygiene technique  - Identify and instruct in appropriate isolation precautions for identified infection/condition  Outcome: Progressing     Problem: SAFETY ADULT  Goal: Patient will remain free of falls  Description: INTERVENTIONS:  - Educate patient/family on patient safety including physical limitations  - Instruct patient to call for assistance with activity   - Consider consulting OT/PT to assist with strengthening/mobility based on AM PAC & JH-HLM score  - Consult OT/PT to assist with strengthening/mobility   - Keep Call bell within reach  - Keep bed low and locked with side rails adjusted as appropriate  - Keep  care items and personal belongings within reach  - Initiate and maintain comfort rounds  - Make Fall Risk Sign visible to staff  - Offer Toileting every 2 Hours, in advance of need  - Initiate/Maintain bed alarm  - Obtain necessary fall risk management equipment: walker  - Apply yellow socks and bracelet for high fall risk patients  - Consider moving patient to room near nurses station  Outcome: Progressing     Problem: DISCHARGE PLANNING  Goal: Discharge to home or other facility with appropriate resources  Description: INTERVENTIONS:  - Identify barriers to discharge w/patient and caregiver  - Arrange for needed discharge resources and transportation as appropriate  - Identify discharge learning needs (meds, wound care, etc.)  - Arrange for interpretive services to assist at discharge as needed  - Refer to Case Management Department for coordinating discharge planning if the patient needs post-hospital services based on physician/advanced practitioner order or complex needs related to functional status, cognitive ability, or social support system  Outcome: Progressing     Problem: CARDIOVASCULAR - ADULT  Goal: Maintains optimal cardiac output and hemodynamic stability  Description: INTERVENTIONS:  - Monitor I/O, vital signs and rhythm  - Monitor for S/S and trends of decreased cardiac output  - Administer and titrate ordered vasoactive medications to optimize hemodynamic stability  - Assess quality of pulses, skin color and temperature  - Assess for signs of decreased coronary artery perfusion  - Instruct patient to report change in severity of symptoms  Outcome: Progressing  Goal: Absence of cardiac dysrhythmias or at baseline rhythm  Description: INTERVENTIONS:  - Continuous cardiac monitoring, vital signs, obtain 12 lead EKG if ordered  - Administer antiarrhythmic and heart rate control medications as ordered  - Monitor electrolytes and administer replacement therapy as ordered  Outcome: Progressing

## 2025-06-25 NOTE — CASE MANAGEMENT
Case Management Assessment    Patient name Mckenna Whitten  Location /-01 MRN [id number]  : 1926 Date 2025       Current Admission Date: 2025  Current Admission Diagnosis:Right lower lobe pneumonia   Patient Active Problem List    Diagnosis Date Noted    NSVT (nonsustained ventricular tachycardia) (HCC) 2025    Muscle spasm of back 2025    Continuous opioid dependence (HCC) 2023    Chronic thoracic back pain 2023    Osteoarthritis 2022    Generalized weakness 2022    Mild intermittent asthma without complication 2022    Pseudogout 2022    Mixed hyperlipidemia 2022    Chronic neck pain 2022    Vitamin D deficiency 2022    Nonrheumatic aortic valve stenosis 2021    Right lower lobe pneumonia 2021    Stage 3b chronic kidney disease (HCC) 2021    Chronic atrial fibrillation (HCC) 2019    Current use of long term anticoagulation 2019    Hypertension, essential 2019    TIA (transient ischemic attack) 2019    Pacemaker 2013      LOS (days): 1  Geometric Mean LOS (GMLOS) (days): 2.8  Days to GMLOS:2     OBJECTIVE:    Risk of Unplanned Readmission Score: 17.39     Current admission status: Inpatient     Preferred Pharmacy:   [id number] - [place], [place] - [facility] [address]  Phone: [phone] Fax: [phone]    [facility] - [facility] [address] [facility]  [address] [facility] [address]  Phone: [phone] Fax: [phone]    Primary Care Provider: Park Sanders MD    Primary Insurance: MEDICARE  Secondary Insurance: AARP    ASSESSMENT:  Active Health Care Proxies    There are no active Health Care Proxies on file.    Advance Directives  Does patient have a Health Care POA?: No  Does patient have Advance Directives?: No    Readmission Root Cause  30 Day Readmission: No    Patient  Information  Admitted from:: Home  Mental Status: Alert  During Assessment patient was accompanied by: Daughter  Assessment information provided by:: Patient, Daughter  Primary Caregiver: Child  Caregiver's Name:: Kallie Whitten (daughter)  Caregiver's Relationship to Patient:: Family Member  Support Systems: Children, Family members  County of Residence: [place]  What [facility] do you live in?: [place]  Type of Current Residence: 2 story home  Living Arrangements: Lives w/ Daughter  Is patient a ?: No    Activities of Daily Living Prior to Admission  Functional Status: Assistance  Completes ADLs independently?: No  Level of ADL dependence: Assistance  Ambulates independently?: Yes  Does patient use assisted devices?: Yes  Assisted Devices (DME) used: Straight Cane, Walker, Wheelchair  Does patient currently own DME?: Yes  What DME does the patient currently own?: Straight Cane, Walker, Wheelchair  Does patient have a history of Outpatient Therapy (PT/OT)?: Yes  Does the patient have a history of Short-Term Rehab?: Yes  Does patient have a history of HHC?: Yes  Does patient currently have HHC?: No    Patient Information Continued  Income Source: Pension/FCI  Does patient have prescription coverage?: Yes  Can the patient afford their medications and any related supplies (such as glucometers or test strips)?: Yes  Does patient receive dialysis treatments?: No  Does patient have a history of substance abuse?: No  Does patient have a history of Mental Health Diagnosis?: No    Means of Transportation  Means of Transport to [facility]:: Family transport    CM met with the patient and her daughter, Kallie, bedside. Kallie reports the patient has no unmet needs at home/in community and that she provides the patient with all her needs. Kallie reports that her brother, the patient's son, passed away less than 48 hours ago from a major heart attack and they are trying to arrange for  services. CM will continue to  follow the patient through discharge.

## 2025-06-25 NOTE — DISCHARGE SUMMARY
Discharge Summary - Hospitalist   Name: Mckenna Whitten 89 y.o. female I MRN: [id number]  Unit/Bed#: -01 I Date of Admission: 6/24/2025   Date of Service: 6/25/2025 I Hospital Day: 1     Assessment & Plan  Right lower lobe pneumonia  Patient with bronchial wall thickening, patchy consolidation and mucus plugging on CT scan.  No shortness of breath.  Saturating well on room air.  No respiratory complaints this morning.  Had some Rales on right lung on examination.  Will switch to p.o. cefdinir/azithromycin for 4 more days to complete total 5 days of antibiotics for pneumonia.    NSVT (nonsustained ventricular tachycardia) (HCC)  Pacemaker interrogated in ED. Patient with 11 episodes of VTACH   Possibly related to stress of son passing.    Evaluated by cardiology today  Episode suspected to be atrial fibrillation with RVR.  Continue Cardizem and Toprol-XL.  Chronic atrial fibrillation (HCC)  Rate controlled.  Continue Cardizem and metoprolol.  Eliquis for anticoagulation  Pacemaker  Recent battery change  Not an ICD per daughter  Stage 3b chronic kidney disease (HCC)  Lab Results   Component Value Date    EGFR 29 06/25/2025    EGFR 27 06/24/2025    EGFR  05/14/2025     Not performed on patients less than 18 years of age or greater than 97 years of age    CREATININE 1.47 (H) 06/25/2025    CREATININE 1.54 (H) 06/24/2025    CREATININE 1.52 (H) 05/14/2025   Creatinine at baseline  Mild intermittent asthma without complication  No acute exacerbation  Continue PRN Albuterol  Left-sided chest wall pain  Possibly from contusion from the fall.  Daughter at bedside says patient has tramadol at home which she has been using for her generalized pain.  Continues as needed Tylenol/tramadol.     Medical Problems       Resolved Problems  Date Reviewed: 6/9/2025   None       Discharging Physician / Practitioner: Avi Fontana MD  PCP: Park Sanders MD  Admission Date:   Admission Orders (From admission, onward)        "Ordered        06/24/25 1434  INPATIENT ADMISSION  Once                          Discharge Date: 06/25/25    Next Steps for Physician/AP Assuming Care:  Repeat CXR in 6-8 weeks      Hospital Course:   Mckenna Whitten is a 89 y.o. female patient who originally presented to the hospital on 6/24/2025 due to fall, left-sided chest pain.  Noted to have bronchial wall thickening/patchy consolidation on CT.  Will start on antibiotics for pneumonia.  Pacemaker interrogation in ED showed episodes concerning for V. tach.  She was evaluated by cardiology for this.  Episode suspected to be brief atrial fibrillation RVR episodes.  No further cardiac testing is recommended at this time.  Outpatient follow-up with her cardiologist.  For pneumonia patient to be transition to p.o. antibiotics.    Please see above list of diagnoses and related plan for additional information.     Discharge Day Visit / Exam:   Subjective:  Pt seen and examined by me in morning.  Complained of left-sided chest wall pain when she moves.  No cough or shortness of breath.    Vitals: Blood Pressure: (!) 176/88 (06/25/25 0816)  Pulse: 80 (06/25/25 0816)  Temperature: 98.6 °F (37 °C) (06/25/25 0816)  Temp Source: Oral (06/25/25 0816)  Respirations: 16 (06/25/25 0816)  Height: 4' 11\" (149.9 cm) (06/24/25 2000)  Weight - Scale: 44.8 kg (98 lb 12.3 oz) (06/24/25 2000)  SpO2: 96 % (06/25/25 0816)    Physical Exam   Constitutional: Pt appears comfortable. Not in any acute distress.  Cardiovascular: Normal rate, regular rhythm, normal heart sounds.  No murmur heard.  Pulmonary/Chest: Effort normal, air entry b/l equal. No respiratory distress. Pt has right-sided Rales, no wheezes.  Abdominal: Soft. Non-distended, Non-tender. Bowel sounds are normal.   Neurological: awake, alert. Moving all extremities spontaneously.  Psychiatric: normal mood and affect.      Discussion with Family: Updated  (daughter) at bedside.    Discharge " instructions/Information to patient and family:   See after visit summary for information provided to patient and family.      Provisions for Follow-Up Care:  See after visit summary for information related to follow-up care and any pertinent home health orders.      Mobility at time of Discharge:   Basic Mobility Inpatient Raw Score: 17  JH-HLM Goal: 5: Stand one or more mins  JH-HLM Achieved: 5: Stand (1 or more minutes)  HLM Goal achieved. Continue to encourage appropriate mobility.     Disposition:   Home    Planned Readmission: no    Administrative Statements   Discharge Statement:  I have spent a total time of 35 minutes in caring for this patient on the day of the visit/encounter. >30 minutes of time was spent on: Diagnostic results, Risks and benefits of tx options, Instructions for management, Patient and family education, Importance of tx compliance, Counseling / Coordination of care, Documenting in the medical record, Reviewing / ordering tests, medicine, procedures  , and Communicating with other healthcare professionals .    **Please Note: This note may have been constructed using a voice recognition system**

## 2025-06-29 LAB
BACTERIA BLD CULT: NORMAL
BACTERIA BLD CULT: NORMAL

## 2025-06-30 DIAGNOSIS — G50.0 TRIGEMINAL NEURALGIA: ICD-10-CM

## 2025-06-30 RX ORDER — TRAMADOL HYDROCHLORIDE 50 MG/1
50 TABLET ORAL 2 TIMES DAILY
Qty: 60 TABLET | Refills: 0 | Status: SHIPPED | OUTPATIENT
Start: 2025-06-30

## 2025-06-30 NOTE — TELEPHONE ENCOUNTER
Patient's daughter called in and asked for Tramadol to be sent in ASAP due for her mother being completely out

## 2025-07-01 ENCOUNTER — OFFICE VISIT (OUTPATIENT)
Age: OVER 89
End: 2025-07-01
Payer: MEDICARE

## 2025-07-01 VITALS
HEIGHT: 58 IN | DIASTOLIC BLOOD PRESSURE: 74 MMHG | BODY MASS INDEX: 20.65 KG/M2 | TEMPERATURE: 98 F | RESPIRATION RATE: 18 BRPM | OXYGEN SATURATION: 96 % | WEIGHT: 98.4 LBS | SYSTOLIC BLOOD PRESSURE: 134 MMHG | HEART RATE: 70 BPM

## 2025-07-01 DIAGNOSIS — N18.32 STAGE 3B CHRONIC KIDNEY DISEASE (HCC): ICD-10-CM

## 2025-07-01 DIAGNOSIS — J45.20 MILD INTERMITTENT ASTHMA WITHOUT COMPLICATION: ICD-10-CM

## 2025-07-01 DIAGNOSIS — I10 HYPERTENSION, ESSENTIAL: Primary | ICD-10-CM

## 2025-07-01 DIAGNOSIS — E78.2 MIXED HYPERLIPIDEMIA: ICD-10-CM

## 2025-07-01 DIAGNOSIS — I48.20 CHRONIC ATRIAL FIBRILLATION (HCC): ICD-10-CM

## 2025-07-01 DIAGNOSIS — F11.20 CONTINUOUS OPIOID DEPENDENCE (HCC): ICD-10-CM

## 2025-07-01 PROCEDURE — 99496 TRANSJ CARE MGMT HIGH F2F 7D: CPT

## 2025-07-01 NOTE — PROGRESS NOTES
Transition of Care Visit:  Name: Mckenna Whitten      : 1926      MRN: [id number]  Encounter Provider: Park Sanders MD  Encounter Date: 2025   Encounter department: [facility]    Assessment & Plan  Hypertension, essential  Under control.  Continue metoprolol , lisinopril and diltiazem.  We will continue to monitor       Chronic atrial fibrillation (HCC)  Under control.  Continue current medication including anticoagulation medication to prevent stroke.  We will reevaluate at next office visit.  Has been followed by cardiologist.         Stage 3b chronic kidney disease (HCC)  Lab Results   Component Value Date    EGFR 29 2025    EGFR 27 2025    EGFR  2025     Not performed on patients less than 18 years of age or greater than 97 years of age    CREATININE 1.47 (H) 2025    CREATININE 1.54 (H) 2025    CREATININE 1.52 (H) 2025   creatinine and GFR stable   Will need periodic BMP  Avoid NSAIDs like ibuprofen Aleve Advil etc.  Avoid high potassium diet.  We will continue to monitor          Mixed hyperlipidemia  Under control.  Continue rosuvastatin.  We will continue to monitor.          Mild intermittent asthma without complication  Under control.    Continue current medication.    We will re-evaluate at next office visit.         Continuous opioid dependence (HCC)  Pain under reasonable control.  Continue tramadol as directed.  We will continue to monitor            History of Present Illness     Transitional Care Management Review:   Mckenna Whitten is a 89 y.o. female here for TCM follow up.     During the TCM phone call patient stated:  TCM Call (since 2025)     Date and time call was made  2025  3:50 PM    Hospital care reviewed  Records reviewed    Patient was hospitialized at  [facility]    Date of Admission  25    Date of discharge  25    Diagnosis  Right lower lobe pneumonia    Disposition  Home     Current Symptoms  None      TCM Call (since 6/17/2025)     Post hospital issues  None    Scheduled for follow up?  Yes    I have advised the patient to call PCP with any new or worsening symptoms  Leslie Armando MA        Patient is here for transitional care management as patient was hospitalized on June 24, 2025 and discharged on June 25, 2025 at [facility] with right lower lobe pneumonia patient also had a fall and left-sided chest pain patient was treated with IV antibiotic and transition to oral antibiotic for outpatient.  Patient currently feeling okay.  Patient denies any chest pain breathing is fairly stable no abdominal pain, nausea, vomiting, fever or chills.  No lightheadedness or dizziness.  No tingling muscle weakness.  No headache.  No other new problem.      Review of Systems   Constitutional:  Negative for chills and fever.   HENT:  Positive for hearing loss. Negative for congestion, ear pain, postnasal drip, sinus pain and sore throat.    Respiratory:  Negative for cough, chest tightness, shortness of breath and wheezing.    Cardiovascular:  Negative for chest pain and leg swelling.   Gastrointestinal:  Negative for abdominal pain, blood in stool and diarrhea.   Genitourinary:  Negative for dysuria and frequency.   Musculoskeletal:  Positive for arthralgias (right hip), back pain (Muscle spasm in the lumbar area) and gait problem. Negative for myalgias.   Neurological:  Negative for headaches.   Psychiatric/Behavioral:  Negative for agitation, behavioral problems and confusion.      Objective   There were no vitals taken for this visit.    Physical Exam  Vitals and nursing note reviewed.   Constitutional:       General: She is not in acute distress.     Appearance: Normal appearance. She is well-developed. She is not ill-appearing, toxic-appearing or diaphoretic.   HENT:      Head: Normocephalic and atraumatic.      Right Ear: External ear normal.      Left Ear: External ear normal.       Nose: Nose normal.      Mouth/Throat:      Mouth: Mucous membranes are moist.      Pharynx: Oropharynx is clear.     Eyes:      General: No scleral icterus.        Right eye: No discharge.         Left eye: No discharge.      Extraocular Movements: Extraocular movements intact.      Conjunctiva/sclera: Conjunctivae normal.      Pupils: Pupils are equal, round, and reactive to light.       Cardiovascular:      Rate and Rhythm: Normal rate. Rhythm irregular.      Pulses: Normal pulses.      Heart sounds: Normal heart sounds. No murmur heard.  Pulmonary:      Effort: Pulmonary effort is normal. No respiratory distress.      Breath sounds: Normal breath sounds. No wheezing, rhonchi or rales.   Abdominal:      General: Abdomen is flat. Bowel sounds are normal. There is no distension.      Palpations: Abdomen is soft.      Tenderness: There is no abdominal tenderness. There is no right CVA tenderness or left CVA tenderness.     Musculoskeletal:         General: No swelling or tenderness. Normal range of motion.      Cervical back: Normal range of motion and neck supple. No rigidity.      Right lower leg: No edema.      Left lower leg: No edema.   Lymphadenopathy:      Cervical: No cervical adenopathy.     Skin:     General: Skin is warm and dry.      Capillary Refill: Capillary refill takes 2 to 3 seconds.      Coloration: Skin is not jaundiced or pale.     Neurological:      General: No focal deficit present.      Mental Status: She is alert and oriented to person, place, and time. Mental status is at baseline.      Motor: No weakness.      Gait: Gait abnormal (Ambulates with walker).     Psychiatric:         Mood and Affect: Mood normal.         Behavior: Behavior normal.       Medications have been reviewed by provider in current encounter

## 2025-07-01 NOTE — ASSESSMENT & PLAN NOTE
Lab Results   Component Value Date    EGFR 29 06/25/2025    EGFR 27 06/24/2025    EGFR  05/14/2025     Not performed on patients less than 18 years of age or greater than 97 years of age    CREATININE 1.47 (H) 06/25/2025    CREATININE 1.54 (H) 06/24/2025    CREATININE 1.52 (H) 05/14/2025   creatinine and GFR stable   Will need periodic BMP  Avoid NSAIDs like ibuprofen Aleve Advil etc.  Avoid high potassium diet.  We will continue to monitor

## 2025-07-21 DIAGNOSIS — E78.2 MIXED HYPERLIPIDEMIA: ICD-10-CM

## 2025-07-21 RX ORDER — ROSUVASTATIN CALCIUM 5 MG/1
TABLET, COATED ORAL
Qty: 26 TABLET | Refills: 0 | Status: SHIPPED | OUTPATIENT
Start: 2025-07-21

## 2025-07-24 PROBLEM — J18.9 RIGHT LOWER LOBE PNEUMONIA: Status: RESOLVED | Noted: 2021-09-08 | Resolved: 2025-07-24
